# Patient Record
Sex: MALE | Race: WHITE | NOT HISPANIC OR LATINO | Employment: FULL TIME | URBAN - METROPOLITAN AREA
[De-identification: names, ages, dates, MRNs, and addresses within clinical notes are randomized per-mention and may not be internally consistent; named-entity substitution may affect disease eponyms.]

---

## 2017-01-16 ENCOUNTER — TELEPHONE (OUTPATIENT)
Dept: INTERNAL MEDICINE | Facility: CLINIC | Age: 62
End: 2017-01-16

## 2017-01-16 NOTE — TELEPHONE ENCOUNTER
Spoke with wife.  Advised that Dr. Pelaez would evaluate at office visit prior to ordering lab, as she may want to order more labs other than just the CBC.  Wife voiced understanding.    ----- Message from Deborah Gomez sent at 1/16/2017 10:06 AM EST -----  Regarding: labs  New patient with Dr. Pelaez this Wednesday.  Wife,  Evangelina, phoned asking about labs.  Patient has leukemia and they like to keep a watch on his blood count.  Can he get done before appointment or at least have done while he is here on Wednesday.    I told her if the doctor decided to put order in prior to Wednesday we would let her know and maybe they could go to lab at [place] to have drawn.      [phone] (Evangelina Espino)

## 2017-02-08 ENCOUNTER — TELEPHONE (OUTPATIENT)
Dept: ONCOLOGY | Facility: HOSPITAL | Age: 62
End: 2017-02-08

## 2017-02-08 ENCOUNTER — TELEPHONE (OUTPATIENT)
Dept: ONCOLOGY | Facility: CLINIC | Age: 62
End: 2017-02-08

## 2017-02-08 NOTE — TELEPHONE ENCOUNTER
LEFT MESSAGE IN REGARD BLOOD COUNTS THAT ARE STABLE IN COMPARASION WITH PREVIOUS ONES, NO NEED FOR ANY OTHER INTERVENTION, REPEAT COUNTS IN 4 TO 6 MONTHS, CALL IF PROBLEMS

## 2017-02-08 NOTE — TELEPHONE ENCOUNTER
----- Message from Jo-Ann Stafford sent at 2/8/2017  2:53 PM EST -----   Pt's wife wants to talk to a nurse      [phone]      Wife letting us know that she is faxing over labs to be reviewed by Dr. Cagle.  They have an agreement with Dr. Cagle that they can fax labs and he will review them.  She understands to call by Friday afternoon if she hasn't heard anything back from us yet to make sure we got the labs.

## 2017-02-24 ENCOUNTER — OFFICE VISIT (OUTPATIENT)
Dept: INTERNAL MEDICINE | Facility: CLINIC | Age: 62
End: 2017-02-24

## 2017-02-24 VITALS
WEIGHT: 197 LBS | DIASTOLIC BLOOD PRESSURE: 82 MMHG | OXYGEN SATURATION: 97 % | SYSTOLIC BLOOD PRESSURE: 124 MMHG | HEIGHT: 74 IN | BODY MASS INDEX: 25.28 KG/M2 | HEART RATE: 113 BPM

## 2017-02-24 DIAGNOSIS — E78.5 HYPERLIPIDEMIA, UNSPECIFIED HYPERLIPIDEMIA TYPE: ICD-10-CM

## 2017-02-24 DIAGNOSIS — C91.10 CLL (CHRONIC LYMPHOCYTIC LEUKEMIA) (HCC): Primary | ICD-10-CM

## 2017-02-24 DIAGNOSIS — Z00.00 HEALTHCARE MAINTENANCE: ICD-10-CM

## 2017-02-24 DIAGNOSIS — I10 ESSENTIAL HYPERTENSION: ICD-10-CM

## 2017-02-24 PROCEDURE — 99214 OFFICE O/P EST MOD 30 MIN: CPT | Performed by: INTERNAL MEDICINE

## 2017-02-24 RX ORDER — LISINOPRIL AND HYDROCHLOROTHIAZIDE 12.5; 1 MG/1; MG/1
1 TABLET ORAL DAILY
Qty: 90 TABLET | Refills: 3 | Status: SHIPPED | OUTPATIENT
Start: 2017-02-24 | End: 2017-05-16

## 2017-02-24 RX ORDER — ATORVASTATIN CALCIUM 10 MG/1
10 TABLET, FILM COATED ORAL DAILY
Qty: 90 TABLET | Refills: 3 | Status: SHIPPED | OUTPATIENT
Start: 2017-02-24 | End: 2017-06-05 | Stop reason: SDUPTHER

## 2017-02-24 NOTE — PROGRESS NOTES
Subjective     Francisco Espino is a 61 y.o. male, who presents with a chief complaint of   Chief Complaint   Patient presents with   • Establish Care   htn, hld    HPI   The pt is here to [facility].  He used to follow with dr Sewell.    HTN - pt on lisinopril.  No ha/dizziness    HLD - He had cramps bad last month.  He says he over did it that day being out and working. His wife says she gave him gatorade, xanax, epsom salt soak, and fluids.   No issues since.  He does have leg cramps occasionally but he says this is rare.  He is on CoQ10.      Allergies - he takes claritin d daily and has for 10 years.  We discussed the risk of taking daily sudafed in a pt with sudafed.  We discussed adding in nasal sprays such as flonase.     CLL - pt's labs have been stable.  He last had labs done on 1/31/17.  He follows with Dr. Cagle in the [facility].       The following portions of the patient's history were reviewed and updated as appropriate: allergies, current medications, past family history, past medical history, past social history, past surgical history and problem list.    Allergies: Codeine    Review of Systems   Constitutional: Negative.    HENT: Negative.    Eyes: Negative.    Respiratory: Negative.    Cardiovascular: Negative.    Gastrointestinal: Negative.    Endocrine: Negative.    Genitourinary: Negative.    Musculoskeletal: Negative.    Skin: Negative.    Allergic/Immunologic: Negative.    Neurological: Negative.    Hematological: Negative.    Psychiatric/Behavioral: Negative.    All other systems reviewed and are negative.      Objective     Wt Readings from Last 3 Encounters:   02/24/17 197 lb (89.4 kg)   05/10/16 198 lb (89.8 kg)   10/07/15 192 lb (87.1 kg)     Temp Readings from Last 3 Encounters:   05/10/16 97.9 °F (36.6 °C) (Oral)   10/07/15 97.3 °F (36.3 °C) (Oral)   04/01/15 96.9 °F (36.1 °C) (Oral)     BP Readings from Last 3 Encounters:   02/24/17 124/82   05/10/16 112/68   10/07/15 119/79     Pulse  Readings from Last 3 Encounters:   02/24/17 113   05/10/16 (!) 122   10/07/15 98     Body mass index is 25.29 kg/(m^2).  SpO2 Readings from Last 3 Encounters:   02/24/17 97%   05/10/16 97%   10/07/15 97%       Physical Exam   Constitutional: He is oriented to person, place, and time. He appears well-developed and well-nourished. No distress.   HENT:   Head: Normocephalic and atraumatic.   Right Ear: External ear normal.   Left Ear: External ear normal.   Nose: Nose normal.   Mouth/Throat: Oropharynx is clear and moist.   Eyes: Conjunctivae and EOM are normal. Pupils are equal, round, and reactive to light.   Neck: Normal range of motion. Neck supple.   Cardiovascular: Normal rate, regular rhythm, normal heart sounds and intact distal pulses.    Pulmonary/Chest: Effort normal and breath sounds normal. No respiratory distress. He has no wheezes.   Musculoskeletal: Normal range of motion.   Normal gait   Neurological: He is alert and oriented to person, place, and time.   Skin: Skin is warm and dry.   Psychiatric: He has a normal mood and affect. His behavior is normal. Judgment and thought content normal.   Nursing note and vitals reviewed.    1/31/17 cbc reviewed.  Wbc 30.4, counts stable.     Results for orders placed or performed in visit on 05/10/16   POCT urinalysis dipstick, automated   Result Value Ref Range    Color Yellow Yellow, Straw, Dark Yellow, Avelina    Clarity, UA Clear Clear    Glucose, UA Negative Negative mg/dL    Bilirubin Negative Negative    Ketones, UA Negative Negative    Specific Gravity  1.025 1.005 - 1.030    Blood, UA Negative Negative    pH, Urine 5.0 5.0 - 8.0    Protein, POC Trace (A) Negative mg/dL    Urobilinogen, UA Normal Normal    Leukocytes Negative Negative    Nitrite, UA Negative Negative       Assessment/Plan   Francisco was seen today for establish care.    Diagnoses and all orders for this visit:    CLL (chronic lymphocytic leukemia)  -     Comprehensive Metabolic Panel; Future  -      CBC & Differential; Future    Essential hypertension  -     lisinopril-hydrochlorothiazide (PRINZIDE,ZESTORETIC) 10-12.5 MG per tablet; Take 1 tablet by mouth Daily.    Hyperlipidemia, unspecified hyperlipidemia type  -     atorvastatin (LIPITOR) 10 MG tablet; Take 1 tablet by mouth Daily. Needs an appointment for further refills    Healthcare maintenance  -     Comprehensive Metabolic Panel; Future  -     CBC & Differential; Future  -     T4, Free; Future  -     TSH; Future  -     Lipid Panel With LDL / HDL Ratio; Future  -     PSA; Future      Labs in May, next OV/labs in November.      Outpatient Medications Prior to Visit   Medication Sig Dispense Refill   • Biotin 5000 MCG capsule Take by mouth.     • Coenzyme Q10 (CO Q-10) 200 MG capsule Take by mouth.     • loratadine-pseudoephedrine (CLARITIN-D 12-hour) 5-120 MG per 12 hr tablet Take 1 tablet by mouth every 12 (twelve) hours. 180 tablet 1   • Multiple Vitamin (MULTIVITAMINS PO) Take by mouth.     • Omega-3 Fatty Acids (FISH OIL) 1000 MG capsule capsule Take by mouth.     • vitamin C (ASCORBIC ACID) 500 MG tablet Take 500 mg by mouth daily.     • Zinc 30 MG capsule Take by mouth.     • atorvastatin (LIPITOR) 10 MG tablet Take 1 tablet by mouth Daily. Needs an appointment for further refills 90 tablet 0   • lisinopril-hydrochlorothiazide (PRINZIDE,ZESTORETIC) 10-12.5 MG per tablet Take 1 tablet by mouth daily. 90 tablet 1   • Glucosamine-Chondroit-Vit C-Mn (GLUCOSAMINE CHONDR 1500 COMPLX PO) Take by mouth.     • Thiamine HCl (VITAMIN B-1) 100 MG tablet Take by mouth.     • TURMERIC CURCUMIN PO Take by mouth.       No facility-administered medications prior to visit.      New Medications Ordered This Visit   Medications   • lisinopril-hydrochlorothiazide (PRINZIDE,ZESTORETIC) 10-12.5 MG per tablet     Sig: Take 1 tablet by mouth Daily.     Dispense:  90 tablet     Refill:  3   • atorvastatin (LIPITOR) 10 MG tablet     Sig: Take 1 tablet by mouth Daily. Needs  an appointment for further refills     Dispense:  90 tablet     Refill:  3       Medications Discontinued During This Encounter   Medication Reason   • Glucosamine-Chondroit-Vit C-Mn (GLUCOSAMINE CHONDR 1500 COMPLX PO)    • Thiamine HCl (VITAMIN B-1) 100 MG tablet    • TURMERIC CURCUMIN PO    • lisinopril-hydrochlorothiazide (PRINZIDE,ZESTORETIC) 10-12.5 MG per tablet Reorder   • atorvastatin (LIPITOR) 10 MG tablet Reorder         Return for due for labs only in May, f/u ov/labs in November.

## 2017-03-01 ENCOUNTER — RESULTS ENCOUNTER (OUTPATIENT)
Dept: INTERNAL MEDICINE | Facility: CLINIC | Age: 62
End: 2017-03-01

## 2017-03-01 DIAGNOSIS — C91.10 CLL (CHRONIC LYMPHOCYTIC LEUKEMIA) (HCC): ICD-10-CM

## 2017-03-01 DIAGNOSIS — Z00.00 HEALTHCARE MAINTENANCE: ICD-10-CM

## 2017-05-16 ENCOUNTER — OFFICE VISIT (OUTPATIENT)
Dept: INTERNAL MEDICINE | Facility: CLINIC | Age: 62
End: 2017-05-16

## 2017-05-16 VITALS
SYSTOLIC BLOOD PRESSURE: 128 MMHG | WEIGHT: 196 LBS | DIASTOLIC BLOOD PRESSURE: 84 MMHG | TEMPERATURE: 97.6 F | HEART RATE: 87 BPM | OXYGEN SATURATION: 98 % | HEIGHT: 74 IN | BODY MASS INDEX: 25.15 KG/M2

## 2017-05-16 DIAGNOSIS — I21.11 ST ELEVATION MYOCARDIAL INFARCTION INVOLVING RIGHT CORONARY ARTERY (HCC): ICD-10-CM

## 2017-05-16 DIAGNOSIS — I10 BENIGN ESSENTIAL HYPERTENSION: ICD-10-CM

## 2017-05-16 DIAGNOSIS — G47.9 SLEEP DIFFICULTIES: ICD-10-CM

## 2017-05-16 DIAGNOSIS — E78.2 MIXED HYPERLIPIDEMIA: ICD-10-CM

## 2017-05-16 DIAGNOSIS — Z09 HOSPITAL DISCHARGE FOLLOW-UP: Primary | ICD-10-CM

## 2017-05-16 PROCEDURE — 99215 OFFICE O/P EST HI 40 MIN: CPT | Performed by: INTERNAL MEDICINE

## 2017-05-16 RX ORDER — ASPIRIN 81 MG/1
81 TABLET ORAL EVERY OTHER DAY
COMMUNITY

## 2017-05-16 RX ORDER — CARVEDILOL 6.25 MG/1
6.25 TABLET ORAL 2 TIMES DAILY WITH MEALS
COMMUNITY
End: 2017-06-05 | Stop reason: SDUPTHER

## 2017-05-16 RX ORDER — LISINOPRIL 5 MG/1
5 TABLET ORAL DAILY
Qty: 90 TABLET | Refills: 1 | Status: SHIPPED | OUTPATIENT
Start: 2017-05-16 | End: 2017-06-05 | Stop reason: SDUPTHER

## 2017-05-16 RX ORDER — TRAZODONE HYDROCHLORIDE 50 MG/1
50 TABLET ORAL NIGHTLY
Qty: 30 TABLET | Refills: 0 | Status: SHIPPED | OUTPATIENT
Start: 2017-05-16 | End: 2017-05-17

## 2017-05-17 ENCOUNTER — TELEPHONE (OUTPATIENT)
Dept: INTERNAL MEDICINE | Facility: CLINIC | Age: 62
End: 2017-05-17

## 2017-05-17 RX ORDER — DOXEPIN HYDROCHLORIDE 10 MG/1
10 CAPSULE ORAL NIGHTLY
Qty: 30 CAPSULE | Refills: 0 | Status: SHIPPED | OUTPATIENT
Start: 2017-05-17 | End: 2017-10-31

## 2017-06-05 DIAGNOSIS — I10 BENIGN ESSENTIAL HYPERTENSION: ICD-10-CM

## 2017-06-05 DIAGNOSIS — E78.5 HYPERLIPIDEMIA, UNSPECIFIED HYPERLIPIDEMIA TYPE: ICD-10-CM

## 2017-06-05 DIAGNOSIS — I21.11 ST ELEVATION MYOCARDIAL INFARCTION INVOLVING RIGHT CORONARY ARTERY (HCC): ICD-10-CM

## 2017-06-05 RX ORDER — ATORVASTATIN CALCIUM 10 MG/1
10 TABLET, FILM COATED ORAL DAILY
Qty: 90 TABLET | Refills: 3 | Status: SHIPPED | OUTPATIENT
Start: 2017-06-05 | End: 2018-05-09 | Stop reason: SDUPTHER

## 2017-06-05 RX ORDER — CARVEDILOL 6.25 MG/1
6.25 TABLET ORAL 2 TIMES DAILY WITH MEALS
Qty: 180 TABLET | Refills: 3 | Status: SHIPPED | OUTPATIENT
Start: 2017-06-05 | End: 2018-05-09 | Stop reason: SDUPTHER

## 2017-06-05 RX ORDER — LISINOPRIL 5 MG/1
5 TABLET ORAL DAILY
Qty: 90 TABLET | Refills: 3 | Status: SHIPPED | OUTPATIENT
Start: 2017-06-05 | End: 2018-04-24 | Stop reason: SDUPTHER

## 2017-06-27 ENCOUNTER — OFFICE VISIT (OUTPATIENT)
Dept: ORTHOPEDIC SURGERY | Facility: CLINIC | Age: 62
End: 2017-06-27

## 2017-06-27 VITALS — WEIGHT: 198.8 LBS | BODY MASS INDEX: 25.51 KG/M2 | TEMPERATURE: 98.2 F | HEIGHT: 74 IN

## 2017-06-27 DIAGNOSIS — M25.511 CHRONIC RIGHT SHOULDER PAIN: Primary | ICD-10-CM

## 2017-06-27 DIAGNOSIS — M75.01 ADHESIVE CAPSULITIS OF RIGHT SHOULDER: ICD-10-CM

## 2017-06-27 DIAGNOSIS — G89.29 CHRONIC RIGHT SHOULDER PAIN: Primary | ICD-10-CM

## 2017-06-27 PROCEDURE — 73030 X-RAY EXAM OF SHOULDER: CPT | Performed by: ORTHOPAEDIC SURGERY

## 2017-06-27 PROCEDURE — 20610 DRAIN/INJ JOINT/BURSA W/O US: CPT | Performed by: ORTHOPAEDIC SURGERY

## 2017-06-27 PROCEDURE — 99204 OFFICE O/P NEW MOD 45 MIN: CPT | Performed by: ORTHOPAEDIC SURGERY

## 2017-06-27 RX ADMIN — BUPIVACAINE HYDROCHLORIDE 4 ML: 5 INJECTION, SOLUTION EPIDURAL; INTRACAUDAL at 14:59

## 2017-06-27 RX ADMIN — METHYLPREDNISOLONE ACETATE 80 MG: 80 INJECTION, SUSPENSION INTRA-ARTICULAR; INTRALESIONAL; INTRAMUSCULAR; SOFT TISSUE at 14:59

## 2017-06-28 ENCOUNTER — TELEPHONE (OUTPATIENT)
Dept: ORTHOPEDIC SURGERY | Facility: CLINIC | Age: 62
End: 2017-06-28

## 2017-06-30 ENCOUNTER — TELEPHONE (OUTPATIENT)
Dept: ORTHOPEDIC SURGERY | Facility: CLINIC | Age: 62
End: 2017-06-30

## 2017-06-30 DIAGNOSIS — R52 PAIN: Primary | ICD-10-CM

## 2017-07-03 RX ORDER — METHYLPREDNISOLONE ACETATE 80 MG/ML
80 INJECTION, SUSPENSION INTRA-ARTICULAR; INTRALESIONAL; INTRAMUSCULAR; SOFT TISSUE
Status: COMPLETED | OUTPATIENT
Start: 2017-06-27 | End: 2017-06-27

## 2017-07-03 RX ORDER — NAPROXEN 500 MG/1
500 TABLET ORAL 2 TIMES DAILY
Qty: 60 TABLET | Refills: 0 | Status: SHIPPED | OUTPATIENT
Start: 2017-07-03 | End: 2018-05-01

## 2017-07-03 RX ORDER — BUPIVACAINE HYDROCHLORIDE 5 MG/ML
4 INJECTION, SOLUTION EPIDURAL; INTRACAUDAL
Status: COMPLETED | OUTPATIENT
Start: 2017-06-27 | End: 2017-06-27

## 2017-07-11 ENCOUNTER — TREATMENT (OUTPATIENT)
Dept: PHYSICAL THERAPY | Facility: CLINIC | Age: 62
End: 2017-07-11

## 2017-07-11 DIAGNOSIS — M75.01 ADHESIVE CAPSULITIS OF RIGHT SHOULDER: Primary | ICD-10-CM

## 2017-07-11 DIAGNOSIS — M25.511 CHRONIC RIGHT SHOULDER PAIN: ICD-10-CM

## 2017-07-11 DIAGNOSIS — G89.29 CHRONIC RIGHT SHOULDER PAIN: ICD-10-CM

## 2017-07-11 PROCEDURE — 97161 PT EVAL LOW COMPLEX 20 MIN: CPT | Performed by: PHYSICAL THERAPIST

## 2017-07-11 PROCEDURE — 97110 THERAPEUTIC EXERCISES: CPT | Performed by: PHYSICAL THERAPIST

## 2017-07-11 NOTE — PROGRESS NOTES
Physical Therapy Initial Evaluation and Plan of Care    Patient Name: Francisco Espino       Patient MRN: [id number]  : 1955  Physician:Nelsy Laguna MD  Date: 2017    Encounter Diagnoses   Name Primary?   • Adhesive capsulitis of right shoulder Yes   • Chronic right shoulder pain        Subjective Evaluation    History of Present Illness  Date of onset: 2017  Mechanism of injury: Pain insidious onset with certain movement.  Injected in shoulder at MD appt.        Subjective comment: Right shoulder hurts whrn I reach back and out to the side.   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 Quality of life: excellent    Pain  Current pain ratin  At best pain ratin  At worst pain ratin  Location: R ant shoulder  Quality: sharp and tight  Relieving factors: rest  Aggravating factors: outstretched reach, movement, overhead activity and sleeping  Progression: improved    Social Support  Lives with: spouse    Hand dominance: right    Diagnostic Tests  X-ray: normal    Patient Goals  Patient goals for therapy: decreased pain, increased motion, increased strength and independence with ADLs/IADLs             Objective     Postural Observations  Seated posture: good  Standing posture: good        Tenderness     Right Shoulder  Tenderness in the biceps tendon (proximal).     Neurological Testing   Sensation     Shoulder   Left Shoulder   Intact: light touch    Right Shoulder   Intact: light touch    Active Range of Motion   Left Shoulder   Flexion: 158 degrees   Extension: 70 degrees   Abduction: 153 degrees   External rotation 90°: 72 degrees   Internal rotation 90°: 80 degrees   Internal rotation BTB: T7     Right Shoulder   Flexion: 123 degrees with pain  Extension: 57 degrees   Abduction: 84 degrees with pain  External rotation 45°: 30 degrees with pain  Internal rotation 45°: 65 degrees   Internal rotation BTB: L2 with pain    Passive Range of Motion     Right Shoulder   Flexion:  150 degrees   Abduction: 90 degrees   External rotation 45°: 37 degrees     Scapular Mobility     Right Shoulder   Scapular mobility: fair    Joint Play     Right Shoulder  Hypomobile in the anterior capsule and posterior capsule.     Strength/Myotome Testing     Right Shoulder     Planes of Motion   Flexion: 4+   Abduction: 4+   External rotation at 0°: 5   Internal rotation at 0°: 5     Isolated Muscles   Biceps: 4+ (pain)   Triceps: 5     Tests     Right Shoulder   Positive Hawkin's.          Assessment & Plan     Assessment  Impairments: abnormal or restricted ROM, activity intolerance, lacks appropriate home exercise program and pain with function  Assessment details: Patient is a good candidate for PT to address the listed impairments and functional limitations. Signs and symptoms are consistent with R glenohumeral adhesive capsulitis.   Prognosis: good    Plan  Therapy options: will be seen for skilled physical therapy services  Planned modality interventions: cryotherapy, ultrasound, thermotherapy (hydrocollator packs) and electrical stimulation/Russian stimulation  Planned therapy interventions: functional ROM exercises, home exercise program, joint mobilization, manual therapy, stretching and strengthening  Frequency: 2x week  Duration in weeks: 6  Treatment plan discussed with: patient  Functional Limitations: lifting, sleeping, pulling, pushing, uncomfortable because of pain, reaching behind back and reaching overhead      See Exercise, Manual, and Modality Logs for complete treatment.     PROCEDURES AND MODALITIES:  Paraffin:   pre-rx  Moist Heat:    Ice:   post-rx  E-Stim:    Ultrasound:    Ionto:   Traction:    Dry Needling:         Therapy Exercise 18584 15 minutes     Timed Code Treatment: 15 Minutes  Total Treatment Time: 55 Minutes    PT SIGNATURE: Meghana Garibay PT, DPT [place] License # [id number]  DATE TREATMENT INITIATED: 7/11/2017    Initial Certification  Certification Period: 10/9/2017  I certify  that the therapy services are furnished while this patient is under my care.  The services outlined above are required by this patient, and will be reviewed every 90 days.     PHYSICIAN: Nelsy Laguna MD      DATE:     Please sign and return via fax to [phone].. Thank you, [facility].

## 2017-07-12 NOTE — PATIENT INSTRUCTIONS
See Exercise Pro printout for HEP issued today.   Educated wife on post capsule stretch with GH AP glide mob.

## 2017-07-13 ENCOUNTER — TREATMENT (OUTPATIENT)
Dept: PHYSICAL THERAPY | Facility: CLINIC | Age: 62
End: 2017-07-13

## 2017-07-13 DIAGNOSIS — M75.01 ADHESIVE CAPSULITIS OF RIGHT SHOULDER: Primary | ICD-10-CM

## 2017-07-13 DIAGNOSIS — G89.29 CHRONIC RIGHT SHOULDER PAIN: ICD-10-CM

## 2017-07-13 DIAGNOSIS — M25.511 CHRONIC RIGHT SHOULDER PAIN: ICD-10-CM

## 2017-07-13 PROCEDURE — 97110 THERAPEUTIC EXERCISES: CPT | Performed by: PHYSICAL THERAPIST

## 2017-07-13 PROCEDURE — 97140 MANUAL THERAPY 1/> REGIONS: CPT | Performed by: PHYSICAL THERAPIST

## 2017-07-13 NOTE — PROGRESS NOTES
Physical Therapy Daily Progress Note    Visit #: 2  Francisco Espino reports: I can already tell there is improvement in my motion. No pain with putting on my belt today.   Pain Scale (0-10):     Subjective       Objective   See Exercise, Manual, and Modality Logs for complete treatment.     PROCEDURES AND MODALITIES:  Paraffin:   pre-rx  Moist Heat:    Ice:   post-rx  E-Stim:    Ultrasound:    Ionto:   Traction:    Dry Needling:         Manual PT [id number] 15 minutes and Therapy Exercise 23510 20 minutes     Timed Code Treatment: 35 Minutes  Total Treatment Time: 38 Minutes    Assessment/Plan  Patient is reporting improvement in AROM and shoulder pain. Seems to be compliant with HEP. Flexion AROM is significantly improved today and less painful.     Progress strengthening /stabilization /functional activity      Meghana Garibay PT, DPT  Physical Therapist  [place] License # [id number]

## 2017-07-18 ENCOUNTER — TREATMENT (OUTPATIENT)
Dept: PHYSICAL THERAPY | Facility: CLINIC | Age: 62
End: 2017-07-18

## 2017-07-18 DIAGNOSIS — G89.29 CHRONIC RIGHT SHOULDER PAIN: ICD-10-CM

## 2017-07-18 DIAGNOSIS — M25.511 CHRONIC RIGHT SHOULDER PAIN: ICD-10-CM

## 2017-07-18 DIAGNOSIS — M75.01 ADHESIVE CAPSULITIS OF RIGHT SHOULDER: Primary | ICD-10-CM

## 2017-07-18 PROCEDURE — 97140 MANUAL THERAPY 1/> REGIONS: CPT | Performed by: PHYSICAL THERAPIST

## 2017-07-18 PROCEDURE — 97110 THERAPEUTIC EXERCISES: CPT | Performed by: PHYSICAL THERAPIST

## 2017-07-18 NOTE — PROGRESS NOTES
Physical Therapy Daily Progress Note    Visit #: 3  Francisco Espino reports: Shoulder is much less painful with putting on my belt and tucking in my shirt. Getting better. Not as much pain in bicep now.   Pain Scale (0-10):     Subjective       Objective     Active Range of Motion     Right Shoulder   Flexion: 150 degrees   Abduction: 128 degrees   External rotation 45°: 46 degrees   Internal rotation BTB: T10      See Exercise, Manual, and Modality Logs for complete treatment.     PROCEDURES AND MODALITIES:  Paraffin:   pre-rx  Moist Heat:    Ice:   post-rx  E-Stim:    Ultrasound:    Ionto:   Traction:    Dry Needling:         Manual PT [id number] 15 minutes and Therapy Exercise [id number] 15 minutes     Timed Code Treatment: 30 Minutes  Total Treatment Time: 32 Minutes    Assessment/Plan  Shoulder mobility is improved in all directions. Pain continues to improve with functional movement. Continues to be compliant with HEP. Abd and ER are most limited at this point in rehab.     Progress per Plan of Care      Meghana Garibay PT, DPT  Physical Therapist  [place] License # [id number]

## 2017-07-20 ENCOUNTER — TREATMENT (OUTPATIENT)
Dept: PHYSICAL THERAPY | Facility: CLINIC | Age: 62
End: 2017-07-20

## 2017-07-20 DIAGNOSIS — M75.01 ADHESIVE CAPSULITIS OF RIGHT SHOULDER: Primary | ICD-10-CM

## 2017-07-20 DIAGNOSIS — M25.511 CHRONIC RIGHT SHOULDER PAIN: ICD-10-CM

## 2017-07-20 DIAGNOSIS — G89.29 CHRONIC RIGHT SHOULDER PAIN: ICD-10-CM

## 2017-07-20 PROCEDURE — 97140 MANUAL THERAPY 1/> REGIONS: CPT | Performed by: PHYSICAL THERAPIST

## 2017-07-20 PROCEDURE — 97110 THERAPEUTIC EXERCISES: CPT | Performed by: PHYSICAL THERAPIST

## 2017-07-20 NOTE — PROGRESS NOTES
Physical Therapy Daily Progress Note    Visit #: 4  Francisco Espino reports: shoulder is sore. Much less pain overall with things like reaching into the back seat.   Pain Scale (0-10):     Subjective       Objective   See Exercise, Manual, and Modality Logs for complete treatment.     PROCEDURES AND MODALITIES:  Paraffin:   pre-rx  Moist Heat:    Ice:   post-rx  E-Stim:    Ultrasound:    Ionto:   Traction:    Dry Needling:         Manual PT [id number] 25 minutes and Therapy Exercise [id number] 14 minutes     Timed Code Treatment: 39 Minutes  Total Treatment Time: 39 Minutes    Assessment/Plan  Flex and abduction are moth improved. Pain is decreasing. Continues to make great progress in shoulder AROM in all motions.     Progress strengthening /stabilization /functional activity      Meghana Garibay PT, DPT  Physical Therapist  [place] License # [id number]

## 2017-09-12 DIAGNOSIS — Z11.59 NEED FOR HEPATITIS C SCREENING TEST: ICD-10-CM

## 2017-09-12 DIAGNOSIS — E78.49 OTHER HYPERLIPIDEMIA: ICD-10-CM

## 2017-09-12 DIAGNOSIS — Z12.5 SCREENING PSA (PROSTATE SPECIFIC ANTIGEN): ICD-10-CM

## 2017-09-12 DIAGNOSIS — I10 ESSENTIAL HYPERTENSION: Primary | ICD-10-CM

## 2017-09-12 PROBLEM — F10.10 ALCOHOL ABUSE: Status: ACTIVE | Noted: 2017-05-09

## 2017-09-12 PROBLEM — E78.5 DYSLIPIDEMIA: Status: ACTIVE | Noted: 2017-05-09

## 2017-09-12 PROBLEM — I21.11 ST ELEVATION (STEMI) MYOCARDIAL INFARCTION INVOLVING RIGHT CORONARY ARTERY: Status: ACTIVE | Noted: 2017-05-09

## 2017-09-12 PROBLEM — I21.9 MYOCARDIAL INFARCTION: Status: ACTIVE | Noted: 2017-05-10

## 2017-09-12 PROBLEM — I25.119 CORONARY ARTERY DISEASE INVOLVING NATIVE CORONARY ARTERY OF NATIVE HEART WITH ANGINA PECTORIS: Status: ACTIVE | Noted: 2017-05-09

## 2017-09-12 PROBLEM — N17.0 ACUTE RENAL FAILURE WITH TUBULAR NECROSIS (HCC): Status: ACTIVE | Noted: 2017-05-09

## 2017-09-12 PROBLEM — Z95.820 S/P ANGIOPLASTY WITH STENT: Status: ACTIVE | Noted: 2017-05-10

## 2017-09-13 ENCOUNTER — LAB (OUTPATIENT)
Dept: INTERNAL MEDICINE | Facility: CLINIC | Age: 62
End: 2017-09-13

## 2017-09-13 DIAGNOSIS — Z11.59 NEED FOR HEPATITIS C SCREENING TEST: ICD-10-CM

## 2017-09-13 DIAGNOSIS — I10 ESSENTIAL HYPERTENSION: ICD-10-CM

## 2017-09-13 DIAGNOSIS — Z12.5 SCREENING PSA (PROSTATE SPECIFIC ANTIGEN): ICD-10-CM

## 2017-09-13 DIAGNOSIS — E78.49 OTHER HYPERLIPIDEMIA: ICD-10-CM

## 2017-09-14 LAB
ALBUMIN SERPL-MCNC: 4.6 G/DL (ref 3.5–5.2)
ALBUMIN/GLOB SERPL: 2 G/DL
ALP SERPL-CCNC: 60 U/L (ref 40–129)
ALT SERPL-CCNC: 29 U/L (ref 5–41)
AST SERPL-CCNC: 36 U/L (ref 5–40)
BILIRUB SERPL-MCNC: 1.1 MG/DL (ref 0.2–1.2)
BUN SERPL-MCNC: 13 MG/DL (ref 8–23)
BUN/CREAT SERPL: 11.7 (ref 7–25)
CALCIUM SERPL-MCNC: 9.3 MG/DL (ref 8.8–10.5)
CHLORIDE SERPL-SCNC: 102 MMOL/L (ref 98–107)
CHOLEST SERPL-MCNC: 170 MG/DL (ref 0–200)
CO2 SERPL-SCNC: 26.4 MMOL/L (ref 22–29)
CREAT SERPL-MCNC: 1.11 MG/DL (ref 0.76–1.27)
DIFFERENTIAL COMMENT: NORMAL
ERYTHROCYTE [DISTWIDTH] IN BLOOD BY AUTOMATED COUNT: 13.8 % (ref 11.5–14.5)
GLOBULIN SER CALC-MCNC: 2.3 GM/DL
GLUCOSE SERPL-MCNC: 99 MG/DL (ref 65–99)
HCT VFR BLD AUTO: 37.9 % (ref 42–52)
HCV AB S/CO SERPL IA: <0.1 S/CO RATIO (ref 0–0.9)
HDLC SERPL-MCNC: 68 MG/DL (ref 40–60)
HGB BLD-MCNC: 12.7 G/DL (ref 14–18)
LDLC SERPL CALC-MCNC: 56 MG/DL (ref 0–100)
LDLC/HDLC SERPL: 0.83 {RATIO}
MCH RBC QN AUTO: 33.5 PG (ref 27–31)
MCHC RBC AUTO-ENTMCNC: 33.5 G/DL (ref 31–37)
MCV RBC AUTO: 100 FL (ref 80–94)
PLATELET # BLD AUTO: 126 10*3/MM3 (ref 140–500)
POTASSIUM SERPL-SCNC: 4.1 MMOL/L (ref 3.5–5.2)
PROT SERPL-MCNC: 6.9 G/DL (ref 6–8.5)
PSA SERPL-MCNC: 0.27 NG/ML (ref 0–4)
RBC # BLD AUTO: 3.79 10*6/MM3 (ref 4.7–6.1)
SODIUM SERPL-SCNC: 143 MMOL/L (ref 136–145)
TRIGL SERPL-MCNC: 228 MG/DL (ref 0–150)
VLDLC SERPL CALC-MCNC: 45.6 MG/DL (ref 8–32)
WBC # BLD AUTO: 35.01 10*3/MM3 (ref 4.8–10.8)

## 2017-09-26 ENCOUNTER — TELEPHONE (OUTPATIENT)
Dept: INTERNAL MEDICINE | Facility: CLINIC | Age: 62
End: 2017-09-26

## 2017-09-26 NOTE — TELEPHONE ENCOUNTER
Patient notified.   ----- Message from Eveline Pelaez MD sent at 9/25/2017  5:00 PM EDT -----  Call pt about labs.  Labs stable

## 2017-10-10 ENCOUNTER — TELEPHONE (OUTPATIENT)
Dept: ONCOLOGY | Facility: HOSPITAL | Age: 62
End: 2017-10-10

## 2017-10-10 NOTE — TELEPHONE ENCOUNTER
Patient's wife called and LVM to let Dr Cagle know he had recent labs done and wanted to see what Dr Cagle thought. Attempted to call her back. No answer. LVM. Last labs are from September. Printed and placed on Dr Cagle desk for review.

## 2017-10-31 ENCOUNTER — OFFICE VISIT (OUTPATIENT)
Dept: INTERNAL MEDICINE | Facility: CLINIC | Age: 62
End: 2017-10-31

## 2017-10-31 VITALS
SYSTOLIC BLOOD PRESSURE: 114 MMHG | HEART RATE: 75 BPM | HEIGHT: 74 IN | WEIGHT: 193.2 LBS | OXYGEN SATURATION: 96 % | DIASTOLIC BLOOD PRESSURE: 84 MMHG | BODY MASS INDEX: 24.79 KG/M2

## 2017-10-31 DIAGNOSIS — J30.1 CHRONIC SEASONAL ALLERGIC RHINITIS DUE TO POLLEN: ICD-10-CM

## 2017-10-31 DIAGNOSIS — I21.11 ST ELEVATION (STEMI) MYOCARDIAL INFARCTION INVOLVING RIGHT CORONARY ARTERY (HCC): ICD-10-CM

## 2017-10-31 DIAGNOSIS — C91.10 CHRONIC LYMPHOCYTIC LEUKEMIA (HCC): ICD-10-CM

## 2017-10-31 DIAGNOSIS — Z00.00 HEALTHCARE MAINTENANCE: Primary | ICD-10-CM

## 2017-10-31 DIAGNOSIS — I10 ESSENTIAL HYPERTENSION: ICD-10-CM

## 2017-10-31 DIAGNOSIS — E78.2 MIXED HYPERLIPIDEMIA: ICD-10-CM

## 2017-10-31 PROCEDURE — 99213 OFFICE O/P EST LOW 20 MIN: CPT | Performed by: INTERNAL MEDICINE

## 2017-10-31 PROCEDURE — 99396 PREV VISIT EST AGE 40-64: CPT | Performed by: INTERNAL MEDICINE

## 2017-10-31 PROCEDURE — 90471 IMMUNIZATION ADMIN: CPT | Performed by: INTERNAL MEDICINE

## 2017-10-31 PROCEDURE — 90670 PCV13 VACCINE IM: CPT | Performed by: INTERNAL MEDICINE

## 2017-10-31 NOTE — PROGRESS NOTES
Subjective     Francisco Espino is a 62 y.o. male, who presents with a chief complaint of   Chief Complaint   Patient presents with   • Annual Exam       HPI   The pt is here for his physical.  He is active but knows he needs to exercise more.  He did cardiac rehab after his heart attack last year.      HLD - on lipitor.  No cramps or myalgias    CLL - Labs being followed by Dr. Cagle    HTN - well controlled.  No ha/dizziness    CAD - s/p stent.  No cp, soa, LE edema, orthopnea, or PND.      Allergic rhinitis - he has seasonal allergy issues that are     UTD at dentist    Last eye exam about 1 year ago.      The following portions of the patient's history were reviewed and updated as appropriate: allergies, current medications, past family history, past medical history, past social history, past surgical history and problem list.    Allergies: Codeine    Review of Systems   Constitutional: Negative.    HENT: Negative.    Eyes: Negative.    Respiratory: Negative.    Cardiovascular: Negative.    Gastrointestinal: Negative.    Endocrine: Negative.    Genitourinary: Negative.    Musculoskeletal: Negative.    Skin: Negative.    Allergic/Immunologic: Negative.    Neurological: Negative.    Hematological: Negative.    Psychiatric/Behavioral: Negative.    All other systems reviewed and are negative.      Objective     Wt Readings from Last 3 Encounters:   10/31/17 193 lb 3.2 oz (87.6 kg)   06/27/17 198 lb 12.8 oz (90.2 kg)   05/16/17 196 lb (88.9 kg)     Temp Readings from Last 3 Encounters:   06/27/17 98.2 °F (36.8 °C) (Temporal Artery )   05/16/17 97.6 °F (36.4 °C)   05/10/16 97.9 °F (36.6 °C) (Oral)     BP Readings from Last 3 Encounters:   10/31/17 114/84   05/16/17 128/84   02/24/17 124/82     Pulse Readings from Last 3 Encounters:   10/31/17 75   05/16/17 87   02/24/17 113     Body mass index is 24.81 kg/(m^2).  SpO2 Readings from Last 3 Encounters:   10/31/17 96%   05/16/17 98%   02/24/17 97%       Physical Exam    Constitutional: He is oriented to person, place, and time. He appears well-developed and well-nourished. No distress.   HENT:   Head: Normocephalic and atraumatic.   Right Ear: External ear normal.   Left Ear: External ear normal.   Nose: Nose normal.   Mouth/Throat: Oropharynx is clear and moist.   Eyes: Conjunctivae and EOM are normal. Pupils are equal, round, and reactive to light. Right eye exhibits no discharge. Left eye exhibits no discharge. No scleral icterus.   Neck: Normal range of motion. Neck supple. No JVD present. No tracheal deviation present. No thyromegaly present.   Cardiovascular: Normal rate, regular rhythm, normal heart sounds and intact distal pulses.    Pulmonary/Chest: Effort normal and breath sounds normal. No stridor. No respiratory distress. He has no wheezes.   Abdominal: Soft. He exhibits no distension and no mass. There is no tenderness.   Genitourinary: Penis normal.   Musculoskeletal: Normal range of motion.   Lymphadenopathy:     He has no cervical adenopathy.   Neurological: He is alert and oriented to person, place, and time. He has normal reflexes. No cranial nerve deficit. He exhibits normal muscle tone.   Skin: Skin is warm and dry. No rash noted.   Psychiatric: He has a normal mood and affect. His behavior is normal. Judgment and thought content normal.   Nursing note and vitals reviewed.      Results for orders placed or performed in visit on 09/13/17   Comprehensive metabolic panel   Result Value Ref Range    Glucose 99 65 - 99 mg/dL    BUN 13 8 - 23 mg/dL    Creatinine 1.11 0.76 - 1.27 mg/dL    eGFR Non African Am 67 >60 mL/min/1.73    eGFR African Am 81 >60 mL/min/1.73    BUN/Creatinine Ratio 11.7 7.0 - 25.0    Sodium 143 136 - 145 mmol/L    Potassium 4.1 3.5 - 5.2 mmol/L    Chloride 102 98 - 107 mmol/L    Total CO2 26.4 22.0 - 29.0 mmol/L    Calcium 9.3 8.8 - 10.5 mg/dL    Total Protein 6.9 6.0 - 8.5 g/dL    Albumin 4.60 3.50 - 5.20 g/dL    Globulin 2.3 gm/dL    A/G  Ratio 2.0 g/dL    Total Bilirubin 1.1 0.2 - 1.2 mg/dL    Alkaline Phosphatase 60 40 - 129 U/L    AST (SGOT) 36 5 - 40 U/L    ALT (SGPT) 29 5 - 41 U/L   Lipid Panel With LDL/HDL Ratio   Result Value Ref Range    Total Cholesterol 170 0 - 200 mg/dL    Triglycerides 228 (H) 0 - 150 mg/dL    HDL Cholesterol 68 (H) 40 - 60 mg/dL    VLDL Cholesterol 45.6 (H) 8 - 32 mg/dL    LDL Cholesterol  56 0 - 100 mg/dL    LDL/HDL Ratio 0.83    Hepatitis C antibody   Result Value Ref Range    Hep C Virus Ab <0.1 0.0 - 0.9 s/co ratio   PSA   Result Value Ref Range    PSA 0.270 0.000 - 4.000 ng/mL   CBC w AUTO Differential   Result Value Ref Range    WBC 35.01 (H) 4.80 - 10.80 10*3/mm3    RBC 3.79 (L) 4.70 - 6.10 10*6/mm3    Hemoglobin 12.7 (L) 14.0 - 18.0 g/dL    Hematocrit 37.9 (L) 42.0 - 52.0 %    .0 (H) 80.0 - 94.0 fL    MCH 33.5 (H) 27.0 - 31.0 pg    MCHC 33.5 31.0 - 37.0 g/dL    RDW 13.8 11.5 - 14.5 %    Platelets 126 (L) 140 - 500 10*3/mm3   Manual Differential   Result Value Ref Range    Differential Comment Comment        Assessment/Plan   Francisco was seen today for annual exam.    Diagnoses and all orders for this visit:    Healthcare maintenance  -     Pneumococcal Conjugate Vaccine 13-Valent All    Chronic lymphocytic leukemia  -     Pneumococcal Conjugate Vaccine 13-Valent All    ST elevation (STEMI) myocardial infarction involving right coronary artery    Essential hypertension    Mixed hyperlipidemia    Chronic seasonal allergic rhinitis due to pollen    Other orders  -     loratadine-pseudoephedrine (CLARITIN-D 12 HOUR) 5-120 MG per 12 hr tablet; Take 1 tablet by mouth Daily.          Outpatient Medications Prior to Visit   Medication Sig Dispense Refill   • aspirin 81 MG EC tablet Take 81 mg by mouth Daily.     • atorvastatin (LIPITOR) 10 MG tablet Take 1 tablet by mouth Daily. Needs an appointment for further refills 90 tablet 3   • carvedilol (COREG) 6.25 MG tablet Take 1 tablet by mouth 2 (Two) Times a Day With  Meals. 180 tablet 3   • Coenzyme Q10 (COQ10 PO) Take  by mouth.     • lisinopril (PRINIVIL,ZESTRIL) 5 MG tablet Take 1 tablet by mouth Daily. 90 tablet 3   • Multiple Vitamin (MULTIVITAMINS PO) Take by mouth.     • Multiple Vitamins-Minerals (EYE VITAMINS) capsule Take  by mouth.     • naproxen (NAPROSYN) 500 MG tablet Take 1 tablet by mouth 2 (Two) Times a Day. 60 tablet 0   • ticagrelor (BRILINTA) 90 MG tablet tablet Take 1 tablet by mouth 2 (Two) Times a Day. 180 tablet 3   • doxepin (SINEquan) 10 MG capsule Take 1 capsule by mouth Every Night. 30 capsule 0     No facility-administered medications prior to visit.      New Medications Ordered This Visit   Medications   • loratadine-pseudoephedrine (CLARITIN-D 12 HOUR) 5-120 MG per 12 hr tablet     Sig: Take 1 tablet by mouth Daily.     Dispense:  90 tablet     Refill:  1       Medications Discontinued During This Encounter   Medication Reason   • doxepin (SINEquan) 10 MG capsule Therapy completed   • Loratadine-Pseudoephedrine (CLARITIN-D 12 HOUR PO) Reorder         Return in about 6 months (around 4/30/2018) for Recheck.

## 2018-04-06 RX ORDER — LORATADINE, PSEUDOEPHEDRINE 5; 120 MG/1; MG/1
TABLET, EXTENDED RELEASE ORAL
Qty: 80 TABLET | Refills: 0 | Status: SHIPPED | OUTPATIENT
Start: 2018-04-06 | End: 2018-05-01

## 2018-04-19 DIAGNOSIS — E78.2 MIXED HYPERLIPIDEMIA: ICD-10-CM

## 2018-04-19 DIAGNOSIS — I10 ESSENTIAL HYPERTENSION: Primary | ICD-10-CM

## 2018-04-19 DIAGNOSIS — E78.5 DYSLIPIDEMIA: ICD-10-CM

## 2018-04-24 ENCOUNTER — RESULTS ENCOUNTER (OUTPATIENT)
Dept: INTERNAL MEDICINE | Facility: CLINIC | Age: 63
End: 2018-04-24

## 2018-04-24 DIAGNOSIS — E78.2 MIXED HYPERLIPIDEMIA: ICD-10-CM

## 2018-04-24 DIAGNOSIS — I10 ESSENTIAL HYPERTENSION: ICD-10-CM

## 2018-04-24 DIAGNOSIS — I21.11 ST ELEVATION MYOCARDIAL INFARCTION INVOLVING RIGHT CORONARY ARTERY (HCC): ICD-10-CM

## 2018-04-24 DIAGNOSIS — I10 BENIGN ESSENTIAL HYPERTENSION: ICD-10-CM

## 2018-04-24 DIAGNOSIS — E78.5 DYSLIPIDEMIA: ICD-10-CM

## 2018-04-24 LAB
ALBUMIN SERPL-MCNC: 4.8 G/DL (ref 3.5–5.2)
ALBUMIN/GLOB SERPL: 1.8 G/DL
ALP SERPL-CCNC: 69 U/L (ref 40–129)
ALT SERPL-CCNC: 118 U/L (ref 5–41)
AST SERPL-CCNC: 124 U/L (ref 5–40)
BASOPHILS # BLD AUTO: 0.07 10*3/MM3 (ref 0–0.2)
BASOPHILS # BLD MANUAL: 0 10*3/MM3 (ref 0–0.2)
BASOPHILS NFR BLD AUTO: 0.2 % (ref 0–2)
BASOPHILS NFR BLD MANUAL: 0 % (ref 0–2)
BILIRUB SERPL-MCNC: 1.2 MG/DL (ref 0.2–1.2)
BUN SERPL-MCNC: 9 MG/DL (ref 8–23)
BUN/CREAT SERPL: 8.3 (ref 7–25)
CALCIUM SERPL-MCNC: 10.1 MG/DL (ref 8.8–10.5)
CHLORIDE SERPL-SCNC: 101 MMOL/L (ref 98–107)
CHOLEST SERPL-MCNC: 163 MG/DL (ref 0–200)
CO2 SERPL-SCNC: 28 MMOL/L (ref 22–29)
CREAT SERPL-MCNC: 1.08 MG/DL (ref 0.76–1.27)
DIFFERENTIAL COMMENT: ABNORMAL
EOSINOPHIL # BLD AUTO: 0.08 10*3/MM3 (ref 0.1–0.3)
EOSINOPHIL # BLD MANUAL: 0 10*3/MM3 (ref 0.1–0.3)
EOSINOPHIL NFR BLD AUTO: 0.2 % (ref 0–4)
EOSINOPHIL NFR BLD MANUAL: 0 % (ref 0–4)
ERYTHROCYTE [DISTWIDTH] IN BLOOD BY AUTOMATED COUNT: 13.3 % (ref 11.5–14.5)
GFR SERPLBLD CREATININE-BSD FMLA CKD-EPI: 69 ML/MIN/1.73
GFR SERPLBLD CREATININE-BSD FMLA CKD-EPI: 84 ML/MIN/1.73
GLOBULIN SER CALC-MCNC: 2.7 GM/DL
GLUCOSE SERPL-MCNC: 96 MG/DL (ref 65–99)
HCT VFR BLD AUTO: 40.5 % (ref 42–52)
HDLC SERPL-MCNC: 70 MG/DL (ref 40–60)
HGB BLD-MCNC: 13.6 G/DL (ref 14–18)
IMM GRANULOCYTES # BLD: 0.06 10*3/MM3 (ref 0–0.03)
IMM GRANULOCYTES NFR BLD: 0.2 % (ref 0–0.5)
LDLC SERPL CALC-MCNC: 66 MG/DL (ref 0–100)
LDLC/HDLC SERPL: 0.94 {RATIO}
LYMPHOCYTES # BLD AUTO: 29.25 10*3/MM3 (ref 0.6–4.8)
LYMPHOCYTES # BLD MANUAL: 30.64 10*3/MM3 (ref 0.6–4.8)
LYMPHOCYTES NFR BLD AUTO: 86.9 % (ref 20–45)
LYMPHOCYTES NFR BLD MANUAL: 91 % (ref 20–45)
MCH RBC QN AUTO: 34.3 PG (ref 27–31)
MCHC RBC AUTO-ENTMCNC: 33.6 G/DL (ref 31–37)
MCV RBC AUTO: 102 FL (ref 80–94)
MONOCYTES # BLD AUTO: 1.25 10*3/MM3 (ref 0–1)
MONOCYTES # BLD MANUAL: 0 10*3/MM3 (ref 0–1)
MONOCYTES NFR BLD AUTO: 3.7 % (ref 3–8)
MONOCYTES NFR BLD MANUAL: 0 % (ref 3–8)
NEUTROPHILS # BLD AUTO: 2.96 10*3/MM3 (ref 1.5–8.3)
NEUTROPHILS # BLD MANUAL: 3.03 10*3/MM3 (ref 1.5–8.3)
NEUTROPHILS NFR BLD AUTO: 8.8 % (ref 45–70)
NEUTROPHILS NFR BLD MANUAL: 9 % (ref 45–70)
NRBC BLD AUTO-RTO: 0 /100 WBC (ref 0–0)
PLATELET # BLD AUTO: 119 10*3/MM3 (ref 140–500)
PLATELET BLD QL SMEAR: ABNORMAL
POTASSIUM SERPL-SCNC: 4.4 MMOL/L (ref 3.5–5.2)
PROT SERPL-MCNC: 7.5 G/DL (ref 6–8.5)
RBC # BLD AUTO: 3.97 10*6/MM3 (ref 4.7–6.1)
RBC MORPH BLD: ABNORMAL
SODIUM SERPL-SCNC: 141 MMOL/L (ref 136–145)
TRIGL SERPL-MCNC: 137 MG/DL (ref 0–150)
VLDLC SERPL CALC-MCNC: 27.4 MG/DL (ref 8–32)
WBC # BLD AUTO: 33.67 10*3/MM3 (ref 4.8–10.8)

## 2018-04-24 RX ORDER — LISINOPRIL 5 MG/1
TABLET ORAL
Qty: 90 TABLET | Refills: 3 | Status: SHIPPED | OUTPATIENT
Start: 2018-04-24 | End: 2019-03-21

## 2018-05-01 ENCOUNTER — OFFICE VISIT (OUTPATIENT)
Dept: INTERNAL MEDICINE | Facility: CLINIC | Age: 63
End: 2018-05-01

## 2018-05-01 VITALS
HEART RATE: 82 BPM | SYSTOLIC BLOOD PRESSURE: 110 MMHG | DIASTOLIC BLOOD PRESSURE: 84 MMHG | BODY MASS INDEX: 24.26 KG/M2 | OXYGEN SATURATION: 94 % | WEIGHT: 189 LBS | HEIGHT: 74 IN

## 2018-05-01 DIAGNOSIS — I10 ESSENTIAL HYPERTENSION: ICD-10-CM

## 2018-05-01 DIAGNOSIS — C91.10 CHRONIC LYMPHOCYTIC LEUKEMIA (HCC): ICD-10-CM

## 2018-05-01 DIAGNOSIS — I21.11 ST ELEVATION (STEMI) MYOCARDIAL INFARCTION INVOLVING RIGHT CORONARY ARTERY (HCC): ICD-10-CM

## 2018-05-01 DIAGNOSIS — R21 RASH: Primary | ICD-10-CM

## 2018-05-01 DIAGNOSIS — Z95.820 S/P ANGIOPLASTY WITH STENT: ICD-10-CM

## 2018-05-01 DIAGNOSIS — E78.2 MIXED HYPERLIPIDEMIA: ICD-10-CM

## 2018-05-01 DIAGNOSIS — R79.89 ELEVATED LIVER FUNCTION TESTS: ICD-10-CM

## 2018-05-01 PROCEDURE — 99214 OFFICE O/P EST MOD 30 MIN: CPT | Performed by: INTERNAL MEDICINE

## 2018-05-01 RX ORDER — UBIDECARENONE 100 MG
100 CAPSULE ORAL DAILY
COMMUNITY
End: 2018-06-15

## 2018-05-01 NOTE — PROGRESS NOTES
Francisco Espino is a 62 y.o. male, who presents with a chief complaint of   Chief Complaint   Patient presents with   • Follow-up     Had labs recently.   • Hypertension       HPI   The pt is here for follow up    htn - well controlled.  On ha/dizziness.    HLD - on statin.  No cramps or myalgias.     LFt's - normal in September but significant increase in LFT's on most recent labs.  He has been on his statin for a long time.  No tylenol.  He has taken cannabis oil a few times.  He takes co q 10, mvi, and eye vitamin.   He drinks several drinks every night.      CAD - s/p stent.  No gallegos, orthopnea.  He is worn out by the end of the day at work.  No formal exercise    CLL - counts all stable.  Dr. Cagle is pt's oncologist.      Right small finger contracture - pt had surgery at [facility] and klein[facility] but sx returned 2 mo later.  He also has a bump on his thumb that isnt painful.        The following portions of the patient's history were reviewed and updated as appropriate: allergies, current medications, past family history, past medical history, past social history, past surgical history and problem list.    Allergies: Codeine    Review of Systems   Constitutional: Negative.    HENT: Negative.    Eyes: Negative.    Respiratory: Negative.    Cardiovascular: Negative.    Gastrointestinal: Negative.    Endocrine: Negative.    Genitourinary: Negative.    Musculoskeletal: Negative.    Skin: Negative.    Allergic/Immunologic: Negative.    Neurological: Negative.    Hematological: Negative.    Psychiatric/Behavioral: Negative.    All other systems reviewed and are negative.            Wt Readings from Last 3 Encounters:   05/01/18 85.7 kg (189 lb)   02/13/18 86.2 kg (190 lb)   12/28/17 86.2 kg (190 lb)     Temp Readings from Last 3 Encounters:   02/13/18 97.6 °F (36.4 °C) (Oral)   12/28/17 98.2 °F (36.8 °C) (Oral)   06/27/17 98.2 °F (36.8 °C) (Temporal Artery )     BP Readings from Last 3 Encounters:   05/01/18 110/84    02/13/18 125/87   12/28/17 132/77     Pulse Readings from Last 3 Encounters:   05/01/18 82   02/13/18 97   12/28/17 83     Body mass index is 24.26 kg/m².  @LASTSAO2(3)@    Physical Exam   Constitutional: He is oriented to person, place, and time. He appears well-developed and well-nourished. No distress.   HENT:   Head: Normocephalic and atraumatic.   Right Ear: External ear normal.   Left Ear: External ear normal.   Nose: Nose normal.   Mouth/Throat: Oropharynx is clear and moist.   Eyes: Conjunctivae and EOM are normal. Pupils are equal, round, and reactive to light.   Neck: Normal range of motion. Neck supple.   Cardiovascular: Normal rate, regular rhythm, normal heart sounds and intact distal pulses.    Pulmonary/Chest: Effort normal and breath sounds normal. No respiratory distress. He has no wheezes.   Musculoskeletal: Normal range of motion.   Normal gait   Neurological: He is alert and oriented to person, place, and time.   Skin: Skin is warm and dry.   Psychiatric: He has a normal mood and affect. His behavior is normal. Judgment and thought content normal.   Nursing note and vitals reviewed.      Results for orders placed or performed in visit on 04/24/18   Comprehensive metabolic panel   Result Value Ref Range    Glucose 96 65 - 99 mg/dL    BUN 9 8 - 23 mg/dL    Creatinine 1.08 0.76 - 1.27 mg/dL    eGFR Non African Am 69 >60 mL/min/1.73    eGFR African Am 84 >60 mL/min/1.73    BUN/Creatinine Ratio 8.3 7.0 - 25.0    Sodium 141 136 - 145 mmol/L    Potassium 4.4 3.5 - 5.2 mmol/L    Chloride 101 98 - 107 mmol/L    Total CO2 28.0 22.0 - 29.0 mmol/L    Calcium 10.1 8.8 - 10.5 mg/dL    Total Protein 7.5 6.0 - 8.5 g/dL    Albumin 4.80 3.50 - 5.20 g/dL    Globulin 2.7 gm/dL    A/G Ratio 1.8 g/dL    Total Bilirubin 1.2 0.2 - 1.2 mg/dL    Alkaline Phosphatase 69 40 - 129 U/L    AST (SGOT) 124 (H) 5 - 40 U/L    ALT (SGPT) 118 (H) 5 - 41 U/L   Lipid Panel With LDL/HDL Ratio   Result Value Ref Range    Total  Cholesterol 163 0 - 200 mg/dL    Triglycerides 137 0 - 150 mg/dL    HDL Cholesterol 70 (H) 40 - 60 mg/dL    VLDL Cholesterol 27.4 8 - 32 mg/dL    LDL Cholesterol  66 0 - 100 mg/dL    LDL/HDL Ratio 0.94    CBC w AUTO Differential   Result Value Ref Range    WBC 33.67 (H) 4.80 - 10.80 10*3/mm3    RBC 3.97 (L) 4.70 - 6.10 10*6/mm3    Hemoglobin 13.6 (L) 14.0 - 18.0 g/dL    Hematocrit 40.5 (L) 42.0 - 52.0 %    .0 (H) 80.0 - 94.0 fL    MCH 34.3 (H) 27.0 - 31.0 pg    MCHC 33.6 31.0 - 37.0 g/dL    RDW 13.3 11.5 - 14.5 %    Platelets 119 (L) 140 - 500 10*3/mm3    Neutrophil Rel % 8.8 (L) 45.0 - 70.0 %    Lymphocyte Rel % 86.9 (H) 20.0 - 45.0 %    Monocyte Rel % 3.7 3.0 - 8.0 %    Eosinophil Rel % 0.2 0.0 - 4.0 %    Basophil Rel % 0.2 0.0 - 2.0 %    Neutrophils Absolute 2.96 1.50 - 8.30 10*3/mm3    Lymphocytes Absolute 29.25 (H) 0.60 - 4.80 10*3/mm3    Monocytes Absolute 1.25 (H) 0.00 - 1.00 10*3/mm3    Eosinophils Absolute 0.08 (L) 0.10 - 0.30 10*3/mm3    Basophils Absolute 0.07 0.00 - 0.20 10*3/mm3    Immature Granulocyte Rel % 0.2 0.0 - 0.5 %    Immature Grans Absolute 0.06 (H) 0.00 - 0.03 10*3/mm3    nRBC 0.0 0.0 - 0.0 /100 WBC   Manual Differential   Result Value Ref Range    Neutrophil Rel % 9.0 (L) 45.0 - 70.0 %    Lymphocyte Rel % 91.0 (H) 20.0 - 45.0 %    Monocyte Rel % 0.0 (L) 3.0 - 8.0 %    Eosinophil Rel % 0.0 0.0 - 4.0 %    Basophil Rel % 0.0 0.0 - 2.0 %    Neutrophils Absolute 3.03 1.50 - 8.30 10*3/mm3    Lymphocytes Absolute 30.64 (H) 0.60 - 4.80 10*3/mm3    Monocytes Absolute 0.00 0.00 - 1.00 10*3/mm3    Eosinophil Abs 0.00 (L) 0.10 - 0.30 10*3/mm3    Basophils Absolute 0.00 0.00 - 0.20 10*3/mm3    Differential Comment Comment     Comment Comment     Plt Comment Comment            Francisco was seen today for follow-up and hypertension.    Diagnoses and all orders for this visit:    Chronic lymphocytic leukemia - cbc stable.  Pt follows with dr. Cagle  -      Liver; Future    Essential hypertension -  well controlled.      Mixed hyperlipidemia  - hold statin/co q 10 bc of elevated LFT's.  -     US Liver; Future    ST elevation (STEMI) myocardial infarction involving right coronary artery - cont asa, brilinta, bb, ace-I    S/P angioplasty with stent  -     US Liver; Future    Elevated liver function tests - hold statin, encouraged pt to cut down on EtOH.   -     US Liver; Future    Recheck labs today    Outpatient Medications Prior to Visit   Medication Sig Dispense Refill   • aspirin 81 MG EC tablet Take 81 mg by mouth Daily.     • atorvastatin (LIPITOR) 10 MG tablet Take 1 tablet by mouth Daily. Needs an appointment for further refills 90 tablet 3   • carvedilol (COREG) 6.25 MG tablet Take 1 tablet by mouth 2 (Two) Times a Day With Meals. 180 tablet 3   • lisinopril (PRINIVIL,ZESTRIL) 5 MG tablet TAKE 1 TABLET DAILY 90 tablet 3   • ticagrelor (BRILINTA) 90 MG tablet tablet Take 1 tablet by mouth 2 (Two) Times a Day. 180 tablet 3   • Coenzyme Q10 (COQ10 PO) Take  by mouth.     • guaiFENesin (MUCINEX) 600 MG 12 hr tablet 1-2 po BID prn drainage     • KLS ALLERCLEAR D-12HR 5-120 MG per 12 hr tablet TAKE ONE TABLET BY MOUTH ONE TIME DAILY  80 tablet 0   • Multiple Vitamin (MULTIVITAMINS PO) Take by mouth.     • Multiple Vitamins-Minerals (EYE VITAMINS) capsule Take  by mouth.     • naproxen (NAPROSYN) 500 MG tablet Take 1 tablet by mouth 2 (Two) Times a Day. 60 tablet 0   • ondansetron (ZOFRAN) 4 MG tablet Take 1 tablet by mouth Every 6 (Six) Hours As Needed for Nausea or Vomiting for up to 10 doses. 10 tablet 0     No facility-administered medications prior to visit.      No orders of the defined types were placed in this encounter.    [unfilled]  Medications Discontinued During This Encounter   Medication Reason   • KLS ALLERCLEAR D-12HR 5-120 MG per 12 hr tablet *Therapy completed   • guaiFENesin (MUCINEX) 600 MG 12 hr tablet *Therapy completed   • Coenzyme Q10 (COQ10 PO) *Therapy completed   • ondansetron  (ZOFRAN) 4 MG tablet *Therapy completed   • naproxen (NAPROSYN) 500 MG tablet *Therapy completed   • Multiple Vitamins-Minerals (EYE VITAMINS) capsule *Therapy completed   • Multiple Vitamin (MULTIVITAMINS PO) *Therapy completed         Return in about 4 weeks (around 5/29/2018) for Recheck.

## 2018-05-02 ENCOUNTER — TELEPHONE (OUTPATIENT)
Dept: INTERNAL MEDICINE | Facility: CLINIC | Age: 63
End: 2018-05-02

## 2018-05-02 LAB
ALBUMIN SERPL-MCNC: 5 G/DL (ref 3.5–5.2)
ALBUMIN/GLOB SERPL: 1.9 G/DL
ALP SERPL-CCNC: 78 U/L (ref 40–129)
ALT SERPL-CCNC: 106 U/L (ref 5–41)
AST SERPL-CCNC: 114 U/L (ref 5–40)
BILIRUB SERPL-MCNC: 1.8 MG/DL (ref 0.2–1.2)
BUN SERPL-MCNC: 9 MG/DL (ref 8–23)
BUN/CREAT SERPL: 8.5 (ref 7–25)
CALCIUM SERPL-MCNC: 9.8 MG/DL (ref 8.8–10.5)
CHLORIDE SERPL-SCNC: 102 MMOL/L (ref 98–107)
CO2 SERPL-SCNC: 27.2 MMOL/L (ref 22–29)
CREAT SERPL-MCNC: 1.06 MG/DL (ref 0.76–1.27)
GFR SERPLBLD CREATININE-BSD FMLA CKD-EPI: 71 ML/MIN/1.73
GFR SERPLBLD CREATININE-BSD FMLA CKD-EPI: 86 ML/MIN/1.73
GGT SERPL-CCNC: 52 U/L (ref 8–61)
GLOBULIN SER CALC-MCNC: 2.6 GM/DL
GLUCOSE SERPL-MCNC: 96 MG/DL (ref 65–99)
HAV IGM SERPL QL IA: NEGATIVE
HBV CORE IGM SERPL QL IA: NEGATIVE
HBV SURFACE AG SERPL QL IA: NEGATIVE
HCV AB S/CO SERPL IA: <0.1 S/CO RATIO (ref 0–0.9)
POTASSIUM SERPL-SCNC: 5.1 MMOL/L (ref 3.5–5.2)
PROT SERPL-MCNC: 7.6 G/DL (ref 6–8.5)
SODIUM SERPL-SCNC: 143 MMOL/L (ref 136–145)

## 2018-05-02 NOTE — TELEPHONE ENCOUNTER
----- Message from Eveline Pelaez MD sent at 5/2/2018 11:41 AM EDT -----  Call pt about labs. Lft/s still high and now bilirubin high.  Needs liver u/s asap

## 2018-05-02 NOTE — TELEPHONE ENCOUNTER
This has been scheduled for tomorrow.  ----- Message from Eveline Pelaez MD sent at 5/2/2018 11:41 AM EDT -----  Call pt about labs. Lft/s still high and now bilirubin high.  Needs liver u/s asap

## 2018-05-03 ENCOUNTER — HOSPITAL ENCOUNTER (OUTPATIENT)
Dept: ULTRASOUND IMAGING | Facility: HOSPITAL | Age: 63
Discharge: HOME | End: 2018-05-03
Attending: INTERNAL MEDICINE | Admitting: INTERNAL MEDICINE

## 2018-05-03 DIAGNOSIS — Z95.820 S/P ANGIOPLASTY WITH STENT: ICD-10-CM

## 2018-05-03 DIAGNOSIS — R79.89 ELEVATED LIVER FUNCTION TESTS: ICD-10-CM

## 2018-05-03 DIAGNOSIS — E78.2 MIXED HYPERLIPIDEMIA: ICD-10-CM

## 2018-05-03 DIAGNOSIS — C91.10 CHRONIC LYMPHOCYTIC LEUKEMIA (HCC): ICD-10-CM

## 2018-05-03 PROCEDURE — 76705 ECHO EXAM OF ABDOMEN: CPT

## 2018-05-04 ENCOUNTER — TELEPHONE (OUTPATIENT)
Dept: INTERNAL MEDICINE | Facility: CLINIC | Age: 63
End: 2018-05-04

## 2018-05-04 DIAGNOSIS — K76.0 FATTY LIVER: Primary | ICD-10-CM

## 2018-05-04 NOTE — TELEPHONE ENCOUNTER
Patient notified, I tried to order MRCP but was flagged for patients creatinine, attempted to get in touch with Dr. Pelaez who is not here today but was unsuccessful. Will talk to her when she comes back on Monday and then order test.   ----- Message from Eveline Pelaez MD sent at 5/3/2018 11:44 AM EDT -----  Call pt about labs.  Liver u/s  Shows fatty infiltration of liver.  Need more detailed imaging.  Needs MRCP ordered

## 2018-05-04 NOTE — TELEPHONE ENCOUNTER
----- Message from Eveline Pelaez MD sent at 5/3/2018 11:44 AM EDT -----  Call pt about labs.  Liver u/s  Shows fatty infiltration of liver.  Need more detailed imaging.  Needs MRCP ordered

## 2018-05-07 DIAGNOSIS — K76.0 FATTY LIVER: Primary | ICD-10-CM

## 2018-05-07 DIAGNOSIS — R79.89 ABNORMAL LFTS: Primary | ICD-10-CM

## 2018-05-08 ENCOUNTER — HOSPITAL ENCOUNTER (OUTPATIENT)
Dept: MRI IMAGING | Facility: HOSPITAL | Age: 63
Discharge: HOME | End: 2018-05-08
Attending: INTERNAL MEDICINE | Admitting: INTERNAL MEDICINE

## 2018-05-08 DIAGNOSIS — R79.89 ABNORMAL LFTS: ICD-10-CM

## 2018-05-08 PROCEDURE — A9577 INJ MULTIHANCE: HCPCS | Performed by: INTERNAL MEDICINE

## 2018-05-08 PROCEDURE — 0 GADOBENATE DIMEGLUMINE 529 MG/ML SOLUTION: Performed by: INTERNAL MEDICINE

## 2018-05-08 PROCEDURE — 74183 MRI ABD W/O CNTR FLWD CNTR: CPT

## 2018-05-08 RX ADMIN — GADOBENATE DIMEGLUMINE 16 ML: 529 INJECTION, SOLUTION INTRAVENOUS at 10:28

## 2018-05-09 DIAGNOSIS — R79.89 ABNORMAL LFTS (LIVER FUNCTION TESTS): Primary | ICD-10-CM

## 2018-05-09 DIAGNOSIS — E78.5 HYPERLIPIDEMIA, UNSPECIFIED HYPERLIPIDEMIA TYPE: ICD-10-CM

## 2018-05-09 RX ORDER — ATORVASTATIN CALCIUM 10 MG/1
TABLET, FILM COATED ORAL
Qty: 90 TABLET | Refills: 3 | Status: SHIPPED | OUTPATIENT
Start: 2018-05-09 | End: 2018-09-05 | Stop reason: ALTCHOICE

## 2018-05-09 RX ORDER — TICAGRELOR 90 MG/1
TABLET ORAL
Qty: 180 TABLET | Refills: 3 | Status: SHIPPED | OUTPATIENT
Start: 2018-05-09 | End: 2019-01-09

## 2018-05-09 RX ORDER — CARVEDILOL 6.25 MG/1
TABLET ORAL
Qty: 180 TABLET | Refills: 3 | Status: SHIPPED | OUTPATIENT
Start: 2018-05-09 | End: 2019-03-21

## 2018-05-12 ENCOUNTER — RESULTS ENCOUNTER (OUTPATIENT)
Dept: INTERNAL MEDICINE | Facility: CLINIC | Age: 63
End: 2018-05-12

## 2018-05-12 DIAGNOSIS — K76.0 FATTY LIVER: ICD-10-CM

## 2018-05-14 ENCOUNTER — RESULTS ENCOUNTER (OUTPATIENT)
Dept: INTERNAL MEDICINE | Facility: CLINIC | Age: 63
End: 2018-05-14

## 2018-05-14 DIAGNOSIS — R79.89 ABNORMAL LFTS (LIVER FUNCTION TESTS): ICD-10-CM

## 2018-05-22 ENCOUNTER — TELEPHONE (OUTPATIENT)
Dept: GASTROENTEROLOGY | Facility: CLINIC | Age: 63
End: 2018-05-22

## 2018-06-01 LAB
ALBUMIN SERPL-MCNC: 4.9 G/DL (ref 3.5–5.2)
ALBUMIN/GLOB SERPL: 2.6 G/DL
ALP SERPL-CCNC: 66 U/L (ref 40–129)
ALT SERPL-CCNC: 73 U/L (ref 5–41)
AST SERPL-CCNC: 88 U/L (ref 5–40)
BILIRUB DIRECT SERPL-MCNC: 0.2 MG/DL (ref 0.2–0.3)
BILIRUB INDIRECT SERPL-MCNC: 1.2 MG/DL
BILIRUB SERPL-MCNC: 1.4 MG/DL (ref 0.2–1.2)
BUN SERPL-MCNC: 10 MG/DL (ref 8–23)
BUN/CREAT SERPL: 10.3 (ref 7–25)
CALCIUM SERPL-MCNC: 9.7 MG/DL (ref 8.8–10.5)
CHLORIDE SERPL-SCNC: 102 MMOL/L (ref 98–107)
CO2 SERPL-SCNC: 26.4 MMOL/L (ref 22–29)
CREAT SERPL-MCNC: 0.97 MG/DL (ref 0.76–1.27)
GFR SERPLBLD CREATININE-BSD FMLA CKD-EPI: 78 ML/MIN/1.73
GFR SERPLBLD CREATININE-BSD FMLA CKD-EPI: 95 ML/MIN/1.73
GLOBULIN SER CALC-MCNC: 1.9 GM/DL
GLUCOSE SERPL-MCNC: 99 MG/DL (ref 65–99)
POTASSIUM SERPL-SCNC: 4.5 MMOL/L (ref 3.5–5.2)
PROT SERPL-MCNC: 6.8 G/DL (ref 6–8.5)
SODIUM SERPL-SCNC: 141 MMOL/L (ref 136–145)

## 2018-06-11 ENCOUNTER — TELEPHONE (OUTPATIENT)
Dept: INTERNAL MEDICINE | Facility: CLINIC | Age: 63
End: 2018-06-11

## 2018-06-11 DIAGNOSIS — R79.89 ELEVATED LFTS: Primary | ICD-10-CM

## 2018-06-11 NOTE — TELEPHONE ENCOUNTER
Left message with details on voicemail, lab order put in.   ----- Message from Eveline Pelaez MD sent at 6/8/2018  4:27 PM EDT -----  Call pt about labs.  LFt's improving.  Recheck in 1-2 weeks.

## 2018-06-13 ENCOUNTER — TELEPHONE (OUTPATIENT)
Dept: ONCOLOGY | Facility: CLINIC | Age: 63
End: 2018-06-13

## 2018-06-13 ENCOUNTER — TELEPHONE (OUTPATIENT)
Dept: INTERNAL MEDICINE | Facility: CLINIC | Age: 63
End: 2018-06-13

## 2018-06-13 DIAGNOSIS — C91.10 CLL (CHRONIC LYMPHOCYTIC LEUKEMIA) (HCC): Primary | ICD-10-CM

## 2018-06-13 NOTE — TELEPHONE ENCOUNTER
----- Message from Russell Cagle MD sent at 6/13/2018  1:57 PM EDT -----  Regarding: FW: lab results  Contact: [phone]  I do not see cbc in may, in April was stable  ----- Message -----  From: Caterina Damian RN  Sent: 6/13/2018  12:52 PM  To: Russell Cagle MD  Subject: lab results                                      Patient's family called today to let us know that his labs from April and May are in Epic.  Please advise if you need us to add or order anything.  Thanks

## 2018-06-13 NOTE — TELEPHONE ENCOUNTER
I spoke with patients wife, notified her of results. She restarted patient back on his Lipitor, she would like to know if he should continue? He started back on 6/2/18. Per Dr. Pelaez, LFT still too high, stop taking and she will recheck at OV. Appt made for patient. Wife understands.   ----- Message from Eveline Pelaez MD sent at 6/13/2018  2:20 PM EDT -----  Repeat LFT's still high but improving. Imaging showed fatty infiltration of liver but otherwise normal.  Ov/labs in 3-4 weeks.    ----- Message -----  From: Belgica Torres MA  Sent: 6/12/2018   4:30 PM  To: Eveline Pelaez MD    Did you see his MRCP? Theres no notes in his chart where you saw it and then sent it to us with your notes. Is he supposed to be taking Lipitor? Does he just need to make an OV?  ----- Message -----  From: Sharona Noguera MA  Sent: 6/12/2018  11:01 AM  To: Eveline Pelaez MD, Belgica Torres MA        ----- Message -----  From: Jessie Cory  Sent: 6/12/2018  10:57 AM  To: Suzanne Pelaez Clinical [place]    PT SPOUSE IS CALLER. STATES HE WAS CALLED WITH LABS RESULTS YESTERDAY BUT WANTS TO KNOW ABOUT HIS LIVER/US RESULTS. WANTS TO MAKE SURE THE LABS AND EVERYTHING WERE OKAY.   HE WAS TAKEN OFF LIPITOR AND THEN PUT BACK ON-jUNE 2ND, FOR CHOLESTEROL. WANTS TO MAKE SURE HE IS SUPPOSED TO BE TAKING THIS-SINCE HE IS A HEART PATIENT.   CALL BACK [phone].  SEES RODRICK

## 2018-06-13 NOTE — TELEPHONE ENCOUNTER
Patient's family called today to inform us that patient's lab results were in Epic for Dr. Cagle to review.  In basket message sent to Dr. Cagle regarding same.  Instructed family member that message was sent . Family v/u.

## 2018-06-15 ENCOUNTER — OFFICE VISIT (OUTPATIENT)
Dept: INTERNAL MEDICINE | Facility: CLINIC | Age: 63
End: 2018-06-15

## 2018-06-15 VITALS
RESPIRATION RATE: 16 BRPM | HEART RATE: 87 BPM | TEMPERATURE: 97.8 F | OXYGEN SATURATION: 98 % | BODY MASS INDEX: 23.87 KG/M2 | DIASTOLIC BLOOD PRESSURE: 78 MMHG | HEIGHT: 74 IN | WEIGHT: 186 LBS | SYSTOLIC BLOOD PRESSURE: 120 MMHG

## 2018-06-15 DIAGNOSIS — R79.89 ELEVATED LIVER FUNCTION TESTS: Primary | ICD-10-CM

## 2018-06-15 DIAGNOSIS — I21.11 ST ELEVATION (STEMI) MYOCARDIAL INFARCTION INVOLVING RIGHT CORONARY ARTERY (HCC): ICD-10-CM

## 2018-06-15 DIAGNOSIS — R23.3 EASY BRUISING: ICD-10-CM

## 2018-06-15 DIAGNOSIS — E78.2 MIXED HYPERLIPIDEMIA: ICD-10-CM

## 2018-06-15 DIAGNOSIS — C91.10 CHRONIC LYMPHOCYTIC LEUKEMIA (HCC): ICD-10-CM

## 2018-06-15 PROCEDURE — 99214 OFFICE O/P EST MOD 30 MIN: CPT | Performed by: INTERNAL MEDICINE

## 2018-06-15 NOTE — PROGRESS NOTES
Francisco Espino is a 63 y.o. male, who presents with a chief complaint of   Chief Complaint   Patient presents with   • Hyperlipidemia       HPI   The pt is here for follow up.  He has been on cholesterol medication for about 4 years.  He has been on cbd oil.  No change in EtOH consumption.  No abd pain.  No jaundice.   He missed his GI appt with dr. krishnan.  The pt had a MI last year.  He saw cardiology and dr. Moy would like him on either a statin or PSCK9 inhibitor.  He has been on brilinta for about a year.  He had lots of bruising and dr. Moy cut the dose down on the brilinta and made his aspirin every other day.  No abd swelling.  No n/v/d/c.  The pt's wife feels like his appetite is down some.      The following portions of the patient's history were reviewed and updated as appropriate: allergies, current medications, past family history, past medical history, past social history, past surgical history and problem list.    Allergies: Codeine    Review of Systems   Constitutional: Negative.    HENT: Negative.    Eyes: Negative.    Respiratory: Negative.    Cardiovascular: Negative.    Gastrointestinal: Negative.    Endocrine: Negative.    Genitourinary: Negative.    Musculoskeletal: Negative.    Skin: Negative.    Allergic/Immunologic: Negative.    Neurological: Negative.    Hematological: Bruises/bleeds easily.   Psychiatric/Behavioral: Negative.    All other systems reviewed and are negative.            Wt Readings from Last 3 Encounters:   06/15/18 84.4 kg (186 lb)   05/08/18 83.9 kg (185 lb)   05/01/18 85.7 kg (189 lb)     Temp Readings from Last 3 Encounters:   06/15/18 97.8 °F (36.6 °C)   02/13/18 97.6 °F (36.4 °C) (Oral)   12/28/17 98.2 °F (36.8 °C) (Oral)     BP Readings from Last 3 Encounters:   06/15/18 120/78   05/01/18 110/84   02/13/18 125/87     Pulse Readings from Last 3 Encounters:   06/15/18 87   05/01/18 82   02/13/18 97     Body mass index is 23.88  kg/m².  @LASTSAO2(3)@    Physical Exam   Constitutional: He is oriented to person, place, and time. He appears well-developed and well-nourished. No distress.   HENT:   Head: Normocephalic and atraumatic.   Right Ear: External ear normal.   Left Ear: External ear normal.   Nose: Nose normal.   Mouth/Throat: Oropharynx is clear and moist.   Eyes: Conjunctivae and EOM are normal. Pupils are equal, round, and reactive to light.   Neck: Normal range of motion. Neck supple.   Cardiovascular: Normal rate, regular rhythm, normal heart sounds and intact distal pulses.    Pulmonary/Chest: Effort normal and breath sounds normal. No respiratory distress. He has no wheezes.   Musculoskeletal: Normal range of motion.   Normal gait   Neurological: He is alert and oriented to person, place, and time.   Skin: Skin is warm and dry.   Psychiatric: He has a normal mood and affect. His behavior is normal. Judgment and thought content normal.   Nursing note and vitals reviewed.      Results for orders placed or performed in visit on 05/14/18   Comprehensive metabolic panel   Result Value Ref Range    Glucose 99 65 - 99 mg/dL    BUN 10 8 - 23 mg/dL    Creatinine 0.97 0.76 - 1.27 mg/dL    eGFR Non African Am 78 >60 mL/min/1.73    eGFR African Am 95 >60 mL/min/1.73    BUN/Creatinine Ratio 10.3 7.0 - 25.0    Sodium 141 136 - 145 mmol/L    Potassium 4.5 3.5 - 5.2 mmol/L    Chloride 102 98 - 107 mmol/L    Total CO2 26.4 22.0 - 29.0 mmol/L    Calcium 9.7 8.8 - 10.5 mg/dL    Total Protein 6.8 6.0 - 8.5 g/dL    Albumin 4.90 3.50 - 5.20 g/dL    Globulin 1.9 gm/dL    A/G Ratio 2.6 g/dL    Total Bilirubin 1.4 (H) 0.2 - 1.2 mg/dL    Alkaline Phosphatase 66 40 - 129 U/L    AST (SGOT) 88 (H) 5 - 40 U/L    ALT (SGPT) 73 (H) 5 - 41 U/L   Bilirubin, Total & Direct   Result Value Ref Range    Bilirubin, Direct 0.2 0.2 - 0.3 mg/dL    Bilirubin, Indirect 1.2 mg/dL           Francisco was seen today for hyperlipidemia.    Diagnoses and all orders for this  visit:    Elevated liver function tests  -     Comprehensive Metabolic Panel; Future  -     CBC & Differential; Future  -     Protime-INR; Future    Easy bruising  -     Comprehensive Metabolic Panel; Future  -     CBC & Differential; Future  -     Protime-INR; Future    Mixed hyperlipidemia    ST elevation (STEMI) myocardial infarction involving right coronary artery    Chronic lymphocytic leukemia  -     CBC & Differential; Future      Check cbc and pt/inr to see if any change in cbc or problem with clotting factors since pt bruising more easily and had to have chronic meds changed    Recheck lft's.  If back to baseline will try statin again.  Pt agrees to f/u with GI.      Will send report to Dr. Cagle for lab review since pt has CLL.     Outpatient Medications Prior to Visit   Medication Sig Dispense Refill   • aspirin 81 MG EC tablet Take 81 mg by mouth Daily.     • atorvastatin (LIPITOR) 10 MG tablet TAKE 1 TABLET DAILY (NEED AN APPOINTMENT FOR FURTHER REFILLS) 90 tablet 3   • BRILINTA 90 MG tablet tablet TAKE 1 TABLET TWICE A  tablet 3   • carvedilol (COREG) 6.25 MG tablet TAKE 1 TABLET TWICE A DAY WITH MEALS 180 tablet 3   • lisinopril (PRINIVIL,ZESTRIL) 5 MG tablet TAKE 1 TABLET DAILY 90 tablet 3   • Multiple Vitamins-Minerals (EYE VITAMINS & MINERALS PO) Take  by mouth.     • Multiple Vitamins-Minerals (MULTIVITAMIN ADULT PO) Take  by mouth.     • coenzyme Q10 100 MG capsule Take 100 mg by mouth Daily.       No facility-administered medications prior to visit.      No orders of the defined types were placed in this encounter.    [unfilled]  Medications Discontinued During This Encounter   Medication Reason   • coenzyme Q10 100 MG capsule *Therapy completed         Return in about 6 weeks (around 7/27/2018) for Recheck, labs.

## 2018-06-16 ENCOUNTER — RESULTS ENCOUNTER (OUTPATIENT)
Dept: INTERNAL MEDICINE | Facility: CLINIC | Age: 63
End: 2018-06-16

## 2018-06-16 DIAGNOSIS — R79.89 ELEVATED LFTS: ICD-10-CM

## 2018-06-19 ENCOUNTER — TELEPHONE (OUTPATIENT)
Dept: INTERNAL MEDICINE | Facility: CLINIC | Age: 63
End: 2018-06-19

## 2018-06-19 NOTE — PROGRESS NOTES
White blood cell count stable, but kidney function down some. I want him to push fluids for the rest of the week and repeat BMP on Friday. If not better, we will hold medications that can affect his kidneys. Has he started any new meds like Mobic, Aleve or Ibuprofen? I so, needs to hold these.

## 2018-06-21 LAB
ALBUMIN SERPL-MCNC: 5.1 G/DL (ref 3.5–5.2)
ALBUMIN/GLOB SERPL: 2.8 G/DL
ALP SERPL-CCNC: 62 U/L (ref 40–129)
ALT SERPL-CCNC: 90 U/L (ref 5–41)
AST SERPL-CCNC: 100 U/L (ref 5–40)
BASOPHILS # BLD MANUAL: 0 10*3/MM3 (ref 0–0.2)
BASOPHILS NFR BLD MANUAL: 0 % (ref 0–2)
BILIRUB SERPL-MCNC: 1.8 MG/DL (ref 0.2–1.2)
BUN SERPL-MCNC: 13 MG/DL (ref 8–23)
BUN/CREAT SERPL: 10.2 (ref 7–25)
CALCIUM SERPL-MCNC: 10.2 MG/DL (ref 8.8–10.5)
CHLORIDE SERPL-SCNC: 98 MMOL/L (ref 98–107)
CO2 SERPL-SCNC: 28.2 MMOL/L (ref 22–29)
CREAT SERPL-MCNC: 1.28 MG/DL (ref 0.76–1.27)
DIFFERENTIAL COMMENT: ABNORMAL
EOSINOPHIL # BLD MANUAL: 0.41 10*3/MM3 (ref 0.1–0.3)
EOSINOPHIL NFR BLD MANUAL: 1 % (ref 0–4)
ERYTHROCYTE [DISTWIDTH] IN BLOOD BY AUTOMATED COUNT: 13.7 % (ref 11.5–14.5)
GFR SERPLBLD CREATININE-BSD FMLA CKD-EPI: 57 ML/MIN/1.73
GFR SERPLBLD CREATININE-BSD FMLA CKD-EPI: 69 ML/MIN/1.73
GLOBULIN SER CALC-MCNC: 1.8 GM/DL
GLUCOSE SERPL-MCNC: 100 MG/DL (ref 65–99)
HCT VFR BLD AUTO: 40.8 % (ref 42–52)
HGB BLD-MCNC: 13.7 G/DL (ref 14–18)
INR PPP: 0.99 (ref 0.9–1.1)
LYMPHOCYTES # BLD MANUAL: 34.59 10*3/MM3 (ref 0.6–4.8)
LYMPHOCYTES NFR BLD MANUAL: 84 % (ref 20–45)
MCH RBC QN AUTO: 34.6 PG (ref 27–31)
MCHC RBC AUTO-ENTMCNC: 33.6 G/DL (ref 31–37)
MCV RBC AUTO: 103 FL (ref 80–94)
MONOCYTES # BLD MANUAL: 0.41 10*3/MM3 (ref 0–1)
MONOCYTES NFR BLD MANUAL: 1 % (ref 3–8)
NEUTROPHILS # BLD MANUAL: 5.77 10*3/MM3 (ref 1.5–8.3)
NEUTROPHILS NFR BLD MANUAL: 14 % (ref 45–70)
PATH INTERP BLD-IMP: NORMAL
PATHOLOGIST NAME: NORMAL
PLATELET # BLD AUTO: 134 10*3/MM3 (ref 140–500)
PLATELET BLD QL SMEAR: ABNORMAL
POTASSIUM SERPL-SCNC: 4.6 MMOL/L (ref 3.5–5.2)
PROT SERPL-MCNC: 6.9 G/DL (ref 6–8.5)
PROTHROMBIN TIME: 13.1 SECONDS (ref 12.1–15)
RBC # BLD AUTO: 3.96 10*6/MM3 (ref 4.7–6.1)
RBC MORPH BLD: ABNORMAL
SODIUM SERPL-SCNC: 139 MMOL/L (ref 136–145)
WBC # BLD AUTO: 41.18 10*3/MM3 (ref 4.8–10.8)

## 2018-06-29 ENCOUNTER — TELEPHONE (OUTPATIENT)
Dept: INTERNAL MEDICINE | Facility: CLINIC | Age: 63
End: 2018-06-29

## 2018-06-29 NOTE — TELEPHONE ENCOUNTER
----- Message from Evans Dale MD sent at 6/27/2018  4:08 PM EDT -----      ----- Message -----  From: Prachi Doan MA  Sent: 6/22/2018   3:00 PM  To: Evans Dale MD    Will you look at this for me. dorys  ----- Message -----  From: Jessie Martinez MD  Sent: 6/21/2018   7:42 PM  To: Suzanne More[address] [place] [address]    Make sure that someone follows up on his kidney function Friday. Coming in for BMP.    I talked to patient to give him lab results and he is unable to come in to get a BMP until July 18th.dg

## 2018-06-29 NOTE — TELEPHONE ENCOUNTER
----- Message from Evans Dale MD sent at 6/27/2018  4:08 PM EDT -----      ----- Message -----  From: Prachi Doan MA  Sent: 6/22/2018   3:00 PM  To: Evans Dale MD    Will you look at this for me. dorys  ----- Message -----  From: Jessie Martinez MD  Sent: 6/21/2018   7:42 PM  To: Suzanne Rosario[address]    Make sure that someone follows up on his kidney function Friday. Coming in for BMP.

## 2018-07-20 ENCOUNTER — LAB (OUTPATIENT)
Dept: INTERNAL MEDICINE | Facility: CLINIC | Age: 63
End: 2018-07-20

## 2018-07-20 DIAGNOSIS — N17.0 ACUTE RENAL FAILURE WITH TUBULAR NECROSIS (HCC): ICD-10-CM

## 2018-07-20 DIAGNOSIS — I10 ESSENTIAL HYPERTENSION: ICD-10-CM

## 2018-07-20 DIAGNOSIS — I10 ESSENTIAL HYPERTENSION: Primary | ICD-10-CM

## 2018-07-20 LAB
ALBUMIN SERPL-MCNC: 4.8 G/DL (ref 3.5–5.2)
ALBUMIN/GLOB SERPL: 2.7 G/DL
ALP SERPL-CCNC: 59 U/L (ref 40–129)
ALT SERPL-CCNC: 44 U/L (ref 5–41)
AST SERPL-CCNC: 49 U/L (ref 5–40)
BASOPHILS # BLD MANUAL: 0 10*3/MM3 (ref 0–0.2)
BASOPHILS NFR BLD MANUAL: 0 % (ref 0–2)
BILIRUB SERPL-MCNC: 1.2 MG/DL (ref 0.2–1.2)
BUN SERPL-MCNC: 10 MG/DL (ref 8–23)
BUN/CREAT SERPL: 9.5 (ref 7–25)
CALCIUM SERPL-MCNC: 9.7 MG/DL (ref 8.8–10.5)
CHLORIDE SERPL-SCNC: 102 MMOL/L (ref 98–107)
CO2 SERPL-SCNC: 28.1 MMOL/L (ref 22–29)
CREAT SERPL-MCNC: 1.05 MG/DL (ref 0.76–1.27)
DIFFERENTIAL COMMENT: ABNORMAL
EOSINOPHIL # BLD MANUAL: 0 10*3/MM3 (ref 0.1–0.3)
EOSINOPHIL NFR BLD MANUAL: 0 % (ref 0–4)
ERYTHROCYTE [DISTWIDTH] IN BLOOD BY AUTOMATED COUNT: 13.8 % (ref 11.5–14.5)
GLOBULIN SER CALC-MCNC: 1.8 GM/DL
GLUCOSE SERPL-MCNC: 98 MG/DL (ref 65–99)
HCT VFR BLD AUTO: 39.7 % (ref 42–52)
HGB BLD-MCNC: 13.6 G/DL (ref 14–18)
LYMPHOCYTES # BLD MANUAL: 35.17 10*3/MM3 (ref 0.6–4.8)
LYMPHOCYTES NFR BLD MANUAL: 92 % (ref 20–45)
MCH RBC QN AUTO: 35 PG (ref 27–31)
MCHC RBC AUTO-ENTMCNC: 34.3 G/DL (ref 31–37)
MCV RBC AUTO: 102.1 FL (ref 80–94)
MONOCYTES # BLD MANUAL: 0 10*3/MM3 (ref 0–1)
MONOCYTES NFR BLD MANUAL: 0 % (ref 3–8)
NEUTROPHILS # BLD MANUAL: 3.06 10*3/MM3 (ref 1.5–8.3)
NEUTROPHILS NFR BLD MANUAL: 8 % (ref 45–70)
PLATELET # BLD AUTO: 126 10*3/MM3 (ref 140–500)
PLATELET BLD QL SMEAR: ABNORMAL
POTASSIUM SERPL-SCNC: 5 MMOL/L (ref 3.5–5.2)
PROT SERPL-MCNC: 6.6 G/DL (ref 6–8.5)
RBC # BLD AUTO: 3.89 10*6/MM3 (ref 4.7–6.1)
RBC MORPH BLD: ABNORMAL
SODIUM SERPL-SCNC: 140 MMOL/L (ref 136–145)
WBC # BLD AUTO: 38.23 10*3/MM3 (ref 4.8–10.8)

## 2018-07-24 ENCOUNTER — TELEPHONE (OUTPATIENT)
Dept: INTERNAL MEDICINE | Facility: CLINIC | Age: 63
End: 2018-07-24

## 2018-07-24 NOTE — TELEPHONE ENCOUNTER
Pt  Given  Results  Will call tomorrow to get  Set up for recheck  labs      ----- Message from Eveline Mensah MD sent at 7/24/2018  4:42 PM EDT -----  This was addressed yesterday. Check results bucket    ----- Message -----  From: Sharona Noguera MA  Sent: 7/24/2018  10:00 AM  To: Eveline Mensah MD, Belgica Torres MA        ----- Message -----  From: Sherri Emerson  Sent: 7/24/2018   9:53 AM  To: Suzanne Mensah Clinical Pool    PATIENT CALLED TO SAY HE JUST WANTS DR MENSAH TO LOOK OVER HIS LABS AND CALL HIM WITH RESULTS PLEAS    [phone]

## 2018-08-31 ENCOUNTER — LAB (OUTPATIENT)
Dept: INTERNAL MEDICINE | Facility: CLINIC | Age: 63
End: 2018-08-31

## 2018-08-31 DIAGNOSIS — R79.89 ELEVATED LIVER FUNCTION TESTS: ICD-10-CM

## 2018-08-31 DIAGNOSIS — E78.2 MIXED HYPERLIPIDEMIA: ICD-10-CM

## 2018-08-31 DIAGNOSIS — E78.5 DYSLIPIDEMIA: ICD-10-CM

## 2018-08-31 DIAGNOSIS — I10 ESSENTIAL HYPERTENSION: Primary | ICD-10-CM

## 2018-08-31 DIAGNOSIS — I10 ESSENTIAL HYPERTENSION: ICD-10-CM

## 2018-08-31 LAB
ALBUMIN SERPL-MCNC: 4.9 G/DL (ref 3.5–5.2)
ALBUMIN/GLOB SERPL: 2.1 G/DL
ALP SERPL-CCNC: 69 U/L (ref 40–129)
ALT SERPL-CCNC: 30 U/L (ref 5–41)
AST SERPL-CCNC: 37 U/L (ref 5–40)
BASOPHILS # BLD AUTO: 0.03 10*3/MM3 (ref 0–0.2)
BASOPHILS NFR BLD AUTO: 0.1 % (ref 0–2)
BILIRUB SERPL-MCNC: 1.2 MG/DL (ref 0.2–1.2)
BUN SERPL-MCNC: 9 MG/DL (ref 8–23)
BUN/CREAT SERPL: 8 (ref 7–25)
CALCIUM SERPL-MCNC: 9.4 MG/DL (ref 8.8–10.5)
CHLORIDE SERPL-SCNC: 103 MMOL/L (ref 98–107)
CHOLEST SERPL-MCNC: 231 MG/DL (ref 0–200)
CO2 SERPL-SCNC: 27.9 MMOL/L (ref 22–29)
CREAT SERPL-MCNC: 1.12 MG/DL (ref 0.76–1.27)
EOSINOPHIL # BLD AUTO: 0.13 10*3/MM3 (ref 0.1–0.3)
EOSINOPHIL NFR BLD AUTO: 0.4 % (ref 0–4)
ERYTHROCYTE [DISTWIDTH] IN BLOOD BY AUTOMATED COUNT: 13.1 % (ref 11.5–14.5)
GLOBULIN SER CALC-MCNC: 2.3 GM/DL
GLUCOSE SERPL-MCNC: 108 MG/DL (ref 65–99)
HCT VFR BLD AUTO: 39.1 % (ref 42–52)
HDLC SERPL-MCNC: 70 MG/DL (ref 40–60)
HGB BLD-MCNC: 13.4 G/DL (ref 14–18)
IMM GRANULOCYTES # BLD: 0.09 10*3/MM3 (ref 0–0.03)
IMM GRANULOCYTES NFR BLD: 0.2 % (ref 0–0.5)
LDLC SERPL CALC-MCNC: 136 MG/DL (ref 0–100)
LDLC/HDLC SERPL: 1.95 {RATIO}
LYMPHOCYTES # BLD AUTO: 31.71 10*3/MM3 (ref 0.6–4.8)
LYMPHOCYTES NFR BLD AUTO: 85.4 % (ref 20–45)
MCH RBC QN AUTO: 34.9 PG (ref 27–31)
MCHC RBC AUTO-ENTMCNC: 34.3 G/DL (ref 31–37)
MCV RBC AUTO: 101.8 FL (ref 80–94)
MONOCYTES # BLD AUTO: 0.52 10*3/MM3 (ref 0–1)
MONOCYTES NFR BLD AUTO: 1.4 % (ref 3–8)
NEUTROPHILS # BLD AUTO: 4.63 10*3/MM3 (ref 1.5–8.3)
NEUTROPHILS NFR BLD AUTO: 12.5 % (ref 45–70)
NRBC BLD AUTO-RTO: 0 /100 WBC (ref 0–0)
PLATELET # BLD AUTO: 121 10*3/MM3 (ref 140–500)
POTASSIUM SERPL-SCNC: 4.4 MMOL/L (ref 3.5–5.2)
PROT SERPL-MCNC: 7.2 G/DL (ref 6–8.5)
RBC # BLD AUTO: 3.84 10*6/MM3 (ref 4.7–6.1)
SODIUM SERPL-SCNC: 141 MMOL/L (ref 136–145)
TRIGL SERPL-MCNC: 124 MG/DL (ref 0–150)
VLDLC SERPL CALC-MCNC: 24.8 MG/DL (ref 8–32)
WBC # BLD AUTO: 37.11 10*3/MM3 (ref 4.8–10.8)

## 2018-09-04 ENCOUNTER — TELEPHONE (OUTPATIENT)
Dept: INTERNAL MEDICINE | Facility: CLINIC | Age: 63
End: 2018-09-04

## 2018-09-04 NOTE — TELEPHONE ENCOUNTER
----- Message from Eveline Pelaez MD sent at 8/31/2018  3:20 PM EDT -----  Call pt about labs.  lft's all back to normal.  Is pt taking his statin again?    Pt given info.dg

## 2018-09-05 ENCOUNTER — TELEPHONE (OUTPATIENT)
Dept: INTERNAL MEDICINE | Facility: CLINIC | Age: 63
End: 2018-09-05

## 2018-09-05 RX ORDER — ROSUVASTATIN CALCIUM 5 MG/1
5 TABLET, COATED ORAL DAILY
Qty: 30 TABLET | Refills: 0 | Status: SHIPPED | OUTPATIENT
Start: 2018-09-05 | End: 2018-10-03 | Stop reason: SDUPTHER

## 2018-09-05 NOTE — TELEPHONE ENCOUNTER
Rx sent in and lab scheduled.    ----- Message from Eveline Pelaez MD sent at 2018  1:47 PM EDT -----  Regarding: RE: STATIN???  Contact: [phone]  crestor 5mg daily  Recheck cmp in 1 month    ----- Message -----  From: Anyi Pereira MA  Sent: 2018  12:36 PM  To: Eveline Pelaez MD  Subject: FW: STATIN???                                        ----- Message -----  From: Tresa Gordon  Sent: 2018  11:37 AM  To: Suzanne Pelaez Clinical Pool  Subject: STATIN???                                        :  53  RODRICK PT.    PATIENT CALLED WANTING TO KNOW THE LEVEL OF STATIN HE NEEDS TO BE TAKING NOW THAT THE LAB RESULTS ARE BACK. PLEASE CALL.

## 2018-09-27 DIAGNOSIS — I10 ESSENTIAL HYPERTENSION: Primary | ICD-10-CM

## 2018-09-27 DIAGNOSIS — E78.2 MIXED HYPERLIPIDEMIA: ICD-10-CM

## 2018-09-27 DIAGNOSIS — R79.89 ELEVATED LIVER FUNCTION TESTS: ICD-10-CM

## 2018-09-27 DIAGNOSIS — E78.5 DYSLIPIDEMIA: ICD-10-CM

## 2018-10-01 LAB
ALBUMIN SERPL-MCNC: 5.4 G/DL (ref 3.5–5.2)
ALBUMIN/GLOB SERPL: 2.2 G/DL
ALP SERPL-CCNC: 71 U/L (ref 40–129)
ALT SERPL-CCNC: 59 U/L (ref 5–41)
AST SERPL-CCNC: 55 U/L (ref 5–40)
BILIRUB SERPL-MCNC: 1.7 MG/DL (ref 0.2–1.2)
BUN SERPL-MCNC: 10 MG/DL (ref 8–23)
BUN/CREAT SERPL: 9 (ref 7–25)
CALCIUM SERPL-MCNC: 10.2 MG/DL (ref 8.8–10.5)
CHLORIDE SERPL-SCNC: 101 MMOL/L (ref 98–107)
CHOLEST SERPL-MCNC: 189 MG/DL (ref 0–200)
CO2 SERPL-SCNC: 27.6 MMOL/L (ref 22–29)
CREAT SERPL-MCNC: 1.11 MG/DL (ref 0.76–1.27)
GLOBULIN SER CALC-MCNC: 2.5 GM/DL
GLUCOSE SERPL-MCNC: 93 MG/DL (ref 65–99)
HDLC SERPL-MCNC: 80 MG/DL (ref 40–60)
LDLC SERPL CALC-MCNC: 83 MG/DL (ref 0–100)
LDLC/HDLC SERPL: 1.04 {RATIO}
POTASSIUM SERPL-SCNC: 4.7 MMOL/L (ref 3.5–5.2)
PROT SERPL-MCNC: 7.9 G/DL (ref 6–8.5)
SODIUM SERPL-SCNC: 141 MMOL/L (ref 136–145)
TRIGL SERPL-MCNC: 130 MG/DL (ref 0–150)
VLDLC SERPL CALC-MCNC: 26 MG/DL (ref 8–32)

## 2018-10-02 ENCOUNTER — RESULTS ENCOUNTER (OUTPATIENT)
Dept: INTERNAL MEDICINE | Facility: CLINIC | Age: 63
End: 2018-10-02

## 2018-10-02 DIAGNOSIS — E78.2 MIXED HYPERLIPIDEMIA: ICD-10-CM

## 2018-10-02 DIAGNOSIS — E78.5 DYSLIPIDEMIA: ICD-10-CM

## 2018-10-02 DIAGNOSIS — R79.89 ELEVATED LIVER FUNCTION TESTS: ICD-10-CM

## 2018-10-02 DIAGNOSIS — I10 ESSENTIAL HYPERTENSION: ICD-10-CM

## 2018-10-03 RX ORDER — ROSUVASTATIN CALCIUM 5 MG/1
TABLET, COATED ORAL
Qty: 30 TABLET | Refills: 5 | Status: SHIPPED | OUTPATIENT
Start: 2018-10-03 | End: 2019-01-24 | Stop reason: SDUPTHER

## 2018-10-16 DIAGNOSIS — R79.89 ELEVATED LFTS: Primary | ICD-10-CM

## 2018-10-18 RX ORDER — LORATADINE, PSEUDOEPHEDRINE 5; 120 MG/1; MG/1
TABLET, EXTENDED RELEASE ORAL
Qty: 80 TABLET | Refills: 2 | Status: SHIPPED | OUTPATIENT
Start: 2018-10-18 | End: 2019-03-21

## 2019-01-09 ENCOUNTER — OFFICE VISIT (OUTPATIENT)
Dept: INTERNAL MEDICINE | Facility: CLINIC | Age: 64
End: 2019-01-09

## 2019-01-09 ENCOUNTER — HOSPITAL ENCOUNTER (OUTPATIENT)
Dept: GENERAL RADIOLOGY | Facility: HOSPITAL | Age: 64
Discharge: HOME | End: 2019-01-09
Attending: INTERNAL MEDICINE | Admitting: INTERNAL MEDICINE

## 2019-01-09 ENCOUNTER — TELEPHONE (OUTPATIENT)
Dept: INTERNAL MEDICINE | Facility: CLINIC | Age: 64
End: 2019-01-09

## 2019-01-09 VITALS
OXYGEN SATURATION: 97 % | SYSTOLIC BLOOD PRESSURE: 128 MMHG | DIASTOLIC BLOOD PRESSURE: 76 MMHG | TEMPERATURE: 99.2 F | WEIGHT: 186 LBS | HEIGHT: 74 IN | BODY MASS INDEX: 23.87 KG/M2 | HEART RATE: 74 BPM

## 2019-01-09 DIAGNOSIS — A68.9 RECURRENT FEVER: Primary | ICD-10-CM

## 2019-01-09 DIAGNOSIS — R79.89 ELEVATED LIVER FUNCTION TESTS: ICD-10-CM

## 2019-01-09 DIAGNOSIS — A68.9 RECURRENT FEVER: ICD-10-CM

## 2019-01-09 DIAGNOSIS — R05.9 COUGH: ICD-10-CM

## 2019-01-09 DIAGNOSIS — C91.10 CHRONIC LYMPHOCYTIC LEUKEMIA (HCC): ICD-10-CM

## 2019-01-09 LAB
ALBUMIN SERPL-MCNC: 4.3 G/DL (ref 3.5–5.2)
ALBUMIN/GLOB SERPL: 1.3 G/DL
ALP SERPL-CCNC: 81 U/L (ref 40–129)
ALT SERPL-CCNC: 8 U/L (ref 5–41)
AST SERPL-CCNC: 12 U/L (ref 5–40)
BASOPHILS # BLD MANUAL: 0 10*3/MM3 (ref 0–0.2)
BASOPHILS NFR BLD MANUAL: 0 % (ref 0–2)
BILIRUB BLD-MCNC: NEGATIVE MG/DL
BILIRUB SERPL-MCNC: 0.8 MG/DL (ref 0.2–1.2)
BUN SERPL-MCNC: 15 MG/DL (ref 8–23)
BUN/CREAT SERPL: 11.4 (ref 7–25)
CALCIUM SERPL-MCNC: 9.4 MG/DL (ref 8.8–10.5)
CHLORIDE SERPL-SCNC: 97 MMOL/L (ref 98–107)
CLARITY, POC: CLEAR
CO2 SERPL-SCNC: 29.1 MMOL/L (ref 22–29)
COLOR UR: ABNORMAL
CREAT SERPL-MCNC: 1.32 MG/DL (ref 0.76–1.27)
DIFFERENTIAL COMMENT: ABNORMAL
EOSINOPHIL # BLD MANUAL: 0 10*3/MM3 (ref 0.1–0.3)
EOSINOPHIL NFR BLD MANUAL: 0 % (ref 0–4)
ERYTHROCYTE [DISTWIDTH] IN BLOOD BY AUTOMATED COUNT: 13.5 % (ref 11.5–14.5)
GLOBULIN SER CALC-MCNC: 3.2 GM/DL
GLUCOSE SERPL-MCNC: 110 MG/DL (ref 65–99)
GLUCOSE UR STRIP-MCNC: NEGATIVE MG/DL
HCT VFR BLD AUTO: 36.6 % (ref 42–52)
HGB BLD-MCNC: 11.9 G/DL (ref 14–18)
KETONES UR QL: ABNORMAL
LEUKOCYTE EST, POC: NEGATIVE
LYMPHOCYTES # BLD MANUAL: 37.16 10*3/MM3 (ref 0.6–4.8)
LYMPHOCYTES NFR BLD MANUAL: 72 % (ref 20–45)
MCH RBC QN AUTO: 33.1 PG (ref 27–31)
MCHC RBC AUTO-ENTMCNC: 32.5 G/DL (ref 31–37)
MCV RBC AUTO: 101.9 FL (ref 80–94)
MONOCYTES # BLD MANUAL: 1.03 10*3/MM3 (ref 0–1)
MONOCYTES NFR BLD MANUAL: 2 % (ref 3–8)
NEUTROPHILS # BLD MANUAL: 8.26 10*3/MM3 (ref 1.5–8.3)
NEUTROPHILS NFR BLD MANUAL: 16 % (ref 45–70)
NITRITE UR-MCNC: NEGATIVE MG/ML
PH UR: 8.5 [PH] (ref 5–8)
PLATELET # BLD AUTO: 184 10*3/MM3 (ref 140–500)
PLATELET BLD QL SMEAR: ABNORMAL
POTASSIUM SERPL-SCNC: 4.3 MMOL/L (ref 3.5–5.2)
PROT SERPL-MCNC: 7.5 G/DL (ref 6–8.5)
PROT UR STRIP-MCNC: NEGATIVE MG/DL
RBC # BLD AUTO: 3.59 10*6/MM3 (ref 4.7–6.1)
RBC # UR STRIP: NEGATIVE /UL
RBC MORPH BLD: ABNORMAL
SODIUM SERPL-SCNC: 140 MMOL/L (ref 136–145)
SP GR UR: 1015 (ref 1–1.03)
UROBILINOGEN UR QL: NORMAL
WBC # BLD AUTO: 51.61 10*3/MM3 (ref 4.8–10.8)

## 2019-01-09 PROCEDURE — 99214 OFFICE O/P EST MOD 30 MIN: CPT | Performed by: INTERNAL MEDICINE

## 2019-01-09 PROCEDURE — 81003 URINALYSIS AUTO W/O SCOPE: CPT | Performed by: INTERNAL MEDICINE

## 2019-01-09 PROCEDURE — 71046 X-RAY EXAM CHEST 2 VIEWS: CPT

## 2019-01-09 RX ORDER — IMIQUIMOD 12.5 MG/.25G
CREAM TOPICAL
COMMUNITY
Start: 2018-12-06 | End: 2019-01-17

## 2019-01-09 NOTE — TELEPHONE ENCOUNTER
----- Message from Jessie Ray sent at 1/9/2019  8:24 AM EST -----  Pt wife is caller. Pt has an appt on Friday and wants to know if tyson wants to do labs for anything before he comes in. He is experiencing fever x 5 weeks. This morning it was 101, last night 103. States he went to the urgent care on thanksgiving and had a URI. Call back is [phone].    Pt is coming into office .dg

## 2019-01-09 NOTE — PROGRESS NOTES
Francisco Espino is a 63 y.o. male, who presents with a chief complaint of   Chief Complaint   Patient presents with   • URI     Cough fever 2 weeks     had cough and sorethroat 2 x's between [facility] and [facility]  Was seen at  [facility] 2 x's       HPI   Pt was sick around [facility] and went to [facility].  he was on abx and got better. Then a week or so later he got sick again.  He went back to [facility] and was put on steroids, cough med, and flonase.  He has had fevers off and on for 1-2 weeks of 101-103.  Tylenol helps.  No weight loss.  He has still been going to work.  No pain.  Most of his upper respiratory sx are now resolved.      He has had recurrent hernias.  He has an appt with dr. lamb on 1/23.  I advised him that we need to assess his fevers before he has any surgery.     The following portions of the patient's history were reviewed and updated as appropriate: allergies, current medications, past family history, past medical history, past social history, past surgical history and problem list.    Allergies: Codeine    Review of Systems   Constitutional: Positive for fever.   HENT: Negative.    Eyes: Negative.    Respiratory: Positive for cough.    Cardiovascular: Negative.    Gastrointestinal: Negative.    Endocrine: Negative.    Genitourinary: Negative.    Musculoskeletal: Negative.    Skin: Negative.    Allergic/Immunologic: Negative.    Neurological: Negative.    Hematological: Negative.    Psychiatric/Behavioral: Negative.    All other systems reviewed and are negative.            Wt Readings from Last 3 Encounters:   01/09/19 84.4 kg (186 lb)   12/17/18 81.6 kg (180 lb)   06/15/18 84.4 kg (186 lb)     Temp Readings from Last 3 Encounters:   01/09/19 99.2 °F (37.3 °C)   12/17/18 98.6 °F (37 °C) (Temporal)   11/23/18 99.1 °F (37.3 °C) (Oral)     BP Readings from Last 3 Encounters:   01/09/19 128/76   12/17/18 118/73   11/23/18 141/86     Pulse Readings from Last 3 Encounters:   01/09/19 74   12/17/18 97    11/23/18 107     Body mass index is 23.88 kg/m².  @LASTSAO2(3)@    Physical Exam   Constitutional: He is oriented to person, place, and time. He appears well-developed and well-nourished. No distress.   HENT:   Head: Normocephalic and atraumatic.   Right Ear: External ear normal.   Left Ear: External ear normal.   Nose: Nose normal.   Mouth/Throat: Oropharynx is clear and moist.   Eyes: Conjunctivae and EOM are normal. Pupils are equal, round, and reactive to light.   Neck: Normal range of motion. Neck supple.   Cardiovascular: Normal rate, regular rhythm, normal heart sounds and intact distal pulses.   Pulmonary/Chest: Effort normal and breath sounds normal. No respiratory distress. He has no wheezes.   Musculoskeletal: Normal range of motion.   Normal gait   Neurological: He is alert and oriented to person, place, and time.   Skin: Skin is warm and dry.   Psychiatric: He has a normal mood and affect. His behavior is normal. Judgment and thought content normal.   Nursing note and vitals reviewed.      Results for orders placed or performed in visit on 01/09/19   POC Urinalysis Dipstick, Automated   Result Value Ref Range    Color Avelina Yellow, Straw, Dark Yellow, Avelina    Clarity, UA Clear Clear    Specific Gravity  1,015.000 (A) 1.005 - 1.030    pH, Urine 8.5 (A) 5.0 - 8.0    Leukocytes Negative Negative    Nitrite, UA Negative Negative    Protein, POC Negative Negative mg/dL    Glucose, UA Negative Negative, 1000 mg/dL (3+) mg/dL    Ketones, UA Trace (A) Negative    Urobilinogen, UA Normal Normal    Bilirubin Negative Negative    Blood, UA Negative Negative           Francisco was seen today for uri.    Diagnoses and all orders for this visit:    Recurrent fever  -     Comprehensive Metabolic Panel  -     CBC & Differential  -     XR Chest PA & Lateral; Future  -     POC Urinalysis Dipstick, Automated    Cough  -     Comprehensive Metabolic Panel  -     CBC & Differential  -     XR Chest PA & Lateral;  Future    Chronic lymphocytic leukemia (CMS/HCC)  -     Comprehensive Metabolic Panel  -     CBC & Differential    Elevated liver function tests  -     Comprehensive Metabolic Panel  -     CBC & Differential      Pt looks well on exam today. Check labs, cxr, and urine today.  Next steps in work up depending on lab results. Push fluids    Outpatient Medications Prior to Visit   Medication Sig Dispense Refill   • ticagrelor (BRILINTA) 60 MG tablet tablet Take 60 mg by mouth 2 (Two) Times a Day.     • aspirin 81 MG EC tablet Take 81 mg by mouth Daily.     • carvedilol (COREG) 6.25 MG tablet TAKE 1 TABLET TWICE A DAY WITH MEALS 180 tablet 3   • FLUZONE QUADRIVALENT 0.5 ML suspension prefilled syringe injection ADM 0.5ML IM UTD  0   • HAVRIX 1440 EL U/ML vaccine ADM 1ML IM UTD  0   • imiquimod (ALDARA) 5 % cream      • KLS ALLERCLEAR D-12HR 5-120 MG per 12 hr tablet TAKE ONE TABLET BY MOUTH ONE TIME DAILY  80 tablet 2   • lisinopril (PRINIVIL,ZESTRIL) 5 MG tablet TAKE 1 TABLET DAILY 90 tablet 3   • Multiple Vitamins-Minerals (EYE VITAMINS & MINERALS PO) Take  by mouth.     • rosuvastatin (CRESTOR) 5 MG tablet TAKE ONE TABLET BY MOUTH ONE TIME DAILY  30 tablet 5   • Zoster Vac Recomb Adjuvanted (SHINGRIX IM) ADM 0.5ML IM UTD  0   • BRILINTA 90 MG tablet tablet TAKE 1 TABLET TWICE A  tablet 3   • fluticasone (FLONASE) 50 MCG/ACT nasal spray 2 sprays into the nostril(s) as directed by provider Daily. 15.8 mL 0   • promethazine-dextromethorphan (PROMETHAZINE-DM) 6.25-15 MG/5ML syrup Take 5 mL by mouth 4 (Four) Times a Day As Needed for Cough. 120 mL 0     No facility-administered medications prior to visit.      No orders of the defined types were placed in this encounter.    [unfilled]  Medications Discontinued During This Encounter   Medication Reason   • fluticasone (FLONASE) 50 MCG/ACT nasal spray *Therapy completed   • promethazine-dextromethorphan (PROMETHAZINE-DM) 6.25-15 MG/5ML syrup *Therapy completed    • BRILINTA 90 MG tablet tablet *Therapy completed         Return if symptoms worsen or fail to improve, for follow up based on lab work.

## 2019-01-10 DIAGNOSIS — J18.9 PNEUMONIA OF LEFT LOWER LOBE DUE TO INFECTIOUS ORGANISM: Primary | ICD-10-CM

## 2019-01-10 RX ORDER — LEVOFLOXACIN 750 MG/1
750 TABLET ORAL DAILY
Qty: 7 TABLET | Refills: 0 | Status: SHIPPED | OUTPATIENT
Start: 2019-01-10 | End: 2019-01-17

## 2019-01-17 ENCOUNTER — HOSPITAL ENCOUNTER (OUTPATIENT)
Dept: GENERAL RADIOLOGY | Facility: HOSPITAL | Age: 64
Discharge: HOME | End: 2019-01-17
Attending: FAMILY MEDICINE | Admitting: FAMILY MEDICINE

## 2019-01-17 ENCOUNTER — OFFICE VISIT (OUTPATIENT)
Dept: INTERNAL MEDICINE | Facility: CLINIC | Age: 64
End: 2019-01-17

## 2019-01-17 VITALS
SYSTOLIC BLOOD PRESSURE: 112 MMHG | DIASTOLIC BLOOD PRESSURE: 80 MMHG | WEIGHT: 179 LBS | BODY MASS INDEX: 22.97 KG/M2 | RESPIRATION RATE: 16 BRPM | HEIGHT: 74 IN | OXYGEN SATURATION: 95 % | HEART RATE: 93 BPM | TEMPERATURE: 97.8 F

## 2019-01-17 DIAGNOSIS — J18.9 PNEUMONIA OF LEFT LOWER LOBE DUE TO INFECTIOUS ORGANISM: Primary | ICD-10-CM

## 2019-01-17 DIAGNOSIS — J18.9 PNEUMONIA OF LEFT LOWER LOBE DUE TO INFECTIOUS ORGANISM: ICD-10-CM

## 2019-01-17 LAB
BASOPHILS # BLD AUTO: 0.03 10*3/MM3 (ref 0–0.2)
BASOPHILS # BLD MANUAL: 0 10*3/MM3 (ref 0–0.2)
BASOPHILS NFR BLD AUTO: 0.1 % (ref 0–2)
BASOPHILS NFR BLD MANUAL: 0 % (ref 0–2)
CRP SERPL-MCNC: 11.8 MG/DL (ref 0–0.5)
DIFFERENTIAL COMMENT: ABNORMAL
EOSINOPHIL # BLD AUTO: 0.17 10*3/MM3 (ref 0.1–0.3)
EOSINOPHIL # BLD MANUAL: 0.39 10*3/MM3 (ref 0.1–0.3)
EOSINOPHIL NFR BLD AUTO: 0.4 % (ref 0–4)
EOSINOPHIL NFR BLD MANUAL: 1 % (ref 0–4)
ERYTHROCYTE [DISTWIDTH] IN BLOOD BY AUTOMATED COUNT: 13.4 % (ref 11.5–14.5)
HCT VFR BLD AUTO: 36.2 % (ref 42–52)
HGB BLD-MCNC: 11.8 G/DL (ref 14–18)
IMM GRANULOCYTES # BLD AUTO: 0.23 10*3/MM3 (ref 0–0.03)
IMM GRANULOCYTES NFR BLD AUTO: 0.6 % (ref 0–0.5)
LYMPHOCYTES # BLD AUTO: 26.93 10*3/MM3 (ref 0.6–4.8)
LYMPHOCYTES # BLD MANUAL: 25.27 10*3/MM3 (ref 0.6–4.8)
LYMPHOCYTES NFR BLD AUTO: 68.2 % (ref 20–45)
LYMPHOCYTES NFR BLD MANUAL: 64 % (ref 20–45)
MCH RBC QN AUTO: 33.1 PG (ref 27–31)
MCHC RBC AUTO-ENTMCNC: 32.6 G/DL (ref 31–37)
MCV RBC AUTO: 101.4 FL (ref 80–94)
MONOCYTES # BLD AUTO: 0.79 10*3/MM3 (ref 0–1)
MONOCYTES # BLD MANUAL: 0.39 10*3/MM3 (ref 0–1)
MONOCYTES NFR BLD AUTO: 2 % (ref 3–8)
MONOCYTES NFR BLD MANUAL: 1 % (ref 3–8)
NEUTROPHILS # BLD AUTO: 11.34 10*3/MM3 (ref 1.5–8.3)
NEUTROPHILS # BLD MANUAL: 11.45 10*3/MM3 (ref 1.5–8.3)
NEUTROPHILS NFR BLD AUTO: 28.7 % (ref 45–70)
NEUTROPHILS NFR BLD MANUAL: 28 % (ref 45–70)
NRBC BLD AUTO-RTO: 0 /100 WBC (ref 0–0)
PLATELET # BLD AUTO: 252 10*3/MM3 (ref 140–500)
PLATELET BLD QL SMEAR: ABNORMAL
RBC # BLD AUTO: 3.57 10*6/MM3 (ref 4.7–6.1)
RBC MORPH BLD: ABNORMAL
WBC # BLD AUTO: 39.49 10*3/MM3 (ref 4.8–10.8)

## 2019-01-17 PROCEDURE — 71046 X-RAY EXAM CHEST 2 VIEWS: CPT

## 2019-01-17 PROCEDURE — 99213 OFFICE O/P EST LOW 20 MIN: CPT | Performed by: FAMILY MEDICINE

## 2019-01-17 RX ORDER — AZITHROMYCIN 250 MG/1
TABLET, FILM COATED ORAL
Qty: 6 TABLET | Refills: 0 | Status: SHIPPED | OUTPATIENT
Start: 2019-01-17 | End: 2019-02-13

## 2019-01-17 NOTE — PROGRESS NOTES
Francisco Espino is a 63 y.o. male, who presents with a chief complaint of   Chief Complaint   Patient presents with   • Pneumonia     having fevers still       HPI     Pt here to f/u on fevers and pneumonia.  He finished the Levaquin.  He states he is feeling somewhat better and feels that his lungs have improved but he reports that he is still spiking fevers.  He doesn't know if the frequency and height of the fevers have improved.  Appetite is a little down and he is tired but still working.    The following portions of the patient's history were reviewed and updated as appropriate: allergies, current medications, past family history, past medical history, past social history, past surgical history and problem list.    Allergies: Codeine    Review of Systems   Constitutional: Positive for appetite change and fever.   Respiratory: Positive for cough.    Cardiovascular: Negative.    Gastrointestinal: Negative.    Neurological: Negative.    Psychiatric/Behavioral: Negative.              Wt Readings from Last 3 Encounters:   01/17/19 81.2 kg (179 lb)   01/09/19 84.4 kg (186 lb)   12/17/18 81.6 kg (180 lb)     Temp Readings from Last 3 Encounters:   01/17/19 97.8 °F (36.6 °C)   01/09/19 99.2 °F (37.3 °C)   12/17/18 98.6 °F (37 °C) (Temporal)     BP Readings from Last 3 Encounters:   01/17/19 112/80   01/09/19 128/76   12/17/18 118/73     Pulse Readings from Last 3 Encounters:   01/17/19 93   01/09/19 74   12/17/18 97     Body mass index is 22.98 kg/m².  @LASTSAO2(3)@    Physical Exam   Constitutional: He is oriented to person, place, and time. He appears well-developed and well-nourished. No distress.   HENT:   Head: Normocephalic and atraumatic.   Eyes: Conjunctivae and EOM are normal.   Neck: Neck supple. No thyromegaly present.   Cardiovascular: Regular rhythm and normal heart sounds.   Pulmonary/Chest: Effort normal and breath sounds normal. No respiratory distress. He has no wheezes. He has no rales.    Musculoskeletal: Normal range of motion. He exhibits no edema.   Neurological: He is alert and oriented to person, place, and time.   Skin: Skin is warm and dry. He is not diaphoretic.   Psychiatric: He has a normal mood and affect. His behavior is normal.   Nursing note and vitals reviewed.      Results for orders placed or performed in visit on 01/09/19   Comprehensive Metabolic Panel   Result Value Ref Range    Glucose 110 (H) 65 - 99 mg/dL    BUN 15 8 - 23 mg/dL    Creatinine 1.32 (H) 0.76 - 1.27 mg/dL    eGFR Non African Am 55 (L) >60 mL/min/1.73    eGFR African Am 66 >60 mL/min/1.73    BUN/Creatinine Ratio 11.4 7.0 - 25.0    Sodium 140 136 - 145 mmol/L    Potassium 4.3 3.5 - 5.2 mmol/L    Chloride 97 (L) 98 - 107 mmol/L    Total CO2 29.1 (H) 22.0 - 29.0 mmol/L    Calcium 9.4 8.8 - 10.5 mg/dL    Total Protein 7.5 6.0 - 8.5 g/dL    Albumin 4.30 3.50 - 5.20 g/dL    Globulin 3.2 gm/dL    A/G Ratio 1.3 g/dL    Total Bilirubin 0.8 0.2 - 1.2 mg/dL    Alkaline Phosphatase 81 40 - 129 U/L    AST (SGOT) 12 5 - 40 U/L    ALT (SGPT) 8 5 - 41 U/L   Manual Differential   Result Value Ref Range    Neutrophil Rel % 16.0 (L) 45.0 - 70.0 %    Lymphocyte Rel % 72.0 (H) 20.0 - 45.0 %    Monocyte Rel % 2.0 (L) 3.0 - 8.0 %    Eosinophil Rel % 0.0 0.0 - 4.0 %    Basophil Rel % 0.0 0.0 - 2.0 %    Neutrophils Absolute 8.26 1.50 - 8.30 10*3/mm3    Lymphocytes Absolute 37.16 (H) 0.60 - 4.80 10*3/mm3    Monocytes Absolute 1.03 (H) 0.00 - 1.00 10*3/mm3    Eosinophil Abs 0.00 (L) 0.10 - 0.30 10*3/mm3    Basophils Absolute 0.00 0.00 - 0.20 10*3/mm3    Differential Comment Comment     Comment Comment     Plt Comment Comment    POC Urinalysis Dipstick, Automated   Result Value Ref Range    Color Avelina Yellow, Straw, Dark Yellow, Avelina    Clarity, UA Clear Clear    Specific Gravity  1,015.000 (A) 1.005 - 1.030    pH, Urine 8.5 (A) 5.0 - 8.0    Leukocytes Negative Negative    Nitrite, UA Negative Negative    Protein, POC Negative Negative  mg/dL    Glucose, UA Negative Negative, 1000 mg/dL (3+) mg/dL    Ketones, UA Trace (A) Negative    Urobilinogen, UA Normal Normal    Bilirubin Negative Negative    Blood, UA Negative Negative   CBC & Differential   Result Value Ref Range    WBC 51.61 (C) 4.80 - 10.80 10*3/mm3    RBC 3.59 (L) 4.70 - 6.10 10*6/mm3    Hemoglobin 11.9 (L) 14.0 - 18.0 g/dL    Hematocrit 36.6 (L) 42.0 - 52.0 %    .9 (H) 80.0 - 94.0 fL    MCH 33.1 (H) 27.0 - 31.0 pg    MCHC 32.5 31.0 - 37.0 g/dL    RDW 13.5 11.5 - 14.5 %    Platelets 184 140 - 500 10*3/mm3           Francisco was seen today for pneumonia.    Diagnoses and all orders for this visit:    Pneumonia of left lower lobe due to infectious organism (CMS/HCC)  -     CBC & Differential  -     C-reactive Protein  -     XR Chest PA & Lateral; Future  -     azithromycin (ZITHROMAX) 250 MG tablet; Take 2 tablets the first day, then 1 tablet daily for 4 days.      Finished levofloxacin.  Still spiking fevers.  Recheck CXR and CBC and add azithromycin.  F/U 1 week.    Outpatient Medications Prior to Visit   Medication Sig Dispense Refill   • aspirin 81 MG EC tablet Take 81 mg by mouth Daily.     • carvedilol (COREG) 6.25 MG tablet TAKE 1 TABLET TWICE A DAY WITH MEALS 180 tablet 3   • KLS ALLERCLEAR D-12HR 5-120 MG per 12 hr tablet TAKE ONE TABLET BY MOUTH ONE TIME DAILY  80 tablet 2   • lisinopril (PRINIVIL,ZESTRIL) 5 MG tablet TAKE 1 TABLET DAILY 90 tablet 3   • Multiple Vitamins-Minerals (EYE VITAMINS & MINERALS PO) Take  by mouth.     • rosuvastatin (CRESTOR) 5 MG tablet TAKE ONE TABLET BY MOUTH ONE TIME DAILY  30 tablet 5   • ticagrelor (BRILINTA) 60 MG tablet tablet Take 60 mg by mouth 2 (Two) Times a Day.     • Zoster Vac Recomb Adjuvanted (SHINGRIX IM) ADM 0.5ML IM UTD  0   • FLUZONE QUADRIVALENT 0.5 ML suspension prefilled syringe injection ADM 0.5ML IM UTD  0   • HAVRIX 1440 EL U/ML vaccine ADM 1ML IM UTD  0   • imiquimod (ALDARA) 5 % cream      • levoFLOXacin (LEVAQUIN) 750 MG  tablet Take 1 tablet by mouth Daily. 7 tablet 0     No facility-administered medications prior to visit.      New Medications Ordered This Visit   Medications   • azithromycin (ZITHROMAX) 250 MG tablet     Sig: Take 2 tablets the first day, then 1 tablet daily for 4 days.     Dispense:  6 tablet     Refill:  0     [unfilled]  Medications Discontinued During This Encounter   Medication Reason   • FLUZONE QUADRIVALENT 0.5 ML suspension prefilled syringe injection *Therapy completed   • HAVRIX 1440 EL U/ML vaccine *Therapy completed   • imiquimod (ALDARA) 5 % cream *Therapy completed   • levoFLOXacin (LEVAQUIN) 750 MG tablet *Therapy completed   • Zoster Vac Recomb Adjuvanted (SHINGRIX IM) *Therapy completed         Return in about 1 week (around 1/24/2019).

## 2019-01-24 ENCOUNTER — OFFICE VISIT (OUTPATIENT)
Dept: INTERNAL MEDICINE | Facility: CLINIC | Age: 64
End: 2019-01-24

## 2019-01-24 VITALS
SYSTOLIC BLOOD PRESSURE: 100 MMHG | OXYGEN SATURATION: 97 % | WEIGHT: 180 LBS | RESPIRATION RATE: 16 BRPM | HEART RATE: 87 BPM | BODY MASS INDEX: 23.1 KG/M2 | TEMPERATURE: 97.8 F | HEIGHT: 74 IN | DIASTOLIC BLOOD PRESSURE: 64 MMHG

## 2019-01-24 DIAGNOSIS — J18.9 PNEUMONIA OF LEFT LOWER LOBE DUE TO INFECTIOUS ORGANISM: Primary | ICD-10-CM

## 2019-01-24 PROCEDURE — 99213 OFFICE O/P EST LOW 20 MIN: CPT | Performed by: FAMILY MEDICINE

## 2019-01-24 RX ORDER — ROSUVASTATIN CALCIUM 5 MG/1
5 TABLET, COATED ORAL DAILY
Qty: 90 TABLET | Refills: 1 | Status: SHIPPED | OUTPATIENT
Start: 2019-01-24 | End: 2019-07-08 | Stop reason: SDUPTHER

## 2019-01-24 NOTE — PROGRESS NOTES
Francisco Espino is a 63 y.o. male, who presents with a chief complaint of   Chief Complaint   Patient presents with   • Pneumonia     recheck       HPI     Pt here to f/u pneumonia.  He took a course of levofloxacin, followed by a course of azithromycin when he was still having fevers.  He reports that he is feeling much better.  He has been afebrile X 4 days and his energy has returned.  His shortness of breath and cough have resolved as well.  Appetite is normal.    The following portions of the patient's history were reviewed and updated as appropriate: allergies, current medications, past family history, past medical history, past social history, past surgical history and problem list.    Allergies: Codeine    Review of Systems   Constitutional: Negative for chills, fatigue and fever.   HENT: Negative.    Respiratory: Negative for cough, shortness of breath and wheezing.    Cardiovascular: Negative.    Gastrointestinal: Negative.    Neurological: Negative.    Psychiatric/Behavioral: Negative.              Wt Readings from Last 3 Encounters:   01/24/19 81.6 kg (180 lb)   01/17/19 81.2 kg (179 lb)   01/09/19 84.4 kg (186 lb)     Temp Readings from Last 3 Encounters:   01/24/19 97.8 °F (36.6 °C)   01/17/19 97.8 °F (36.6 °C)   01/09/19 99.2 °F (37.3 °C)     BP Readings from Last 3 Encounters:   01/24/19 100/64   01/17/19 112/80   01/09/19 128/76     Pulse Readings from Last 3 Encounters:   01/24/19 87   01/17/19 93   01/09/19 74     Body mass index is 23.11 kg/m².  @LASTSAO2(3)@    Physical Exam   Constitutional: He is oriented to person, place, and time. He appears well-developed and well-nourished. No distress.   HENT:   Head: Normocephalic and atraumatic.   Eyes: Conjunctivae and EOM are normal.   Neck: Neck supple. No thyromegaly present.   Cardiovascular: Normal rate and regular rhythm.   Pulmonary/Chest: Effort normal and breath sounds normal. No respiratory distress. He has no wheezes. He has no rales.    Musculoskeletal: Normal range of motion. He exhibits no edema.   Neurological: He is alert and oriented to person, place, and time.   Skin: Skin is warm and dry.   Psychiatric: He has a normal mood and affect. His behavior is normal.   Nursing note and vitals reviewed.      Results for orders placed or performed in visit on 01/17/19   C-reactive Protein   Result Value Ref Range    C-Reactive Protein 11.80 (H) 0.00 - 0.50 mg/dL   Manual Differential   Result Value Ref Range    Neutrophil Rel % 28.0 (L) 45.0 - 70.0 %    Lymphocyte Rel % 64.0 (H) 20.0 - 45.0 %    Monocyte Rel % 1.0 (L) 3.0 - 8.0 %    Eosinophil Rel % 1.0 0.0 - 4.0 %    Basophil Rel % 0.0 0.0 - 2.0 %    Neutrophils Absolute 11.45 (H) 1.50 - 8.30 10*3/mm3    Lymphocytes Absolute 25.27 (H) 0.60 - 4.80 10*3/mm3    Monocytes Absolute 0.39 0.00 - 1.00 10*3/mm3    Eosinophil Abs 0.39 (H) 0.10 - 0.30 10*3/mm3    Basophils Absolute 0.00 0.00 - 0.20 10*3/mm3    Differential Comment Comment     Comment Comment     Plt Comment Comment    CBC & Differential   Result Value Ref Range    WBC 39.49 (H) 4.80 - 10.80 10*3/mm3    RBC 3.57 (L) 4.70 - 6.10 10*6/mm3    Hemoglobin 11.8 (L) 14.0 - 18.0 g/dL    Hematocrit 36.2 (L) 42.0 - 52.0 %    .4 (H) 80.0 - 94.0 fL    MCH 33.1 (H) 27.0 - 31.0 pg    MCHC 32.6 31.0 - 37.0 g/dL    RDW 13.4 11.5 - 14.5 %    Platelets 252 140 - 500 10*3/mm3    Neutrophil Rel % 28.7 (L) 45.0 - 70.0 %    Lymphocyte Rel % 68.2 (H) 20.0 - 45.0 %    Monocyte Rel % 2.0 (L) 3.0 - 8.0 %    Eosinophil Rel % 0.4 0.0 - 4.0 %    Basophil Rel % 0.1 0.0 - 2.0 %    Neutrophils Absolute 11.34 (H) 1.50 - 8.30 10*3/mm3    Lymphocytes Absolute 26.93 (H) 0.60 - 4.80 10*3/mm3    Monocytes Absolute 0.79 0.00 - 1.00 10*3/mm3    Eosinophils Absolute 0.17 0.10 - 0.30 10*3/mm3    Basophils Absolute 0.03 0.00 - 0.20 10*3/mm3    Immature Granulocyte Rel % 0.6 (H) 0.0 - 0.5 %    Immature Grans Absolute 0.23 (H) 0.00 - 0.03 10*3/mm3    nRBC 0.0 0.0 - 0.0 /100 WBC            Francisco was seen today for pneumonia.    Diagnoses and all orders for this visit:    Pneumonia of left lower lobe due to infectious organism (CMS/HCC)  -     XR Chest PA & Lateral; Future    Other orders  -     rosuvastatin (CRESTOR) 5 MG tablet; Take 1 tablet by mouth Daily.      Symptoms resolved.  CXR and labs reviewed.  Repeat chest x-ray in 4 weeks.    Outpatient Medications Prior to Visit   Medication Sig Dispense Refill   • aspirin 81 MG EC tablet Take 81 mg by mouth Daily.     • azithromycin (ZITHROMAX) 250 MG tablet Take 2 tablets the first day, then 1 tablet daily for 4 days. 6 tablet 0   • carvedilol (COREG) 6.25 MG tablet TAKE 1 TABLET TWICE A DAY WITH MEALS 180 tablet 3   • KLS ALLERCLEAR D-12HR 5-120 MG per 12 hr tablet TAKE ONE TABLET BY MOUTH ONE TIME DAILY  80 tablet 2   • lisinopril (PRINIVIL,ZESTRIL) 5 MG tablet TAKE 1 TABLET DAILY 90 tablet 3   • Multiple Vitamins-Minerals (EYE VITAMINS & MINERALS PO) Take  by mouth.     • ticagrelor (BRILINTA) 60 MG tablet tablet Take 60 mg by mouth 2 (Two) Times a Day.     • rosuvastatin (CRESTOR) 5 MG tablet TAKE ONE TABLET BY MOUTH ONE TIME DAILY  30 tablet 5     No facility-administered medications prior to visit.      New Medications Ordered This Visit   Medications   • rosuvastatin (CRESTOR) 5 MG tablet     Sig: Take 1 tablet by mouth Daily.     Dispense:  90 tablet     Refill:  1     [unfilled]  Medications Discontinued During This Encounter   Medication Reason   • rosuvastatin (CRESTOR) 5 MG tablet Reorder         No Follow-up on file.

## 2019-02-10 PROBLEM — K40.90 INGUINAL HERNIA: Status: ACTIVE | Noted: 2019-02-10

## 2019-02-10 NOTE — PROGRESS NOTES
SURGERY  Francisco SOSA Srinivas   1955 02/13/19    Chief Complaint: Recurrent left inguinal hernia    HPI    Patient is a very pleasant 63 y.o. male who was referred by Dr. Aparna Pelaez, with a recurrent left inguinal hernia.  Unfortunately he had an open repair on the left side in his late 20s.  He has a complicated hernia history, with 2 prior right sided repairs by Dr. Mcdonald, and finally a laparoscopic right inguinal hernia by me in 2002.  I opened up the old EMR system in order to ascertain that the procedure was done via transperitoneal approach, using a 7-1/2 x 15 cm piece of [place].  He never had any trouble with that and has always been pleased.    He also has a somewhat complicated history in the context that he is on Brilinta and aspirin, having undergone a stent placement, cared for by Dr. James Moy at [facility].  He just saw him last week and was given a clean bill of health, but did not discuss anything as far as the potential need to stop his medications.    Further complicating medical decision making for or against surgery are his chronic lymphocytic leukemia, with WBC running in the 30-50Ks.  His problem list includes thrombocytopenia, but his most recent value inn 2019 was 252K.  Dr. Guerra has not recommended any treatment at this time.    His last colonoscopy was in 2006 and he had a normal Cologuard recently.    Past Medical History:   Diagnosis Date   • Abnormal liver function test    • CLL (chronic lymphocytic leukemia) (CMS/HCC)    • Heart disease    • Hyperlipidemia    • Hypertension    • Screen for colon cancer 09/2016    Negative Cologaurd   • Screening PSA (prostate specific antigen) 02/2013    .5   • Thrombocytopenia (CMS/HCC)      Past Surgical History:   Procedure Laterality Date   • CATARACT EXTRACTION Bilateral    • COLONOSCOPY N/A 06/05/2006    Normal, repeat in 10 years, Dr. Preethi Gonzalez   • CORONARY ANGIOPLASTY WITH STENT PLACEMENT N/A 05/09/2017    Left heart catheterization,  Selective native vessel coronary arteriography, Left ventriculography, Successful percutaneous intervention to the 100% occluded right coronary artery with a 3.5 x 38 mm Synergy drug-eluting stent (post-dilated w/ a 4.0 x 20 mm NC Emerge balloon), Selective right femoral angiography, Successful Perclose arteriotomy closure. Dr. James Pierson   • INGUINAL HERNIA REPAIR Left    • INGUINAL HERNIA REPAIR Right     x2   • LAPAROSCOPIC INGUINAL HERNIA REPAIR Right 10/03/2002    w/ extra peritoneal [place]-Benton mesh (transperitoneal repair), Dr. Preethi Gonzalez   • REFRACTIVE SURGERY Bilateral    • RETINAL DETACHMENT SURGERY Right 07/02/2013     Family History   Problem Relation Age of Onset   • Heart attack Mother    • Heart disease Mother    • Diabetes Mother    • Aortic aneurysm Mother    • No Known Problems Father    • Diabetes type II Other    • Diabetes Brother    • Hyperlipidemia Brother    • Stroke Sister 65   • No Known Problems Brother      Social History     Socioeconomic History   • Marital status:      Spouse name: Charlotte Espino   • Number of children: Not on file   • Years of education: Not on file   • Highest education level: Not on file   Social Needs   • Financial resource strain: Not on file   • Food insecurity - worry: Not on file   • Food insecurity - inability: Not on file   • Transportation needs - medical: Not on file   • Transportation needs - non-medical: Not on file   Occupational History   • Occupation: Sales     Employer: [employer]   Tobacco Use   • Smoking status: Never Smoker   • Smokeless tobacco: Never Used   Substance and Sexual Activity   • Alcohol use: Yes     Comment: several a day   • Drug use: No   • Sexual activity: Defer   Other Topics Concern   • Not on file   Social History Narrative   • Not on file         Current Outpatient Medications:   •  aspirin 81 MG EC tablet, Take 81 mg by mouth Daily., Disp: , Rfl:   •  carvedilol (COREG) 6.25 MG tablet, TAKE 1 TABLET TWICE  "A DAY WITH MEALS, Disp: 180 tablet, Rfl: 3  •  KLS ALLERCLEAR D-12HR 5-120 MG per 12 hr tablet, TAKE ONE TABLET BY MOUTH ONE TIME DAILY , Disp: 80 tablet, Rfl: 2  •  lisinopril (PRINIVIL,ZESTRIL) 5 MG tablet, TAKE 1 TABLET DAILY, Disp: 90 tablet, Rfl: 3  •  Multiple Vitamins-Minerals (EYE VITAMINS & MINERALS PO), Take 1 tablet by mouth Daily., Disp: , Rfl:   •  Multiple Vitamins-Minerals (MULTIVITAMIN PO), Take 1 tablet by mouth Daily., Disp: , Rfl:   •  rosuvastatin (CRESTOR) 5 MG tablet, Take 1 tablet by mouth Daily., Disp: 90 tablet, Rfl: 1  •  ticagrelor (BRILINTA) 60 MG tablet tablet, Take 60 mg by mouth 2 (Two) Times a Day., Disp: , Rfl:     Allergies   Allergen Reactions   • Codeine Nausea Only     Review of Systems   Genitourinary: Positive for scrotal swelling. Negative for testicular pain.   All other systems reviewed and are negative.      Vitals:    02/13/19 1350   Pulse: 89   SpO2: 94%   Weight: 85.3 kg (188 lb)   Height: 188 cm (74\")       PHYSICAL EXAM:    Pulse 89   Ht 188 cm (74\")   Wt 85.3 kg (188 lb)   SpO2 94%   BMI 24.14 kg/m²   Body mass index is 24.14 kg/m².    Constitutional: well developed, well nourished, appears very healthy, stated age, thin  Eyes: sclera nonicteric, conjunctiva not injected   ENMT: Hearing intact, trachea midline, thyroid without masses  CVS: RRR, no murmur, peripheral edema not present  Respiratory: CTA, normal respiratory effort   Gastrointestinal: no hepatosplenomegaly, abdomen soft, nontender, no guarding, no rebound   Genitourinary: inguinal hernia visible and quite large on the left, with a diameter of about 4 cm, protruding down into the scrotum, but reducible  Musculoskeletal: gait normal, muscle mass normal  Skin: warm and dry, no rashes visible  Neurological: awake and alert, seems to have reasonable capacity for understanding for medical decision making  Psychiatric: appears to have reasonable judgement, pleasant  Lymphatics: no cervical " adenopathy    Radiographic Findings: Chest x-ray 1/17/2019 showed a left base infiltrate.  Patient indicates that he had symptoms that have now resolved    Lab Findings: Hemoglobin 11.8, white count 39.5 on 1/17/2019    Pamphlet reviewed: Laparoscopic inguinal hernia repair    IMPRESSION:  · Recurrent left inguinal hernia, that is quite large, with what is probably a chronic sac  · Prior hernia repair on the right x 2, with recurrence, with repair that remains done by me thereafter in 2002.  This was done via a transperitoneal approach with [place]  · Hypertension on Coreg and lisinopril  · Hyperlipidemia on Crestor  · Status post MI with stents on Brilinta and aspirin  · Intolerance for codeine.  Patient states now it likely was related to taking that on an empty stomach and he can take synthetic codeine    PLAN:  · Attempted laparoscopic left recurrent inguinal hernia repair.  I described to the patient that since he has had an open repair there, it would be much preferable to do it laparoscopically, since he has had mesh placed on the right, it may interfere with our extraperitoneal approach, and we may have to convert to either a transperitoneal or an open.  At any rate, it needs to be fixed as it is quite large.  · Consult with cardiology as to whether the patient can be off his Brilinta and aspirin for 3 days.  Since there is a fairly large area of space that is developed for the hernia repair behind the abdominal wall, I would consider this a high risk for bleed status.  We will send this note to Dr. Moy so that he can have his input.  Patient will attempt to contact them as well.  · Continue all other meds perioperatively  · Kefzol, OxyContin 10, Tylenol 1000 mg, SCDs and holding.    Preethi Gonzalez MD  02/13/19  5:32 PM

## 2019-02-13 ENCOUNTER — OFFICE VISIT (OUTPATIENT)
Dept: SURGERY | Facility: CLINIC | Age: 64
End: 2019-02-13

## 2019-02-13 ENCOUNTER — PREP FOR SURGERY (OUTPATIENT)
Dept: OTHER | Facility: HOSPITAL | Age: 64
End: 2019-02-13

## 2019-02-13 VITALS — BODY MASS INDEX: 24.13 KG/M2 | HEIGHT: 74 IN | OXYGEN SATURATION: 94 % | HEART RATE: 89 BPM | WEIGHT: 188 LBS

## 2019-02-13 DIAGNOSIS — F10.10 ALCOHOL ABUSE: Primary | ICD-10-CM

## 2019-02-13 DIAGNOSIS — N17.0 ACUTE RENAL FAILURE WITH TUBULAR NECROSIS (HCC): ICD-10-CM

## 2019-02-13 DIAGNOSIS — Z95.820 S/P ANGIOPLASTY WITH STENT: ICD-10-CM

## 2019-02-13 DIAGNOSIS — I25.119 CORONARY ARTERY DISEASE INVOLVING NATIVE CORONARY ARTERY OF NATIVE HEART WITH ANGINA PECTORIS (HCC): ICD-10-CM

## 2019-02-13 DIAGNOSIS — K40.90 LEFT INGUINAL HERNIA: Primary | ICD-10-CM

## 2019-02-13 DIAGNOSIS — C91.10 CHRONIC LYMPHOCYTIC LEUKEMIA (HCC): ICD-10-CM

## 2019-02-13 DIAGNOSIS — K40.90 INGUINAL HERNIA WITHOUT OBSTRUCTION OR GANGRENE, RECURRENCE NOT SPECIFIED, UNSPECIFIED LATERALITY: ICD-10-CM

## 2019-02-13 DIAGNOSIS — K40.90 LEFT INGUINAL HERNIA: ICD-10-CM

## 2019-02-13 PROCEDURE — 99244 OFF/OP CNSLTJ NEW/EST MOD 40: CPT | Performed by: SURGERY

## 2019-02-13 RX ORDER — CEFAZOLIN SODIUM 2 G/100ML
2 INJECTION, SOLUTION INTRAVENOUS ONCE
Status: CANCELLED | OUTPATIENT
Start: 2019-03-14 | End: 2019-02-13

## 2019-02-13 RX ORDER — ACETAMINOPHEN 500 MG
1000 TABLET ORAL ONCE
Status: CANCELLED | OUTPATIENT
Start: 2019-03-14 | End: 2019-02-13

## 2019-02-13 RX ORDER — OXYCODONE HCL 10 MG/1
10 TABLET, FILM COATED, EXTENDED RELEASE ORAL ONCE
Status: CANCELLED | OUTPATIENT
Start: 2019-03-14 | End: 2019-02-13

## 2019-03-04 ENCOUNTER — TELEPHONE (OUTPATIENT)
Dept: SURGERY | Facility: CLINIC | Age: 64
End: 2019-03-04

## 2019-03-07 ENCOUNTER — APPOINTMENT (OUTPATIENT)
Dept: PREADMISSION TESTING | Facility: HOSPITAL | Age: 64
End: 2019-03-07

## 2019-03-21 ENCOUNTER — APPOINTMENT (OUTPATIENT)
Dept: PREADMISSION TESTING | Facility: HOSPITAL | Age: 64
End: 2019-03-21

## 2019-03-21 VITALS
WEIGHT: 189 LBS | SYSTOLIC BLOOD PRESSURE: 123 MMHG | RESPIRATION RATE: 16 BRPM | DIASTOLIC BLOOD PRESSURE: 79 MMHG | HEART RATE: 93 BPM | OXYGEN SATURATION: 97 % | BODY MASS INDEX: 24.26 KG/M2 | HEIGHT: 74 IN | TEMPERATURE: 97.5 F

## 2019-03-21 DIAGNOSIS — K40.90 LEFT INGUINAL HERNIA: ICD-10-CM

## 2019-03-21 LAB
ABO GROUP BLD: NORMAL
ANION GAP SERPL CALCULATED.3IONS-SCNC: 12.8 MMOL/L
BLD GP AB SCN SERPL QL: NEGATIVE
BUN BLD-MCNC: 13 MG/DL (ref 8–23)
BUN/CREAT SERPL: 12 (ref 7–25)
CALCIUM SPEC-SCNC: 9.7 MG/DL (ref 8.6–10.5)
CHLORIDE SERPL-SCNC: 103 MMOL/L (ref 98–107)
CO2 SERPL-SCNC: 26.2 MMOL/L (ref 22–29)
CREAT BLD-MCNC: 1.08 MG/DL (ref 0.76–1.27)
DEPRECATED RDW RBC AUTO: 54.6 FL (ref 37–54)
ERYTHROCYTE [DISTWIDTH] IN BLOOD BY AUTOMATED COUNT: 14.6 % (ref 12.3–15.4)
GFR SERPL CREATININE-BSD FRML MDRD: 69 ML/MIN/1.73
GLUCOSE BLD-MCNC: 114 MG/DL (ref 65–99)
HCT VFR BLD AUTO: 37.2 % (ref 37.5–51)
HGB BLD-MCNC: 12.1 G/DL (ref 13–17.7)
MCH RBC QN AUTO: 33.1 PG (ref 26.6–33)
MCHC RBC AUTO-ENTMCNC: 32.5 G/DL (ref 31.5–35.7)
MCV RBC AUTO: 101.6 FL (ref 79–97)
PLATELET # BLD AUTO: 133 10*3/MM3 (ref 140–450)
PMV BLD AUTO: 10.4 FL (ref 6–12)
POTASSIUM BLD-SCNC: 4.4 MMOL/L (ref 3.5–5.2)
RBC # BLD AUTO: 3.66 10*6/MM3 (ref 4.14–5.8)
RH BLD: POSITIVE
SODIUM BLD-SCNC: 142 MMOL/L (ref 136–145)
T&S EXPIRATION DATE: NORMAL
WBC NRBC COR # BLD: 47.79 10*3/MM3 (ref 3.4–10.8)

## 2019-03-21 PROCEDURE — 86901 BLOOD TYPING SEROLOGIC RH(D): CPT | Performed by: SURGERY

## 2019-03-21 PROCEDURE — 85027 COMPLETE CBC AUTOMATED: CPT | Performed by: SURGERY

## 2019-03-21 PROCEDURE — 86850 RBC ANTIBODY SCREEN: CPT | Performed by: SURGERY

## 2019-03-21 PROCEDURE — 86900 BLOOD TYPING SEROLOGIC ABO: CPT | Performed by: SURGERY

## 2019-03-21 PROCEDURE — 36415 COLL VENOUS BLD VENIPUNCTURE: CPT

## 2019-03-21 PROCEDURE — 80048 BASIC METABOLIC PNL TOTAL CA: CPT | Performed by: SURGERY

## 2019-03-21 RX ORDER — CARVEDILOL 6.25 MG/1
6.25 TABLET ORAL 2 TIMES DAILY WITH MEALS
COMMUNITY
End: 2019-04-15 | Stop reason: SDUPTHER

## 2019-03-21 RX ORDER — LISINOPRIL 5 MG/1
5 TABLET ORAL EVERY MORNING
COMMUNITY
End: 2019-04-17 | Stop reason: SDUPTHER

## 2019-03-21 RX ORDER — ACETAMINOPHEN 500 MG
1000 TABLET ORAL EVERY 6 HOURS PRN
COMMUNITY

## 2019-03-21 NOTE — DISCHARGE INSTRUCTIONS
2% CHLORAHEXIDINE GLUCONATE* CLOTH  Preparing or “prepping” skin before surgery can reduce the risk of infection at the surgical site. To make the process easier, [facility] has chosen disposable cloths moistened with a rinse-free, 2% Chlorhexidine Gluconate (CHG) antiseptic solution. The steps below outline the prepping process and should be carefully followed.        Use the prep cloth on the area that is circled in the diagram             Directions Night before Surgery  1) Shower using a fresh bar of anti-bacterial soap (such as Dial) and clean washcloth.  Use a clean towel to completely dry your skin.  2) Do not use any lotions, oils or creams on your skin.  3) Open the package and remove 1 cloth, wipe your skin for 30 seconds in a circular motion.  Allow to dry for 3 minutes.  4) Repeat #3 with second cloth.  5) Do not touch your eyes, ears, or mouth with the prep cloth.  6) Allow the wet prep solution to air dry.  7) Discard the prep cloth and wash your hands with soap and water.   8) Dress in clean bed clothes and sleep on fresh clean bed sheets.   9) You may experience some temporary itching after the prep.    Directions Day of Surgery  1) Repeat steps 1,2,3,4,5,6,7, and 9.   2) Dress in clean clothes before coming to the hospital.  Take the following medications the morning of surgery with a small sip of water:        General Instructions:  • Do not eat solid food after midnight the night before surgery.  • You may drink clear liquids day of surgery but must stop at least one hour before your hospital arrival time.  • It is beneficial for you to have a clear drink that contains carbohydrates the day of surgery.  We suggest a 12 to 20 ounce bottle of Gatorade or Powerade for non-diabetic patients or a 12 to 20 ounce bottle of G2 or Powerade Zero for diabetic patients. (Pediatric patients, are not advised to drink a 12 to 20 ounce carbohydrate drink)    Clear liquids are liquids you can see  through.  Nothing red in color.     Plain water                               Sports drinks  Sodas                                   Gelatin (Jell-O)  Fruit juices without pulp such as white grape juice and apple juice  Popsicles that contain no fruit or yogurt  Tea or coffee (no cream or milk added)  Gatorade / Powerade  G2 / Powerade Zero    • Infants may have breast milk up to four hours before surgery.  • Infants drinking formula may drink formula up to six hours before surgery.   • Patients who avoid smoking, chewing tobacco and alcohol for 4 weeks prior to surgery have a reduced risk of post-operative complications.  Quit smoking as many days before surgery as you can.  • Do not smoke, use chewing tobacco or drink alcohol the day of surgery.   • If applicable bring your C-PAP/ BI-PAP machine.  • Bring any papers given to you in the doctor’s office.  • Wear clean comfortable clothes and socks.  • Do not wear contact lenses or make-up.  Bring a case for your glasses.   • Bring crutches or walker if applicable.  • Remove all piercings.  Leave jewelry and any other valuables at home.  • Hair extensions with metal clips must be removed prior to surgery.  • The Pre-Admission Testing nurse will instruct you to bring medications if unable to obtain an accurate list in Pre-Admission Testing.        If you were given a blood bank ID arm band remember to bring it with you the day of surgery.    Preventing a Surgical Site Infection:  • For 2 to 3 days before surgery, avoid shaving with a razor because the razor can irritate skin and make it easier to develop an infection.    • Any areas of open skin can increase the risk of a post-operative wound infection by allowing bacteria to enter and travel throughout the body.  Notify your surgeon if you have any skin wounds / rashes even if it is not near the expected surgical site.  The area will need assessed to determine if surgery should be delayed until it is healed.  • The  night prior to surgery sleep in a clean bed with clean clothing.  Do not allow pets to sleep with you.  • Shower on the morning of surgery using a fresh bar of anti-bacterial soap (such as Dial) and clean washcloth.  Dry with a clean towel and dress in clean clothing.  • Ask your surgeon if you will be receiving antibiotics prior to surgery.  • Make sure you, your family, and all healthcare providers clean their hands with soap and water or an alcohol based hand  before caring for you or your wound.    Day of surgery:3/28/19   0545  Upon arrival, a Pre-op nurse and Anesthesiologist will review your health history, obtain vital signs, and answer questions you may have.  The only belongings needed at this time will be your home medications and if applicable your C-PAP/BI-PAP machine.  If you are staying overnight your family can leave the rest of your belongings in the car and bring them to your room later.  A Pre-op nurse will start an IV and you may receive medication in preparation for surgery, including something to help you relax.  Your family will be able to see you in the Pre-op area.  While you are in surgery your family should notify the waiting room  if they leave the waiting room area and provide a contact phone number.    Please be aware that surgery does come with discomfort.  We want to make every effort to control your discomfort so please discuss any uncontrolled symptoms with your nurse.   Your doctor will most likely have prescribed pain medications.      If you are going home after surgery you will receive individualized written care instructions before being discharged.  A responsible adult must drive you to and from the hospital on the day of your surgery and stay with you for 24 hours.    If you are staying overnight following surgery, you will be transported to your hospital room following the recovery period.  [facility] has all private rooms.    You have  received a list of surgical assistants for your reference.  If you have any questions please call Pre-Admission Testing at [phone].  Deductibles and co-payments are collected on the day of service. Please be prepared to pay the required co-pay, deductible or deposit on the day of service as defined by your plan.

## 2019-03-22 ENCOUNTER — TELEPHONE (OUTPATIENT)
Dept: SURGERY | Facility: CLINIC | Age: 64
End: 2019-03-22

## 2019-03-28 ENCOUNTER — ANESTHESIA EVENT (OUTPATIENT)
Dept: PERIOP | Facility: HOSPITAL | Age: 64
End: 2019-03-28

## 2019-03-28 ENCOUNTER — ANESTHESIA (OUTPATIENT)
Dept: PERIOP | Facility: HOSPITAL | Age: 64
End: 2019-03-28

## 2019-03-28 ENCOUNTER — HOSPITAL ENCOUNTER (OUTPATIENT)
Facility: HOSPITAL | Age: 64
Setting detail: HOSPITAL OUTPATIENT SURGERY
Discharge: HOME | End: 2019-03-28
Attending: SURGERY | Admitting: SURGERY

## 2019-03-28 VITALS
WEIGHT: 190.04 LBS | TEMPERATURE: 98.3 F | RESPIRATION RATE: 16 BRPM | SYSTOLIC BLOOD PRESSURE: 121 MMHG | DIASTOLIC BLOOD PRESSURE: 84 MMHG | OXYGEN SATURATION: 94 % | HEART RATE: 78 BPM | BODY MASS INDEX: 24.4 KG/M2

## 2019-03-28 DIAGNOSIS — K40.90 LEFT INGUINAL HERNIA: ICD-10-CM

## 2019-03-28 PROCEDURE — 25010000002 FENTANYL CITRATE (PF) 100 MCG/2ML SOLUTION: Performed by: ANESTHESIOLOGY

## 2019-03-28 PROCEDURE — 25010000002 ONDANSETRON PER 1 MG: Performed by: NURSE ANESTHETIST, CERTIFIED REGISTERED

## 2019-03-28 PROCEDURE — 25010000002 MIDAZOLAM PER 1 MG: Performed by: ANESTHESIOLOGY

## 2019-03-28 PROCEDURE — 25010000003 CEFAZOLIN IN DEXTROSE 2-4 GM/100ML-% SOLUTION: Performed by: SURGERY

## 2019-03-28 PROCEDURE — 25010000002 FENTANYL CITRATE (PF) 100 MCG/2ML SOLUTION: Performed by: NURSE ANESTHETIST, CERTIFIED REGISTERED

## 2019-03-28 PROCEDURE — 25010000002 DEXAMETHASONE PER 1 MG: Performed by: NURSE ANESTHETIST, CERTIFIED REGISTERED

## 2019-03-28 PROCEDURE — C1781 MESH (IMPLANTABLE): HCPCS | Performed by: SURGERY

## 2019-03-28 PROCEDURE — 49651 LAP ING HERNIA REPAIR RECUR: CPT | Performed by: SPECIALIST/TECHNOLOGIST, OTHER

## 2019-03-28 PROCEDURE — 25010000002 PROPOFOL 10 MG/ML EMULSION: Performed by: NURSE ANESTHETIST, CERTIFIED REGISTERED

## 2019-03-28 PROCEDURE — 49651 LAP ING HERNIA REPAIR RECUR: CPT | Performed by: SURGERY

## 2019-03-28 DEVICE — CLIP LIG HEMOLOK PA 6CT MD/LG GRN: Type: IMPLANTABLE DEVICE | Status: FUNCTIONAL

## 2019-03-28 DEVICE — BARD 3DMAX MESH LEFT LARGE
Type: IMPLANTABLE DEVICE | Status: FUNCTIONAL
Brand: BARD 3DMAX MESH

## 2019-03-28 RX ORDER — SODIUM CHLORIDE 0.9 % (FLUSH) 0.9 %
3 SYRINGE (ML) INJECTION EVERY 12 HOURS SCHEDULED
Status: DISCONTINUED | OUTPATIENT
Start: 2019-03-28 | End: 2019-03-28 | Stop reason: HOSPADM

## 2019-03-28 RX ORDER — MAGNESIUM HYDROXIDE 1200 MG/15ML
LIQUID ORAL AS NEEDED
Status: DISCONTINUED | OUTPATIENT
Start: 2019-03-28 | End: 2019-03-28 | Stop reason: HOSPADM

## 2019-03-28 RX ORDER — LIDOCAINE HYDROCHLORIDE 10 MG/ML
0.5 INJECTION, SOLUTION EPIDURAL; INFILTRATION; INTRACAUDAL; PERINEURAL ONCE AS NEEDED
Status: DISCONTINUED | OUTPATIENT
Start: 2019-03-28 | End: 2019-03-28 | Stop reason: HOSPADM

## 2019-03-28 RX ORDER — HYDROMORPHONE HYDROCHLORIDE 1 MG/ML
0.5 INJECTION, SOLUTION INTRAMUSCULAR; INTRAVENOUS; SUBCUTANEOUS
Status: DISCONTINUED | OUTPATIENT
Start: 2019-03-28 | End: 2019-03-28 | Stop reason: HOSPADM

## 2019-03-28 RX ORDER — ROCURONIUM BROMIDE 10 MG/ML
INJECTION, SOLUTION INTRAVENOUS AS NEEDED
Status: DISCONTINUED | OUTPATIENT
Start: 2019-03-28 | End: 2019-03-28 | Stop reason: SURG

## 2019-03-28 RX ORDER — OXYCODONE AND ACETAMINOPHEN 7.5; 325 MG/1; MG/1
1 TABLET ORAL EVERY 4 HOURS PRN
Status: DISCONTINUED | OUTPATIENT
Start: 2019-03-28 | End: 2019-03-28 | Stop reason: HOSPADM

## 2019-03-28 RX ORDER — LIDOCAINE HYDROCHLORIDE 20 MG/ML
INJECTION, SOLUTION INFILTRATION; PERINEURAL AS NEEDED
Status: DISCONTINUED | OUTPATIENT
Start: 2019-03-28 | End: 2019-03-28 | Stop reason: SURG

## 2019-03-28 RX ORDER — PROMETHAZINE HYDROCHLORIDE 25 MG/1
25 TABLET ORAL ONCE AS NEEDED
Status: DISCONTINUED | OUTPATIENT
Start: 2019-03-28 | End: 2019-03-28 | Stop reason: HOSPADM

## 2019-03-28 RX ORDER — OXYCODONE HCL 10 MG/1
10 TABLET, FILM COATED, EXTENDED RELEASE ORAL ONCE
Status: COMPLETED | OUTPATIENT
Start: 2019-03-28 | End: 2019-03-28

## 2019-03-28 RX ORDER — SODIUM CHLORIDE 9 MG/ML
INJECTION, SOLUTION INTRAVENOUS AS NEEDED
Status: DISCONTINUED | OUTPATIENT
Start: 2019-03-28 | End: 2019-03-28 | Stop reason: HOSPADM

## 2019-03-28 RX ORDER — PROMETHAZINE HYDROCHLORIDE 25 MG/ML
6.25 INJECTION, SOLUTION INTRAMUSCULAR; INTRAVENOUS ONCE AS NEEDED
Status: DISCONTINUED | OUTPATIENT
Start: 2019-03-28 | End: 2019-03-28 | Stop reason: HOSPADM

## 2019-03-28 RX ORDER — OXYCODONE HYDROCHLORIDE AND ACETAMINOPHEN 5; 325 MG/1; MG/1
1 TABLET ORAL EVERY 6 HOURS PRN
Qty: 10 TABLET | Refills: 0 | Status: SHIPPED | OUTPATIENT
Start: 2019-03-28 | End: 2019-04-08

## 2019-03-28 RX ORDER — SODIUM CHLORIDE, SODIUM LACTATE, POTASSIUM CHLORIDE, CALCIUM CHLORIDE 600; 310; 30; 20 MG/100ML; MG/100ML; MG/100ML; MG/100ML
9 INJECTION, SOLUTION INTRAVENOUS CONTINUOUS
Status: DISCONTINUED | OUTPATIENT
Start: 2019-03-28 | End: 2019-03-28 | Stop reason: HOSPADM

## 2019-03-28 RX ORDER — CEFAZOLIN SODIUM 2 G/100ML
2 INJECTION, SOLUTION INTRAVENOUS ONCE
Status: COMPLETED | OUTPATIENT
Start: 2019-03-28 | End: 2019-03-28

## 2019-03-28 RX ORDER — MIDAZOLAM HYDROCHLORIDE 1 MG/ML
1 INJECTION INTRAMUSCULAR; INTRAVENOUS
Status: DISCONTINUED | OUTPATIENT
Start: 2019-03-28 | End: 2019-03-28 | Stop reason: HOSPADM

## 2019-03-28 RX ORDER — ONDANSETRON 2 MG/ML
4 INJECTION INTRAMUSCULAR; INTRAVENOUS ONCE AS NEEDED
Status: DISCONTINUED | OUTPATIENT
Start: 2019-03-28 | End: 2019-03-28 | Stop reason: HOSPADM

## 2019-03-28 RX ORDER — EPHEDRINE SULFATE 50 MG/ML
5 INJECTION, SOLUTION INTRAVENOUS ONCE AS NEEDED
Status: DISCONTINUED | OUTPATIENT
Start: 2019-03-28 | End: 2019-03-28 | Stop reason: HOSPADM

## 2019-03-28 RX ORDER — ONDANSETRON 2 MG/ML
INJECTION INTRAMUSCULAR; INTRAVENOUS AS NEEDED
Status: DISCONTINUED | OUTPATIENT
Start: 2019-03-28 | End: 2019-03-28 | Stop reason: SURG

## 2019-03-28 RX ORDER — PROMETHAZINE HYDROCHLORIDE 25 MG/1
25 SUPPOSITORY RECTAL ONCE AS NEEDED
Status: DISCONTINUED | OUTPATIENT
Start: 2019-03-28 | End: 2019-03-28 | Stop reason: HOSPADM

## 2019-03-28 RX ORDER — BUPIVACAINE HYDROCHLORIDE AND EPINEPHRINE 5; 5 MG/ML; UG/ML
INJECTION, SOLUTION PERINEURAL AS NEEDED
Status: DISCONTINUED | OUTPATIENT
Start: 2019-03-28 | End: 2019-03-28 | Stop reason: HOSPADM

## 2019-03-28 RX ORDER — FLUMAZENIL 0.1 MG/ML
0.2 INJECTION INTRAVENOUS AS NEEDED
Status: DISCONTINUED | OUTPATIENT
Start: 2019-03-28 | End: 2019-03-28 | Stop reason: HOSPADM

## 2019-03-28 RX ORDER — PROPOFOL 10 MG/ML
VIAL (ML) INTRAVENOUS AS NEEDED
Status: DISCONTINUED | OUTPATIENT
Start: 2019-03-28 | End: 2019-03-28 | Stop reason: SURG

## 2019-03-28 RX ORDER — FAMOTIDINE 10 MG/ML
20 INJECTION, SOLUTION INTRAVENOUS ONCE
Status: COMPLETED | OUTPATIENT
Start: 2019-03-28 | End: 2019-03-28

## 2019-03-28 RX ORDER — SODIUM CHLORIDE 0.9 % (FLUSH) 0.9 %
1-10 SYRINGE (ML) INJECTION AS NEEDED
Status: DISCONTINUED | OUTPATIENT
Start: 2019-03-28 | End: 2019-03-28 | Stop reason: HOSPADM

## 2019-03-28 RX ORDER — FENTANYL CITRATE 50 UG/ML
100 INJECTION, SOLUTION INTRAMUSCULAR; INTRAVENOUS
Status: DISCONTINUED | OUTPATIENT
Start: 2019-03-28 | End: 2019-03-28 | Stop reason: HOSPADM

## 2019-03-28 RX ORDER — HYDROCODONE BITARTRATE AND ACETAMINOPHEN 7.5; 325 MG/1; MG/1
1 TABLET ORAL ONCE AS NEEDED
Status: DISCONTINUED | OUTPATIENT
Start: 2019-03-28 | End: 2019-03-28 | Stop reason: HOSPADM

## 2019-03-28 RX ORDER — FENTANYL CITRATE 50 UG/ML
50 INJECTION, SOLUTION INTRAMUSCULAR; INTRAVENOUS
Status: DISCONTINUED | OUTPATIENT
Start: 2019-03-28 | End: 2019-03-28 | Stop reason: HOSPADM

## 2019-03-28 RX ORDER — ACETAMINOPHEN 500 MG
1000 TABLET ORAL ONCE
Status: COMPLETED | OUTPATIENT
Start: 2019-03-28 | End: 2019-03-28

## 2019-03-28 RX ORDER — FENTANYL CITRATE 50 UG/ML
INJECTION, SOLUTION INTRAMUSCULAR; INTRAVENOUS AS NEEDED
Status: DISCONTINUED | OUTPATIENT
Start: 2019-03-28 | End: 2019-03-28 | Stop reason: SURG

## 2019-03-28 RX ORDER — MIDAZOLAM HYDROCHLORIDE 1 MG/ML
2 INJECTION INTRAMUSCULAR; INTRAVENOUS
Status: DISCONTINUED | OUTPATIENT
Start: 2019-03-28 | End: 2019-03-28 | Stop reason: HOSPADM

## 2019-03-28 RX ORDER — ONDANSETRON 4 MG/1
4 TABLET, ORALLY DISINTEGRATING ORAL EVERY 8 HOURS PRN
Qty: 10 TABLET | Refills: 0 | Status: SHIPPED | OUTPATIENT
Start: 2019-03-28 | End: 2019-04-08

## 2019-03-28 RX ORDER — DEXAMETHASONE SODIUM PHOSPHATE 10 MG/ML
INJECTION INTRAMUSCULAR; INTRAVENOUS AS NEEDED
Status: DISCONTINUED | OUTPATIENT
Start: 2019-03-28 | End: 2019-03-28 | Stop reason: SURG

## 2019-03-28 RX ADMIN — FENTANYL CITRATE 50 MCG: 50 INJECTION INTRAMUSCULAR; INTRAVENOUS at 08:01

## 2019-03-28 RX ADMIN — CEFAZOLIN SODIUM 2 G: 2 INJECTION, SOLUTION INTRAVENOUS at 07:40

## 2019-03-28 RX ADMIN — ROCURONIUM BROMIDE 40 MG: 10 INJECTION, SOLUTION INTRAVENOUS at 07:34

## 2019-03-28 RX ADMIN — FENTANYL CITRATE 50 MCG: 50 INJECTION, SOLUTION INTRAMUSCULAR; INTRAVENOUS at 09:07

## 2019-03-28 RX ADMIN — ONDANSETRON 4 MG: 2 INJECTION INTRAMUSCULAR; INTRAVENOUS at 08:23

## 2019-03-28 RX ADMIN — LIDOCAINE HYDROCHLORIDE 100 MG: 20 INJECTION, SOLUTION INFILTRATION; PERINEURAL at 07:34

## 2019-03-28 RX ADMIN — ROCURONIUM BROMIDE 10 MG: 10 INJECTION, SOLUTION INTRAVENOUS at 07:54

## 2019-03-28 RX ADMIN — MIDAZOLAM 1 MG: 1 INJECTION INTRAMUSCULAR; INTRAVENOUS at 06:49

## 2019-03-28 RX ADMIN — FAMOTIDINE 20 MG: 10 INJECTION INTRAVENOUS at 06:48

## 2019-03-28 RX ADMIN — SODIUM CHLORIDE, POTASSIUM CHLORIDE, SODIUM LACTATE AND CALCIUM CHLORIDE 9 ML/HR: 600; 310; 30; 20 INJECTION, SOLUTION INTRAVENOUS at 08:48

## 2019-03-28 RX ADMIN — PROPOFOL 200 MG: 10 INJECTION, EMULSION INTRAVENOUS at 07:34

## 2019-03-28 RX ADMIN — OXYCODONE HYDROCHLORIDE 10 MG: 10 TABLET, FILM COATED, EXTENDED RELEASE ORAL at 06:48

## 2019-03-28 RX ADMIN — ACETAMINOPHEN 1000 MG: 500 TABLET, FILM COATED ORAL at 06:48

## 2019-03-28 RX ADMIN — FENTANYL CITRATE 100 MCG: 50 INJECTION INTRAMUSCULAR; INTRAVENOUS at 07:34

## 2019-03-28 RX ADMIN — OXYCODONE HYDROCHLORIDE AND ACETAMINOPHEN 1 TABLET: 7.5; 325 TABLET ORAL at 08:48

## 2019-03-28 RX ADMIN — DEXAMETHASONE SODIUM PHOSPHATE 8 MG: 10 INJECTION INTRAMUSCULAR; INTRAVENOUS at 07:56

## 2019-03-28 RX ADMIN — SODIUM CHLORIDE, POTASSIUM CHLORIDE, SODIUM LACTATE AND CALCIUM CHLORIDE 9 ML/HR: 600; 310; 30; 20 INJECTION, SOLUTION INTRAVENOUS at 06:48

## 2019-03-28 RX ADMIN — FENTANYL CITRATE 50 MCG: 50 INJECTION INTRAMUSCULAR; INTRAVENOUS at 08:34

## 2019-03-28 RX ADMIN — FENTANYL CITRATE 50 MCG: 50 INJECTION, SOLUTION INTRAMUSCULAR; INTRAVENOUS at 09:14

## 2019-03-28 NOTE — ANESTHESIA POSTPROCEDURE EVALUATION
Patient: Francisco Espino    Procedure Summary     Date:  03/28/19 Room / Location:   [facility]  [facility]    Anesthesia Start:  0730 Anesthesia Stop:  0844    Procedure:  LEFT INGUINAL HERNIA REPAIR LAPAROSCOPIC (Left Abdomen) Diagnosis:       Left inguinal hernia      (Left inguinal hernia [K40.90])    Surgeon:  Preethi Gonzalez MD Provider:  Vicente Pack MD    Anesthesia Type:  general ASA Status:  3          Anesthesia Type: general  Last vitals  BP   121/84 (03/28/19 0946)   Temp   36.8 °C (98.3 °F) (03/28/19 0841)   Pulse   78 (03/28/19 0946)   Resp   16 (03/28/19 0946)     SpO2   94 % (03/28/19 0946)     Post Anesthesia Care and Evaluation    Patient location during evaluation: bedside  Patient participation: complete - patient participated  Level of consciousness: awake  Pain score: 1  Pain management: adequate  Airway patency: patent  Anesthetic complications: No anesthetic complications    Cardiovascular status: acceptable  Respiratory status: acceptable  Hydration status: acceptable    Comments: --------------------            03/28/19 0946     --------------------   BP:       121/84     Pulse:      78       Resp:       16       Temp:                SpO2:      94%      --------------------

## 2019-03-28 NOTE — ANESTHESIA PREPROCEDURE EVALUATION
Anesthesia Evaluation     Patient summary reviewed and Nursing notes reviewed   NPO Solid Status: > 8 hours             Airway   Mallampati: II  TM distance: >3 FB  Neck ROM: full  no difficulty expected  Dental - normal exam     Pulmonary - negative pulmonary ROS and normal exam   Cardiovascular - normal exam    (+) hypertension, past MI , CAD, cardiac stents more than 12 months ago     ROS comment: No cp/soa    Neuro/Psych- negative ROS  GI/Hepatic/Renal/Endo - negative ROS     Musculoskeletal (-) negative ROS    Abdominal  - normal exam   Substance History - negative use     OB/GYN negative ob/gyn ROS         Other        ROS/Med Hx Other: CLL                  Anesthesia Plan    ASA 3     general     intravenous induction   Anesthetic plan, all risks, benefits, and alternatives have been provided, discussed and informed consent has been obtained with: patient.    Plan discussed with CRNA.

## 2019-03-28 NOTE — ANESTHESIA PROCEDURE NOTES
Airway  Urgency: elective    Date/Time: 3/28/2019 7:50 AM  End Time:3/28/2019 7:50 AM  Airway not difficult    General Information and Staff    Patient location during procedure: OR  Anesthesiologist: Vicente Pack MD  CRNA: Shannan Fernandes CRNA    Indications and Patient Condition  Indications for airway management: airway protection    Preoxygenated: yes  MILS maintained throughout  Mask difficulty assessment: 1 - vent by mask    Final Airway Details  Final airway type: endotracheal airway      Successful airway: ETT  Cuffed: yes   Successful intubation technique: direct laryngoscopy  Endotracheal tube insertion site: oral  Blade: [place]  Blade size: 2  ETT size (mm): 7.5  Cormack-Lehane Classification: grade I - full view of glottis  Placement verified by: chest auscultation and capnometry   Measured from: lips  Number of attempts at approach: 1    Additional Comments  Atraumatic, Secured after verification of placement

## 2019-03-30 RX ORDER — BENZONATATE 100 MG/1
100 CAPSULE ORAL 3 TIMES DAILY PRN
Qty: 30 CAPSULE | Refills: 1 | Status: SHIPPED | OUTPATIENT
Start: 2019-03-30 | End: 2019-03-31

## 2019-03-31 ENCOUNTER — HOSPITAL ENCOUNTER (EMERGENCY)
Facility: HOSPITAL | Age: 64
Discharge: HOME | End: 2019-03-31
Attending: EMERGENCY MEDICINE | Admitting: EMERGENCY MEDICINE

## 2019-03-31 ENCOUNTER — APPOINTMENT (OUTPATIENT)
Dept: GENERAL RADIOLOGY | Facility: HOSPITAL | Age: 64
End: 2019-03-31

## 2019-03-31 VITALS
WEIGHT: 180.38 LBS | BODY MASS INDEX: 23.15 KG/M2 | DIASTOLIC BLOOD PRESSURE: 97 MMHG | HEART RATE: 86 BPM | TEMPERATURE: 97.8 F | RESPIRATION RATE: 18 BRPM | SYSTOLIC BLOOD PRESSURE: 148 MMHG | OXYGEN SATURATION: 95 % | HEIGHT: 74 IN

## 2019-03-31 DIAGNOSIS — J10.1 INFLUENZA A: Primary | ICD-10-CM

## 2019-03-31 LAB
ALBUMIN SERPL-MCNC: 4.1 G/DL (ref 3.5–5.2)
ALBUMIN/GLOB SERPL: 1.5 G/DL
ALP SERPL-CCNC: 61 U/L (ref 39–117)
ALT SERPL W P-5'-P-CCNC: 17 U/L (ref 1–41)
ANION GAP SERPL CALCULATED.3IONS-SCNC: 11.9 MMOL/L
AST SERPL-CCNC: 20 U/L (ref 1–40)
BILIRUB SERPL-MCNC: 1.2 MG/DL (ref 0.2–1.2)
BUN BLD-MCNC: 11 MG/DL (ref 8–23)
BUN/CREAT SERPL: 11.5 (ref 7–25)
CALCIUM SPEC-SCNC: 8.9 MG/DL (ref 8.6–10.5)
CHLORIDE SERPL-SCNC: 104 MMOL/L (ref 98–107)
CO2 SERPL-SCNC: 25.1 MMOL/L (ref 22–29)
CREAT BLD-MCNC: 0.96 MG/DL (ref 0.76–1.27)
DEPRECATED RDW RBC AUTO: 54.6 FL (ref 37–54)
EOSINOPHIL # BLD MANUAL: 0.68 10*3/MM3 (ref 0–0.4)
EOSINOPHIL NFR BLD MANUAL: 2 % (ref 0.3–6.2)
ERYTHROCYTE [DISTWIDTH] IN BLOOD BY AUTOMATED COUNT: 14.8 % (ref 12.3–15.4)
FLUAV AG NPH QL: POSITIVE
FLUBV AG NPH QL IA: NEGATIVE
GFR SERPL CREATININE-BSD FRML MDRD: 79 ML/MIN/1.73
GLOBULIN UR ELPH-MCNC: 2.8 GM/DL
GLUCOSE BLD-MCNC: 105 MG/DL (ref 65–99)
HCT VFR BLD AUTO: 32.8 % (ref 37.5–51)
HGB BLD-MCNC: 10.6 G/DL (ref 13–17.7)
LYMPHOCYTES # BLD MANUAL: 22.14 10*3/MM3 (ref 0.7–3.1)
LYMPHOCYTES NFR BLD MANUAL: 5 % (ref 5–12)
LYMPHOCYTES NFR BLD MANUAL: 65 % (ref 19.6–45.3)
MCH RBC QN AUTO: 32.7 PG (ref 26.6–33)
MCHC RBC AUTO-ENTMCNC: 32.3 G/DL (ref 31.5–35.7)
MCV RBC AUTO: 101.2 FL (ref 79–97)
MONOCYTES # BLD AUTO: 1.7 10*3/MM3 (ref 0.1–0.9)
NEUTROPHILS # BLD AUTO: 9.54 10*3/MM3 (ref 1.4–7)
NEUTROPHILS NFR BLD MANUAL: 28 % (ref 42.7–76)
PLAT MORPH BLD: NORMAL
PLATELET # BLD AUTO: 93 10*3/MM3 (ref 140–450)
PMV BLD AUTO: 10.4 FL (ref 6–12)
POTASSIUM BLD-SCNC: 3.8 MMOL/L (ref 3.5–5.2)
PROT SERPL-MCNC: 6.9 G/DL (ref 6–8.5)
RBC # BLD AUTO: 3.24 10*6/MM3 (ref 4.14–5.8)
RBC MORPH BLD: NORMAL
SMUDGE CELLS BLD QL SMEAR: ABNORMAL
SODIUM BLD-SCNC: 141 MMOL/L (ref 136–145)
WBC NRBC COR # BLD: 34.06 10*3/MM3 (ref 3.4–10.8)

## 2019-03-31 PROCEDURE — 80053 COMPREHEN METABOLIC PANEL: CPT | Performed by: PHYSICIAN ASSISTANT

## 2019-03-31 PROCEDURE — 87804 INFLUENZA ASSAY W/OPTIC: CPT | Performed by: PHYSICIAN ASSISTANT

## 2019-03-31 PROCEDURE — 71046 X-RAY EXAM CHEST 2 VIEWS: CPT

## 2019-03-31 PROCEDURE — 99283 EMERGENCY DEPT VISIT LOW MDM: CPT

## 2019-03-31 PROCEDURE — 85007 BL SMEAR W/DIFF WBC COUNT: CPT | Performed by: PHYSICIAN ASSISTANT

## 2019-03-31 PROCEDURE — 85025 COMPLETE CBC W/AUTO DIFF WBC: CPT | Performed by: PHYSICIAN ASSISTANT

## 2019-03-31 RX ORDER — SODIUM CHLORIDE 0.9 % (FLUSH) 0.9 %
10 SYRINGE (ML) INJECTION AS NEEDED
Status: DISCONTINUED | OUTPATIENT
Start: 2019-03-31 | End: 2019-03-31 | Stop reason: HOSPADM

## 2019-03-31 RX ORDER — OSELTAMIVIR PHOSPHATE 75 MG/1
75 CAPSULE ORAL 2 TIMES DAILY
Qty: 10 CAPSULE | Refills: 0 | Status: SHIPPED | OUTPATIENT
Start: 2019-03-31 | End: 2019-04-08

## 2019-03-31 NOTE — PROGRESS NOTES
Called because he has a cough. Had pna a couple weeks ago.  Just had hernia repair two days ago.  No fever.  Cough is not productive  Not SOA  Will call in tessalon  I don't think he needs an ED visit for CXR or labs tonight.  He will go to ED if SOA or fever.

## 2019-04-08 ENCOUNTER — OFFICE VISIT (OUTPATIENT)
Dept: SURGERY | Facility: CLINIC | Age: 64
End: 2019-04-08

## 2019-04-08 DIAGNOSIS — K40.90 LEFT INGUINAL HERNIA: Primary | ICD-10-CM

## 2019-04-08 PROCEDURE — 99024 POSTOP FOLLOW-UP VISIT: CPT | Performed by: SURGERY

## 2019-04-08 NOTE — PROGRESS NOTES
SURGERY: [facility]  Post op Visit  Francisco Espino  04/08/19    Mr Espino presents today after having undergone Surgery 3/28/19, of a laparoscopic left recurrent inguinal hernia repair, total extraperitoneal approach, with mesh.  He unfortunately was diagnosed with the flu 2 days thereafter, and thus almost certainly had it on the day of surgery.  Thus he has had a lot of coughing today.  His history was notable for a prior hernia repair on the right X 3, most recent done by me, intact, 2002, transperitoneal approach with [place]-B[place].  At this year's surgery, he was found to have a large direct chronic recurrent left inguinal hernia with contained fat and a recurrent indirect with scarring of the fascia and muscle to the lateral aspect.     He looks great today with minor bruising.  The repair feels intact.   i'm glad to see this with his periop WBC of 47K    He did a cologuard a couple of years ago.     Preethi Gonzalez MD   04/08/19  3:37 PM

## 2019-04-15 RX ORDER — CARVEDILOL 6.25 MG/1
TABLET ORAL
Qty: 180 TABLET | Refills: 3 | Status: SHIPPED | OUTPATIENT
Start: 2019-04-15 | End: 2019-07-11 | Stop reason: SDUPTHER

## 2019-04-17 RX ORDER — LISINOPRIL 5 MG/1
TABLET ORAL
Qty: 90 TABLET | Refills: 3 | Status: SHIPPED | OUTPATIENT
Start: 2019-04-17 | End: 2020-06-25

## 2019-06-25 ENCOUNTER — TELEPHONE (OUTPATIENT)
Dept: ONCOLOGY | Facility: HOSPITAL | Age: 64
End: 2019-06-25

## 2019-06-25 NOTE — TELEPHONE ENCOUNTER
----- Message from Elliot Michel sent at 6/25/2019  4:09 PM EDT -----  Contact: [phone]  Has CLL doing great. It's time for his booster shoot for shingles. Wife  Was reading about this and it said people with cancer should not take it's a live viruses.

## 2019-06-25 NOTE — TELEPHONE ENCOUNTER
Pt's wife called and said pt is due for his shingles booster. Reviewed with Dr Cagle and it is ok for him to have.

## 2019-07-08 RX ORDER — ROSUVASTATIN CALCIUM 5 MG/1
TABLET, COATED ORAL
Qty: 90 TABLET | Refills: 3 | Status: SHIPPED | OUTPATIENT
Start: 2019-07-08 | End: 2019-07-22 | Stop reason: SDUPTHER

## 2019-07-11 RX ORDER — CARVEDILOL 6.25 MG/1
6.25 TABLET ORAL 2 TIMES DAILY WITH MEALS
Qty: 180 TABLET | Refills: 3 | Status: SHIPPED | OUTPATIENT
Start: 2019-07-11 | End: 2020-06-25

## 2019-07-22 RX ORDER — ROSUVASTATIN CALCIUM 5 MG/1
5 TABLET, COATED ORAL DAILY
Qty: 90 TABLET | Refills: 3 | Status: SHIPPED | OUTPATIENT
Start: 2019-07-22 | End: 2019-07-25 | Stop reason: SDUPTHER

## 2019-07-25 RX ORDER — ROSUVASTATIN CALCIUM 5 MG/1
5 TABLET, COATED ORAL DAILY
Qty: 90 TABLET | Refills: 1 | Status: SHIPPED | OUTPATIENT
Start: 2019-07-25 | End: 2020-06-25

## 2019-08-13 ENCOUNTER — APPOINTMENT (OUTPATIENT)
Dept: GENERAL RADIOLOGY | Facility: HOSPITAL | Age: 64
End: 2019-08-13

## 2019-08-13 PROCEDURE — 71046 X-RAY EXAM CHEST 2 VIEWS: CPT | Performed by: EMERGENCY MEDICINE

## 2019-08-21 RX ORDER — LORATADINE, PSEUDOEPHEDRINE 5; 120 MG/1; MG/1
TABLET, EXTENDED RELEASE ORAL
Qty: 60 TABLET | Refills: 1 | OUTPATIENT
Start: 2019-08-21

## 2019-10-10 DIAGNOSIS — E78.5 DYSLIPIDEMIA: ICD-10-CM

## 2019-10-10 DIAGNOSIS — E78.2 MIXED HYPERLIPIDEMIA: ICD-10-CM

## 2019-10-10 DIAGNOSIS — I10 ESSENTIAL HYPERTENSION: Primary | ICD-10-CM

## 2019-10-14 LAB
ALBUMIN SERPL-MCNC: 5 G/DL (ref 3.5–5.2)
ALBUMIN/GLOB SERPL: 2.3 G/DL
ALP SERPL-CCNC: 62 U/L (ref 39–117)
ALT SERPL-CCNC: 37 U/L (ref 1–41)
AST SERPL-CCNC: 47 U/L (ref 1–40)
BASOPHILS # BLD AUTO: (no result) 10*3/UL
BASOPHILS # BLD MANUAL: 0 10*3/MM3 (ref 0–0.2)
BASOPHILS NFR BLD MANUAL: 0 % (ref 0–1.5)
BILIRUB SERPL-MCNC: 1.2 MG/DL (ref 0.2–1.2)
BUN SERPL-MCNC: 12 MG/DL (ref 8–23)
BUN/CREAT SERPL: 10.9 (ref 7–25)
CALCIUM SERPL-MCNC: 9.5 MG/DL (ref 8.6–10.5)
CHLORIDE SERPL-SCNC: 103 MMOL/L (ref 98–107)
CHOLEST SERPL-MCNC: 175 MG/DL (ref 0–200)
CO2 SERPL-SCNC: 26.6 MMOL/L (ref 22–29)
CREAT SERPL-MCNC: 1.1 MG/DL (ref 0.76–1.27)
DIFFERENTIAL COMMENT: ABNORMAL
EOSINOPHIL # BLD AUTO: (no result) 10*3/UL
EOSINOPHIL # BLD MANUAL: 0 10*3/MM3 (ref 0–0.4)
EOSINOPHIL NFR BLD AUTO: (no result) %
EOSINOPHIL NFR BLD MANUAL: 0 % (ref 0.3–6.2)
ERYTHROCYTE [DISTWIDTH] IN BLOOD BY AUTOMATED COUNT: 13.4 % (ref 12.3–15.4)
GLOBULIN SER CALC-MCNC: 2.2 GM/DL
GLUCOSE SERPL-MCNC: 99 MG/DL (ref 65–99)
HBA1C MFR BLD: 5.9 % (ref 4.8–5.6)
HCT VFR BLD AUTO: 38.1 % (ref 37.5–51)
HDLC SERPL-MCNC: 61 MG/DL (ref 40–60)
HGB BLD-MCNC: 12.7 G/DL (ref 13–17.7)
LDLC SERPL CALC-MCNC: 90 MG/DL (ref 0–100)
LDLC/HDLC SERPL: 1.47 {RATIO}
LYMPHOCYTES # BLD AUTO: (no result) 10*3/UL
LYMPHOCYTES # BLD MANUAL: 38.05 10*3/MM3 (ref 0.7–3.1)
LYMPHOCYTES NFR BLD AUTO: (no result) %
LYMPHOCYTES NFR BLD MANUAL: 94 % (ref 19.6–45.3)
MCH RBC QN AUTO: 34.3 PG (ref 26.6–33)
MCHC RBC AUTO-ENTMCNC: 33.3 G/DL (ref 31.5–35.7)
MCV RBC AUTO: 103 FL (ref 79–97)
MONOCYTES # BLD MANUAL: 0.4 10*3/MM3 (ref 0.1–0.9)
MONOCYTES NFR BLD AUTO: (no result) %
MONOCYTES NFR BLD MANUAL: 1 % (ref 5–12)
NEUTROPHILS # BLD MANUAL: 2.02 10*3/MM3 (ref 1.7–7)
NEUTROPHILS NFR BLD AUTO: (no result) %
NEUTROPHILS NFR BLD MANUAL: 5 % (ref 42.7–76)
PLATELET # BLD AUTO: 118 10*3/MM3 (ref 140–450)
PLATELET BLD QL SMEAR: ABNORMAL
POTASSIUM SERPL-SCNC: 4.9 MMOL/L (ref 3.5–5.2)
PROT SERPL-MCNC: 7.2 G/DL (ref 6–8.5)
RBC # BLD AUTO: 3.7 10*6/MM3 (ref 4.14–5.8)
RBC MORPH BLD: ABNORMAL
SODIUM SERPL-SCNC: 142 MMOL/L (ref 136–145)
TRIGL SERPL-MCNC: 121 MG/DL (ref 0–150)
VLDLC SERPL CALC-MCNC: 24.2 MG/DL
WBC # BLD AUTO: 40.48 10*3/MM3 (ref 3.4–10.8)

## 2019-10-15 ENCOUNTER — RESULTS ENCOUNTER (OUTPATIENT)
Dept: INTERNAL MEDICINE | Facility: CLINIC | Age: 64
End: 2019-10-15

## 2019-10-15 DIAGNOSIS — E78.2 MIXED HYPERLIPIDEMIA: ICD-10-CM

## 2019-10-15 DIAGNOSIS — E78.5 DYSLIPIDEMIA: ICD-10-CM

## 2019-10-15 DIAGNOSIS — I10 ESSENTIAL HYPERTENSION: ICD-10-CM

## 2019-11-11 ENCOUNTER — OFFICE VISIT (OUTPATIENT)
Dept: INTERNAL MEDICINE | Facility: CLINIC | Age: 64
End: 2019-11-11

## 2019-11-11 ENCOUNTER — RESULTS ENCOUNTER (OUTPATIENT)
Dept: INTERNAL MEDICINE | Facility: CLINIC | Age: 64
End: 2019-11-11

## 2019-11-11 VITALS
HEIGHT: 74 IN | OXYGEN SATURATION: 98 % | WEIGHT: 190.4 LBS | DIASTOLIC BLOOD PRESSURE: 68 MMHG | BODY MASS INDEX: 24.43 KG/M2 | SYSTOLIC BLOOD PRESSURE: 122 MMHG | RESPIRATION RATE: 16 BRPM | HEART RATE: 73 BPM

## 2019-11-11 DIAGNOSIS — I25.119 CORONARY ARTERY DISEASE INVOLVING NATIVE CORONARY ARTERY OF NATIVE HEART WITH ANGINA PECTORIS (HCC): ICD-10-CM

## 2019-11-11 DIAGNOSIS — Z00.00 HEALTHCARE MAINTENANCE: ICD-10-CM

## 2019-11-11 DIAGNOSIS — E78.2 MIXED HYPERLIPIDEMIA: ICD-10-CM

## 2019-11-11 DIAGNOSIS — R73.01 IMPAIRED FASTING GLUCOSE: ICD-10-CM

## 2019-11-11 DIAGNOSIS — I21.11 ST ELEVATION (STEMI) MYOCARDIAL INFARCTION INVOLVING RIGHT CORONARY ARTERY (HCC): ICD-10-CM

## 2019-11-11 DIAGNOSIS — C91.10 CHRONIC LYMPHOCYTIC LEUKEMIA (HCC): ICD-10-CM

## 2019-11-11 DIAGNOSIS — I10 ESSENTIAL HYPERTENSION: ICD-10-CM

## 2019-11-11 DIAGNOSIS — Z00.00 HEALTHCARE MAINTENANCE: Primary | ICD-10-CM

## 2019-11-11 PROCEDURE — 99397 PER PM REEVAL EST PAT 65+ YR: CPT | Performed by: INTERNAL MEDICINE

## 2019-11-11 PROCEDURE — 99213 OFFICE O/P EST LOW 20 MIN: CPT | Performed by: INTERNAL MEDICINE

## 2019-11-11 PROCEDURE — 90471 IMMUNIZATION ADMIN: CPT | Performed by: INTERNAL MEDICINE

## 2019-11-11 PROCEDURE — 90732 PPSV23 VACC 2 YRS+ SUBQ/IM: CPT | Performed by: INTERNAL MEDICINE

## 2019-11-11 NOTE — PROGRESS NOTES
Francisco Espino is a 64 y.o. male, who presents with a chief complaint of   Chief Complaint   Patient presents with   • Annual Exam   impaired glucose tolerance, cad, htn, hld.     HPI   Pt here for his physical.  He has been doing well.    No increased fatigue.     He has had hernia surgery with Dr. Gonzalez.    He tries to stay active and eat a healthy diet    Up to date at dentist and eye doctor.    Due for cologuard.      HM reviewed.     CLL - we monitor labs and send them to dr. Cagle.      Impaired fasting glucose - a1c 5.9    HLD - on crestor.  No cramps or myalgias.     htn - on carvedilol, lisinopril.  No ha/dizziness.  No cough.     CAD - pt denies soa/chest pain.  Pt on ace-I, bb, statin, brilinta, and baby asa.    The following portions of the patient's history were reviewed and updated as appropriate: allergies, current medications, past family history, past medical history, past social history, past surgical history and problem list.    Allergies: Codeine    Review of Systems   Constitutional: Negative.    HENT: Negative.    Eyes: Negative.    Respiratory: Negative.    Cardiovascular: Negative.    Gastrointestinal: Negative.    Endocrine: Negative.    Genitourinary: Negative.    Musculoskeletal: Negative.    Skin: Negative.    Allergic/Immunologic: Negative.    Neurological: Negative.    Hematological: Negative.    Psychiatric/Behavioral: Negative.    All other systems reviewed and are negative.            Wt Readings from Last 3 Encounters:   11/11/19 86.4 kg (190 lb 6.4 oz)   03/31/19 81.8 kg (180 lb 6 oz)   03/28/19 86.2 kg (190 lb 0.6 oz)     Temp Readings from Last 3 Encounters:   08/16/19 98.2 °F (36.8 °C) (Oral)   08/13/19 98 °F (36.7 °C) (Oral)   03/31/19 97.8 °F (36.6 °C) (Tympanic)     BP Readings from Last 3 Encounters:   11/11/19 122/68   08/16/19 127/83   08/13/19 123/82     Pulse Readings from Last 3 Encounters:   11/11/19 73   08/16/19 92   08/13/19 83     Body mass index is 24.45  kg/m².  @LASTSAO2(3)@    Physical Exam   Constitutional: He is oriented to person, place, and time. He appears well-developed and well-nourished. No distress.   HENT:   Head: Normocephalic and atraumatic.   Right Ear: External ear normal.   Left Ear: External ear normal.   Nose: Nose normal.   Mouth/Throat: Oropharynx is clear and moist.   Eyes: Conjunctivae and EOM are normal. Pupils are equal, round, and reactive to light.   Neck: Normal range of motion. Neck supple.   Cardiovascular: Normal rate, regular rhythm, normal heart sounds and intact distal pulses.   Pulmonary/Chest: Effort normal and breath sounds normal. No respiratory distress. He has no wheezes.   Musculoskeletal: Normal range of motion.   Normal gait   Neurological: He is alert and oriented to person, place, and time.   Skin: Skin is warm and dry.   Psychiatric: He has a normal mood and affect. His behavior is normal. Judgment and thought content normal.   Nursing note and vitals reviewed.      Results for orders placed or performed in visit on 10/15/19   Lipid Panel With LDL / HDL Ratio   Result Value Ref Range    Total Cholesterol 175 0 - 200 mg/dL    Triglycerides 121 0 - 150 mg/dL    HDL Cholesterol 61 (H) 40 - 60 mg/dL    VLDL Cholesterol 24.2 mg/dL    LDL Cholesterol  90 0 - 100 mg/dL    LDL/HDL Ratio 1.47    Hemoglobin A1c   Result Value Ref Range    Hemoglobin A1C 5.90 (H) 4.80 - 5.60 %   Comprehensive Metabolic Panel   Result Value Ref Range    Glucose 99 65 - 99 mg/dL    BUN 12 8 - 23 mg/dL    Creatinine 1.10 0.76 - 1.27 mg/dL    eGFR Non African Am 67 >60 mL/min/1.73    eGFR African Am 81 >60 mL/min/1.73    BUN/Creatinine Ratio 10.9 7.0 - 25.0    Sodium 142 136 - 145 mmol/L    Potassium 4.9 3.5 - 5.2 mmol/L    Chloride 103 98 - 107 mmol/L    Total CO2 26.6 22.0 - 29.0 mmol/L    Calcium 9.5 8.6 - 10.5 mg/dL    Total Protein 7.2 6.0 - 8.5 g/dL    Albumin 5.00 3.50 - 5.20 g/dL    Globulin 2.2 gm/dL    A/G Ratio 2.3 g/dL    Total Bilirubin  1.2 0.2 - 1.2 mg/dL    Alkaline Phosphatase 62 39 - 117 U/L    AST (SGOT) 47 (H) 1 - 40 U/L    ALT (SGPT) 37 1 - 41 U/L   Manual Differential   Result Value Ref Range    Neutrophil Rel % 5.0 (L) 42.7 - 76.0 %    Lymphocyte Rel % 94.0 (H) 19.6 - 45.3 %    Monocyte Rel % 1.0 (L) 5.0 - 12.0 %    Eosinophil Rel % 0.0 (L) 0.3 - 6.2 %    Basophil Rel % 0.0 0.0 - 1.5 %    Neutrophils Absolute 2.02 1.70 - 7.00 10*3/mm3    Lymphocytes Absolute 38.05 (H) 0.70 - 3.10 10*3/mm3    Monocytes Absolute 0.40 0.10 - 0.90 10*3/mm3    Eosinophil Abs 0.00 0.00 - 0.40 10*3/mm3    Basophils Absolute 0.00 0.00 - 0.20 10*3/mm3    Differential Comment Comment     Comment Comment     Plt Comment Comment    CBC & Differential   Result Value Ref Range    WBC 40.48 (C) 3.40 - 10.80 10*3/mm3    RBC 3.70 (L) 4.14 - 5.80 10*6/mm3    Hemoglobin 12.7 (L) 13.0 - 17.7 g/dL    Hematocrit 38.1 37.5 - 51.0 %    .0 (H) 79.0 - 97.0 fL    MCH 34.3 (H) 26.6 - 33.0 pg    MCHC 33.3 31.5 - 35.7 g/dL    RDW 13.4 12.3 - 15.4 %    Platelets 118 (L) 140 - 450 10*3/mm3    Neutrophil Rel % CANCELED     Lymphocyte Rel % CANCELED     Monocyte Rel % CANCELED     Eosinophil Rel % CANCELED     Lymphocytes Absolute CANCELED     Eosinophils Absolute CANCELED     Basophils Absolute CANCELED            Francisco was seen today for annual exam.    Diagnoses and all orders for this visit:    Healthcare maintenance  -     Cologuard - Stool, Per Rectum; Future  -     Pneumococcal Polysaccharide Vaccine 23-Valent (PPSV23) Greater Than or Equal To 1yo Subcutaneous / IM  -     PSA SCREENING; Future    Essential hypertension  -     T4, Free; Future  -     TSH; Future  -     Comprehensive Metabolic Panel; Future  -     CBC & Differential; Future    Mixed hyperlipidemia    Coronary artery disease involving native coronary artery of native heart with angina pectoris (CMS/HCC)  -     Comprehensive Metabolic Panel; Future  -     CBC & Differential; Future  -     Lipid Panel With LDL /  HDL Ratio; Future    ST elevation (STEMI) myocardial infarction involving right coronary artery (CMS/HCC)    Chronic lymphocytic leukemia (CMS/HCC)  -     CBC & Differential; Future    Impaired fasting glucose  -     Comprehensive Metabolic Panel; Future  -     Hemoglobin A1c; Future  -     Lipid Panel With LDL / HDL Ratio; Future    Other orders  -     Cancel: DEXA Bone Density Axial; Future  -     Discontinue: loratadine-pseudoephedrine (CLARITIN-D 12 HOUR) 5-120 MG per 12 hr tablet; Take 1 tablet by mouth 2 (Two) Times a Day.  -     Discontinue: loratadine-pseudoephedrine (CLARITIN-D 12 HOUR) 5-120 MG per 12 hr tablet; Take 1 tablet by mouth 2 (Two) Times a Day.  -     loratadine-pseudoephedrine (CLARITIN-D 12 HOUR) 5-120 MG per 12 hr tablet; Take 1 tablet by mouth 2 (Two) Times a Day.      Discussed health maintenance recommendations with patient and handout given.      Outpatient Medications Prior to Visit   Medication Sig Dispense Refill   • acetaminophen (TYLENOL) 500 MG tablet Take 1,000 mg by mouth Every 6 (Six) Hours As Needed for Mild Pain .     • aspirin 81 MG EC tablet Take 81 mg by mouth Every Other Day.     • carvedilol (COREG) 6.25 MG tablet Take 1 tablet by mouth 2 (Two) Times a Day With Meals. 180 tablet 3   • fluticasone (FLONASE) 50 MCG/ACT nasal spray 2 sprays into the nostril(s) as directed by provider Daily. 1 bottle 0   • lisinopril (PRINIVIL,ZESTRIL) 5 MG tablet TAKE 1 TABLET DAILY 90 tablet 3   • Multiple Vitamins-Minerals (EYE VITAMINS & MINERALS PO) Take 1 tablet by mouth Daily.     • rosuvastatin (CRESTOR) 5 MG tablet Take 1 tablet by mouth Daily. 90 tablet 1   • ticagrelor (BRILINTA) 60 MG tablet tablet Take 1 tablet by mouth 2 (Two) Times a Day. Told to stop 5 days before surgery 60 tablet    • benzonatate (TESSALON) 200 MG capsule Take 1 capsule by mouth 3 (Three) Times a Day As Needed for Cough. 20 capsule 0   • Loratadine-Pseudoephedrine (CLARITIN-D 12 HOUR PO) Take 1 tablet by  mouth Daily.     • predniSONE (DELTASONE) 10 MG (21) tablet pack Take  by mouth Daily. Use as directed on package 1 each 0     No facility-administered medications prior to visit.      New Medications Ordered This Visit   Medications   • loratadine-pseudoephedrine (CLARITIN-D 12 HOUR) 5-120 MG per 12 hr tablet     Sig: Take 1 tablet by mouth 2 (Two) Times a Day.     Dispense:  180 tablet     Refill:  0     [unfilled]  Medications Discontinued During This Encounter   Medication Reason   • benzonatate (TESSALON) 200 MG capsule *Therapy completed   • predniSONE (DELTASONE) 10 MG (21) tablet pack *Therapy completed   • Loratadine-Pseudoephedrine (CLARITIN-D 12 HOUR PO) Reorder   • loratadine-pseudoephedrine (CLARITIN-D 12 HOUR) 5-120 MG per 12 hr tablet Reorder   • loratadine-pseudoephedrine (CLARITIN-D 12 HOUR) 5-120 MG per 12 hr tablet Reorder         Return in about 6 months (around 5/11/2020) for Recheck, labs.

## 2019-11-11 NOTE — PATIENT INSTRUCTIONS
Health Maintenance, Male  A healthy lifestyle and preventive care is important for your health and wellness. Ask your health care provider about what schedule of regular examinations is right for you.  What should I know about weight and diet?  Eat a Healthy Diet  · Eat plenty of vegetables, fruits, whole grains, low-fat dairy products, and lean protein.  · Do not eat a lot of foods high in solid fats, added sugars, or salt.    Maintain a Healthy Weight  Regular exercise can help you achieve or maintain a healthy weight. You should:  · Do at least 150 minutes of exercise each week. The exercise should increase your heart rate and make you sweat (moderate-intensity exercise).  · Do strength-training exercises at least twice a week.  Watch Your Levels of Cholesterol and Blood Lipids  · Have your blood tested for lipids and cholesterol every 5 years starting at 35 years of age. If you are at high risk for heart disease, you should start having your blood tested when you are 20 years old. You may need to have your cholesterol levels checked more often if:  ? Your lipid or cholesterol levels are high.  ? You are older than 50 years of age.  ? You are at high risk for heart disease.  What should I know about cancer screening?  Many types of cancers can be detected early and may often be prevented.  Lung Cancer  · You should be screened every year for lung cancer if:  ? You are a current smoker who has smoked for at least 30 years.  ? You are a former smoker who has quit within the past 15 years.  · Talk to your health care provider about your screening options, when you should start screening, and how often you should be screened.  Colorectal Cancer  · Routine colorectal cancer screening usually begins at 50 years of age and should be repeated every 5-10 years until you are 75 years old. You may need to be screened more often if early forms of precancerous polyps or small growths are found. Your health care provider may  recommend screening at an earlier age if you have risk factors for colon cancer.  · Your health care provider may recommend using home test kits to check for hidden blood in the stool.  · A small camera at the end of a tube can be used to examine your colon (sigmoidoscopy or colonoscopy). This checks for the earliest forms of colorectal cancer.  Prostate and Testicular Cancer  · Depending on your age and overall health, your health care provider may do certain tests to screen for prostate and testicular cancer.  · Talk to your health care provider about any symptoms or concerns you have about testicular or prostate cancer.  Skin Cancer  · Check your skin from head to toe regularly.  · Tell your health care provider about any new moles or changes in moles, especially if:  ? There is a change in a mole’s size, shape, or color.  ? You have a mole that is larger than a pencil eraser.  · Always use sunscreen. Apply sunscreen liberally and repeat throughout the day.  · Protect yourself by wearing long sleeves, pants, a wide-brimmed hat, and sunglasses when outside.  What should I know about heart disease, diabetes, and high blood pressure?  · If you are 18-39 years of age, have your blood pressure checked every 3-5 years. If you are 40 years of age or older, have your blood pressure checked every year. You should have your blood pressure measured twice--once when you are at a hospital or clinic, and once when you are not at a hospital or clinic. Record the average of the two measurements. To check your blood pressure when you are not at a hospital or clinic, you can use:  ? An automated blood pressure machine at a pharmacy.  ? A home blood pressure monitor.  · Talk to your health care provider about your target blood pressure.  · If you are between 45-79 years old, ask your health care provider if you should take aspirin to prevent heart disease.  · Have regular diabetes screenings by checking your fasting blood sugar  level.  ? If you are at a normal weight and have a low risk for diabetes, have this test once every three years after the age of 45.  ? If you are overweight and have a high risk for diabetes, consider being tested at a younger age or more often.  · A one-time screening for abdominal aortic aneurysm (AAA) by ultrasound is recommended for men aged 65-75 years who are current or former smokers.  What should I know about preventing infection?  Hepatitis B  If you have a higher risk for hepatitis B, you should be screened for this virus. Talk with your health care provider to find out if you are at risk for hepatitis B infection.  Hepatitis C  Blood testing is recommended for:  · Everyone born from 1945 through 1965.  · Anyone with known risk factors for hepatitis C.  Sexually Transmitted Diseases (STDs)  · You should be screened each year for STDs including gonorrhea and chlamydia if:  ? You are sexually active and are younger than 24 years of age.  ? You are older than 24 years of age and your health care provider tells you that you are at risk for this type of infection.  ? Your sexual activity has changed since you were last screened and you are at an increased risk for chlamydia or gonorrhea. Ask your health care provider if you are at risk.  · Talk with your health care provider about whether you are at high risk of being infected with HIV. Your health care provider may recommend a prescription medicine to help prevent HIV infection.  What else can I do?  · Schedule regular health, dental, and eye exams.  · Stay current with your vaccines (immunizations).  · Do not use any tobacco products, such as cigarettes, chewing tobacco, and e-cigarettes. If you need help quitting, ask your health care provider.  · Limit alcohol intake to no more than 2 drinks per day. One drink equals 12 ounces of beer, 5 ounces of wine, or 1½ ounces of hard liquor.  · Do not use street drugs.  · Do not share needles.  · Ask your health  care provider for help if you need support or information about quitting drugs.  · Tell your health care provider if you often feel depressed.  · Tell your health care provider if you have ever been abused or do not feel safe at home.  This information is not intended to replace advice given to you by your health care provider. Make sure you discuss any questions you have with your health care provider.  Document Released: 06/15/2009 Document Revised: 08/16/2017 Document Reviewed: 09/20/2016  [employer] Interactive Patient Education © 2019 [employer]

## 2019-12-11 ENCOUNTER — TELEPHONE (OUTPATIENT)
Dept: INTERNAL MEDICINE | Facility: CLINIC | Age: 64
End: 2019-12-11

## 2019-12-11 NOTE — TELEPHONE ENCOUNTER
Patient has not received kit for cologuard and cologuard tells patient's wife they have no order.  Please fax order to cologuard.  Order appears to be in Epic.

## 2020-01-10 DIAGNOSIS — R19.5 POSITIVE COLORECTAL CANCER SCREENING USING COLOGUARD TEST: Primary | ICD-10-CM

## 2020-01-14 ENCOUNTER — TELEPHONE (OUTPATIENT)
Dept: INTERNAL MEDICINE | Facility: CLINIC | Age: 65
End: 2020-01-14

## 2020-01-14 NOTE — TELEPHONE ENCOUNTER
Pt would like to use jerry lamb for his colonoscopy not dr leon. Can we update his referral? Has a positive cologuard. Sees tyson.

## 2020-02-03 ENCOUNTER — TELEPHONE (OUTPATIENT)
Dept: GASTROENTEROLOGY | Facility: CLINIC | Age: 65
End: 2020-02-03

## 2020-02-03 ENCOUNTER — PREP FOR SURGERY (OUTPATIENT)
Dept: OTHER | Facility: HOSPITAL | Age: 65
End: 2020-02-03

## 2020-02-03 DIAGNOSIS — R19.5 POSITIVE COLORECTAL CANCER SCREENING USING COLOGUARD TEST: Primary | ICD-10-CM

## 2020-02-06 PROBLEM — R19.5 POSITIVE COLORECTAL CANCER SCREENING USING COLOGUARD TEST: Status: ACTIVE | Noted: 2020-02-06

## 2020-03-10 ENCOUNTER — TELEPHONE (OUTPATIENT)
Dept: ONCOLOGY | Facility: CLINIC | Age: 65
End: 2020-03-10

## 2020-03-10 NOTE — TELEPHONE ENCOUNTER
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 They are requesting:  Pt's Name  DX  Pt's Doctor  [facility] [employer] : [phone]

## 2020-03-10 NOTE — TELEPHONE ENCOUNTER
D/W Virginia Donis. Per Virginia Pt is to contact PCP for letter. He has not been seen in our office since 2018. Pt will call PCP and

## 2020-03-10 NOTE — TELEPHONE ENCOUNTER
PT'S WIFE CALLING ASKING FOR LETTER TO CANCEL THEIR UPCOMING TRIP D/T COVID-19. WE HAVEN'T SEEN PT IN OFFICE SINCE '14. PER DR. ENNIS PT IS NOT F/U HERE ANYMORE. IF THEY CALL FOR ANYTHING PT'S PCP NEEDS TO HANDLE. HE WILL NOT WRITE LETTER. PT'S PCP NEEDS TO DO THAT. MADE PT'S WIFE AWARE AND SHE V/U.

## 2020-03-20 RX ORDER — LORATADINE, PSEUDOEPHEDRINE 5; 120 MG/1; MG/1
TABLET, EXTENDED RELEASE ORAL
Qty: 180 TABLET | Refills: 0 | Status: SHIPPED | OUTPATIENT
Start: 2020-03-20 | End: 2020-09-11

## 2020-03-24 ENCOUNTER — TELEPHONE (OUTPATIENT)
Dept: GASTROENTEROLOGY | Facility: CLINIC | Age: 65
End: 2020-03-24

## 2020-04-20 NOTE — TELEPHONE ENCOUNTER
Received a faxed clearance from Dr Moy that it is okay for the patient to hold his Brilinta for 5 days prior to his scheduled colonoscopy.

## 2020-05-11 ENCOUNTER — RESULTS ENCOUNTER (OUTPATIENT)
Dept: INTERNAL MEDICINE | Facility: CLINIC | Age: 65
End: 2020-05-11

## 2020-05-11 DIAGNOSIS — R73.01 IMPAIRED FASTING GLUCOSE: ICD-10-CM

## 2020-05-11 DIAGNOSIS — C91.10 CHRONIC LYMPHOCYTIC LEUKEMIA (HCC): ICD-10-CM

## 2020-05-11 DIAGNOSIS — Z00.00 HEALTHCARE MAINTENANCE: ICD-10-CM

## 2020-05-11 DIAGNOSIS — I10 ESSENTIAL HYPERTENSION: ICD-10-CM

## 2020-05-11 DIAGNOSIS — I25.119 CORONARY ARTERY DISEASE INVOLVING NATIVE CORONARY ARTERY OF NATIVE HEART WITH ANGINA PECTORIS (HCC): ICD-10-CM

## 2020-06-18 NOTE — TELEPHONE ENCOUNTER
Spoke with patient's wife and told her that patient needs to hold his Brilinta 5 days prior to his scheduled colonoscopy.  Patient also needs to come to the hospital several days prior to his colonoscopy to be tested for covid.  The hospital will contact him to set up an appointment.  Voiced understanding.

## 2020-06-25 RX ORDER — ROSUVASTATIN CALCIUM 5 MG/1
TABLET, COATED ORAL
Qty: 30 TABLET | Refills: 0 | Status: SHIPPED | OUTPATIENT
Start: 2020-06-25 | End: 2020-08-04

## 2020-06-25 RX ORDER — CARVEDILOL 6.25 MG/1
TABLET ORAL
Qty: 60 TABLET | Refills: 0 | Status: SHIPPED | OUTPATIENT
Start: 2020-06-25 | End: 2020-07-24

## 2020-06-25 RX ORDER — LISINOPRIL 5 MG/1
TABLET ORAL
Qty: 30 TABLET | Refills: 0 | Status: SHIPPED | OUTPATIENT
Start: 2020-06-25 | End: 2020-08-04

## 2020-06-26 ENCOUNTER — TELEPHONE (OUTPATIENT)
Dept: ONCOLOGY | Facility: CLINIC | Age: 65
End: 2020-06-26

## 2020-06-26 ENCOUNTER — TELEPHONE (OUTPATIENT)
Dept: INTERNAL MEDICINE | Facility: CLINIC | Age: 65
End: 2020-06-26

## 2020-06-26 NOTE — TELEPHONE ENCOUNTER
Patient hasn't been in for a couple of years. Was seeing dr. Cagle and wants to make an appt for check up/ follow up       Call patient back at [phone]

## 2020-07-02 ENCOUNTER — TELEPHONE (OUTPATIENT)
Dept: INTERNAL MEDICINE | Facility: CLINIC | Age: 65
End: 2020-07-02

## 2020-07-02 ENCOUNTER — TRANSCRIBE ORDERS (OUTPATIENT)
Dept: SLEEP MEDICINE | Facility: HOSPITAL | Age: 65
End: 2020-07-02

## 2020-07-02 DIAGNOSIS — C91.10 CHRONIC LYMPHOCYTIC LEUKEMIA (HCC): Primary | ICD-10-CM

## 2020-07-02 DIAGNOSIS — Z01.818 OTHER SPECIFIED PRE-OPERATIVE EXAMINATION: Primary | ICD-10-CM

## 2020-07-07 ENCOUNTER — TELEPHONE (OUTPATIENT)
Dept: ONCOLOGY | Facility: CLINIC | Age: 65
End: 2020-07-07

## 2020-07-07 NOTE — TELEPHONE ENCOUNTER
Patient is returning call from [facility]. There is no note in chart as to who or why patient was called.     [phone]

## 2020-07-13 ENCOUNTER — LAB (OUTPATIENT)
Dept: LAB | Facility: HOSPITAL | Age: 65
End: 2020-07-13

## 2020-07-13 DIAGNOSIS — Z01.818 OTHER SPECIFIED PRE-OPERATIVE EXAMINATION: ICD-10-CM

## 2020-07-13 PROCEDURE — U0004 COV-19 TEST NON-CDC HGH THRU: HCPCS

## 2020-07-13 PROCEDURE — C9803 HOPD COVID-19 SPEC COLLECT: HCPCS

## 2020-07-14 LAB
REF LAB TEST METHOD: NORMAL
SARS-COV-2 RNA RESP QL NAA+PROBE: NOT DETECTED

## 2020-07-15 ENCOUNTER — ANESTHESIA EVENT (OUTPATIENT)
Dept: GASTROENTEROLOGY | Facility: HOSPITAL | Age: 65
End: 2020-07-15

## 2020-07-15 ENCOUNTER — ANESTHESIA (OUTPATIENT)
Dept: GASTROENTEROLOGY | Facility: HOSPITAL | Age: 65
End: 2020-07-15

## 2020-07-15 ENCOUNTER — HOSPITAL ENCOUNTER (OUTPATIENT)
Facility: HOSPITAL | Age: 65
Setting detail: HOSPITAL OUTPATIENT SURGERY
Discharge: HOME | End: 2020-07-15
Attending: INTERNAL MEDICINE | Admitting: INTERNAL MEDICINE

## 2020-07-15 VITALS
BODY MASS INDEX: 24 KG/M2 | HEART RATE: 76 BPM | OXYGEN SATURATION: 96 % | SYSTOLIC BLOOD PRESSURE: 114 MMHG | RESPIRATION RATE: 16 BRPM | WEIGHT: 187 LBS | DIASTOLIC BLOOD PRESSURE: 79 MMHG | HEIGHT: 74 IN | TEMPERATURE: 98.1 F

## 2020-07-15 DIAGNOSIS — R19.5 POSITIVE COLORECTAL CANCER SCREENING USING COLOGUARD TEST: ICD-10-CM

## 2020-07-15 PROCEDURE — 25010000002 PROPOFOL 10 MG/ML EMULSION: Performed by: ANESTHESIOLOGY

## 2020-07-15 PROCEDURE — S0260 H&P FOR SURGERY: HCPCS | Performed by: INTERNAL MEDICINE

## 2020-07-15 PROCEDURE — 45385 COLONOSCOPY W/LESION REMOVAL: CPT | Performed by: INTERNAL MEDICINE

## 2020-07-15 PROCEDURE — 88305 TISSUE EXAM BY PATHOLOGIST: CPT | Performed by: INTERNAL MEDICINE

## 2020-07-15 DEVICE — DEV CLIP ENDO RESOLUTION360 CONTRL ROT 235CM: Type: IMPLANTABLE DEVICE | Site: DESCENDING COLON | Status: FUNCTIONAL

## 2020-07-15 RX ORDER — PROPOFOL 10 MG/ML
VIAL (ML) INTRAVENOUS CONTINUOUS PRN
Status: DISCONTINUED | OUTPATIENT
Start: 2020-07-15 | End: 2020-07-15 | Stop reason: SURG

## 2020-07-15 RX ORDER — SODIUM CHLORIDE, SODIUM LACTATE, POTASSIUM CHLORIDE, CALCIUM CHLORIDE 600; 310; 30; 20 MG/100ML; MG/100ML; MG/100ML; MG/100ML
30 INJECTION, SOLUTION INTRAVENOUS CONTINUOUS PRN
Status: DISCONTINUED | OUTPATIENT
Start: 2020-07-15 | End: 2020-07-15 | Stop reason: HOSPADM

## 2020-07-15 RX ORDER — LIDOCAINE HYDROCHLORIDE 20 MG/ML
INJECTION, SOLUTION INFILTRATION; PERINEURAL AS NEEDED
Status: DISCONTINUED | OUTPATIENT
Start: 2020-07-15 | End: 2020-07-15 | Stop reason: SURG

## 2020-07-15 RX ORDER — SODIUM CHLORIDE 0.9 % (FLUSH) 0.9 %
10 SYRINGE (ML) INJECTION AS NEEDED
Status: DISCONTINUED | OUTPATIENT
Start: 2020-07-15 | End: 2020-07-15 | Stop reason: HOSPADM

## 2020-07-15 RX ORDER — PROPOFOL 10 MG/ML
VIAL (ML) INTRAVENOUS AS NEEDED
Status: DISCONTINUED | OUTPATIENT
Start: 2020-07-15 | End: 2020-07-15 | Stop reason: SURG

## 2020-07-15 RX ADMIN — PROPOFOL 150 MG: 10 INJECTION, EMULSION INTRAVENOUS at 10:14

## 2020-07-15 RX ADMIN — SODIUM CHLORIDE, POTASSIUM CHLORIDE, SODIUM LACTATE AND CALCIUM CHLORIDE 30 ML/HR: 600; 310; 30; 20 INJECTION, SOLUTION INTRAVENOUS at 10:03

## 2020-07-15 RX ADMIN — PROPOFOL 50 MG: 10 INJECTION, EMULSION INTRAVENOUS at 10:26

## 2020-07-15 RX ADMIN — PROPOFOL 50 MG: 10 INJECTION, EMULSION INTRAVENOUS at 10:21

## 2020-07-15 RX ADMIN — PROPOFOL 50 MG: 10 INJECTION, EMULSION INTRAVENOUS at 10:37

## 2020-07-15 RX ADMIN — PROPOFOL 160 MCG/KG/MIN: 10 INJECTION, EMULSION INTRAVENOUS at 10:14

## 2020-07-15 RX ADMIN — LIDOCAINE HYDROCHLORIDE 100 MG: 20 INJECTION, SOLUTION INFILTRATION; PERINEURAL at 10:14

## 2020-07-15 RX ADMIN — PROPOFOL 50 MG: 10 INJECTION, EMULSION INTRAVENOUS at 10:32

## 2020-07-15 NOTE — ANESTHESIA POSTPROCEDURE EVALUATION
Patient: Francisco Espino    Procedure Summary     Date:  07/15/20 Room / Location:   [facility]    Anesthesia Start:  1009 Anesthesia Stop:  1043    Procedure:  COLONOSCOPY TO CECUM & T.I. WITH COLD SNARE POLYPECTOMIES, CLIP PLACEMENT X 2 (N/A ) Diagnosis:       Positive colorectal cancer screening using Cologuard test      (Positive colorectal cancer screening using Cologuard test [R19.5])    Surgeon:  Yennifer Salazar MD Provider:  Collin Rowe MD    Anesthesia Type:  MAC ASA Status:  3          Anesthesia Type: MAC    Vitals  No vitals data found for the desired time range.          Post Anesthesia Care and Evaluation    Patient location during evaluation: bedside  Patient participation: complete - patient participated  Level of consciousness: responsive to light touch, responsive to verbal stimuli and sleepy but conscious  Pain score: 0  Pain management: adequate  Airway patency: patent  Anesthetic complications: No anesthetic complications  PONV Status: none  Cardiovascular status: acceptable and hemodynamically stable  Respiratory status: acceptable  Hydration status: acceptable

## 2020-07-15 NOTE — H&P
[facility]  Pre Procedure History & Physical    Chief Complaint:   Positive cologard    Subjective     HPI:   64 yo with h/o positive cologard.  H/o CAD on brilinta - held x 5 days.   H/o CLL with thrombocytopenia.  He reports negative cologard 5 years ago.  Negative c/s 2005.  No fh crc/polyps.     Past Medical History:   Past Medical History:   Diagnosis Date   • Abnormal liver function test    • CLL (chronic lymphocytic leukemia) (CMS/HCC)    • CLL (chronic lymphocytic leukemia) (CMS/HCC)    • Coronary artery disease     stent   • Heart disease    • History of pneumonia     1/2019   • Hyperlipidemia    • Hypertension    • Inguinal hernia     left side to have surgery 3/28/19   • Screen for colon cancer 09/2016    Negative Cologaurd   • Screening PSA (prostate specific antigen) 02/2013    .5   • Thrombocytopenia (CMS/HCC)        Past Surgical History:  Past Surgical History:   Procedure Laterality Date   • CARDIAC CATHETERIZATION     • CATARACT EXTRACTION Bilateral    • COLONOSCOPY N/A 06/05/2006    Normal, repeat in 10 years, Dr. Preethi Gonzalez   • CORONARY ANGIOPLASTY WITH STENT PLACEMENT N/A 05/09/2017    Left heart catheterization, Selective native vessel coronary arteriography, Left ventriculography, Successful percutaneous intervention to the 100% occluded right coronary artery with a 3.5 x 38 mm Synergy drug-eluting stent (post-dilated w/ a 4.0 x 20 mm NC Emerge balloon), Selective right femoral angiography, Successful Perclose arteriotomy closure. Dr. James Pierson   • INGUINAL HERNIA REPAIR Left    • INGUINAL HERNIA REPAIR Right     x2   • INGUINAL HERNIA REPAIR Left 3/28/2019    Procedure: LEFT INGUINAL HERNIA REPAIR LAPAROSCOPIC;  Surgeon: Preethi Gonzalez MD;  [place] [facility] [place] [facility] [place]  Service: General   • LAPAROSCOPIC INGUINAL HERNIA REPAIR Right 10/03/2002    w/ extra peritoneal Goe-Benton mesh (transperitoneal repair), Dr. Preethi Gonzalez   • REFRACTIVE SURGERY Bilateral     • RETINAL DETACHMENT SURGERY Right 07/02/2013       Family History:  Family History   Problem Relation Age of Onset   • Heart attack Mother    • Heart disease Mother    • Diabetes Mother    • Aortic aneurysm Mother    • No Known Problems Father    • Diabetes type II Other    • Diabetes Brother    • Hyperlipidemia Brother    • Stroke Sister 65   • No Known Problems Brother    • Malig Hyperthermia Neg Hx        Social History:   reports that he has never smoked. He has never used smokeless tobacco. He reports that he drinks about 21.0 standard drinks of alcohol per week. He reports that he does not use drugs.    Medications:   Medications Prior to Admission   Medication Sig Dispense Refill Last Dose   • acetaminophen (TYLENOL) 500 MG tablet Take 1,000 mg by mouth Every 6 (Six) Hours As Needed for Mild Pain .   7/12/2020   • aspirin 81 MG EC tablet Take 81 mg by mouth Every Other Day.   7/13/2020 at Unknown time   • carvedilol (COREG) 6.25 MG tablet TAKE 1 TABLET TWICE DAILY  WITH MEALS (Patient taking differently: Take 6.25 mg by mouth 2 (Two) Times a Day With Meals.) 60 tablet 0 7/15/2020 at Unknown time   • fluticasone (FLONASE) 50 MCG/ACT nasal spray 2 sprays into the nostril(s) as directed by provider Daily. 1 bottle 0 7/12/2020   • KLS ALLERCLEAR D-12HR 5-120 MG per 12 hr tablet TAKE ONE TABLET BY MOUTH TWICE DAILY  (Patient taking differently: Take 1 tablet by mouth 2 (Two) Times a Day.) 180 tablet 0 7/14/2020 at Unknown time   • lisinopril (PRINIVIL,ZESTRIL) 5 MG tablet TAKE 1 TABLET DAILY (Patient taking differently: Take 5 mg by mouth.) 30 tablet 0 7/15/2020 at Unknown time   • Multiple Vitamins-Minerals (EYE VITAMINS & MINERALS PO) Take 1 tablet by mouth Daily.   7/13/2020   • rosuvastatin (CRESTOR) 5 MG tablet TAKE 1 TABLET DAILY 30 tablet 0 7/13/2020   • ticagrelor (BRILINTA) 60 MG tablet tablet Take 1 tablet by mouth 2 (Two) Times a Day. Told to stop 5 days before surgery 60 tablet  7/12/2020  "      Allergies:  Codeine    ROS:    Pertinent items are noted in HPI, all other systems reviewed and negative     Objective     Blood pressure 144/96, pulse 83, temperature 98.1 °F (36.7 °C), temperature source Oral, resp. rate 12, height 188 cm (74\"), weight 84.8 kg (187 lb), SpO2 96 %.    Physical Exam   Constitutional: Pt is oriented to person, place, and time and well-developed, well-nourished, and in no distress.   Mouth/Throat: Oropharynx is clear and moist.   Neck: Normal range of motion.   Cardiovascular: Normal rate, regular rhythm    Pulmonary/Chest: Effort normal    Abdominal: Soft. Nontender  Skin: Skin is warm and dry.   Psychiatric: Mood, memory, affect and judgment normal.     Assessment/Plan     Diagnosis:  Positive cologard    Anticipated Surgical Procedure:  colonoscopy with possible interventions    The risks, benefits, and alternatives of this procedure have been discussed with the patient or the responsible party- the patient understands and agrees to proceed.                                                            "

## 2020-07-15 NOTE — ANESTHESIA PREPROCEDURE EVALUATION
Anesthesia Evaluation     Patient summary reviewed and Nursing notes reviewed   no history of anesthetic complications:  NPO Solid Status: > 8 hours  NPO Liquid Status: > 2 hours           Airway   Mallampati: II  TM distance: >3 FB  Neck ROM: full  no difficulty expected  Dental - normal exam     Pulmonary - normal exam   (+) pneumonia resolved ,   (-) COPD, asthma, lung cancer, no home oxygen  Cardiovascular - normal exam  Exercise tolerance: excellent (>7 METS)    ECG reviewed  Rhythm: regular  Rate: normal    (+) hypertension well controlled 2 medications or greater, past MI  >12 months, CAD, cardiac stents more than 12 months ago angina with exertion, hyperlipidemia,   (-) CHF, orthopnea, PND, [place], pericardial effusion      Neuro/Psych  (+) psychiatric history,     (-) seizures, TIA, CVA  GI/Hepatic/Renal/Endo    (+)   liver disease history of elevated LFT, renal disease,   (-) hiatal hernia, GERD, PUD, hepatitis, diabetes, GI bleed, no thyroid disorder    Musculoskeletal (-) negative ROS    Abdominal  - normal exam   Substance History   (+) alcohol use,       Comment: Hx/o EtOH use/abuse.   OB/GYN negative ob/gyn ROS         Other   blood dyscrasia thrombocytopenia,   history of cancer remission      Other Comment: Hx/o CLL                  Anesthesia Plan    ASA 3     MAC     intravenous induction     Anesthetic plan, all risks, benefits, and alternatives have been provided, discussed and informed consent has been obtained with: patient.    Plan discussed with CRNA and attending.

## 2020-07-15 NOTE — DISCHARGE INSTRUCTIONS
Colon Polyps    Polyps are tissue growths inside the body. Polyps can grow in many places, including the large intestine (colon). A polyp may be a round bump or a mushroom-shaped growth. You could have one polyp or several.  Most colon polyps are noncancerous (benign). However, some colon polyps can become cancerous over time. Finding and removing the polyps early can help prevent this.  What are the causes?  The exact cause of colon polyps is not known.  What increases the risk?  You are more likely to develop this condition if you:  · Have a family history of colon cancer or colon polyps.  · Are older than 50 or older than 45 if you are .  · Have inflammatory bowel disease, such as ulcerative colitis or Crohn's disease.  · Have certain hereditary conditions, such as:  ? Familial adenomatous polyposis.  ? Ramirez syndrome.  ? Turcot syndrome.  ? Peutz-Jeghers syndrome.  · Are overweight.  · Smoke cigarettes.  · Do not get enough exercise.  · Drink too much alcohol.  · Eat a diet that is high in fat and red meat and low in fiber.  · Had childhood cancer that was treated with abdominal radiation.  What are the signs or symptoms?  Most polyps do not cause symptoms.  If you have symptoms, they may include:  · Blood coming from your rectum when having a bowel movement.  · Blood in your stool. The stool may look dark red or black.  · Abdominal pain.  · A change in bowel habits, such as constipation or diarrhea.  How is this diagnosed?  This condition is diagnosed with a colonoscopy. This is a procedure in which a lighted, flexible scope is inserted into the anus and then passed into the colon to examine the area. Polyps are sometimes found when a colonoscopy is done as part of routine cancer screening tests.  How is this treated?  Treatment for this condition involves removing any polyps that are found. Most polyps can be removed during a colonoscopy. Those polyps will then be tested for cancer.  Additional treatment may be needed depending on the results of testing.  Follow these instructions at home:  Lifestyle  · Maintain a healthy weight, or lose weight if recommended by your health care provider.  · Exercise every day or as told by your health care provider.  · Do not use any products that contain nicotine or tobacco, such as cigarettes and e-cigarettes. If you need help quitting, ask your health care provider.  · If you drink alcohol, limit how much you have:  ? 0-1 drink a day for women.  ? 0-2 drinks a day for men.  · Be aware of how much alcohol is in your drink. In the [place] one drink equals one 12 oz bottle of beer (355 mL), one 5 oz glass of wine (148 mL), or one 1½ oz shot of hard liquor (44 mL).  Eating and drinking    · Eat foods that are high in fiber, such as fruits, vegetables, and whole grains.  · Eat foods that are high in calcium and vitamin D, such as milk, cheese, yogurt, eggs, liver, fish, and broccoli.  · Limit foods that are high in fat, such as fried foods and desserts.  · Limit the amount of red meat and processed meat you eat, such as hot dogs, sausage, helton, and lunch meats.  General instructions  · Keep all follow-up visits as told by your health care provider. This is important.  ? This includes having regularly scheduled colonoscopies.  ? Talk to your health care provider about when you need a colonoscopy.  Contact a health care provider if:  · You have new or worsening bleeding during a bowel movement.  · You have new or increased blood in your stool.  · You have a change in bowel habits.  · You lose weight for no known reason.  Summary  · Polyps are tissue growths inside the body. Polyps can grow in many places, including the colon.  · Most colon polyps are noncancerous (benign), but some can become cancerous over time.  · This condition is diagnosed with a colonoscopy.  · Treatment for this condition involves removing any polyps that are found. Most polyps can be removed  during a colonoscopy.  This information is not intended to replace advice given to you by your health care provider. Make sure you discuss any questions you have with your health care provider.  Document Released: 09/13/2005 Document Revised: 04/04/2019 Document Reviewed: 04/04/2019  Elsevier Patient Education © 2020 [employer]    Hemorrhoids  Hemorrhoids are swollen veins in and around the rectum or anus. There are two types of hemorrhoids:  · Internal hemorrhoids. These occur in the veins that are just inside the rectum. They may poke through to the outside and become irritated and painful.  · External hemorrhoids. These occur in the veins that are outside the anus and can be felt as a painful swelling or hard lump near the anus.  Most hemorrhoids do not cause serious problems, and they can be managed with home treatments such as diet and lifestyle changes. If home treatments do not help the symptoms, procedures can be done to shrink or remove the hemorrhoids.  What are the causes?  This condition is caused by increased pressure in the anal area. This pressure may result from various things, including:  · Constipation.  · Straining to have a bowel movement.  · Diarrhea.  · Pregnancy.  · Obesity.  · Sitting for long periods of time.  · Heavy lifting or other activity that causes you to strain.  · Anal sex.  · Riding a bike for a long period of time.  What are the signs or symptoms?  Symptoms of this condition include:  · Pain.  · Anal itching or irritation.  · Rectal bleeding.  · Leakage of stool (feces).  · Anal swelling.  · One or more lumps around the anus.  How is this diagnosed?  This condition can often be diagnosed through a visual exam. Other exams or tests may also be done, such as:  · An exam that involves feeling the rectal area with a gloved hand (digital rectal exam).  · An exam of the anal canal that is done using a small tube (anoscope).  · A blood test, if you have lost a significant amount of  blood.  · A test to look inside the colon using a flexible tube with a camera on the end (sigmoidoscopy or colonoscopy).  How is this treated?  This condition can usually be treated at home. However, various procedures may be done if dietary changes, lifestyle changes, and other home treatments do not help your symptoms. These procedures can help make the hemorrhoids smaller or remove them completely. Some of these procedures involve surgery, and others do not. Common procedures include:  · Rubber band ligation. Rubber bands are placed at the base of the hemorrhoids to cut off their blood supply.  · Sclerotherapy. Medicine is injected into the hemorrhoids to shrink them.  · Infrared coagulation. A type of light energy is used to get rid of the hemorrhoids.  · Hemorrhoidectomy surgery. The hemorrhoids are surgically removed, and the veins that supply them are tied off.  · Stapled hemorrhoidopexy surgery. The surgeon staples the base of the hemorrhoid to the rectal wall.  Follow these instructions at home:  Eating and drinking    · Eat foods that have a lot of fiber in them, such as whole grains, beans, nuts, fruits, and vegetables.  · Ask your health care provider about taking products that have added fiber (fiber supplements).  · Reduce the amount of fat in your diet. You can do this by eating low-fat dairy products, eating less red meat, and avoiding processed foods.  · Drink enough fluid to keep your urine pale yellow.  Managing pain and swelling    · Take warm sitz baths for 20 minutes, 3-4 times a day to ease pain and discomfort. You may do this in a bathtub or using a portable sitz bath that fits over the toilet.  · If directed, apply ice to the affected area. Using ice packs between sitz baths may be helpful.  ? Put ice in a plastic bag.  ? Place a towel between your skin and the bag.  ? Leave the ice on for 20 minutes, 2-3 times a day.  General instructions  · Take over-the-counter and prescription medicines  only as told by your health care provider.  · Use medicated creams or suppositories as told.  · Get regular exercise. Ask your health care provider how much and what kind of exercise is best for you. In general, you should do moderate exercise for at least 30 minutes on most days of the week (150 minutes each week). This can include activities such as walking, biking, or yoga.  · Go to the bathroom when you have the urge to have a bowel movement. Do not wait.  · Avoid straining to have bowel movements.  · Keep the anal area dry and clean. Use wet toilet paper or moist towelettes after a bowel movement.  · Do not sit on the toilet for long periods of time. This increases blood pooling and pain.  · Keep all follow-up visits as told by your health care provider. This is important.  Contact a health care provider if you have:  · Increasing pain and swelling that are not controlled by treatment or medicine.  · Difficulty having a bowel movement, or you are unable to have a bowel movement.  · Pain or inflammation outside the area of the hemorrhoids.  Get help right away if you have:  · Uncontrolled bleeding from your rectum.  Summary  · Hemorrhoids are swollen veins in and around the rectum or anus.  · Most hemorrhoids can be managed with home treatments such as diet and lifestyle changes.  · Taking warm sitz baths can help ease pain and discomfort.  · In severe cases, procedures or surgery can be done to shrink or remove the hemorrhoids.  This information is not intended to replace advice given to you by your health care provider. Make sure you discuss any questions you have with your health care provider.  Document Released: 12/15/2001 Document Revised: 12/26/2019 Document Reviewed: 05/09/2019  [employer] Patient Education © 2020 [employer]    Diverticulosis    Diverticulosis is a condition that develops when small pouches (diverticula) form in the wall of the large intestine (colon). The colon is where water is  absorbed and stool is formed. The pouches form when the inside layer of the colon pushes through weak spots in the outer layers of the colon. You may have a few pouches or many of them.  What are the causes?  The cause of this condition is not known.  What increases the risk?  The following factors may make you more likely to develop this condition:  · Being older than age 60. Your risk for this condition increases with age. Diverticulosis is rare among people younger than age 30. By age 80, many people have it.  · Eating a low-fiber diet.  · Having frequent constipation.  · Being overweight.  · Not getting enough exercise.  · Smoking.  · Taking over-the-counter pain medicines, like aspirin and ibuprofen.  · Having a family history of diverticulosis.  What are the signs or symptoms?  In most people, there are no symptoms of this condition. If you do have symptoms, they may include:  · Bloating.  · Cramps in the abdomen.  · Constipation or diarrhea.  · Pain in the lower left side of the abdomen.  How is this diagnosed?  This condition is most often diagnosed during an exam for other colon problems. Because diverticulosis usually has no symptoms, it often cannot be diagnosed independently. This condition may be diagnosed by:  · Using a flexible scope to examine the colon (colonoscopy).  · Taking an X-ray of the colon after dye has been put into the colon (barium enema).  · Doing a CT scan.  How is this treated?  You may not need treatment for this condition if you have never developed an infection related to diverticulosis. If you have had an infection before, treatment may include:  · Eating a high-fiber diet. This may include eating more fruits, vegetables, and grains.  · Taking a fiber supplement.  · Taking a live bacteria supplement (probiotic).  · Taking medicine to relax your colon.  · Taking antibiotic medicines.  Follow these instructions at home:  · Drink 6-8 glasses of water or more each day to prevent  constipation.  · Try not to strain when you have a bowel movement.  · If you have had an infection before:  ? Eat more fiber as directed by your health care provider or your diet and nutrition specialist (dietitian).  ? Take a fiber supplement or probiotic, if your health care provider approves.  · Take over-the-counter and prescription medicines only as told by your health care provider.  · If you were prescribed an antibiotic, take it as told by your health care provider. Do not stop taking the antibiotic even if you start to feel better.  · Keep all follow-up visits as told by your health care provider. This is important.  Contact a health care provider if:  · You have pain in your abdomen.  · You have bloating.  · You have cramps.  · You have not had a bowel movement in 3 days.  Get help right away if:  · Your pain gets worse.  · Your bloating becomes very bad.  · You have a fever or chills, and your symptoms suddenly get worse.  · You vomit.  · You have bowel movements that are bloody or black.  · You have bleeding from your rectum.  Summary  · Diverticulosis is a condition that develops when small pouches (diverticula) form in the wall of the large intestine (colon).  · You may have a few pouches or many of them.  · This condition is most often diagnosed during an exam for other colon problems.  · If you have had an infection related to diverticulosis, treatment may include increasing the fiber in your diet, taking supplements, or taking medicines.  This information is not intended to replace advice given to you by your health care provider. Make sure you discuss any questions you have with your health care provider.  Document Released: 09/14/2005 Document Revised: 11/30/2018 Document Reviewed: 11/06/2017  [employer] Patient Education © 2020 [employer]    Colonoscopy, Adult, Care After  This sheet gives you information about how to care for yourself after your procedure. Your doctor may also give you more  specific instructions. If you have problems or questions, call your doctor.  What can I expect after the procedure?  After the procedure, it is common to have:  · A small amount of blood in your poop for 24 hours.  · Some gas.  · Mild cramping or bloating in your belly.  Follow these instructions at home:  General instructions  · For the first 24 hours after the procedure:  ? Do not drive or use machinery.  ? Do not sign important documents.  ? Do not drink alcohol.  ? Do your daily activities more slowly than normal.  ? Eat foods that are soft and easy to digest.  · Take over-the-counter or prescription medicines only as told by your doctor.  To help cramping and bloating:    · Try walking around.  · Put heat on your belly (abdomen) as told by your doctor. Use a heat source that your doctor recommends, such as a moist heat pack or a heating pad.  ? Put a towel between your skin and the heat source.  ? Leave the heat on for 20-30 minutes.  ? Remove the heat if your skin turns bright red. This is especially important if you cannot feel pain, heat, or cold. You can get burned.  Eating and drinking    · Drink enough fluid to keep your pee (urine) clear or pale yellow.  · Return to your normal diet as told by your doctor. Avoid heavy or fried foods that are hard to digest.  · Avoid drinking alcohol for as long as told by your doctor.  Contact a doctor if:  · You have blood in your poop (stool) 2-3 days after the procedure.  Get help right away if:  · You have more than a small amount of blood in your poop.  · You see large clumps of tissue (blood clots) in your poop.  · Your belly is swollen.  · You feel sick to your stomach (nauseous).  · You throw up (vomit).  · You have a fever.  · You have belly pain that gets worse, and medicine does not help your pain.  Summary  · After the procedure, it is common to have a small amount of blood in your poop. You may also have mild cramping and bloating in your belly.  · For the  first 24 hours after the procedure, do not drive or use machinery, do not sign important documents, and do not drink alcohol.  · Get help right away if you have a lot of blood in your poop, feel sick to your stomach, have a fever, or have more belly pain.  This information is not intended to replace advice given to you by your health care provider. Make sure you discuss any questions you have with your health care provider.  Document Released: 01/20/2012 Document Revised: 10/18/2018 Document Reviewed: 09/11/2017  [employer] Patient Education © 2020 [employer]

## 2020-07-16 LAB
CYTO UR: NORMAL
LAB AP CASE REPORT: NORMAL
PATH REPORT.FINAL DX SPEC: NORMAL
PATH REPORT.GROSS SPEC: NORMAL

## 2020-07-17 ENCOUNTER — TELEPHONE (OUTPATIENT)
Dept: GASTROENTEROLOGY | Facility: CLINIC | Age: 65
End: 2020-07-17

## 2020-07-20 NOTE — TELEPHONE ENCOUNTER
Called pt and advised of Dr Salazar's note. Pt verb understanding.    C/s placed in recall for 07/15/2023.

## 2020-07-24 RX ORDER — CARVEDILOL 6.25 MG/1
6.25 TABLET ORAL 2 TIMES DAILY WITH MEALS
Qty: 180 TABLET | Refills: 0 | Status: SHIPPED | OUTPATIENT
Start: 2020-07-24 | End: 2020-10-09

## 2020-08-04 RX ORDER — LISINOPRIL 5 MG/1
TABLET ORAL
Qty: 30 TABLET | Refills: 0 | Status: SHIPPED | OUTPATIENT
Start: 2020-08-04 | End: 2020-08-24

## 2020-08-04 RX ORDER — ROSUVASTATIN CALCIUM 5 MG/1
TABLET, COATED ORAL
Qty: 30 TABLET | Refills: 0 | Status: SHIPPED | OUTPATIENT
Start: 2020-08-04 | End: 2020-08-24

## 2020-08-11 ENCOUNTER — CONSULT (OUTPATIENT)
Dept: ONCOLOGY | Facility: CLINIC | Age: 65
End: 2020-08-11

## 2020-08-11 ENCOUNTER — LAB (OUTPATIENT)
Dept: LAB | Facility: HOSPITAL | Age: 65
End: 2020-08-11

## 2020-08-11 VITALS
HEART RATE: 73 BPM | DIASTOLIC BLOOD PRESSURE: 81 MMHG | OXYGEN SATURATION: 96 % | TEMPERATURE: 97.5 F | RESPIRATION RATE: 18 BRPM | WEIGHT: 189.2 LBS | BODY MASS INDEX: 24.28 KG/M2 | HEIGHT: 74 IN | SYSTOLIC BLOOD PRESSURE: 125 MMHG

## 2020-08-11 DIAGNOSIS — C91.10 CHRONIC LYMPHOCYTIC LEUKEMIA (HCC): Primary | ICD-10-CM

## 2020-08-11 DIAGNOSIS — C91.10 CHRONIC LYMPHOCYTIC LEUKEMIA (HCC): ICD-10-CM

## 2020-08-11 DIAGNOSIS — G96.89 CHRONIC LYMPHOCYTIC INFLAMMATION WITH PONTINE PERIVASCULAR ENHANCEMENT RESPONSIVE TO STEROIDS: Primary | ICD-10-CM

## 2020-08-11 LAB
ALBUMIN SERPL-MCNC: 5.1 G/DL (ref 3.5–5.2)
ALBUMIN/GLOB SERPL: 1.8 G/DL (ref 1.1–2.4)
ALP SERPL-CCNC: 72 U/L (ref 38–116)
ALT SERPL W P-5'-P-CCNC: 51 U/L (ref 0–41)
ANION GAP SERPL CALCULATED.3IONS-SCNC: 12.5 MMOL/L (ref 5–15)
AST SERPL-CCNC: 65 U/L (ref 0–40)
B2 MICROGLOB SERPL-MCNC: 2.9 MG/L (ref 0.8–2.2)
BASOPHILS # BLD AUTO: 0.03 10*3/MM3 (ref 0–0.2)
BASOPHILS NFR BLD AUTO: 0.1 % (ref 0–1.5)
BILIRUB SERPL-MCNC: 1.6 MG/DL (ref 0.2–1.2)
BUN SERPL-MCNC: 11 MG/DL (ref 6–20)
BUN/CREAT SERPL: 8.9 (ref 7.3–30)
CALCIUM SPEC-SCNC: 10.1 MG/DL (ref 8.5–10.2)
CHLORIDE SERPL-SCNC: 102 MMOL/L (ref 98–107)
CO2 SERPL-SCNC: 25.5 MMOL/L (ref 22–29)
CREAT SERPL-MCNC: 1.23 MG/DL (ref 0.7–1.3)
DEPRECATED RDW RBC AUTO: 48.9 FL (ref 37–54)
EOSINOPHIL # BLD AUTO: 0.06 10*3/MM3 (ref 0–0.4)
EOSINOPHIL NFR BLD AUTO: 0.1 % (ref 0.3–6.2)
ERYTHROCYTE [DISTWIDTH] IN BLOOD BY AUTOMATED COUNT: 13.2 % (ref 12.3–15.4)
GFR SERPL CREATININE-BSD FRML MDRD: 59 ML/MIN/1.73
GLOBULIN UR ELPH-MCNC: 2.8 GM/DL (ref 1.8–3.5)
GLUCOSE SERPL-MCNC: 107 MG/DL (ref 74–124)
HCT VFR BLD AUTO: 37.5 % (ref 37.5–51)
HGB BLD-MCNC: 13 G/DL (ref 13–17.7)
IMM GRANULOCYTES # BLD AUTO: 0.04 10*3/MM3 (ref 0–0.05)
IMM GRANULOCYTES NFR BLD AUTO: 0.1 % (ref 0–0.5)
LDH SERPL-CCNC: 167 U/L (ref 99–259)
LYMPHOCYTES # BLD AUTO: 53.6 10*3/MM3 (ref 0.7–3.1)
LYMPHOCYTES NFR BLD AUTO: 94 % (ref 19.6–45.3)
MCH RBC QN AUTO: 35 PG (ref 26.6–33)
MCHC RBC AUTO-ENTMCNC: 34.7 G/DL (ref 31.5–35.7)
MCV RBC AUTO: 101.1 FL (ref 79–97)
MONOCYTES # BLD AUTO: 0.91 10*3/MM3 (ref 0.1–0.9)
MONOCYTES NFR BLD AUTO: 1.6 % (ref 5–12)
NEUTROPHILS NFR BLD AUTO: 2.37 10*3/MM3 (ref 1.7–7)
NEUTROPHILS NFR BLD AUTO: 4.1 % (ref 42.7–76)
NRBC BLD AUTO-RTO: 0 /100 WBC (ref 0–0.2)
PLATELET # BLD AUTO: 106 10*3/MM3 (ref 140–450)
PMV BLD AUTO: 10.2 FL (ref 6–12)
POTASSIUM SERPL-SCNC: 4.5 MMOL/L (ref 3.5–4.7)
PROT SERPL-MCNC: 7.9 G/DL (ref 6.3–8)
RBC # BLD AUTO: 3.71 10*6/MM3 (ref 4.14–5.8)
SODIUM SERPL-SCNC: 140 MMOL/L (ref 134–145)
WBC # BLD AUTO: 57.01 10*3/MM3 (ref 3.4–10.8)

## 2020-08-11 PROCEDURE — 83615 LACTATE (LD) (LDH) ENZYME: CPT | Performed by: INTERNAL MEDICINE

## 2020-08-11 PROCEDURE — 82232 ASSAY OF BETA-2 PROTEIN: CPT | Performed by: INTERNAL MEDICINE

## 2020-08-11 PROCEDURE — 99204 OFFICE O/P NEW MOD 45 MIN: CPT | Performed by: INTERNAL MEDICINE

## 2020-08-11 PROCEDURE — 85025 COMPLETE CBC W/AUTO DIFF WBC: CPT

## 2020-08-11 PROCEDURE — 36415 COLL VENOUS BLD VENIPUNCTURE: CPT

## 2020-08-11 PROCEDURE — 80053 COMPREHEN METABOLIC PANEL: CPT | Performed by: INTERNAL MEDICINE

## 2020-08-11 NOTE — PROGRESS NOTES
Subjective     REASON FOR CONSULTATION: CLL SINCE 2005 NOT REQUIRING ANY INTERVENTION   Provide an opinion on any further workup or treatment                             REQUESTING PHYSICIAN:  MARY MENSAH MD.    RECORDS OBTAINED:  Records of the patients history including those obtained from the referring provider were reviewed and summarized in detail.        History of Present Illness This patient is a 65-year-old white male who I saw in consultation the 1st time in 12/2010 with a chronic history of lymphocytosis and mild leukocytosis with normal hemoglobin, normal platelet count, no organomegalies or lymphadenopathy and no infections or autoimmunity in the background of diagnosis of chronic lymphoid leukemia. I followed him up through the years and then he got lost to follow up given the lack of need for any other intervention. His wife has been insistent to him in coming back for reassessment. Overall the patient continues doing extremely well. He has not had any fever, chills, night sweats, pruritus, lymphadenopathy, fatigue or repetitive infections. He has not developed any autoimmunities. He has not had any clinical bleeding. His energy level is excellent. He continues working for [employer]. He does not smoke, he does not drink at this time. The patient otherwise is busy with his life and with his family.     The patient also has had multiple other testing in between that will be described below.    In the interval of time he presented to [facility] close to a year and a half ago with indigestion and abdominal pain and nausea. He was diagnosed with an inferior acute myocardial infarction, he had a stent in circulation, complete occlusion of the right coronary artery and since then he has not had any further issues.         Past Medical History:   Diagnosis Date   • Abnormal liver function test    • Alcohol abuse    • CLL (chronic lymphocytic leukemia) (CMS/HCC)    • Coronary artery disease      stent   • Heart disease    • History of pneumonia     1/2019   • Hyperlipidemia    • Hypertension    • Inguinal hernia     left side to have surgery 3/28/19   • Myocardial infarction (CMS/HCC)    • Screen for colon cancer 09/2016    Negative Cologaurd   • Screening PSA (prostate specific antigen) 02/2013    .5   • Thrombocytopenia (CMS/HCC)         Past Surgical History:   Procedure Laterality Date   • CARDIAC CATHETERIZATION  2017   • CATARACT EXTRACTION Bilateral    • COLONOSCOPY N/A 06/05/2006    Normal, repeat in 10 years, Dr. Preethi Gonzalez   • COLONOSCOPY N/A 7/15/2020    Procedure: COLONOSCOPY TO CECUM & T.I. WITH COLD SNARE POLYPECTOMIES, CLIP PLACEMENT X 2;  Surgeon: Yennifer Salazar MD;  Location: [facility] ENDOSCOPY;  Service: Gastroenterology;  Laterality: N/A;  PRE- POSITIVE COLOGUARD  POST- COLON POLYPS, DIVERTICULOSIS, HEMORRHOIDS   • CORONARY ANGIOPLASTY WITH STENT PLACEMENT N/A 05/09/2017    Left heart catheterization, Selective native vessel coronary arteriography, Left ventriculography, Successful percutaneous intervention to the 100% occluded right coronary artery with a 3.5 x 38 mm Synergy drug-eluting stent (post-dilated w/ a 4.0 x 20 mm NC Emerge balloon), Selective right femoral angiography, Successful Perclose arteriotomy closure. Dr. James Pierson   • INGUINAL HERNIA REPAIR Left    • INGUINAL HERNIA REPAIR Right     x2   • INGUINAL HERNIA REPAIR Left 3/28/2019    Procedure: LEFT INGUINAL HERNIA REPAIR LAPAROSCOPIC;  Surgeon: Preethi Gonzalez MD;  Location: [facility] OR [facility] [place];  Service: General   • LAPAROSCOPIC INGUINAL HERNIA REPAIR Right 10/03/2002    w/ extra peritoneal Goe-Benton mesh (transperitoneal repair), Dr. Preethi Gonzalez   • REFRACTIVE SURGERY Bilateral    • RETINAL DETACHMENT SURGERY Right 07/02/2013      ONCOLOGY HISTORY SHOLA ENNIS MD:ONCOLOGIC HISTORY:  On 12/22/10 he was reviewed.  He   had a totally normal white count with normal differential, predominance of lymphocytes.   Normal platelet count.  His chemistry profile was normal including LDH.  His beta-2 microglobulin was normal.  His quantitative immunoglobulins were normal.  His jane  p  heral blood flow cytometry documented a typical pattern by flow cytometry of CLL, CD5, CD19 negative, dim positive CD20, ZAP-70 negative and CD38 negative.  His CT scan of the chest, abdomen and pelvis disclosed no peripheral adenopathy or hepatosplenomeg  a  ly.  His level of immunoglobulins was normal.  With this in mind we thought that the patient had very early stage disease with excellent prognostic features and we advised him to remain on observation.  He will be rechecked every 3 months for the first ye  ar and thereafter every 6 months depending on the clinical circumstances.  Appropriate booklet information was given to him and his wife to review and further learn.          Given the excellent characteristics of his disease on clinical grounds, I doubt at this time a bone marrow is needed.        On 1/8/13 his physical exam is unremarkable with no peripheral adenopathy or hepatosplenomegaly, no B symptoms, no infections, no autoimmunity.  On the other hand he had areas of the skin on the left cheek and right cheek bhavani  t look like actinic keratosis.  His white count was up to17,000 with a normal hemoglobin and borderline platelet count of 125,000.  We asked the patient to try to minimize stressors in life and we asked him to return back in four months.  We also asked priscila edwards to be seen by dermatology in order to treat his multiple actinic keratosis.          On 05/09/13 the patient was asymptomatic with regard to his chronic lymphoid leukemia, no fatigue, fevers, chills, repeated infections or peripheral lymphadenopathy.  His physi  francisco j exam was unremarkable with no palpable lymphadenopathy or hepatosplenomegaly.  His white count has now improved to 12,900, stable hemoglobin of 13.6 and stable platelet count of 121,000.  With this in mind we  advised him to remain on observation, sawyer honeycutt for reevaluation in six months.         On 05/07/2014 the patient was asymptomatic in regard to his CLL with no autoimmunity, no infections and no progressive disease clinically.  On the other hand we have seen duplication of his white count in one year.  T  he patient and wife are aware that eventually he will require intervention for this if the white count continues changing the way it is.  At this time we do not justify the need of any other therapy or any other testing.        Current Outpatient Medications on File Prior to Visit   Medication Sig Dispense Refill   • acetaminophen (TYLENOL) 500 MG tablet Take 1,000 mg by mouth Every 6 (Six) Hours As Needed for Mild Pain .     • aspirin 81 MG EC tablet Take 81 mg by mouth Every Other Day.     • carvedilol (COREG) 6.25 MG tablet Take 1 tablet by mouth 2 (Two) Times a Day With Meals. 180 tablet 0   • fluticasone (FLONASE) 50 MCG/ACT nasal spray 2 sprays into the nostril(s) as directed by provider Daily. 1 bottle 0   • KLS ALLERCLEAR D-12HR 5-120 MG per 12 hr tablet TAKE ONE TABLET BY MOUTH TWICE DAILY  (Patient taking differently: Take 1 tablet by mouth 2 (Two) Times a Day.) 180 tablet 0   • lisinopril (PRINIVIL,ZESTRIL) 5 MG tablet TAKE 1 TABLET DAILY, PLEASEMAKE AN APPOINTMENT WITH   YOUR DOCTOR 30 tablet 0   • Multiple Vitamins-Minerals (EYE VITAMINS & MINERALS PO) Take 1 tablet by mouth Daily.     • rosuvastatin (CRESTOR) 5 MG tablet TAKE 1 TABLET DAILY, PLEASEMAKE AN APPOINTMENT WITH   YOUR DOCTOR 30 tablet 0   • ticagrelor (BRILINTA) 60 MG tablet tablet Take 1 tablet by mouth 2 (Two) Times a Day. Told to stop 5 days before surgery 60 tablet      No current facility-administered medications on file prior to visit.         ALLERGIES:    Allergies   Allergen Reactions   • Codeine Nausea Only        Social History     Socioeconomic History   • Marital status:      Spouse name: Charlotte Espino   • Number of  children: Not on file   • Years of education: Not on file   • Highest education level: Not on file   Occupational History   • Occupation: [employer]     Employer: [employer]   Tobacco Use   • Smoking status: Never Smoker   • Smokeless tobacco: Never Used   Substance and Sexual Activity   • Alcohol use: Yes     Alcohol/week: 21.0 standard drinks     Types: 21 Shots of liquor per week   • Drug use: No   • Sexual activity: Defer        Family History   Problem Relation Age of Onset   • Heart attack Mother    • Heart disease Mother    • Diabetes Mother    • Aortic aneurysm Mother    • Coronary artery disease Mother    • Early death Mother    • No Known Problems Father    • Diabetes type II Other    • Diabetes Brother    • Hyperlipidemia Brother    • Stroke Sister 65   • Diabetes Sister    • Hyperlipidemia Sister    • Hypertension Sister    • No Known Problems Brother    • Stomach cancer Paternal Uncle    • Malig Hyperthermia Neg Hx         Review of Systems   General: no fever, no chills, no fatigue,no weight changes, no lack of appetite.  Eyes: no epiphora, xerophthalmia,conjunctivitis, pain, glaucoma, blurred vision, blindness, secretion, photophobia, proptosis, diplopia.  Ears: no otorrhea, tinnitus, otorrhagia, deafness, pain, vertigo.  Nose: no rhinorrhea, no epistaxis, no alteration in perception of odors, no sinuses pressure.  Mouth: no alteration in gums or teeth,  No ulcers, no difficulty with mastication or deglut ion, no odynophagia.  Neck: no masses or pain, no thyroid alterations, no pain in muscles or arteries, no carotid odynia, no crepitation.  Respiratory: no cough, no sputum production,no dyspnea,no trepopnea, no pleuritic pain,no hemoptysis.  Heart: no syncope, no irregularity, no palpitations, no angina,no orthopnea,no paroxysmal nocturnal dyspnea.  Vascular Venous: no tenderness,no edema,no palpable cords,no postphlebitic syndrome, no skin changes no ulcerations.  Vascular Arterial: no distal  "ischemia, noclaudication, no gangrene, no neuropathic ischemic pain, no skin ulcers, no paleness no cyanosis.  GI: no dysphagia, no odynophagia, no regurgitation, no heartburn,no indigestion,no nausea,no vomiting,no hematemesis ,no melena,no jaundice,no distention, no obstipation,no enterorrhagia,no proctalgia,no anal  lesions, no changes in bowel habits.  : no frequency, no hesitancy, no hematuria, no discharge,no  pain.  Musculoskeletal: no muscle or tendon pain or inflammation,no  joint pain, no edema, no functional limitation,no fasciculations, no mass.  Neurologic: no headache, no seizures, noalterations on Craneal nerves, no motor deficit, no sensory deficit, normal coordination, no alteration in memory,normal orientation, calculation,normal writting, verbal and written language.  Skin: no rashes,no pruritus no localized lesions.  Psychiatric: no anxiety, no depression,no agitation, no delusions, proper insight.      Objective     Vitals:    08/11/20 1006   BP: 125/81   Pulse: 73   Resp: 18   Temp: 97.5 °F (36.4 °C)   TempSrc: Temporal   SpO2: 96%   Weight: 85.8 kg (189 lb 3.2 oz)   Height: 188 cm (74.02\")   PainSc: 0-No pain     Current Status 8/11/2020   ECOG score 0       Physical Exam  I HAVE PERSONALLY REVIEWED THE HISTORY OF THE PRESENT ILLNESS, PAST MEDICAL HISTORY, FAMILY HISTORY, SOCIAL HISTORY, ALLERGIES, MEDICATIONS STATED ABOVE IN THE OFFICE NOTE FROM TODAY.        GENERAL:  Well-developed, well-nourished  Patient  in no acute distress.   SKIN:  Warm, dry ,NO rashes,NO purpura ,NO petechiae.MULTIPLE ACTINIC KERATOSIS IN LEFT SIDE OF THE FACE, BASAL CELL CANCER DORSAL ASPECT LEFT HAND  HEENT:  Pupils were equal and reactive to light and accomodation, conjunctivas non injected, no pterigion, normal extraocular movements, normal visual acuity.   NECK:  Supple with good range of motion; no thyromegaly or masses, no JVD or bruits, no cervical adenopathies.No carotid arteries pain, no carotid abnormal " pulsation , NO arterial dance.  LYMPHATICS:  No cervical, NO supraclavicular, NO axillary,NO epitrochlear , NO inguinal adenopathy.  CARDIAC   normal rate and regular rhythm, without murmur,NO rubs NO S3 NO S4 right or left . Normal femoral, popliteal, pedis, brachial and carotid pulses.  VASCULAR ARTERIAL: normal carotids,brachial,radial,femoral,popliteal, pedis pulses , no bruits.no paleness or cyanosis, no pain, no edema, no numbness, no gangrene.  VASCULAR VENOUS: no cyanosis, collateral circulation, varicosities, edema, palpable cords, pain, erythema.  ABDOMEN:  Soft, nontender with no hepatomegaly, no splenomegaly,no masses, no ascites, no collateral circulation,no distention,no Dallin sign, no abdominal pain, no inguinal hernias,no umbilical hernia, no abdominal bruits. Normal bowel sounds.  GENITAL: Not  Performed.  EXTREMITIES  AND SPINE:  No clubbing, cyanosis or edema, no deformities or pain .No kyphosis, scoliosis, deformities or pain in spine, ribs or pelvic bone.  NEUROLOGICAL:  Patient was awake, alert, oriented to time, person and place.      RECENT LABS:  Hematology WBC   Date Value Ref Range Status   08/11/2020 57.01 (H) 3.40 - 10.80 10*3/mm3 Final   10/14/2019 40.48 (C) 3.40 - 10.80 10*3/mm3 Final     RBC   Date Value Ref Range Status   08/11/2020 3.71 (L) 4.14 - 5.80 10*6/mm3 Final   10/14/2019 3.70 (L) 4.14 - 5.80 10*6/mm3 Final     Hemoglobin   Date Value Ref Range Status   08/11/2020 13.0 13.0 - 17.7 g/dL Final     Hematocrit   Date Value Ref Range Status   08/11/2020 37.5 37.5 - 51.0 % Final     Platelets   Date Value Ref Range Status   08/11/2020 106 (L) 140 - 450 10*3/mm3 Final      MRI ABDOMEN AND MRCP, 5/8/2018:     HISTORY:  62-year-old male with abnormal liver function tests.     TECHNIQUE:  MRI examination of the abdomen before and after gadolinium contrast  administration (16 cc MultiHance), including multiphase postcontrast  images. MRCP sequences were also obtained.      FINDINGS:  Minimal geographic regions of hepatic steatosis. Liver and spleen are  otherwise normal in size and appearance. No mass or other suspicious  focal liver lesion is identified. The pancreas is normal in MRI  appearance. No upper abdominal ascites, adenopathy or mass is  identified. The gallbladder is nondistended.     The intrahepatic and extra hepatic bile ducts are normal in caliber and  appearance. The pancreatic duct is also normal. Hepatic veins, portal  veins and IVC are patent.     IMPRESSION:  1. Minimal geographic regions of hepatic steatosis.  2. MRI examination of the abdomen is otherwise negative, including MRCP.     This report was finalized on 5/8/2018 3:15 PM by Dr. Braeden Walton MD.     PA AND LATERAL CHEST X-RAY     HISTORY: Cough, pneumonia diagnosis in January.     Chest x-ray consisting of PA and lateral views is provided.  Comparison  exams: Chest x-ray 03/31/2019 and 01/09/2019.     FINDINGS: There is a coronary artery stent. The cardiomediastinal  silhouette is normal. The lungs are clear; the pneumonia seen previously  has resolved. The costophrenic sulci are dry and the bones appear  normal. There is no pneumothorax.     IMPRESSION:  Negative.     This report was finalized on 8/13/2019 3:06 PM by Dr. Christopher Ball M.D.     Component      Latest Ref Rng & Units 8/31/2018 1/9/2019 1/9/2019 1/17/2019          10:41 AM  4:41 PM  4:41 PM 12:31 PM   WBC      3.40 - 10.80 10*3/mm3 37.11 (H) 51.61 (HH)  39.49 (H)   RBC      4.14 - 5.80 10*6/mm3 3.84 (L) 3.59 (L)  3.57 (L)   Hemoglobin      13.0 - 17.7 g/dL 13.4 (L) 11.9 (L)  11.8 (L)   Hematocrit      37.5 - 51.0 % 39.1 (L) 36.6 (L)  36.2 (L)   MCV      79.0 - 97.0 fL 101.8 (H) 101.9 (H)  101.4 (H)   MCH      26.6 - 33.0 pg 34.9 (H) 33.1 (H)  33.1 (H)   MCHC      31.5 - 35.7 g/dL 34.3 32.5  32.6   RDW      12.3 - 15.4 % 13.1 13.5  13.4   RDW-SD      37.0 - 54.0 fl       MPV      6.0 - 12.0 fL       Platelets      140 - 450  10*3/mm3 121 (L) 184  252   Neutrophil Rel %      42.7 - 76.0 % 12.5 (L)  16.0 (L) 28.7 (L)   Lymphocyte Rel %      19.6 - 45.3 % 85.4 (H)  72.0 (H) 68.2 (H)   Monocyte Rel %      5.0 - 12.0 % 1.4 (L)  2.0 (L) 2.0 (L)   Eosinophil Rel %      0.3 - 6.2 % 0.4  0.0 0.4   Basophil Rel %      0.0 - 1.5 % 0.1  0.0 0.1   Immature Granulocyte Rel %      0.0 - 0.5 % 0.2   0.6 (H)   Neutrophils Absolute      1.70 - 7.00 10*3/mm3 4.63  8.26 11.34 (H)   Lymphocytes Absolute      0.70 - 3.10 10*3/mm3 31.71 (H)  37.16 (H) 26.93 (H)   Monocytes Absolute      0.10 - 0.90 10*3/mm3 0.52  1.03 (H) 0.79   Eosinophils Absolute      0.00 - 0.40 10*3/mm3 0.13   0.17   Basophils Absolute      0.00 - 0.20 10*3/mm3 0.03  0.00 0.03   Immature Grans, Absolute      0.00 - 0.05 10*3/mm3 0.09 (H)   0.23 (H)   nRBC      0.0 - 0.2 /100 WBC 0.0   0.0     Component      Latest Ref Rng & Units 1/17/2019 3/21/2019 3/31/2019 3/31/2019          12:31 PM  2:35 PM  8:11 AM  8:11 AM   WBC      3.40 - 10.80 10*3/mm3  47.79 (HH) 34.06 (HH)    RBC      4.14 - 5.80 10*6/mm3  3.66 (L) 3.24 (L)    Hemoglobin      13.0 - 17.7 g/dL  12.1 (L) 10.6 (L)    Hematocrit      37.5 - 51.0 %  37.2 (L) 32.8 (L)    MCV      79.0 - 97.0 fL  101.6 (H) 101.2 (H)    MCH      26.6 - 33.0 pg  33.1 (H) 32.7    MCHC      31.5 - 35.7 g/dL  32.5 32.3    RDW      12.3 - 15.4 %  14.6 14.8    RDW-SD      37.0 - 54.0 fl  54.6 (H) 54.6 (H)    MPV      6.0 - 12.0 fL  10.4 10.4    Platelets      140 - 450 10*3/mm3  133 (L) 93 (L)    Neutrophil Rel %      42.7 - 76.0 % 28.0 (L)   28.0 (L)   Lymphocyte Rel %      19.6 - 45.3 % 64.0 (H)   65.0 (H)   Monocyte Rel %      5.0 - 12.0 % 1.0 (L)   5.0   Eosinophil Rel %      0.3 - 6.2 % 1.0   2.0   Basophil Rel %      0.0 - 1.5 % 0.0      Immature Granulocyte Rel %      0.0 - 0.5 %       Neutrophils Absolute      1.70 - 7.00 10*3/mm3 11.45 (H)   9.54 (H)   Lymphocytes Absolute      0.70 - 3.10 10*3/mm3 25.27 (H)   22.14 (H)   Monocytes Absolute       0.10 - 0.90 10*3/mm3 0.39   1.70 (H)   Eosinophils Absolute      0.00 - 0.40 10*3/mm3    0.68 (H)   Basophils Absolute      0.00 - 0.20 10*3/mm3 0.00      Immature Grans, Absolute      0.00 - 0.05 10*3/mm3       nRBC      0.0 - 0.2 /100 WBC         Component      Latest Ref Rng & Units 10/14/2019 10/14/2019 8/11/2020           9:39 AM  9:39 AM  7:49 AM   WBC      3.40 - 10.80 10*3/mm3 40.48 (HH)  57.01 (H)   RBC      4.14 - 5.80 10*6/mm3 3.70 (L)  3.71 (L)   Hemoglobin      13.0 - 17.7 g/dL 12.7 (L)  13.0   Hematocrit      37.5 - 51.0 % 38.1  37.5   MCV      79.0 - 97.0 fL 103.0 (H)  101.1 (H)   MCH      26.6 - 33.0 pg 34.3 (H)  35.0 (H)   MCHC      31.5 - 35.7 g/dL 33.3  34.7   RDW      12.3 - 15.4 % 13.4  13.2   RDW-SD      37.0 - 54.0 fl   48.9   MPV      6.0 - 12.0 fL   10.2   Platelets      140 - 450 10*3/mm3 118 (L)  106 (L)   Neutrophil Rel %      42.7 - 76.0 % CANCELED 5.0 (L) 4.1 (L)   Lymphocyte Rel %      19.6 - 45.3 % CANCELED 94.0 (H) 94.0 (H)   Monocyte Rel %      5.0 - 12.0 % CANCELED 1.0 (L) 1.6 (L)   Eosinophil Rel %      0.3 - 6.2 % CANCELED 0.0 (L) 0.1 (L)   Basophil Rel %      0.0 - 1.5 %  0.0 0.1   Immature Granulocyte Rel %      0.0 - 0.5 %   0.1   Neutrophils Absolute      1.70 - 7.00 10*3/mm3  2.02 2.37   Lymphocytes Absolute      0.70 - 3.10 10*3/mm3 CANCELED 38.05 (H) 53.60 (H)   Monocytes Absolute      0.10 - 0.90 10*3/mm3  0.40 0.91 (H)   Eosinophils Absolute      0.00 - 0.40 10*3/mm3 CANCELED  0.06   Basophils Absolute      0.00 - 0.20 10*3/mm3 CANCELED 0.00 0.03   Immature Grans, Absolute      0.00 - 0.05 10*3/mm3   0.04   nRBC      0.0 - 0.2 /100 WBC   0.0       Assessment/Plan  In summary this patient has had chronic lymphoid leukemia for the last 15 years. He never has required any intervention since the original definitive diagnosis in 12/2010. At that time his white count was close to 13,000. Obviously the white count has slowly crept up to what it is today but the count today is  no different than what it was in 01/2019. The hemoglobin is stable. The platelet count is the only number that has really changed through the years. The patient is not having any clinical bleeding. He has no palpable splenomegaly, he has no peripheral adenopathy, he has no B symptoms, he has no repetitive infections. He has not had anything that suggests autoimmunity.     The patient is up to date on his vaccinations.     He has had colonoscopy, polyps were removed from the colon. All of them were tubular adenomas with no dysplasia. He had a positive Cologuard test.     The patient has very white skin, he has a lot of sun exposure. Most of the exposure is on the left side of his face, multiple lesions actinic keratosis and he has a lesion on the left hand dorsal aspect of it 2 cm in diameter that corresponds to a basal cell carcinoma. The skin cancer is not an issue that goes away from the diagnosis of CLL, in fact as I pointed out to him today and as I did in the past skin cancer is more prevalent in patients who have lymphoid malignancies. Even sometimes it is more aggressive. Therefore I asked him to be more proactive in regard to sun protection on his face and upper body. He has tinted the window of his car to minimize sun exposure. I think this is a great measure and I asked him to have a hat in the car, Neutrogena 50 or 100 also available for application and also application on his lower lip. I asked him to be seen by his dermatologist to take care of the lesions in his face and his left hand.     I sent him back to the lab to obtain a chemistry profile, beta-2 microglobulin, LDH, flow cytometry and serum protein immunoelectrophoresis. He will be called at home with the report of this. I would like to review him back in 4 months with a CBC. I find no need for radiological assessment at this time or new bone marrow testing.     As I did in the past there are multiple new medications for the treatment of CLL  including venetoclax that made a wonder drug and if in the future he needs any I think this will be the ideal medication in a tablet form that should be very dramatic in regard to improvement.    I discussed all of these facts with the patient and his wife today.     I DISCUSSED WITH PATIENT IN DETAIL FORMS TO DECREASE CHANCES OF CORONAVIRUS INFECTION INCLUDING ISOLATION, PROPER HAND HIGIENE, AVOID PUBLIC PLACES  WITH CROWDS, FOLLOW  CDC RECOMENDATIONS, AND KEEP PERSONAL AND SOCIAL RESPONSIBILITY, WARE A MASK IN PUBLIC PLACES.  PATIENT IS AWARE THIS INFECTION COULD HAVE SEVERE CONSEQUENCES TO PERSONAL HEALTH AND FAMILY RAMIFICATIONS OF THIS.

## 2020-08-13 ENCOUNTER — TELEPHONE (OUTPATIENT)
Dept: ONCOLOGY | Facility: CLINIC | Age: 65
End: 2020-08-13

## 2020-08-13 LAB
ALBUMIN SERPL-MCNC: 4.4 G/DL (ref 2.9–4.4)
ALBUMIN/GLOB SERPL: 1.6 {RATIO} (ref 0.7–1.7)
ALPHA1 GLOB FLD ELPH-MCNC: 0.2 G/DL (ref 0–0.4)
ALPHA2 GLOB SERPL ELPH-MCNC: 0.7 G/DL (ref 0.4–1)
B-GLOBULIN SERPL ELPH-MCNC: 1.1 G/DL (ref 0.7–1.3)
GAMMA GLOB SERPL ELPH-MCNC: 0.8 G/DL (ref 0.4–1.8)
GLOBULIN SER CALC-MCNC: 2.8 G/DL (ref 2.2–3.9)
IGA SERPL-MCNC: 121 MG/DL (ref 61–437)
IGG SERPL-MCNC: 918 MG/DL (ref 603–1613)
IGM SERPL-MCNC: 35 MG/DL (ref 20–172)
INTERPRETATION SERPL IEP-IMP: ABNORMAL
KAPPA LC SERPL-MCNC: 23.6 MG/L (ref 3.3–19.4)
KAPPA LC/LAMBDA SER: 1.9 {RATIO} (ref 0.26–1.65)
LAMBDA LC FREE SERPL-MCNC: 12.4 MG/L (ref 5.7–26.3)
Lab: ABNORMAL
M-SPIKE: ABNORMAL G/DL
PROT SERPL-MCNC: 7.2 G/DL (ref 6–8.5)

## 2020-08-13 NOTE — TELEPHONE ENCOUNTER
I have called the patient today to notify him that the flow cytometry in peripheral blood is no different than what it was 10 years ago. Eventually in the next appointment in 4 months we will take a sample for IgVH rearrangement. The chemistry profile remains normal. The beta-2 microglobulin is minimally elevated and his serum protein electrophoresis discloses no monoclonal proteins. The patient had visits with the dermatologist today. The lesion in the left hand was taken care of and many of the lesions in the face. We will review him back in 4 months. He was grateful about the phone call.

## 2020-08-24 RX ORDER — ROSUVASTATIN CALCIUM 5 MG/1
TABLET, COATED ORAL
Qty: 30 TABLET | Refills: 0 | Status: SHIPPED | OUTPATIENT
Start: 2020-08-24 | End: 2020-09-21

## 2020-08-24 RX ORDER — LISINOPRIL 5 MG/1
TABLET ORAL
Qty: 30 TABLET | Refills: 0 | Status: SHIPPED | OUTPATIENT
Start: 2020-08-24 | End: 2020-09-21

## 2020-09-11 RX ORDER — LORATADINE, PSEUDOEPHEDRINE 5; 120 MG/1; MG/1
TABLET, EXTENDED RELEASE ORAL
Qty: 30 TABLET | Refills: 0 | Status: SHIPPED | OUTPATIENT
Start: 2020-09-11 | End: 2021-07-16

## 2020-09-15 LAB — REF LAB TEST METHOD: NORMAL

## 2020-09-21 DIAGNOSIS — C91.10 CHRONIC LYMPHOCYTIC LEUKEMIA (HCC): Primary | ICD-10-CM

## 2020-09-21 RX ORDER — ROSUVASTATIN CALCIUM 5 MG/1
TABLET, COATED ORAL
Qty: 30 TABLET | Refills: 0 | Status: SHIPPED | OUTPATIENT
Start: 2020-09-21 | End: 2020-10-09

## 2020-09-21 RX ORDER — LISINOPRIL 5 MG/1
TABLET ORAL
Qty: 30 TABLET | Refills: 0 | Status: SHIPPED | OUTPATIENT
Start: 2020-09-21 | End: 2020-10-12

## 2020-10-09 RX ORDER — CARVEDILOL 6.25 MG/1
TABLET ORAL
Qty: 180 TABLET | Refills: 0 | Status: SHIPPED | OUTPATIENT
Start: 2020-10-09 | End: 2020-12-08 | Stop reason: SDUPTHER

## 2020-10-09 RX ORDER — ROSUVASTATIN CALCIUM 5 MG/1
TABLET, COATED ORAL
Qty: 30 TABLET | Refills: 0 | Status: SHIPPED | OUTPATIENT
Start: 2020-10-09 | End: 2020-11-02

## 2020-10-12 RX ORDER — LISINOPRIL 5 MG/1
TABLET ORAL
Qty: 30 TABLET | Refills: 0 | Status: SHIPPED | OUTPATIENT
Start: 2020-10-12 | End: 2020-11-09

## 2020-11-02 RX ORDER — ROSUVASTATIN CALCIUM 5 MG/1
TABLET, COATED ORAL
Qty: 30 TABLET | Refills: 0 | Status: SHIPPED | OUTPATIENT
Start: 2020-11-02 | End: 2020-12-08 | Stop reason: SDUPTHER

## 2020-11-05 LAB
ALBUMIN SERPL-MCNC: 5 G/DL (ref 3.5–5.2)
ALBUMIN/GLOB SERPL: 2.8 G/DL
ALP SERPL-CCNC: 82 U/L (ref 39–117)
ALT SERPL-CCNC: 51 U/L (ref 1–41)
AST SERPL-CCNC: 74 U/L (ref 1–40)
BASOPHILS # BLD AUTO: ABNORMAL 10*3/UL
BASOPHILS # BLD MANUAL: 0 10*3/MM3 (ref 0–0.2)
BASOPHILS NFR BLD MANUAL: 0 % (ref 0–1.5)
BILIRUB SERPL-MCNC: 1.6 MG/DL (ref 0–1.2)
BUN SERPL-MCNC: 12 MG/DL (ref 8–23)
BUN/CREAT SERPL: 10.6 (ref 7–25)
CALCIUM SERPL-MCNC: 9.4 MG/DL (ref 8.6–10.5)
CHLORIDE SERPL-SCNC: 102 MMOL/L (ref 98–107)
CHOLEST SERPL-MCNC: 183 MG/DL (ref 0–200)
CO2 SERPL-SCNC: 26.3 MMOL/L (ref 22–29)
CREAT SERPL-MCNC: 1.13 MG/DL (ref 0.76–1.27)
DIFFERENTIAL COMMENT: ABNORMAL
EOSINOPHIL # BLD AUTO: ABNORMAL 10*3/UL
EOSINOPHIL # BLD MANUAL: 0 10*3/MM3 (ref 0–0.4)
EOSINOPHIL NFR BLD AUTO: ABNORMAL %
EOSINOPHIL NFR BLD MANUAL: 0 % (ref 0.3–6.2)
ERYTHROCYTE [DISTWIDTH] IN BLOOD BY AUTOMATED COUNT: 13.3 % (ref 12.3–15.4)
GLOBULIN SER CALC-MCNC: 1.8 GM/DL
GLUCOSE SERPL-MCNC: 86 MG/DL (ref 65–99)
HBA1C MFR BLD: 5.8 % (ref 4.8–5.6)
HCT VFR BLD AUTO: 36.2 % (ref 37.5–51)
HDLC SERPL-MCNC: 72 MG/DL (ref 40–60)
HGB BLD-MCNC: 12.6 G/DL (ref 13–17.7)
LDLC SERPL CALC-MCNC: 76 MG/DL (ref 0–100)
LDLC/HDLC SERPL: 0.94 {RATIO}
LYMPHOCYTES # BLD AUTO: ABNORMAL 10*3/UL
LYMPHOCYTES # BLD MANUAL: 41.71 10*3/MM3 (ref 0.7–3.1)
LYMPHOCYTES NFR BLD AUTO: ABNORMAL %
LYMPHOCYTES NFR BLD MANUAL: 89 % (ref 19.6–45.3)
MCH RBC QN AUTO: 35.9 PG (ref 26.6–33)
MCHC RBC AUTO-ENTMCNC: 34.8 G/DL (ref 31.5–35.7)
MCV RBC AUTO: 103.1 FL (ref 79–97)
MONOCYTES # BLD MANUAL: 1.41 10*3/MM3 (ref 0.1–0.9)
MONOCYTES NFR BLD AUTO: ABNORMAL %
MONOCYTES NFR BLD MANUAL: 3 % (ref 5–12)
NEUTROPHILS # BLD MANUAL: 3.75 10*3/MM3 (ref 1.7–7)
NEUTROPHILS NFR BLD AUTO: ABNORMAL %
NEUTROPHILS NFR BLD MANUAL: 8 % (ref 42.7–76)
PLATELET # BLD AUTO: 92 10*3/MM3 (ref 140–450)
PLATELET BLD QL SMEAR: ABNORMAL
POTASSIUM SERPL-SCNC: 4.6 MMOL/L (ref 3.5–5.2)
PROT SERPL-MCNC: 6.8 G/DL (ref 6–8.5)
PSA SERPL-MCNC: 0.37 NG/ML (ref 0–4)
RBC # BLD AUTO: 3.51 10*6/MM3 (ref 4.14–5.8)
RBC MORPH BLD: ABNORMAL
SODIUM SERPL-SCNC: 141 MMOL/L (ref 136–145)
T4 FREE SERPL-MCNC: 1.19 NG/DL (ref 0.93–1.7)
TRIGL SERPL-MCNC: 215 MG/DL (ref 0–150)
TSH SERPL DL<=0.005 MIU/L-ACNC: 1.21 UIU/ML (ref 0.27–4.2)
VLDLC SERPL CALC-MCNC: 35 MG/DL (ref 5–40)
WBC # BLD AUTO: 46.87 10*3/MM3 (ref 3.4–10.8)

## 2020-11-09 RX ORDER — LISINOPRIL 5 MG/1
TABLET ORAL
Qty: 30 TABLET | Refills: 0 | Status: SHIPPED | OUTPATIENT
Start: 2020-11-09 | End: 2020-12-08 | Stop reason: SDUPTHER

## 2020-11-24 RX ORDER — ROSUVASTATIN CALCIUM 5 MG/1
TABLET, COATED ORAL
Qty: 30 TABLET | Refills: 0 | OUTPATIENT
Start: 2020-11-24

## 2020-12-01 ENCOUNTER — LAB (OUTPATIENT)
Dept: LAB | Facility: HOSPITAL | Age: 65
End: 2020-12-01

## 2020-12-01 ENCOUNTER — OFFICE VISIT (OUTPATIENT)
Dept: ONCOLOGY | Facility: CLINIC | Age: 65
End: 2020-12-01

## 2020-12-01 VITALS
SYSTOLIC BLOOD PRESSURE: 131 MMHG | RESPIRATION RATE: 16 BRPM | WEIGHT: 187.9 LBS | BODY MASS INDEX: 24.11 KG/M2 | DIASTOLIC BLOOD PRESSURE: 78 MMHG | HEART RATE: 86 BPM | HEIGHT: 74 IN | TEMPERATURE: 98.3 F | OXYGEN SATURATION: 97 %

## 2020-12-01 DIAGNOSIS — C91.10 CHRONIC LYMPHOCYTIC LEUKEMIA (HCC): ICD-10-CM

## 2020-12-01 DIAGNOSIS — C91.10 CHRONIC LYMPHOCYTIC LEUKEMIA (HCC): Primary | ICD-10-CM

## 2020-12-01 LAB
BASOPHILS # BLD AUTO: 0.03 10*3/MM3 (ref 0–0.2)
BASOPHILS NFR BLD AUTO: 0.1 % (ref 0–1.5)
DEPRECATED RDW RBC AUTO: 53.6 FL (ref 37–54)
EOSINOPHIL # BLD AUTO: 0.04 10*3/MM3 (ref 0–0.4)
EOSINOPHIL NFR BLD AUTO: 0.1 % (ref 0.3–6.2)
ERYTHROCYTE [DISTWIDTH] IN BLOOD BY AUTOMATED COUNT: 13.6 % (ref 12.3–15.4)
HCT VFR BLD AUTO: 38 % (ref 37.5–51)
HGB BLD-MCNC: 12.6 G/DL (ref 13–17.7)
IMM GRANULOCYTES # BLD AUTO: 0.05 10*3/MM3 (ref 0–0.05)
IMM GRANULOCYTES NFR BLD AUTO: 0.1 % (ref 0–0.5)
LYMPHOCYTES # BLD AUTO: 41.79 10*3/MM3 (ref 0.7–3.1)
LYMPHOCYTES NFR BLD AUTO: 93.4 % (ref 19.6–45.3)
MCH RBC QN AUTO: 35.6 PG (ref 26.6–33)
MCHC RBC AUTO-ENTMCNC: 33.2 G/DL (ref 31.5–35.7)
MCV RBC AUTO: 107.3 FL (ref 79–97)
MONOCYTES # BLD AUTO: 0.52 10*3/MM3 (ref 0.1–0.9)
MONOCYTES NFR BLD AUTO: 1.2 % (ref 5–12)
NEUTROPHILS NFR BLD AUTO: 2.29 10*3/MM3 (ref 1.7–7)
NEUTROPHILS NFR BLD AUTO: 5.1 % (ref 42.7–76)
NRBC BLD AUTO-RTO: 0 /100 WBC (ref 0–0.2)
PLATELET # BLD AUTO: 96 10*3/MM3 (ref 140–450)
PMV BLD AUTO: 10.1 FL (ref 6–12)
RBC # BLD AUTO: 3.54 10*6/MM3 (ref 4.14–5.8)
WBC # BLD AUTO: 44.72 10*3/MM3 (ref 3.4–10.8)

## 2020-12-01 PROCEDURE — 88185 FLOWCYTOMETRY/TC ADD-ON: CPT

## 2020-12-01 PROCEDURE — 36415 COLL VENOUS BLD VENIPUNCTURE: CPT

## 2020-12-01 PROCEDURE — 88184 FLOWCYTOMETRY/ TC 1 MARKER: CPT

## 2020-12-01 PROCEDURE — 99213 OFFICE O/P EST LOW 20 MIN: CPT | Performed by: NURSE PRACTITIONER

## 2020-12-01 PROCEDURE — 85025 COMPLETE CBC W/AUTO DIFF WBC: CPT

## 2020-12-01 NOTE — PROGRESS NOTES
Subjective     REASON FOR FOLLOW-UP: CLL SINCE 2005 NOT REQUIRING ANY INTERVENTION                             REQUESTING PHYSICIAN:  MARY MENSAH MD.    History of Present Illness T patient is a 65-year-old male with the above-mentioned history who is here today for follow-up on CLL.  He reports he is doing well.  He denies fevers, chills, night sweats.  He has a good appetite and good energy level.  He states he has had no changes since his last visit.  He continues on anticoagulation due to history of acute MI, denies bleeding issues.      Past Medical History:   Diagnosis Date   • Abnormal liver function test    • Alcohol abuse    • CLL (chronic lymphocytic leukemia) (CMS/HCC)    • Coronary artery disease     stent   • Heart disease    • History of pneumonia     1/2019   • Hyperlipidemia    • Hypertension    • Inguinal hernia     left side to have surgery 3/28/19   • Myocardial infarction (CMS/HCC)    • Screen for colon cancer 09/2016    Negative Cologaurd   • Screening PSA (prostate specific antigen) 02/2013    .5   • Thrombocytopenia (CMS/HCC)         Past Surgical History:   Procedure Laterality Date   • CARDIAC CATHETERIZATION  2017   • CATARACT EXTRACTION Bilateral    • COLONOSCOPY N/A 06/05/2006    Normal, repeat in 10 years, Dr. Preethi Gonzalez   • COLONOSCOPY N/A 7/15/2020    Procedure: COLONOSCOPY TO CECUM & T.I. WITH COLD SNARE POLYPECTOMIES, CLIP PLACEMENT X 2;  Surgeon: Yennifer Salazar MD;  Location: [facility] ENDOSCOPY;  Service: Gastroenterology;  Laterality: N/A;  PRE- POSITIVE COLOGUARD  POST- COLON POLYPS, DIVERTICULOSIS, HEMORRHOIDS   • CORONARY ANGIOPLASTY WITH STENT PLACEMENT N/A 05/09/2017    Left heart catheterization, Selective native vessel coronary arteriography, Left ventriculography, Successful percutaneous intervention to the 100% occluded right coronary artery with a 3.5 x 38 mm Synergy drug-eluting stent (post-dilated w/ a 4.0 x 20 mm NC Emerge balloon), Selective right femoral  angiography, Successful Perclose arteriotomy closure. Dr. James Pierson   • INGUINAL HERNIA REPAIR Left    • INGUINAL HERNIA REPAIR Right     x2   • INGUINAL HERNIA REPAIR Left 3/28/2019    Procedure: LEFT INGUINAL HERNIA REPAIR LAPAROSCOPIC;  Surgeon: Preethi Gonzalez MD;  Location: [facility] [place];  Service: General   • LAPAROSCOPIC INGUINAL HERNIA REPAIR Right 10/03/2002    w/ extra peritoneal Goe-Benton mesh (transperitoneal repair), Dr. Preethi Gonzalez   • REFRACTIVE SURGERY Bilateral    • RETINAL DETACHMENT SURGERY Right 07/02/2013      ONCOLOGY HISTORY SHOLA ENNIS MD:ONCOLOGIC HISTORY:  On 12/22/10 he was reviewed.  He   had a totally normal white count with normal differential, predominance of lymphocytes.  Normal platelet count.  His chemistry profile was normal including LDH.  His beta-2 microglobulin was normal.  His quantitative immunoglobulins were normal.  His jane  p  heral blood flow cytometry documented a typical pattern by flow cytometry of CLL, CD5, CD19 negative, dim positive CD20, ZAP-70 negative and CD38 negative.  His CT scan of the chest, abdomen and pelvis disclosed no peripheral adenopathy or hepatosplenomeg  a  ly.  His level of immunoglobulins was normal.  With this in mind we thought that the patient had very early stage disease with excellent prognostic features and we advised him to remain on observation.  He will be rechecked every 3 months for the first ye  ar and thereafter every 6 months depending on the clinical circumstances.  Appropriate booklet information was given to him and his wife to review and further learn.          Given the excellent characteristics of his disease on clinical grounds, I doubt at this time a bone marrow is needed.        On 1/8/13 his physical exam is unremarkable with no peripheral adenopathy or hepatosplenomegaly, no B symptoms, no infections, no autoimmunity.  On the other hand he had areas of the skin on the left cheek and right cheek bhavani  t look  like actinic keratosis.  His white count was up to17,000 with a normal hemoglobin and borderline platelet count of 125,000.  We asked the patient to try to minimize stressors in life and we asked him to return back in four months.  We also asked priscila edwards to be seen by dermatology in order to treat his multiple actinic keratosis.          On 05/09/13 the patient was asymptomatic with regard to his chronic lymphoid leukemia, no fatigue, fevers, chills, repeated infections or peripheral lymphadenopathy.  His physi  francisco j exam was unremarkable with no palpable lymphadenopathy or hepatosplenomegaly.  His white count has now improved to 12,900, stable hemoglobin of 13.6 and stable platelet count of 121,000.  With this in mind we advised him to remain on observation, retu  rning for reevaluation in six months.         On 05/07/2014 the patient was asymptomatic in regard to his CLL with no autoimmunity, no infections and no progressive disease clinically.  On the other hand we have seen duplication of his white count in one year.  T  he patient and wife are aware that eventually he will require intervention for this if the white count continues changing the way it is.  At this time we do not justify the need of any other therapy or any other testing.        Current Outpatient Medications on File Prior to Visit   Medication Sig Dispense Refill   • acetaminophen (TYLENOL) 500 MG tablet Take 1,000 mg by mouth Every 6 (Six) Hours As Needed for Mild Pain .     • aspirin 81 MG EC tablet Take 81 mg by mouth Every Other Day.     • carvedilol (COREG) 6.25 MG tablet TAKE 1 TABLET TWICE DAILY  WITH MEALS. 180 tablet 0   • fluticasone (FLONASE) 50 MCG/ACT nasal spray 2 sprays into the nostril(s) as directed by provider Daily. 1 bottle 0   • KLS ALLERCLEAR D-12HR 5-120 MG per 12 hr tablet TAKE ONE TABLET BY MOUTH TWICE DAILY  30 tablet 0   • lisinopril (PRINIVIL,ZESTRIL) 5 MG tablet TAKE 1 TABLET DAILY 30 tablet 0   • Multiple  Vitamins-Minerals (EYE VITAMINS & MINERALS PO) Take 1 tablet by mouth Daily.     • rosuvastatin (CRESTOR) 5 MG tablet TAKE 1 TABLET DAILY 30 tablet 0   • ticagrelor (BRILINTA) 60 MG tablet tablet Take 1 tablet by mouth 2 (Two) Times a Day. Told to stop 5 days before surgery 60 tablet    • [DISCONTINUED] azithromycin (ZITHROMAX) 250 MG tablet Take 2 tablets the first day, then 1 tablet daily for 4 days. 6 tablet 0     No current facility-administered medications on file prior to visit.         ALLERGIES:    Allergies   Allergen Reactions   • Codeine Nausea Only        Social History     Socioeconomic History   • Marital status:      Spouse name: Charlotte Espino   • Number of children: Not on file   • Years of education: Not on file   • Highest education level: Not on file   Occupational History   • Occupation: [employer]     Employer: [employer]   Tobacco Use   • Smoking status: Never Smoker   • Smokeless tobacco: Never Used   Substance and Sexual Activity   • Alcohol use: Yes     Alcohol/week: 21.0 standard drinks     Types: 21 Shots of liquor per week   • Drug use: No   • Sexual activity: Defer        Family History   Problem Relation Age of Onset   • Heart attack Mother    • Heart disease Mother    • Diabetes Mother    • Aortic aneurysm Mother    • Coronary artery disease Mother    • Early death Mother    • No Known Problems Father    • Diabetes type II Other    • Diabetes Brother    • Hyperlipidemia Brother    • Stroke Sister 65   • Diabetes Sister    • Hyperlipidemia Sister    • Hypertension Sister    • No Known Problems Brother    • Stomach cancer Paternal Uncle    • Malig Hyperthermia Neg Hx         Review of Systems   Constitutional: Negative for activity change, appetite change, chills, fatigue and fever.   HENT: Negative for mouth sores, nosebleeds and trouble swallowing.    Respiratory: Negative for cough and shortness of breath.    Cardiovascular: Negative for chest pain and leg swelling.  "  Gastrointestinal: Negative for abdominal pain, constipation, diarrhea, nausea and vomiting.   Genitourinary: Negative for difficulty urinating.   Skin: Negative for rash.   Neurological: Negative for dizziness, weakness and numbness.   Hematological: Negative for adenopathy. Does not bruise/bleed easily.   Psychiatric/Behavioral: Negative for sleep disturbance.        Objective     Vitals:    12/01/20 1059   BP: 131/78   Pulse: 86   Resp: 16   Temp: 98.3 °F (36.8 °C)   TempSrc: Temporal   SpO2: 97%   Weight: 85.2 kg (187 lb 14.4 oz)   Height: 188 cm (74.02\")   PainSc: 0-No pain     Current Status 8/11/2020   ECOG score 0       Physical Exam   Constitutional: He is oriented to person, place, and time. He appears well-developed.   HENT:   Head: Normocephalic and atraumatic.   Nose: Nose normal.   Mouth/Throat: No oropharyngeal exudate.   Eyes: Pupils are equal, round, and reactive to light.   Neck: Normal range of motion. Neck supple.   Cardiovascular: Normal rate, regular rhythm and normal heart sounds.   Pulmonary/Chest: Effort normal and breath sounds normal. No respiratory distress. He has no wheezes. He has no rhonchi. He has no rales.   Abdominal: Soft. Normal appearance and bowel sounds are normal. He exhibits no distension. There is no abdominal tenderness.   Musculoskeletal: Normal range of motion.   Lymphadenopathy:     He has no cervical adenopathy.        Right: No supraclavicular adenopathy present.        Left: No supraclavicular adenopathy present.   Neurological: He is alert and oriented to person, place, and time.   Skin: Skin is warm and dry.   Psychiatric: His behavior is normal.   Nursing note and vitals reviewed.        RECENT LABS:  Hematology WBC   Date Value Ref Range Status   12/01/2020 44.72 (H) 3.40 - 10.80 10*3/mm3 Final   11/04/2020 46.87 (C) 3.40 - 10.80 10*3/mm3 Final     RBC   Date Value Ref Range Status   12/01/2020 3.54 (L) 4.14 - 5.80 10*6/mm3 Final   11/04/2020 3.51 (L) 4.14 - " 5.80 10*6/mm3 Final     Hemoglobin   Date Value Ref Range Status   12/01/2020 12.6 (L) 13.0 - 17.7 g/dL Final     Hematocrit   Date Value Ref Range Status   12/01/2020 38.0 37.5 - 51.0 % Final     Platelets   Date Value Ref Range Status   12/01/2020 96 (L) 140 - 450 10*3/mm3 Final        Assessment/Plan    1.  CLL: originally diagnosed in 2005. He never has required any intervention since the original definitive diagnosis in 12/2010. At that time his white count was close to 13,000. Obviously the white count has slowly crept up to what it is today but the count today is no different than what it was in 01/2019. The hemoglobin is stable. The platelet count is the only number that has really changed through the years. The patient is not having any clinical bleeding. He has no palpable splenomegaly, he has no peripheral adenopathy, he has no B symptoms, he has no repetitive infections. He has not had anything that suggests autoimmunity.   · Patient returns 12/1/2020 for follow-up, continuing to do well no B symptoms, issues with repetitive infections, or other concerns.  WBC today 44.72, hemoglobin 12.6, platelet count 96,000.  We will continue to closely monitor.  Flow cytometry is pending today.        PLAN:  1. Return in 4 months for follow-up visit with Dr. Cagle with repeat labs at that time, sooner should he develop any new concerns or problems.

## 2020-12-07 LAB — REF LAB TEST METHOD: NORMAL

## 2020-12-08 ENCOUNTER — TELEMEDICINE (OUTPATIENT)
Dept: INTERNAL MEDICINE | Facility: CLINIC | Age: 65
End: 2020-12-08

## 2020-12-08 VITALS — HEART RATE: 82 BPM | DIASTOLIC BLOOD PRESSURE: 71 MMHG | SYSTOLIC BLOOD PRESSURE: 110 MMHG | TEMPERATURE: 98.5 F

## 2020-12-08 DIAGNOSIS — I10 ESSENTIAL HYPERTENSION: Primary | ICD-10-CM

## 2020-12-08 DIAGNOSIS — C91.10 CHRONIC LYMPHOCYTIC LEUKEMIA (HCC): ICD-10-CM

## 2020-12-08 DIAGNOSIS — I25.119 CORONARY ARTERY DISEASE INVOLVING NATIVE CORONARY ARTERY OF NATIVE HEART WITH ANGINA PECTORIS (HCC): ICD-10-CM

## 2020-12-08 DIAGNOSIS — R73.01 IMPAIRED FASTING GLUCOSE: ICD-10-CM

## 2020-12-08 DIAGNOSIS — E78.5 DYSLIPIDEMIA: ICD-10-CM

## 2020-12-08 PROCEDURE — 99214 OFFICE O/P EST MOD 30 MIN: CPT | Performed by: INTERNAL MEDICINE

## 2020-12-08 RX ORDER — LISINOPRIL 5 MG/1
5 TABLET ORAL DAILY
Qty: 90 TABLET | Refills: 3 | Status: SHIPPED | OUTPATIENT
Start: 2020-12-08 | End: 2021-06-21 | Stop reason: SDUPTHER

## 2020-12-08 RX ORDER — ROSUVASTATIN CALCIUM 5 MG/1
5 TABLET, COATED ORAL DAILY
Qty: 90 TABLET | Refills: 3 | Status: SHIPPED | OUTPATIENT
Start: 2020-12-08 | End: 2020-12-15

## 2020-12-08 RX ORDER — CARVEDILOL 6.25 MG/1
6.25 TABLET ORAL 2 TIMES DAILY WITH MEALS
Qty: 180 TABLET | Refills: 3 | Status: SHIPPED | OUTPATIENT
Start: 2020-12-08 | End: 2020-12-15

## 2020-12-08 NOTE — PROGRESS NOTES
Francisco Espino is a 65 y.o. male, who presents with a chief complaint of   Chief Complaint   Patient presents with   • Hypertension   • Hyperlipidemia       HPI   This visit has been scheduled as a telehealth visit to comply with patient safety concerns in accordance with CDC recommendations. This was an audio and video enabled telemedicine encounter.    You have chosen to receive care through a televisit visit. Do you consent to use a televisit visit for your medical care today? Yes    Pt here for f/u.  LOV 11/2020    He has been working from home.  He wears a mask when he goes out.  He had a URI about a month ago but is doing well at this time.      CLL - Pt follows with dr. Cagle.  CLL was present on flow cytometry but unchanged.  Next f/u in 4 mo. No increased fatigue, increased infections or other B sx.      Impaired fasting glucose - a1c 5.9->5.8.  He know he needs to watch how many starches.  He is not exercising.  He has a treadmill he needs to use.       HLD - on crestor.  No cramps or myalgias.      htn - on carvedilol, lisinopril.  No ha/dizziness.  No cough.      CAD - pt denies soa/chest pain.  Pt on ace-I, bb, statin, brilinta, and baby asa.    Allergies - he has been taking claritin d.  Encouraged him to take plan claritin and only take the D for really bad sx.  He has flonase but hasnt used it on a regular basis.      The following portions of the patient's history were reviewed and updated as appropriate: allergies, current medications, past family history, past medical history, past social history, past surgical history and problem list.    Allergies: Codeine    Review of Systems   Constitutional: Negative.    HENT: Negative.    Eyes: Negative.    Respiratory: Negative.    Cardiovascular: Negative.    Gastrointestinal: Negative.    Endocrine: Negative.    Genitourinary: Negative.    Musculoskeletal: Negative.    Skin: Negative.    Allergic/Immunologic: Negative.    Neurological: Negative.     Hematological: Negative.    Psychiatric/Behavioral: Negative.    All other systems reviewed and are negative.            Wt Readings from Last 3 Encounters:   12/01/20 85.2 kg (187 lb 14.4 oz)   10/10/20 81.6 kg (180 lb)   08/11/20 85.8 kg (189 lb 3.2 oz)     Temp Readings from Last 3 Encounters:   12/08/20 98.5 °F (36.9 °C)   12/01/20 98.3 °F (36.8 °C) (Temporal)   10/10/20 97.6 °F (36.4 °C) (Temporal)     BP Readings from Last 3 Encounters:   12/08/20 110/71   12/01/20 131/78   10/10/20 131/86     Pulse Readings from Last 3 Encounters:   12/08/20 82   12/01/20 86   10/10/20 77     There is no height or weight on file to calculate BMI.  @LASTSAO2(3)@    Physical Exam  Vitals signs and nursing note reviewed.   Constitutional:       General: He is not in acute distress.     Appearance: He is well-developed.   HENT:      Head: Normocephalic and atraumatic.      Right Ear: External ear normal.      Left Ear: External ear normal.      Nose: Nose normal.   Eyes:      Conjunctiva/sclera: Conjunctivae normal.   Neck:      Musculoskeletal: Normal range of motion and neck supple.   Pulmonary:      Effort: Pulmonary effort is normal. No respiratory distress.   Skin:     Findings: No rash.   Neurological:      Mental Status: He is alert and oriented to person, place, and time.   Psychiatric:         Behavior: Behavior normal.         Thought Content: Thought content normal.         Judgment: Judgment normal.         Results for orders placed or performed in visit on 12/01/20   Flow Cytometry, Blood    Specimen: Blood   Result Value Ref Range    Reference Lab Report Flow Cytometry,Blood    CBC Auto Differential    Specimen: Blood   Result Value Ref Range    WBC 44.72 (H) 3.40 - 10.80 10*3/mm3    RBC 3.54 (L) 4.14 - 5.80 10*6/mm3    Hemoglobin 12.6 (L) 13.0 - 17.7 g/dL    Hematocrit 38.0 37.5 - 51.0 %    .3 (H) 79.0 - 97.0 fL    MCH 35.6 (H) 26.6 - 33.0 pg    MCHC 33.2 31.5 - 35.7 g/dL    RDW 13.6 12.3 - 15.4 %     RDW-SD 53.6 37.0 - 54.0 fl    MPV 10.1 6.0 - 12.0 fL    Platelets 96 (L) 140 - 450 10*3/mm3    Neutrophil % 5.1 (L) 42.7 - 76.0 %    Lymphocyte % 93.4 (H) 19.6 - 45.3 %    Monocyte % 1.2 (L) 5.0 - 12.0 %    Eosinophil % 0.1 (L) 0.3 - 6.2 %    Basophil % 0.1 0.0 - 1.5 %    Immature Grans % 0.1 0.0 - 0.5 %    Neutrophils, Absolute 2.29 1.70 - 7.00 10*3/mm3    Lymphocytes, Absolute 41.79 (H) 0.70 - 3.10 10*3/mm3    Monocytes, Absolute 0.52 0.10 - 0.90 10*3/mm3    Eosinophils, Absolute 0.04 0.00 - 0.40 10*3/mm3    Basophils, Absolute 0.03 0.00 - 0.20 10*3/mm3    Immature Grans, Absolute 0.05 0.00 - 0.05 10*3/mm3    nRBC 0.0 0.0 - 0.2 /100 WBC           Diagnoses and all orders for this visit:    1. Essential hypertension (Primary)  -     lisinopril (PRINIVIL,ZESTRIL) 5 MG tablet; Take 1 tablet by mouth Daily.  Dispense: 90 tablet; Refill: 3  -     carvedilol (COREG) 6.25 MG tablet; Take 1 tablet by mouth 2 (Two) Times a Day With Meals.  Dispense: 180 tablet; Refill: 3    2. Chronic lymphocytic leukemia (CMS/[facility]) - cont f/u with dr. Cagle    3. Coronary artery disease involving native coronary artery of native heart with angina pectoris (CMS/[facility])  -     ticagrelor (Brilinta) 60 MG tablet tablet; Take 1 tablet by mouth 2 (Two) Times a Day. Told to stop 5 days before surgery  Dispense: 180 tablet; Refill: 3  -     rosuvastatin (CRESTOR) 5 MG tablet; Take 1 tablet by mouth Daily.  Dispense: 90 tablet; Refill: 3  -     lisinopril (PRINIVIL,ZESTRIL) 5 MG tablet; Take 1 tablet by mouth Daily.  Dispense: 90 tablet; Refill: 3  -     carvedilol (COREG) 6.25 MG tablet; Take 1 tablet by mouth 2 (Two) Times a Day With Meals.  Dispense: 180 tablet; Refill: 3    4. Impaired fasting glucose - discussed healthy diet/exercise and lowering carb intake.      5. Dyslipidemia  -     rosuvastatin (CRESTOR) 5 MG tablet; Take 1 tablet by mouth Daily.  Dispense: 90 tablet; Refill: 3    Continue current medications.  Encouraged healthy  diet/exercise.  OV labs in    6 months.  Pt to call sooner if any other issues arise.            Outpatient Medications Prior to Visit   Medication Sig Dispense Refill   • acetaminophen (TYLENOL) 500 MG tablet Take 1,000 mg by mouth Every 6 (Six) Hours As Needed for Mild Pain .     • aspirin 81 MG EC tablet Take 81 mg by mouth Every Other Day.     • fluticasone (FLONASE) 50 MCG/ACT nasal spray 2 sprays into the nostril(s) as directed by provider Daily. 1 bottle 0   • KLS ALLERCLEAR D-12HR 5-120 MG per 12 hr tablet TAKE ONE TABLET BY MOUTH TWICE DAILY  30 tablet 0   • Multiple Vitamins-Minerals (EYE VITAMINS & MINERALS PO) Take 1 tablet by mouth Daily.     • carvedilol (COREG) 6.25 MG tablet TAKE 1 TABLET TWICE DAILY  WITH MEALS. 180 tablet 0   • lisinopril (PRINIVIL,ZESTRIL) 5 MG tablet TAKE 1 TABLET DAILY 30 tablet 0   • rosuvastatin (CRESTOR) 5 MG tablet TAKE 1 TABLET DAILY 30 tablet 0   • ticagrelor (BRILINTA) 60 MG tablet tablet Take 1 tablet by mouth 2 (Two) Times a Day. Told to stop 5 days before surgery 60 tablet      No facility-administered medications prior to visit.      New Medications Ordered This Visit   Medications   • ticagrelor (Brilinta) 60 MG tablet tablet     Sig: Take 1 tablet by mouth 2 (Two) Times a Day. Told to stop 5 days before surgery     Dispense:  180 tablet     Refill:  3   • rosuvastatin (CRESTOR) 5 MG tablet     Sig: Take 1 tablet by mouth Daily.     Dispense:  90 tablet     Refill:  3   • lisinopril (PRINIVIL,ZESTRIL) 5 MG tablet     Sig: Take 1 tablet by mouth Daily.     Dispense:  90 tablet     Refill:  3   • carvedilol (COREG) 6.25 MG tablet     Sig: Take 1 tablet by mouth 2 (Two) Times a Day With Meals.     Dispense:  180 tablet     Refill:  3     [unfilled]  Medications Discontinued During This Encounter   Medication Reason   • ticagrelor (BRILINTA) 60 MG tablet tablet Reorder   • carvedilol (COREG) 6.25 MG tablet Reorder   • rosuvastatin (CRESTOR) 5 MG tablet Reorder   •  lisinopril (PRINIVIL,ZESTRIL) 5 MG tablet Reorder         Return in about 6 months (around 6/8/2021) for Recheck, labs.

## 2020-12-14 DIAGNOSIS — I10 ESSENTIAL HYPERTENSION: ICD-10-CM

## 2020-12-14 DIAGNOSIS — E78.5 DYSLIPIDEMIA: ICD-10-CM

## 2020-12-14 DIAGNOSIS — I25.119 CORONARY ARTERY DISEASE INVOLVING NATIVE CORONARY ARTERY OF NATIVE HEART WITH ANGINA PECTORIS (HCC): ICD-10-CM

## 2020-12-15 RX ORDER — ROSUVASTATIN CALCIUM 5 MG/1
TABLET, COATED ORAL
Qty: 90 TABLET | Refills: 1 | Status: SHIPPED | OUTPATIENT
Start: 2020-12-15 | End: 2020-12-16 | Stop reason: SDUPTHER

## 2020-12-15 RX ORDER — CARVEDILOL 6.25 MG/1
TABLET ORAL
Qty: 180 TABLET | Refills: 1 | Status: SHIPPED | OUTPATIENT
Start: 2020-12-15 | End: 2021-07-12

## 2020-12-16 NOTE — TELEPHONE ENCOUNTER
Caller: Evangelina Espino    Relationship: Emergency Contact    Best call back number: [phone]   Medication needed:   Requested Prescriptions     Pending Prescriptions Disp Refills   • rosuvastatin (CRESTOR) 5 MG tablet 90 tablet 1     Sig: Take 1 tablet by mouth Daily.     Signed Prescriptions Disp Refills   • carvedilol (COREG) 6.25 MG tablet 180 tablet 1     Sig: TAKE 1 TABLET TWICE DAILY  WITH MEALS.     Authorizing Provider: MARY MENSAH   • rosuvastatin (CRESTOR) 5 MG tablet 90 tablet 1     Sig: TAKE 1 TABLET DAILY     Authorizing Provider: MARY MENSAH       When do you need the refill by: TODAY    What details did the patient provide when requesting the medication: MS. ESPINO SAYS THAT HIS ROSUVASTAIN WAS SENT TO [facility] (SHE LEFT THE RX AT PHARMACY), BUT SHOULD  BE SENT TO [facility] INSTEAD.       Does the patient have less than a 3 day supply:  [x] Yes  [x] No    What is the patient's preferred pharmacy: [facility] - [place], [place] - [facility] - [phone] [facility] [phone] FX

## 2020-12-18 RX ORDER — ROSUVASTATIN CALCIUM 5 MG/1
5 TABLET, COATED ORAL DAILY
Qty: 90 TABLET | Refills: 1 | Status: SHIPPED | OUTPATIENT
Start: 2020-12-18 | End: 2021-07-12

## 2021-01-05 ENCOUNTER — TELEPHONE (OUTPATIENT)
Dept: INTERNAL MEDICINE | Facility: CLINIC | Age: 66
End: 2021-01-05

## 2021-01-05 DIAGNOSIS — E78.5 DYSLIPIDEMIA: ICD-10-CM

## 2021-01-05 DIAGNOSIS — I25.119 CORONARY ARTERY DISEASE INVOLVING NATIVE CORONARY ARTERY OF NATIVE HEART WITH ANGINA PECTORIS (HCC): ICD-10-CM

## 2021-01-05 RX ORDER — ROSUVASTATIN CALCIUM 5 MG/1
5 TABLET, COATED ORAL DAILY
Qty: 90 TABLET | Refills: 1 | Status: CANCELLED | OUTPATIENT
Start: 2021-01-05

## 2021-01-05 NOTE — TELEPHONE ENCOUNTER
Caller: Evangelina Espino    Relationship: Emergency Contact    Best call back number:[phone]  Medication needed:   Requested Prescriptions     Pending Prescriptions Disp Refills   • rosuvastatin (CRESTOR) 5 MG tablet 90 tablet 1     Sig: Take 1 tablet by mouth Daily.       When do you need the refill by: asap    What details did the patient provide when requesting the medication:still has plenty, got message saying all medications could be refilled except for  rosuvastatin (CRESTOR) 5 MG tablet  Does the patient have less than a 3 day supply:  [] Yes  [x] No    What is the patient's preferred pharmacy: [facility] - [place], [place] - [facility] - [phone]  - [phone] FX

## 2021-01-05 NOTE — TELEPHONE ENCOUNTER
The message says request for rosuvastatin then says can fill all meds rosuvastatin. Please clarify with patient

## 2021-01-06 NOTE — TELEPHONE ENCOUNTER
Talked with Evangelina Galvan, PT is good on all medication, Evangelina got an alert and not sure why. PT good on refills if anything else needed they will contact office.

## 2021-01-20 ENCOUNTER — TELEPHONE (OUTPATIENT)
Dept: ONCOLOGY | Facility: CLINIC | Age: 66
End: 2021-01-20

## 2021-01-21 ENCOUNTER — TELEPHONE (OUTPATIENT)
Dept: ONCOLOGY | Facility: CLINIC | Age: 66
End: 2021-01-21

## 2021-01-21 NOTE — TELEPHONE ENCOUNTER
Spoke with patient this am to let him know he can get the covid vaccine when it becomes available to him. Patient v/u.

## 2021-03-19 ENCOUNTER — BULK ORDERING (OUTPATIENT)
Dept: CASE MANAGEMENT | Facility: OTHER | Age: 66
End: 2021-03-19

## 2021-03-19 DIAGNOSIS — Z23 IMMUNIZATION DUE: ICD-10-CM

## 2021-03-31 DIAGNOSIS — C91.10 CHRONIC LYMPHOCYTIC LEUKEMIA (HCC): Primary | ICD-10-CM

## 2021-04-08 ENCOUNTER — APPOINTMENT (OUTPATIENT)
Dept: LAB | Facility: HOSPITAL | Age: 66
End: 2021-04-08

## 2021-04-08 ENCOUNTER — TELEPHONE (OUTPATIENT)
Dept: ONCOLOGY | Facility: CLINIC | Age: 66
End: 2021-04-08

## 2021-04-08 NOTE — TELEPHONE ENCOUNTER
Caller: TEGAN    Relationship: WIFE    Best call back number: [phone]    What was the call regarding: TEGAN CALLED TO CANCEL TODAY'S APPT. SHE SAYS LEATHA WILL CALL BACK LATER TO RESCHEDULE.    Do you require a callback: NO

## 2021-06-18 ENCOUNTER — TELEPHONE (OUTPATIENT)
Dept: INTERNAL MEDICINE | Facility: CLINIC | Age: 66
End: 2021-06-18

## 2021-06-18 DIAGNOSIS — I25.119 CORONARY ARTERY DISEASE INVOLVING NATIVE CORONARY ARTERY OF NATIVE HEART WITH ANGINA PECTORIS (HCC): ICD-10-CM

## 2021-06-18 DIAGNOSIS — I10 ESSENTIAL HYPERTENSION: ICD-10-CM

## 2021-06-18 RX ORDER — LISINOPRIL 5 MG/1
5 TABLET ORAL DAILY
Qty: 90 TABLET | Refills: 3 | Status: CANCELLED | OUTPATIENT
Start: 2021-06-18

## 2021-06-18 NOTE — TELEPHONE ENCOUNTER
Hub please inform patient PATIENT WILL NEED TO CONTACT CARDIOLOGIST SINCE THEY INCREASED THE DOSAGE TO GET THE DOSAGE DIALED BACK IF THE PATIENT IS HAVING DIZZY SPELLS.

## 2021-06-18 NOTE — TELEPHONE ENCOUNTER
PATIENTS SPOUSE CALLED IN [place] THE PATIENT HAS CUT BACK ON THE  rosuvastatin (CRESTOR) 5 MG tablet      SHE SAID THE HEART DOCTOR INCREASED IT TO 20 MG, SINCE TAKING THE HIGHER DOSAGE HE'S BEEN DIZZY SO THEY STOPPED THE MEDICATION.      SHE WOULD LIKE TO KNOW WHAT SHOULD BE DONE.    PLEASE CALL BACK TO [facility]    BEST CALL BACK # [phone]

## 2021-06-21 DIAGNOSIS — I10 ESSENTIAL HYPERTENSION: ICD-10-CM

## 2021-06-21 DIAGNOSIS — I25.119 CORONARY ARTERY DISEASE INVOLVING NATIVE CORONARY ARTERY OF NATIVE HEART WITH ANGINA PECTORIS (HCC): ICD-10-CM

## 2021-06-21 RX ORDER — LISINOPRIL 5 MG/1
5 TABLET ORAL DAILY
Qty: 90 TABLET | Refills: 3 | Status: SHIPPED | OUTPATIENT
Start: 2021-06-21 | End: 2021-07-16

## 2021-07-07 ENCOUNTER — TELEPHONE (OUTPATIENT)
Dept: INTERNAL MEDICINE | Facility: CLINIC | Age: 66
End: 2021-07-07

## 2021-07-09 ENCOUNTER — LAB (OUTPATIENT)
Dept: INTERNAL MEDICINE | Facility: CLINIC | Age: 66
End: 2021-07-09

## 2021-07-09 ENCOUNTER — DOCUMENTATION (OUTPATIENT)
Dept: INTERNAL MEDICINE | Facility: CLINIC | Age: 66
End: 2021-07-09

## 2021-07-09 DIAGNOSIS — R79.9 ABNORMAL BLOOD CHEMISTRY: ICD-10-CM

## 2021-07-09 DIAGNOSIS — C92.10 CML (CHRONIC MYELOCYTIC LEUKEMIA) (HCC): ICD-10-CM

## 2021-07-09 DIAGNOSIS — E78.2 HYPERLIPEMIA, MIXED: ICD-10-CM

## 2021-07-09 DIAGNOSIS — C92.10 CML (CHRONIC MYELOCYTIC LEUKEMIA) (HCC): Primary | ICD-10-CM

## 2021-07-10 DIAGNOSIS — I25.119 CORONARY ARTERY DISEASE INVOLVING NATIVE CORONARY ARTERY OF NATIVE HEART WITH ANGINA PECTORIS (HCC): ICD-10-CM

## 2021-07-10 DIAGNOSIS — E78.5 DYSLIPIDEMIA: ICD-10-CM

## 2021-07-10 DIAGNOSIS — I10 ESSENTIAL HYPERTENSION: ICD-10-CM

## 2021-07-10 LAB
ALBUMIN SERPL-MCNC: 5.2 G/DL (ref 3.5–5.2)
ALBUMIN/GLOB SERPL: 2.5 G/DL
ALP SERPL-CCNC: 91 U/L (ref 39–117)
ALT SERPL-CCNC: 24 U/L (ref 1–41)
AST SERPL-CCNC: 33 U/L (ref 1–40)
BASOPHILS # BLD AUTO: ABNORMAL 10*3/UL
BASOPHILS # BLD MANUAL: 0 10*3/MM3 (ref 0–0.2)
BASOPHILS NFR BLD MANUAL: 0 % (ref 0–1.5)
BILIRUB SERPL-MCNC: 1.2 MG/DL (ref 0–1.2)
BUN SERPL-MCNC: 10 MG/DL (ref 8–23)
BUN/CREAT SERPL: 8.1 (ref 7–25)
CALCIUM SERPL-MCNC: 9.7 MG/DL (ref 8.6–10.5)
CHLORIDE SERPL-SCNC: 106 MMOL/L (ref 98–107)
CHOLEST SERPL-MCNC: 174 MG/DL (ref 0–200)
CO2 SERPL-SCNC: 25.9 MMOL/L (ref 22–29)
CREAT SERPL-MCNC: 1.24 MG/DL (ref 0.76–1.27)
DIFFERENTIAL COMMENT: ABNORMAL
EOSINOPHIL # BLD AUTO: ABNORMAL 10*3/UL
EOSINOPHIL # BLD MANUAL: 0 10*3/MM3 (ref 0–0.4)
EOSINOPHIL NFR BLD AUTO: ABNORMAL %
EOSINOPHIL NFR BLD MANUAL: 0 % (ref 0.3–6.2)
ERYTHROCYTE [DISTWIDTH] IN BLOOD BY AUTOMATED COUNT: 13.3 % (ref 12.3–15.4)
GLOBULIN SER CALC-MCNC: 2.1 GM/DL
GLUCOSE SERPL-MCNC: 92 MG/DL (ref 65–99)
HBA1C MFR BLD: 5.9 % (ref 4.8–5.6)
HCT VFR BLD AUTO: 33.9 % (ref 37.5–51)
HDLC SERPL-MCNC: 54 MG/DL (ref 40–60)
HGB BLD-MCNC: 11.3 G/DL (ref 13–17.7)
LDLC SERPL CALC-MCNC: 80 MG/DL (ref 0–100)
LDLC/HDLC SERPL: 1.33 {RATIO}
LYMPHOCYTES # BLD AUTO: ABNORMAL 10*3/UL
LYMPHOCYTES # BLD MANUAL: 64.02 10*3/MM3 (ref 0.7–3.1)
LYMPHOCYTES NFR BLD AUTO: ABNORMAL %
LYMPHOCYTES NFR BLD MANUAL: 88 % (ref 19.6–45.3)
MCH RBC QN AUTO: 34.7 PG (ref 26.6–33)
MCHC RBC AUTO-ENTMCNC: 33.3 G/DL (ref 31.5–35.7)
MCV RBC AUTO: 104 FL (ref 79–97)
MONOCYTES # BLD MANUAL: 0 10*3/MM3 (ref 0.1–0.9)
MONOCYTES NFR BLD AUTO: ABNORMAL %
MONOCYTES NFR BLD MANUAL: 0 % (ref 5–12)
NEUTROPHILS # BLD MANUAL: 8.73 10*3/MM3 (ref 1.7–7)
NEUTROPHILS NFR BLD AUTO: ABNORMAL %
NEUTROPHILS NFR BLD MANUAL: 12 % (ref 42.7–76)
PLATELET # BLD AUTO: 116 10*3/MM3 (ref 140–450)
PLATELET BLD QL SMEAR: ABNORMAL
POTASSIUM SERPL-SCNC: 4.3 MMOL/L (ref 3.5–5.2)
PROT SERPL-MCNC: 7.3 G/DL (ref 6–8.5)
RBC # BLD AUTO: 3.26 10*6/MM3 (ref 4.14–5.8)
RBC MORPH BLD: ABNORMAL
SODIUM SERPL-SCNC: 144 MMOL/L (ref 136–145)
TRIGL SERPL-MCNC: 241 MG/DL (ref 0–150)
VLDLC SERPL CALC-MCNC: 40 MG/DL (ref 5–40)
WBC # BLD AUTO: 72.75 10*3/MM3 (ref 3.4–10.8)

## 2021-07-12 RX ORDER — ROSUVASTATIN CALCIUM 5 MG/1
TABLET, COATED ORAL
Qty: 90 TABLET | Refills: 1 | Status: SHIPPED | OUTPATIENT
Start: 2021-07-12 | End: 2021-07-16

## 2021-07-12 RX ORDER — CARVEDILOL 6.25 MG/1
TABLET ORAL
Qty: 180 TABLET | Refills: 1 | Status: SHIPPED | OUTPATIENT
Start: 2021-07-12 | End: 2022-04-06

## 2021-07-13 DIAGNOSIS — E78.2 MIXED HYPERLIPIDEMIA: ICD-10-CM

## 2021-07-13 DIAGNOSIS — C91.10 CHRONIC LYMPHOCYTIC LEUKEMIA (HCC): Primary | ICD-10-CM

## 2021-07-13 DIAGNOSIS — R19.5 POSITIVE COLORECTAL CANCER SCREENING USING COLOGUARD TEST: ICD-10-CM

## 2021-07-16 ENCOUNTER — OFFICE VISIT (OUTPATIENT)
Dept: INTERNAL MEDICINE | Facility: CLINIC | Age: 66
End: 2021-07-16

## 2021-07-16 VITALS
WEIGHT: 186.5 LBS | RESPIRATION RATE: 19 BRPM | HEIGHT: 74 IN | BODY MASS INDEX: 23.93 KG/M2 | OXYGEN SATURATION: 99 % | HEART RATE: 78 BPM | SYSTOLIC BLOOD PRESSURE: 110 MMHG | TEMPERATURE: 96.4 F | DIASTOLIC BLOOD PRESSURE: 70 MMHG

## 2021-07-16 DIAGNOSIS — I25.119 CORONARY ARTERY DISEASE INVOLVING NATIVE CORONARY ARTERY OF NATIVE HEART WITH ANGINA PECTORIS (HCC): ICD-10-CM

## 2021-07-16 DIAGNOSIS — E78.5 DYSLIPIDEMIA: ICD-10-CM

## 2021-07-16 DIAGNOSIS — C91.10 CHRONIC LYMPHOCYTIC LEUKEMIA (HCC): Primary | ICD-10-CM

## 2021-07-16 PROCEDURE — 99214 OFFICE O/P EST MOD 30 MIN: CPT | Performed by: INTERNAL MEDICINE

## 2021-07-16 RX ORDER — ROSUVASTATIN CALCIUM 10 MG/1
10 TABLET, COATED ORAL DAILY
Qty: 90 TABLET | Refills: 3
Start: 2021-07-16 | End: 2022-07-25

## 2021-07-16 RX ORDER — ROSUVASTATIN CALCIUM 10 MG/1
10 TABLET, COATED ORAL DAILY
COMMUNITY
End: 2021-07-20

## 2021-07-16 NOTE — PROGRESS NOTES
Francisco Espino is a 66 y.o. male, who presents with a chief complaint of   Chief Complaint   Patient presents with   • Annual Exam           HPI   Pt here for f/u today with his wife    CLL - Pt follows with dr. Cagle.  Wbc baseline around 40K and has increased to 72K.  No increased fatigue, increased infections, bleeding issues, soa, weight loss, nausea or other complaints.       Impaired fasting glucose - a1c 5.9->5.8->5.9.  He know he needs to watch how many starches.  He is not exercising.  He has a treadmill he needs to use.       HLD - on crestor.  No cramps or myalgias.      htn - on carvedilol.  No ha/dizziness.  No cough. Lisinopril recently stopped bc of low bp.     CAD - pt denies soa/chest pain.  Pt on ace-I, bb, statin, brilinta, and baby asa.     Allergies - he has been taking claritin d on a very limited basis.       The following portions of the patient's history were reviewed and updated as appropriate: allergies, current medications, past family history, past medical history, past social history, past surgical history and problem list.    Allergies: Codeine    Review of Systems   Constitutional: Negative.    HENT: Negative.    Eyes: Negative.    Respiratory: Negative.    Cardiovascular: Negative.    Gastrointestinal: Negative.    Endocrine: Negative.    Genitourinary: Negative.    Musculoskeletal: Negative.    Skin: Negative.    Allergic/Immunologic: Negative.    Neurological: Negative.    Hematological: Negative.    Psychiatric/Behavioral: Negative.    All other systems reviewed and are negative.            Wt Readings from Last 3 Encounters:   07/16/21 84.6 kg (186 lb 8 oz)   12/01/20 85.2 kg (187 lb 14.4 oz)   10/10/20 81.6 kg (180 lb)     Temp Readings from Last 3 Encounters:   07/16/21 96.4 °F (35.8 °C) (Temporal)   12/08/20 98.5 °F (36.9 °C)   12/01/20 98.3 °F (36.8 °C) (Temporal)     BP Readings from Last 3 Encounters:   07/16/21 110/70   12/08/20 110/71   12/01/20 131/78     Pulse  Readings from Last 3 Encounters:   07/16/21 78   12/08/20 82   12/01/20 86     Body mass index is 23.93 kg/m².  SpO2 Readings from Last 3 Encounters:   07/16/21 99%   12/01/20 97%   10/10/20 99%          Physical Exam  Vitals and nursing note reviewed.   Constitutional:       General: He is not in acute distress.     Appearance: He is well-developed.   HENT:      Head: Normocephalic and atraumatic.      Right Ear: External ear normal.      Left Ear: External ear normal.      Nose: Nose normal.   Eyes:      Conjunctiva/sclera: Conjunctivae normal.      Pupils: Pupils are equal, round, and reactive to light.   Cardiovascular:      Rate and Rhythm: Normal rate and regular rhythm.      Heart sounds: Normal heart sounds.   Pulmonary:      Effort: Pulmonary effort is normal. No respiratory distress.      Breath sounds: Normal breath sounds. No wheezing.   Musculoskeletal:         General: Normal range of motion.      Cervical back: Normal range of motion and neck supple.      Comments: Normal gait   Skin:     General: Skin is warm and dry.   Neurological:      Mental Status: He is alert and oriented to person, place, and time.   Psychiatric:         Behavior: Behavior normal.         Thought Content: Thought content normal.         Judgment: Judgment normal.         Results for orders placed or performed in visit on 07/09/21   Lipid Panel With LDL / HDL Ratio    Specimen: Blood   Result Value Ref Range    Total Cholesterol 174 0 - 200 mg/dL    Triglycerides 241 (H) 0 - 150 mg/dL    HDL Cholesterol 54 40 - 60 mg/dL    VLDL Cholesterol Daniele 40 5 - 40 mg/dL    LDL Chol Calc (NIH) 80 0 - 100 mg/dL    LDL/HDL RATIO 1.33    Comprehensive Metabolic Panel   Result Value Ref Range    Glucose 92 65 - 99 mg/dL    BUN 10 8 - 23 mg/dL    Creatinine 1.24 0.76 - 1.27 mg/dL    eGFR Non African Am 58 (L) >60 mL/min/1.73    eGFR African Am 70 >60 mL/min/1.73    BUN/Creatinine Ratio 8.1 7.0 - 25.0    Sodium 144 136 - 145 mmol/L    Potassium  4.3 3.5 - 5.2 mmol/L    Chloride 106 98 - 107 mmol/L    Total CO2 25.9 22.0 - 29.0 mmol/L    Calcium 9.7 8.6 - 10.5 mg/dL    Total Protein 7.3 6.0 - 8.5 g/dL    Albumin 5.20 3.50 - 5.20 g/dL    Globulin 2.1 gm/dL    A/G Ratio 2.5 g/dL    Total Bilirubin 1.2 0.0 - 1.2 mg/dL    Alkaline Phosphatase 91 39 - 117 U/L    AST (SGOT) 33 1 - 40 U/L    ALT (SGPT) 24 1 - 41 U/L   Manual Differential   Result Value Ref Range    Neutrophil Rel % 12.0 (L) 42.7 - 76.0 %    Lymphocyte Rel % 88.0 (H) 19.6 - 45.3 %    Monocyte Rel % 0.0 (L) 5.0 - 12.0 %    Eosinophil Rel % 0.0 (L) 0.3 - 6.2 %    Basophil Rel % 0.0 0.0 - 1.5 %    Neutrophils Absolute 8.73 (H) 1.70 - 7.00 10*3/mm3    Lymphocytes Absolute 64.02 (H) 0.70 - 3.10 10*3/mm3    Monocytes Absolute 0.00 (L) 0.10 - 0.90 10*3/mm3    Eosinophil Abs 0.00 0.00 - 0.40 10*3/mm3    Basophils Absolute 0.00 0.00 - 0.20 10*3/mm3    Differential Comment Comment     Comment Comment     Plt Comment Comment    Hemoglobin A1c   Result Value Ref Range    Hemoglobin A1C 5.90 (H) 4.80 - 5.60 %   CBC & Differential    Specimen: Blood   Result Value Ref Range    WBC 72.75 (C) 3.40 - 10.80 10*3/mm3    RBC 3.26 (L) 4.14 - 5.80 10*6/mm3    Hemoglobin 11.3 (L) 13.0 - 17.7 g/dL    Hematocrit 33.9 (L) 37.5 - 51.0 %    .0 (H) 79.0 - 97.0 fL    MCH 34.7 (H) 26.6 - 33.0 pg    MCHC 33.3 31.5 - 35.7 g/dL    RDW 13.3 12.3 - 15.4 %    Platelets 116 (L) 140 - 450 10*3/mm3    Neutrophil Rel % CANCELED     Lymphocyte Rel % CANCELED     Monocyte Rel % CANCELED     Eosinophil Rel % CANCELED     Lymphocytes Absolute CANCELED     Eosinophils Absolute CANCELED     Basophils Absolute CANCELED      Result Review :                  Assessment and Plan    Diagnoses and all orders for this visit:    1. Chronic lymphocytic leukemia (CMS/HCC) (Primary) -  Wbc baseline around 40K and has increased to 72K. I notified Dr. Cagle of change in labs when I reviewed them and dr. Cagle has already set up f/u appt with pt for  7/20.     2. Coronary artery disease involving native coronary artery of native heart with angina pectoris (CMS/HCC)  -     rosuvastatin (CRESTOR) 10 MG tablet; Take 1 tablet by mouth Daily.  Dispense: 90 tablet; Refill: 3    3. Dyslipidemia  -     rosuvastatin (CRESTOR) 10 MG tablet; Take 1 tablet by mouth Daily.  Dispense: 90 tablet; Refill: 3    Pt has been vaccinated against covid-19.  Encouraged masking and safety procedures to prevent illness.              Outpatient Medications Prior to Visit   Medication Sig Dispense Refill   • acetaminophen (TYLENOL) 500 MG tablet Take 1,000 mg by mouth Every 6 (Six) Hours As Needed for Mild Pain .     • aspirin 81 MG EC tablet Take 81 mg by mouth Every Other Day.     • carvedilol (COREG) 6.25 MG tablet TAKE 1 TABLET TWICE DAILY  WITH MEALS. 180 tablet 1   • fluticasone (FLONASE) 50 MCG/ACT nasal spray 2 sprays into the nostril(s) as directed by provider Daily. 1 bottle 0   • Multiple Vitamins-Minerals (EYE VITAMINS & MINERALS PO) Take 1 tablet by mouth Daily.     • rosuvastatin (CRESTOR) 10 MG tablet Take 10 mg by mouth Daily.     • ticagrelor (Brilinta) 60 MG tablet tablet Take 1 tablet by mouth 2 (Two) Times a Day. Told to stop 5 days before surgery 180 tablet 3   • rosuvastatin (CRESTOR) 5 MG tablet TAKE 1 TABLET DAILY (Patient taking differently: 10 mg.) 90 tablet 1   • KLS ALLERCLEAR D-12HR 5-120 MG per 12 hr tablet TAKE ONE TABLET BY MOUTH TWICE DAILY  30 tablet 0   • lisinopril (PRINIVIL,ZESTRIL) 5 MG tablet Take 1 tablet by mouth Daily. 90 tablet 3     No facility-administered medications prior to visit.     New Medications Ordered This Visit   Medications   • rosuvastatin (CRESTOR) 10 MG tablet     Sig: Take 1 tablet by mouth Daily.     Dispense:  90 tablet     Refill:  3     [unfilled]  Medications Discontinued During This Encounter   Medication Reason   • KLS ALLERCLEAR D-12HR 5-120 MG per 12 hr tablet *Therapy completed   • lisinopril (PRINIVIL,ZESTRIL) 5  MG tablet *Therapy completed   • rosuvastatin (CRESTOR) 5 MG tablet          Return in about 6 months (around 1/16/2022) for Recheck, labs.    Patient was given instructions and counseling regarding her condition or for health maintenance advice. Please see specific information pulled into the AVS if appropriate.

## 2021-07-20 ENCOUNTER — OFFICE VISIT (OUTPATIENT)
Dept: ONCOLOGY | Facility: CLINIC | Age: 66
End: 2021-07-20

## 2021-07-20 ENCOUNTER — TELEPHONE (OUTPATIENT)
Dept: ONCOLOGY | Facility: CLINIC | Age: 66
End: 2021-07-20

## 2021-07-20 ENCOUNTER — LAB (OUTPATIENT)
Dept: LAB | Facility: HOSPITAL | Age: 66
End: 2021-07-20

## 2021-07-20 VITALS
HEIGHT: 74 IN | WEIGHT: 188.5 LBS | DIASTOLIC BLOOD PRESSURE: 79 MMHG | BODY MASS INDEX: 24.19 KG/M2 | RESPIRATION RATE: 18 BRPM | SYSTOLIC BLOOD PRESSURE: 133 MMHG | HEART RATE: 83 BPM | TEMPERATURE: 97.8 F | OXYGEN SATURATION: 97 %

## 2021-07-20 DIAGNOSIS — E78.2 MIXED HYPERLIPIDEMIA: ICD-10-CM

## 2021-07-20 DIAGNOSIS — R19.5 POSITIVE COLORECTAL CANCER SCREENING USING COLOGUARD TEST: ICD-10-CM

## 2021-07-20 DIAGNOSIS — C91.10 CHRONIC LYMPHOCYTIC LEUKEMIA (HCC): ICD-10-CM

## 2021-07-20 DIAGNOSIS — C91.10 CHRONIC LYMPHOCYTIC LEUKEMIA (HCC): Primary | ICD-10-CM

## 2021-07-20 LAB
ALBUMIN SERPL-MCNC: 5 G/DL (ref 3.5–5.2)
ALBUMIN/GLOB SERPL: 2.4 G/DL
ALP SERPL-CCNC: 84 U/L (ref 39–117)
ALT SERPL W P-5'-P-CCNC: 24 U/L (ref 1–41)
ANION GAP SERPL CALCULATED.3IONS-SCNC: 9.2 MMOL/L (ref 5–15)
AST SERPL-CCNC: 32 U/L (ref 1–40)
B2 MICROGLOB SERPL-MCNC: 3.1 MG/L (ref 0.8–2.2)
BASOPHILS # BLD AUTO: 0.1 10*3/MM3 (ref 0–0.2)
BASOPHILS NFR BLD AUTO: 0.1 % (ref 0–1.5)
BILIRUB SERPL-MCNC: 1.5 MG/DL (ref 0–1.2)
BUN SERPL-MCNC: 15 MG/DL (ref 8–23)
BUN/CREAT SERPL: 11.3 (ref 7–25)
CALCIUM SPEC-SCNC: 10 MG/DL (ref 8.6–10.5)
CHLORIDE SERPL-SCNC: 105 MMOL/L (ref 98–107)
CO2 SERPL-SCNC: 27.8 MMOL/L (ref 22–29)
CREAT SERPL-MCNC: 1.33 MG/DL (ref 0.76–1.27)
DEPRECATED RDW RBC AUTO: 56 FL (ref 37–54)
EOSINOPHIL # BLD AUTO: 0.05 10*3/MM3 (ref 0–0.4)
EOSINOPHIL NFR BLD AUTO: 0.1 % (ref 0.3–6.2)
ERYTHROCYTE [DISTWIDTH] IN BLOOD BY AUTOMATED COUNT: 14.5 % (ref 12.3–15.4)
GFR SERPL CREATININE-BSD FRML MDRD: 54 ML/MIN/1.73
GLOBULIN UR ELPH-MCNC: 2.1 GM/DL
GLUCOSE SERPL-MCNC: 108 MG/DL (ref 65–99)
HCT VFR BLD AUTO: 35.4 % (ref 37.5–51)
HGB BLD-MCNC: 11.2 G/DL (ref 13–17.7)
IMM GRANULOCYTES # BLD AUTO: 0.13 10*3/MM3 (ref 0–0.05)
IMM GRANULOCYTES NFR BLD AUTO: 0.2 % (ref 0–0.5)
LDH SERPL-CCNC: 188 U/L (ref 135–225)
LYMPHOCYTES # BLD AUTO: 70.97 10*3/MM3 (ref 0.7–3.1)
LYMPHOCYTES NFR BLD AUTO: 91.2 % (ref 19.6–45.3)
MCH RBC QN AUTO: 34.4 PG (ref 26.6–33)
MCHC RBC AUTO-ENTMCNC: 31.6 G/DL (ref 31.5–35.7)
MCV RBC AUTO: 108.6 FL (ref 79–97)
MONOCYTES # BLD AUTO: 1.39 10*3/MM3 (ref 0.1–0.9)
MONOCYTES NFR BLD AUTO: 1.8 % (ref 5–12)
NEUTROPHILS NFR BLD AUTO: 5.21 10*3/MM3 (ref 1.7–7)
NEUTROPHILS NFR BLD AUTO: 6.6 % (ref 42.7–76)
NRBC BLD AUTO-RTO: 0 /100 WBC (ref 0–0.2)
PLATELET # BLD AUTO: 123 10*3/MM3 (ref 140–450)
PMV BLD AUTO: 10.1 FL (ref 6–12)
POTASSIUM SERPL-SCNC: 4.5 MMOL/L (ref 3.5–5.2)
PROT SERPL-MCNC: 7.1 G/DL (ref 6–8.5)
RBC # BLD AUTO: 3.26 10*6/MM3 (ref 4.14–5.8)
SODIUM SERPL-SCNC: 142 MMOL/L (ref 136–145)
URATE SERPL-MCNC: 5.1 MG/DL (ref 3.4–7)
WBC # BLD AUTO: 77.85 10*3/MM3 (ref 3.4–10.8)

## 2021-07-20 PROCEDURE — 99214 OFFICE O/P EST MOD 30 MIN: CPT | Performed by: INTERNAL MEDICINE

## 2021-07-20 PROCEDURE — 84550 ASSAY OF BLOOD/URIC ACID: CPT | Performed by: INTERNAL MEDICINE

## 2021-07-20 PROCEDURE — 80053 COMPREHEN METABOLIC PANEL: CPT | Performed by: INTERNAL MEDICINE

## 2021-07-20 PROCEDURE — 83615 LACTATE (LD) (LDH) ENZYME: CPT | Performed by: INTERNAL MEDICINE

## 2021-07-20 PROCEDURE — 82232 ASSAY OF BETA-2 PROTEIN: CPT | Performed by: INTERNAL MEDICINE

## 2021-07-20 PROCEDURE — 85025 COMPLETE CBC W/AUTO DIFF WBC: CPT

## 2021-07-20 PROCEDURE — 36415 COLL VENOUS BLD VENIPUNCTURE: CPT

## 2021-07-20 NOTE — PROGRESS NOTES
Subjective     REASON FOR FOLLOW UP: CLL SINCE 2005 NOT REQUIRING ANY INTERVENTION        History of Present Illness This patient is a 65-year-old white male who I saw in consultation the 1st time in 12/2010 with a chronic history of lymphocytosis and mild leukocytosis with normal hemoglobin, normal platelet count, no organomegalies or lymphadenopathy and no infections or autoimmunity in the background of diagnosis of chronic lymphoid leukemia. I followed him up through the years and then he got lost to follow up given the lack of need for any other intervention. His wife has been insistent to him in coming back for reassessment. Overall the patient continues doing extremely well. He has not had any fever, chills, night sweats, pruritus, lymphadenopathy, fatigue or repetitive infections. He has not developed any autoimmunities. He has not had any clinical bleeding. His energy level is excellent. He continues working for [employer]. He does not smoke, he does not drink at this time. The patient otherwise is busy with his life and with his family.     The patient also has had multiple other testing in between that will be described below.    In the interval of time he presented to [facility] close to a year and a half ago with indigestion and abdominal pain and nausea. He was diagnosed with an inferior acute myocardial infarction, he had a stent in circulation, complete occlusion of the right coronary artery and since then he has not had any further issues.     The patient returned to the office on 07/20/2021 in company of his wife after recent laboratory evaluation has documented a white count in the 70,000 category. The patient in fact has discontinued his blood pressure medication because it was making his blood pressure too low and actually he has been feeling terrific. His appetite is excellent, he is a very healthy eater. He has not gained any weight, his bowel activity and urination is normal. He has  not had any fever, chills, night sweats, pruritus, adenopathy or nocturnal sweats. He has not had any infections and he has not had any obvious autoimmunity. He remains fully functional working from home and with no difficulties whatsoever.         Past Medical History:   Diagnosis Date   • Abnormal liver function test    • Alcohol abuse    • CLL (chronic lymphocytic leukemia) (CMS/HCC)    • Coronary artery disease     stent   • Heart disease    • History of pneumonia     1/2019   • Hyperlipidemia    • Hypertension    • Inguinal hernia     left side to have surgery 3/28/19   • Myocardial infarction (CMS/HCC)    • Screen for colon cancer 09/2016    Negative Cologaurd   • Screening PSA (prostate specific antigen) 02/2013    .5   • Thrombocytopenia (CMS/HCC)         Past Surgical History:   Procedure Laterality Date   • CARDIAC CATHETERIZATION  2017   • CATARACT EXTRACTION Bilateral    • COLONOSCOPY N/A 06/05/2006    Normal, repeat in 10 years, Dr. Preethi Gonzalez   • COLONOSCOPY N/A 7/15/2020    Procedure: COLONOSCOPY TO CECUM & T.I. WITH COLD SNARE POLYPECTOMIES, CLIP PLACEMENT X 2;  Surgeon: Yennifer Salazar MD;  Location: [facility] END[facility];  Service: Gastroenterology;  Laterality: N/A;  PRE- POSITIVE COLOGUARD  POST- COLON POLYPS, DIVERTICULOSIS, HEMORRHOIDS   • CORONARY ANGIOPLASTY WITH STENT PLACEMENT N/A 05/09/2017    Left heart catheterization, Selective native vessel coronary arteriography, Left ventriculography, Successful percutaneous intervention to the 100% occluded right coronary artery with a 3.5 x 38 mm Synergy drug-eluting stent (post-dilated w/ a 4.0 x 20 mm NC Emerge balloon), Selective right femoral angiography, Successful Perclose arteriotomy closure. Dr. James Pierson   • INGUINAL HERNIA REPAIR Left    • INGUINAL HERNIA REPAIR Right     x2   • INGUINAL HERNIA REPAIR Left 3/28/2019    Procedure: LEFT INGUINAL HERNIA REPAIR LAPAROSCOPIC;  Surgeon: Preethi Gonzalez MD;  Location: [facility];   Service: General   • LAPAROSCOPIC INGUINAL HERNIA REPAIR Right 10/03/2002    w/ extra peritoneal Goe-Benton mesh (transperitoneal repair), Dr. Preethi Gonzalez   • REFRACTIVE SURGERY Bilateral    • RETINAL DETACHMENT SURGERY Right 07/02/2013      ONCOLOGY HISTORY SHOLA ENNIS MD:ONCOLOGIC HISTORY:  On 12/22/10 he was reviewed.  He   had a totally normal white count with normal differential, predominance of lymphocytes.  Normal platelet count.  His chemistry profile was normal including LDH.  His beta-2 microglobulin was normal.  His quantitative immunoglobulins were normal.  His jane  p  heral blood flow cytometry documented a typical pattern by flow cytometry of CLL, CD5, CD19 negative, dim positive CD20, ZAP-70 negative and CD38 negative.  His CT scan of the chest, abdomen and pelvis disclosed no peripheral adenopathy or hepatosplenomeg  a  ly.  His level of immunoglobulins was normal.  With this in mind we thought that the patient had very early stage disease with excellent prognostic features and we advised him to remain on observation.  He will be rechecked every 3 months for the first ye  ar and thereafter every 6 months depending on the clinical circumstances.  Appropriate booklet information was given to him and his wife to review and further learn.          Given the excellent characteristics of his disease on clinical grounds, I doubt at this time a bone marrow is needed.        On 1/8/13 his physical exam is unremarkable with no peripheral adenopathy or hepatosplenomegaly, no B symptoms, no infections, no autoimmunity.  On the other hand he had areas of the skin on the left cheek and right cheek bhavani  t look like actinic keratosis.  His white count was up to17,000 with a normal hemoglobin and borderline platelet count of 125,000.  We asked the patient to try to minimize stressors in life and we asked him to return back in four months.  We also asked priscila edwards to be seen by dermatology in order to treat his  multiple actinic keratosis.          On 05/09/13 the patient was asymptomatic with regard to his chronic lymphoid leukemia, no fatigue, fevers, chills, repeated infections or peripheral lymphadenopathy.  His physi  francisco j exam was unremarkable with no palpable lymphadenopathy or hepatosplenomegaly.  His white count has now improved to 12,900, stable hemoglobin of 13.6 and stable platelet count of 121,000.  With this in mind we advised him to remain on observation, retu  rning for reevaluation in six months.         On 05/07/2014 the patient was asymptomatic in regard to his CLL with no autoimmunity, no infections and no progressive disease clinically.  On the other hand we have seen duplication of his white count in one year.  T  he patient and wife are aware that eventually he will require intervention for this if the white count continues changing the way it is.  At this time we do not justify the need of any other therapy or any other testing.        Current Outpatient Medications on File Prior to Visit   Medication Sig Dispense Refill   • acetaminophen (TYLENOL) 500 MG tablet Take 1,000 mg by mouth Every 6 (Six) Hours As Needed for Mild Pain .     • aspirin 81 MG EC tablet Take 81 mg by mouth Every Other Day.     • carvedilol (COREG) 6.25 MG tablet TAKE 1 TABLET TWICE DAILY  WITH MEALS. 180 tablet 1   • fluticasone (FLONASE) 50 MCG/ACT nasal spray 2 sprays into the nostril(s) as directed by provider Daily. 1 bottle 0   • Multiple Vitamins-Minerals (EYE VITAMINS & MINERALS PO) Take 1 tablet by mouth Daily.     • rosuvastatin (CRESTOR) 10 MG tablet Take 1 tablet by mouth Daily. 90 tablet 3   • ticagrelor (Brilinta) 60 MG tablet tablet Take 1 tablet by mouth 2 (Two) Times a Day. Told to stop 5 days before surgery 180 tablet 3   • [DISCONTINUED] rosuvastatin (CRESTOR) 10 MG tablet Take 10 mg by mouth Daily.       No current facility-administered medications on file prior to visit.        ALLERGIES:    Allergies  "  Allergen Reactions   • Codeine Nausea Only        Social History     Socioeconomic History   • Marital status:      Spouse name: Charlotte Espino   • Number of children: Not on file   • Years of education: Not on file   • Highest education level: Not on file   Tobacco Use   • Smoking status: Never Smoker   • Smokeless tobacco: Never Used   Substance and Sexual Activity   • Alcohol use: Yes     Alcohol/week: 21.0 standard drinks     Types: 21 Shots of liquor per week   • Drug use: No   • Sexual activity: Defer        Family History   Problem Relation Age of Onset   • Heart attack Mother    • Heart disease Mother    • Diabetes Mother    • Aortic aneurysm Mother    • Coronary artery disease Mother    • Early death Mother    • No Known Problems Father    • Diabetes type II Other    • Diabetes Brother    • Hyperlipidemia Brother    • Stroke Sister 65   • Diabetes Sister    • Hyperlipidemia Sister    • Hypertension Sister    • No Known Problems Brother    • Stomach cancer Paternal Uncle    • Malig Hyperthermia Neg Hx           Objective     Vitals:    07/20/21 1442   BP: 133/79   Pulse: 83   Resp: 18   Temp: 97.8 °F (36.6 °C)   TempSrc: Temporal   SpO2: 97%   Weight: 85.5 kg (188 lb 8 oz)   Height: 188 cm (74.02\")   PainSc: 0-No pain     Current Status 7/20/2021   ECOG score 0       Physical Exam   I HAVE PERSONALLY REVIEWED THE HISTORY OF THE PRESENT ILLNESS, PAST MEDICAL HISTORY, FAMILY HISTORY, SOCIAL HISTORY, ALLERGIES, MEDICATIONS STATED ABOVE IN THE OFFICE NOTE FROM TODAY.        GENERAL:  Well-developed, well-nourished  Patient  in no acute distress.   SKIN:  Warm, dry ,NO rashes,NO purpura ,NO petechiae.  HEENT:  Pupils were equal and reactive to light and accomodation, conjunctivae noninjected, no pterygium, normal extraocular movements, normal visual acuity.   NECK:  Supple with good range of motion; no thyromegaly or masses, no JVD or bruits, no cervical adenopathies.No carotid artery pain, no carotid " abnormal pulsation , NO arterial dance.  LYMPHATICS:  No cervical, NO supraclavicular, NO axillary,NO epitrochlear , NO inguinal adenopathy.  CARDIAC   normal rate and regular rhythm, without murmur,NO rubs NO S3 NO S4 right or left .   VASCULAR VENOUS: no cyanosis, collateral circulation, varicosities, edema, palpable cords, pain, erythema.  ABDOMEN:  Soft, nontender with no hepatomegaly, no splenomegaly,no masses, no ascites, no collateral circulation,no distention,no Murray sign, no abdominal pain  EXTREMITIES  AND SPINE:  No clubbing, cyanosis or edema, no deformities or pain .No kyphosis, scoliosis, deformities or pain in spine, ribs or pelvic bone.  NEUROLOGICAL:  Patient was awake, alert, oriented to time, person and place.          RECENT LABS:  Hematology WBC   Date Value Ref Range Status   07/20/2021 77.85 (H) 3.40 - 10.80 10*3/mm3 Final   07/09/2021 72.75 (C) 3.40 - 10.80 10*3/mm3 Final     RBC   Date Value Ref Range Status   07/20/2021 3.26 (L) 4.14 - 5.80 10*6/mm3 Final   07/09/2021 3.26 (L) 4.14 - 5.80 10*6/mm3 Final     Hemoglobin   Date Value Ref Range Status   07/20/2021 11.2 (L) 13.0 - 17.7 g/dL Final     Hematocrit   Date Value Ref Range Status   07/20/2021 35.4 (L) 37.5 - 51.0 % Final     Platelets   Date Value Ref Range Status   07/20/2021 123 (L) 140 - 450 10*3/mm3 Final                     Assessment/Plan  In summary this patient has had chronic lymphoid leukemia for the last 15 years. He never has required any intervention since the original definitive diagnosis in 12/2010. At that time his white count was close to 13,000. Obviously the white count has slowly crept up to what it is today but the count today is no different than what it was in 01/2019. The hemoglobin is stable. The platelet count is the only number that has really changed through the years. The patient is not having any clinical bleeding. He has no palpable splenomegaly, he has no peripheral adenopathy, he has no B symptoms,  he has no repetitive infections. He has not had anything that suggests autoimmunity.     The patient is up to date on his vaccinations.     He has had colonoscopy, polyps were removed from the colon. All of them were tubular adenomas with no dysplasia. He had a positive Cologuard test.       I reviewed with the patient and his wife on 07/20/2021 the fact that his white count has further risen to 77,000 today. The hemoglobin has started to drop some 11.3, the platelet count has fluctuating in the low 100,000s. He has no peripheral adenopathy, he has no hepatosplenomegaly. He has no B symptoms. He has no fatigue and he has not had repetitive infections or autoimmunity. I do believe that this patient is getting closer and closer to the need for initiation of therapy for his condition. I pointed out to him that probably the best medicine available nowadays is Calquence that he takes in a pill form with minimal side effects and tremendous benefit. I do believe that I want to watch him a little bit closer and to review him back in a couple of months with another blood count. I asked him to pay attention to symptoms including nocturnal sweats, fatigue, chills, fever or repetitive infections. If any of these situations happen he is supposed to notify us. Radiologically speaking I do not find the need for anything at this point and eventually we will need to reevaluate his disease with FISH analysis in peripheral blood to further classify the disease like nowadays is done.     I made the patient aware of all of these issues and circumstances. I have a chemistry profile pending today, a beta-2 microglobulin, an LDH and uric acid. Monoclonal proteins will be measured to be sure that there is no production of this by the leukemia cells. Also it will give me the proper information in regard to his production of immunoglobulin G.     The patient was in agreement. As usual he is very fearful to come and see me but he has gotten  used to seeing numbers and gotten used to seeing me and he is more comfortable with my conversation. His wife is instrumental in regard his care and she is a terrific individual that is always with him.     The patient is aware that sooner or later we are going to need to pull the trigger and move on into therapy and he feels comfortable that we have new modalities of therapy that will not make him ill.

## 2021-07-20 NOTE — TELEPHONE ENCOUNTER
Caller: TEGAN     Relationship to patient: WIFE     Best call back number: [phone]    WIFE IS WANTING THE NAME OF THE PRESCRIPTION THEY DISCUSSED IN TODAYS APPT WITH DR ENNIS. THEY HAD DISCUSSED STARTING IT AT THE NEXT VISIT. SHE LOST THE CARD.   PLEASE CALL AND ADVISE   I SEE NOTHING IN NOTES  THANK YOU

## 2021-07-21 LAB
ALBUMIN SERPL ELPH-MCNC: 4.4 G/DL (ref 2.9–4.4)
ALBUMIN/GLOB SERPL: 1.5 {RATIO} (ref 0.7–1.7)
ALPHA1 GLOB SERPL ELPH-MCNC: 0.2 G/DL (ref 0–0.4)
ALPHA2 GLOB SERPL ELPH-MCNC: 0.8 G/DL (ref 0.4–1)
B-GLOBULIN SERPL ELPH-MCNC: 1.1 G/DL (ref 0.7–1.3)
GAMMA GLOB SERPL ELPH-MCNC: 1 G/DL (ref 0.4–1.8)
GLOBULIN SER-MCNC: 3 G/DL (ref 2.2–3.9)
IGA SERPL-MCNC: 121 MG/DL (ref 61–437)
IGG SERPL-MCNC: 948 MG/DL (ref 603–1613)
IGM SERPL-MCNC: 35 MG/DL (ref 20–172)
INTERPRETATION SERPL IEP-IMP: ABNORMAL
KAPPA LC FREE SER-MCNC: 27.2 MG/L (ref 3.3–19.4)
KAPPA LC FREE/LAMBDA FREE SER: 1.39 {RATIO} (ref 0.26–1.65)
LABORATORY COMMENT REPORT: ABNORMAL
LAMBDA LC FREE SERPL-MCNC: 19.6 MG/L (ref 5.7–26.3)
M PROTEIN SERPL ELPH-MCNC: ABNORMAL G/DL
PROT SERPL-MCNC: 7.4 G/DL (ref 6–8.5)

## 2021-07-30 ENCOUNTER — TELEPHONE (OUTPATIENT)
Dept: ONCOLOGY | Facility: CLINIC | Age: 66
End: 2021-07-30

## 2021-07-30 NOTE — TELEPHONE ENCOUNTER
Caller: PT    Relationship: SELF    Best call back number: [phone] OK TO LEAVE  MS     What form or medical record are you requesting: LETTER STATING WHAT MEDICATION YOU ARE GOING TO BE PUTTING HIM ON    Who is requesting this form or medical record from you: [employer]    How would you like to receive the form or medical records (pick-up, mail, fax):  OR EMAIL TO PT  If fax, what is the fax number:  If mail, what is the address:   If pick-up, provide patient with address and location details    Timeframe paperwork needed: TODAY     Additional notes: PT CAN EITHER  OR YOU CAN EMAIL TO HIM AT:  [email or web address]    THIS LETTER NEEDS TO STATE THAT HE WILL BE TAKING CALQUENCE. FOR THE TX OF CHRONIC MYELOID LEUKEMIA

## 2021-08-12 ENCOUNTER — TELEPHONE (OUTPATIENT)
Dept: ONCOLOGY | Facility: CLINIC | Age: 66
End: 2021-08-12

## 2021-08-12 NOTE — TELEPHONE ENCOUNTER
Spoke with wife after reviewing concerns about the booster. I let her know that since the covid booster is not available at this time, we can not give a definite answer. I informed her that more than likely it is going to be a good idea to receive the booster considering new strands but to call back once its available to inquire again. She v/u. No other questions.

## 2021-08-12 NOTE — TELEPHONE ENCOUNTER
Caller: Evangelina Espino    Relationship: Emergency Contact    Best call back number:[phone]    What was the call regarding: EVANGELINA CALLED REGARDING A COVID VACCINE BOOSTER. SHE SAYS SHE HAS HEARD THAT ONE MIGHT BE NEEDED. SHE IS WONDERING IF LEATHA SHOULD GET IT OR NOT.    Do you require a callback: YES

## 2021-08-17 ENCOUNTER — TELEPHONE (OUTPATIENT)
Dept: ONCOLOGY | Facility: CLINIC | Age: 66
End: 2021-08-17

## 2021-08-17 NOTE — TELEPHONE ENCOUNTER
Provider: LOLI    Caller: TEGAN    Relationship to Patient: WIFE      Phone Number: [phone]    Reason for Call: PATIENT INQUIRED IF IT IS SAFE FOR HIM TO HAVE THE COVID BOOSTER SHOT. PLEASE CALL BACK TO ADVISE

## 2021-09-29 ENCOUNTER — OFFICE VISIT (OUTPATIENT)
Dept: ONCOLOGY | Facility: CLINIC | Age: 66
End: 2021-09-29

## 2021-09-29 ENCOUNTER — LAB (OUTPATIENT)
Dept: LAB | Facility: HOSPITAL | Age: 66
End: 2021-09-29

## 2021-09-29 VITALS
WEIGHT: 188 LBS | SYSTOLIC BLOOD PRESSURE: 126 MMHG | TEMPERATURE: 97.7 F | RESPIRATION RATE: 18 BRPM | BODY MASS INDEX: 24.13 KG/M2 | OXYGEN SATURATION: 97 % | HEIGHT: 74 IN | HEART RATE: 81 BPM | DIASTOLIC BLOOD PRESSURE: 79 MMHG

## 2021-09-29 DIAGNOSIS — C91.10 CHRONIC LYMPHOCYTIC LEUKEMIA (HCC): Primary | ICD-10-CM

## 2021-09-29 DIAGNOSIS — C91.10 CHRONIC LYMPHOCYTIC LEUKEMIA (HCC): ICD-10-CM

## 2021-09-29 LAB
ALBUMIN SERPL-MCNC: 4.8 G/DL (ref 3.5–5.2)
ALBUMIN/GLOB SERPL: 1.8 G/DL (ref 1.1–2.4)
ALP SERPL-CCNC: 80 U/L (ref 38–116)
ALT SERPL W P-5'-P-CCNC: 50 U/L (ref 0–41)
ANION GAP SERPL CALCULATED.3IONS-SCNC: 10.5 MMOL/L (ref 5–15)
AST SERPL-CCNC: 58 U/L (ref 0–40)
BASOPHILS # BLD AUTO: 0.05 10*3/MM3 (ref 0–0.2)
BASOPHILS NFR BLD AUTO: 0.1 % (ref 0–1.5)
BILIRUB SERPL-MCNC: 1.4 MG/DL (ref 0.2–1.2)
BUN SERPL-MCNC: 13 MG/DL (ref 6–20)
BUN/CREAT SERPL: 8.4 (ref 7.3–30)
CALCIUM SPEC-SCNC: 10.2 MG/DL (ref 8.5–10.2)
CHLORIDE SERPL-SCNC: 99 MMOL/L (ref 98–107)
CO2 SERPL-SCNC: 26.5 MMOL/L (ref 22–29)
CREAT SERPL-MCNC: 1.55 MG/DL (ref 0.7–1.3)
DEPRECATED RDW RBC AUTO: 54.1 FL (ref 37–54)
EOSINOPHIL # BLD AUTO: 0.09 10*3/MM3 (ref 0–0.4)
EOSINOPHIL NFR BLD AUTO: 0.2 % (ref 0.3–6.2)
ERYTHROCYTE [DISTWIDTH] IN BLOOD BY AUTOMATED COUNT: 13.5 % (ref 12.3–15.4)
GFR SERPL CREATININE-BSD FRML MDRD: 45 ML/MIN/1.73
GLOBULIN UR ELPH-MCNC: 2.7 GM/DL (ref 1.8–3.5)
GLUCOSE SERPL-MCNC: 113 MG/DL (ref 74–124)
HCT VFR BLD AUTO: 37.9 % (ref 37.5–51)
HGB BLD-MCNC: 12.3 G/DL (ref 13–17.7)
IMM GRANULOCYTES # BLD AUTO: 0.08 10*3/MM3 (ref 0–0.05)
IMM GRANULOCYTES NFR BLD AUTO: 0.2 % (ref 0–0.5)
LYMPHOCYTES # BLD AUTO: 45.32 10*3/MM3 (ref 0.7–3.1)
LYMPHOCYTES NFR BLD AUTO: 88.9 % (ref 19.6–45.3)
MCH RBC QN AUTO: 35.1 PG (ref 26.6–33)
MCHC RBC AUTO-ENTMCNC: 32.5 G/DL (ref 31.5–35.7)
MCV RBC AUTO: 108.3 FL (ref 79–97)
MONOCYTES # BLD AUTO: 1.48 10*3/MM3 (ref 0.1–0.9)
MONOCYTES NFR BLD AUTO: 2.9 % (ref 5–12)
NEUTROPHILS NFR BLD AUTO: 3.93 10*3/MM3 (ref 1.7–7)
NEUTROPHILS NFR BLD AUTO: 7.7 % (ref 42.7–76)
NRBC BLD AUTO-RTO: 0 /100 WBC (ref 0–0.2)
PLATELET # BLD AUTO: 107 10*3/MM3 (ref 140–450)
PMV BLD AUTO: 10.2 FL (ref 6–12)
POTASSIUM SERPL-SCNC: 4.7 MMOL/L (ref 3.5–4.7)
PROT SERPL-MCNC: 7.5 G/DL (ref 6.3–8)
RBC # BLD AUTO: 3.5 10*6/MM3 (ref 4.14–5.8)
SODIUM SERPL-SCNC: 136 MMOL/L (ref 134–145)
WBC # BLD AUTO: 50.95 10*3/MM3 (ref 3.4–10.8)

## 2021-09-29 PROCEDURE — 99213 OFFICE O/P EST LOW 20 MIN: CPT | Performed by: INTERNAL MEDICINE

## 2021-09-29 PROCEDURE — 36415 COLL VENOUS BLD VENIPUNCTURE: CPT

## 2021-09-29 PROCEDURE — 85025 COMPLETE CBC W/AUTO DIFF WBC: CPT

## 2021-09-29 PROCEDURE — 80053 COMPREHEN METABOLIC PANEL: CPT

## 2021-09-29 RX ORDER — LISINOPRIL 5 MG/1
2.5 TABLET ORAL DAILY
COMMUNITY
Start: 2021-09-07 | End: 2022-08-18

## 2021-09-29 RX ORDER — ROSUVASTATIN CALCIUM 20 MG/1
20 TABLET, COATED ORAL NIGHTLY
COMMUNITY
Start: 2021-09-22

## 2021-09-29 NOTE — PROGRESS NOTES
Subjective     REASON FOR FOLLOW UP: CLL SINCE 2005 NOT REQUIRING ANY INTERVENTION        History of Present Illness DURING THE VISIT WITH THE PATIENT TODAY , PATIENT HAD FACE MASK, I HAD PROPER PROTECTIVE EQUIPMENT, AND I DID HAND HYGIENE WITH SOAP AND WATER BEFORE AND AFTER THE VISIT.  This patient returns today to the office for follow up of the above diagnosis in company of his wife. He has proceeded recently with a booster for COVID vaccination by [employer] having achiness and discomfort for 1 day that resolved. In the meantime he has not had any fever, chills, night sweats, pruritus, adenopathies or changes in performance status. No infections, no autoimmunity. His appetite and weight remain stable, his bowel function and urination are normal. Even his blood pressure has further improved and he has been able to decrease his blood pressure medication dosing. He denies any new cardiovascular or respiratory issues. No bone pain, no joint pain. He wants me to review 3 lesions in the scalp.             Past Medical History:   Diagnosis Date   • Abnormal liver function test    • Alcohol abuse    • CLL (chronic lymphocytic leukemia) (CMS/HCC)    • Coronary artery disease     stent   • Heart disease    • History of pneumonia     1/2019   • Hyperlipidemia    • Hypertension    • Inguinal hernia     left side to have surgery 3/28/19   • Myocardial infarction (CMS/HCC)    • Screen for colon cancer 09/2016    Negative Cologaurd   • Screening PSA (prostate specific antigen) 02/2013    .5   • Thrombocytopenia (CMS/HCC)         Past Surgical History:   Procedure Laterality Date   • CARDIAC CATHETERIZATION  2017   • CATARACT EXTRACTION Bilateral    • COLONOSCOPY N/A 06/05/2006    Normal, repeat in 10 years, Dr. Preethi Gonzalez   • COLONOSCOPY N/A 7/15/2020    Procedure: COLONOSCOPY TO CECUM & T.I. WITH COLD SNARE POLYPECTOMIES, CLIP PLACEMENT X 2;  Surgeon: Yennifer Salazar MD;  Location: [facility] ENDOSCOPY;  Service:  Gastroenterology;  Laterality: N/A;  PRE- POSITIVE COLOGUARD  POST- COLON POLYPS, DIVERTICULOSIS, HEMORRHOIDS   • CORONARY ANGIOPLASTY WITH STENT PLACEMENT N/A 05/09/2017    Left heart catheterization, Selective native vessel coronary arteriography, Left ventriculography, Successful percutaneous intervention to the 100% occluded right coronary artery with a 3.5 x 38 mm Synergy drug-eluting stent (post-dilated w/ a 4.0 x 20 mm NC Emerge balloon), Selective right femoral angiography, Successful Perclose arteriotomy closure. Dr. James Pierson   • INGUINAL HERNIA REPAIR Left    • INGUINAL HERNIA REPAIR Right     x2   • INGUINAL HERNIA REPAIR Left 3/28/2019    Procedure: LEFT INGUINAL HERNIA REPAIR LAPAROSCOPIC;  Surgeon: Preethi Gonzalez MD;  Location: [facility] OR [facility];  Service: General   • LAPAROSCOPIC INGUINAL HERNIA REPAIR Right 10/03/2002    w/ extra peritoneal Goe-Benton mesh (transperitoneal repair), Dr. Preethi Gonzalez   • REFRACTIVE SURGERY Bilateral    • RETINAL DETACHMENT SURGERY Right 07/02/2013      ONCOLOGY HISTORY SHOLA ENNIS MD:ONCOLOGIC HISTORY:  On 12/22/10 he was reviewed.  He   had a totally normal white count with normal differential, predominance of lymphocytes.  Normal platelet count.  His chemistry profile was normal including LDH.  His beta-2 microglobulin was normal.  His quantitative immunoglobulins were normal.  His jane  p  heral blood flow cytometry documented a typical pattern by flow cytometry of CLL, CD5, CD19 negative, dim positive CD20, ZAP-70 negative and CD38 negative.  His CT scan of the chest, abdomen and pelvis disclosed no peripheral adenopathy or hepatosplenomeg  a  ly.  His level of immunoglobulins was normal.  With this in mind we thought that the patient had very early stage disease with excellent prognostic features and we advised him to remain on observation.  He will be rechecked every 3 months for the first ye  ar and thereafter every 6 months depending on the clinical  circumstances.  Appropriate booklet information was given to him and his wife to review and further learn.          Given the excellent characteristics of his disease on clinical grounds, I doubt at this time a bone marrow is needed.        On 1/8/13 his physical exam is unremarkable with no peripheral adenopathy or hepatosplenomegaly, no B symptoms, no infections, no autoimmunity.  On the other hand he had areas of the skin on the left cheek and right cheek bhavani  t look like actinic keratosis.  His white count was up to17,000 with a normal hemoglobin and borderline platelet count of 125,000.  We asked the patient to try to minimize stressors in life and we asked him to return back in four months.  We also asked priscila edwards to be seen by dermatology in order to treat his multiple actinic keratosis.          On 05/09/13 the patient was asymptomatic with regard to his chronic lymphoid leukemia, no fatigue, fevers, chills, repeated infections or peripheral lymphadenopathy.  His physi  francisco j exam was unremarkable with no palpable lymphadenopathy or hepatosplenomegaly.  His white count has now improved to 12,900, stable hemoglobin of 13.6 and stable platelet count of 121,000.  With this in mind we advised him to remain on observation, retu  rning for reevaluation in six months.         On 05/07/2014 the patient was asymptomatic in regard to his CLL with no autoimmunity, no infections and no progressive disease clinically.  On the other hand we have seen duplication of his white count in one year.  T  he patient and wife are aware that eventually he will require intervention for this if the white count continues changing the way it is.  At this time we do not justify the need of any other therapy or any other testing.        Current Outpatient Medications on File Prior to Visit   Medication Sig Dispense Refill   • acetaminophen (TYLENOL) 500 MG tablet Take 1,000 mg by mouth Every 6 (Six) Hours As Needed for Mild Pain .     •  aspirin 81 MG EC tablet Take 81 mg by mouth Every Other Day.     • carvedilol (COREG) 6.25 MG tablet TAKE 1 TABLET TWICE DAILY  WITH MEALS. 180 tablet 1   • fluticasone (FLONASE) 50 MCG/ACT nasal spray 2 sprays into the nostril(s) as directed by provider Daily. 1 bottle 0   • lisinopril (PRINIVIL,ZESTRIL) 5 MG tablet      • Multiple Vitamins-Minerals (EYE VITAMINS & MINERALS PO) Take 1 tablet by mouth Daily.     • rosuvastatin (CRESTOR) 10 MG tablet Take 1 tablet by mouth Daily. 90 tablet 3   • rosuvastatin (CRESTOR) 20 MG tablet      • ticagrelor (Brilinta) 60 MG tablet tablet Take 1 tablet by mouth 2 (Two) Times a Day. Told to stop 5 days before surgery 180 tablet 3     No current facility-administered medications on file prior to visit.        ALLERGIES:    Allergies   Allergen Reactions   • Codeine Nausea Only        Social History     Socioeconomic History   • Marital status:      Spouse name: Charlotte Espino   • Number of children: Not on file   • Years of education: Not on file   • Highest education level: Not on file   Tobacco Use   • Smoking status: Never Smoker   • Smokeless tobacco: Never Used   Substance and Sexual Activity   • Alcohol use: Yes     Alcohol/week: 21.0 standard drinks     Types: 21 Shots of liquor per week   • Drug use: No   • Sexual activity: Defer        Family History   Problem Relation Age of Onset   • Heart attack Mother    • Heart disease Mother    • Diabetes Mother    • Aortic aneurysm Mother    • Coronary artery disease Mother    • Early death Mother    • No Known Problems Father    • Diabetes type II Other    • Diabetes Brother    • Hyperlipidemia Brother    • Stroke Sister 65   • Diabetes Sister    • Hyperlipidemia Sister    • Hypertension Sister    • No Known Problems Brother    • Stomach cancer Paternal Uncle    • Malig Hyperthermia Neg Hx           Objective     Vitals:    09/29/21 0946   BP: 126/79   Pulse: 81   Resp: 18   Temp: 97.7 °F (36.5 °C)   TempSrc: Temporal  "  SpO2: 97%   Weight: 85.3 kg (188 lb)   Height: 188 cm (74.02\")   PainSc: 0-No pain     Current Status 9/29/2021   ECOG score 0       Physical Exam     I HAVE PERSONALLY REVIEWED THE HISTORY OF THE PRESENT ILLNESS, PAST MEDICAL HISTORY, FAMILY HISTORY, SOCIAL HISTORY, ALLERGIES, MEDICATIONS STATED ABOVE IN THE  NOTE FROM TODAY.        GENERAL:  Well-developed, well-nourished  Patient  in no acute distress.   SKIN:  Warm, dry ,NO rashes,NO purpura ,NO petechiae.He has 3 small lesions on the scalp on top of th cranium that suggest squamous cell carcinoma all of them are measuring each one of them no more than 5 mm in size.       HEENT:  Pupils were equal and reactive to light and accomodation, conjunctivae noninjected, no pterygium, normal extraocular movements, normal visual acuity.   NECK:  Supple with good range of motion; no thyromegaly , no other masses, no JVD or bruits, no cervical adenopathies.No carotid artery pain, no carotid abnormal pulsation , NO arterial dance.  LYMPHATICS:  No cervical, NO supraclavicular, NO axillary,NO epitrochlear , NO inguinal adenopathy.  CARDIAC   normal rate and regular rhythm, without murmur,NO rubs NO S3 NO S4 right or left .  LUNGS: normal breath sounds bilateral, no wheezing, rhonchi, crackles or rubs.  VASCULAR VENOUS: no cyanosis, collateral circulation, varicosities, edema, palpable cords, pain, erythema.  ABDOMEN:  Soft, nontender with no hepatomegaly, no splenomegaly,no masses, no ascites, no collateral circulation,no distention,no [place] sign.  EXTREMITIES  AND SPINE:  No clubbing, cyanosis or edema, no deformities , no pain .No kyphosis, scoliosis, no other deformities, no pain in spine, no pain in ribs , no pain inpelvic bone.  NEUROLOGICAL:  Patient was awake, alert, oriented to time, person and place.            RECENT LABS:  Hematology WBC   Date Value Ref Range Status   09/29/2021 50.95 (H) 3.40 - 10.80 10*3/mm3 Final   07/09/2021 72.75 (C) 3.40 - 10.80 10*3/mm3 " Final     RBC   Date Value Ref Range Status   09/29/2021 3.50 (L) 4.14 - 5.80 10*6/mm3 Final   07/09/2021 3.26 (L) 4.14 - 5.80 10*6/mm3 Final     Hemoglobin   Date Value Ref Range Status   09/29/2021 12.3 (L) 13.0 - 17.7 g/dL Final     Hematocrit   Date Value Ref Range Status   09/29/2021 37.9 37.5 - 51.0 % Final     Platelets   Date Value Ref Range Status   09/29/2021 107 (L) 140 - 450 10*3/mm3 Final                     Assessment/Plan  In summary this patient has had chronic lymphoid leukemia for the last 15 years. He never has required any intervention since the original definitive diagnosis in 12/2010. At that time his white count was close to 13,000. Obviously the white count has slowly crept up to what it is today but the count today is no different than what it was in 01/2019. The hemoglobin is stable. The platelet count is the only number that has really changed through the years. The patient is not having any clinical bleeding. He has no palpable splenomegaly, he has no peripheral adenopathy, he has no B symptoms, he has no repetitive infections. He has not had anything that suggests autoimmunity.     The patient is up to date on his vaccinations.     He has had colonoscopy, polyps were removed from the colon. All of them were tubular adenomas with no dysplasia. He had a positive Cologuard test.       I reviewed with the patient and his wife on 07/20/2021 the fact that his white count has further risen to 77,000 today. The hemoglobin has started to drop some 11.3, the platelet count has fluctuating in the low 100,000s. He has no peripheral adenopathy, he has no hepatosplenomegaly. He has no B symptoms. He has no fatigue and he has not had repetitive infections or autoimmunity. I do believe that this patient is getting closer and closer to the need for initiation of therapy for his condition. I pointed out to him that probably the best medicine available nowadays is Calquence that he takes in a pill form  with minimal side effects and tremendous benefit. I do believe that I want to watch him a little bit closer and to review him back in a couple of months with another blood count. I asked him to pay attention to symptoms including nocturnal sweats, fatigue, chills, fever or repetitive infections. If any of these situations happen he is supposed to notify us. Radiologically speaking I do not find the need for anything at this point and eventually we will need to reevaluate his disease with FISH analysis in peripheral blood to further classify the disease like nowadays is done.     I made the patient aware of all of these issues and circumstances. I have a chemistry profile pending today, a beta-2 microglobulin, an LDH and uric acid. Monoclonal proteins will be measured to be sure that there is no production of this by the leukemia cells. Also it will give me the proper information in regard to his production of immunoglobulin G.     The patient was in agreement. As usual he is very fearful to come and see me but he has gotten used to seeing numbers and gotten used to seeing me and he is more comfortable with my conversation. His wife is instrumental in regard his care and she is a terrific individual that is always with him.     The patient is aware that sooner or later we are going to need to pull the trigger and move on into therapy and he feels comfortable that we have new modalities of therapy that will not make him ill.     The patient was further reviewed on 09/29/2021. He has no symptoms pertinent to his CLL. He has not had any fever or infections, no autoimmunity. Surprising enough his white count is down to 50,000 from 77,000 before. Hemoglobin is stable. Platelet count is minimally low, no different than before, no clinical bleeding.     He has no peripheral adenopathy. He has no changes in performance status. He has no hepatosplenomegaly. I do believe that this patient needs to remain in observation from  the point of view of the CLL and he is aware of the symptoms that he could have in case that he has progressive disease. He will let us know if that is the case.     He will return to see us back in 4 months with a CBC.    Given the 3 lesions that he has in the scalp I asked him to be seen by dermatologist. I think one of them is already a squamous carcinoma and the other ones are probably actinic keratosis but it is worth it to treat these issues before they become a problem. He understands that he needs to have sun protection and he understands that skin cancer is most common in patient's with CLL and has the tendency to be more aggressive. A previous lesion documented on his left hand was removed by his dermatologist months ago.    ·

## 2021-12-20 ENCOUNTER — TELEPHONE (OUTPATIENT)
Dept: INTERNAL MEDICINE | Facility: CLINIC | Age: 66
End: 2021-12-20

## 2021-12-20 NOTE — TELEPHONE ENCOUNTER
Caller: Evangelina Espino    Relationship to patient: Emergency Contact    Best call back number:  [phone]     Patient is needing: EVANGELINA CALLED IN AND WANTED TO KNOW IF PATIENT CAN GET A ORDER FOR COLORGUARD TO BE SENT TO HIS RESIANCE .

## 2022-01-05 ENCOUNTER — TELEPHONE (OUTPATIENT)
Dept: ONCOLOGY | Facility: CLINIC | Age: 67
End: 2022-01-05

## 2022-01-05 NOTE — TELEPHONE ENCOUNTER
Caller: Francisco Espino    Relationship: Self    Best call back number: [phone]    What is the best time to reach you: ANYTIME.  LEAVE VM IF NO ANSWER    Who are you requesting to speak with (clinical staff, provider,  specific staff member): NURSE    What was the call regarding: SPOUSE CALLED STATING THAT PATIENT WAS DIAGNOSED WITH COVID ON 12/28/21 AND WAS GIVEN A Z-PACK.  SPOUSE STATES THAT PATIENT HAS PREVIOUSLY RECEIVED PREDNISONE ALONG WITH A Z-PACK.  PATIENT HAS COMPLETED THE Z-PACK BUT THEY WOULD LIKE TO KNOW IF DR. ENNIS MIGHT WANT TO PRESCRIBE THE PREDNISONE, AS PATIENT IS STILL EXPERIENCING A LOW GRADE FEVER EVERY DAY.      PATIENT IS SCHEDULED FOR OFFICE VISIT ON 1/18/22    [id number] - [place], [place] - [address].  [phone]  - [phone] FX    Do you require a callback: YES.

## 2022-01-05 NOTE — TELEPHONE ENCOUNTER
Patient returned my call to report that he continues to have elevated temperatures, 100 degrees to 102.5 degrees daily.He still has a cough, shortness of breath, headache and fatigue.  He is concerned that he is no better after the acute phase of Covid back in December.  He reports that he was given a Z-Reid and a medrol dosepak at the beginning of his symptoms.  He is concerned that with his CLL, he may need to repeat the steroid Dose Pack.  Please advise.

## 2022-01-05 NOTE — TELEPHONE ENCOUNTER
Returned call to patient to ask more questions, I had to leave message for him to call back.  He is requesting Prednisone since he is still experiencing a fever due to Covid diagnosed on 12/28/2021.  Please advise.

## 2022-01-05 NOTE — TELEPHONE ENCOUNTER
Pt advised to be re-evaluated for his persistent fevers despite staying on top of his tylenol per Dr. Cagle. Pt v/u and will seek out care. Deana Samuels RN

## 2022-01-06 ENCOUNTER — HOSPITAL ENCOUNTER (EMERGENCY)
Facility: HOSPITAL | Age: 67
Discharge: HOME | End: 2022-01-06
Attending: EMERGENCY MEDICINE | Admitting: EMERGENCY MEDICINE

## 2022-01-06 ENCOUNTER — APPOINTMENT (OUTPATIENT)
Dept: GENERAL RADIOLOGY | Facility: HOSPITAL | Age: 67
End: 2022-01-06

## 2022-01-06 VITALS
HEIGHT: 74 IN | SYSTOLIC BLOOD PRESSURE: 129 MMHG | TEMPERATURE: 98.9 F | WEIGHT: 175 LBS | DIASTOLIC BLOOD PRESSURE: 83 MMHG | BODY MASS INDEX: 22.46 KG/M2 | HEART RATE: 85 BPM | RESPIRATION RATE: 18 BRPM | OXYGEN SATURATION: 97 %

## 2022-01-06 DIAGNOSIS — R50.81 FEVER IN OTHER DISEASES: ICD-10-CM

## 2022-01-06 DIAGNOSIS — U07.1 COVID-19 VIRUS INFECTION: Primary | ICD-10-CM

## 2022-01-06 DIAGNOSIS — C91.10 CHRONIC LYMPHOCYTIC LEUKEMIA: ICD-10-CM

## 2022-01-06 LAB
ALBUMIN SERPL-MCNC: 4.1 G/DL (ref 3.5–5.2)
ALBUMIN/GLOB SERPL: 1.5 G/DL
ALP SERPL-CCNC: 80 U/L (ref 39–117)
ALT SERPL W P-5'-P-CCNC: 14 U/L (ref 1–41)
ANION GAP SERPL CALCULATED.3IONS-SCNC: 11 MMOL/L (ref 5–15)
AST SERPL-CCNC: 20 U/L (ref 1–40)
BASOPHILS # BLD MANUAL: 0.33 10*3/MM3 (ref 0–0.2)
BASOPHILS NFR BLD MANUAL: 1.1 % (ref 0–1.5)
BILIRUB SERPL-MCNC: 1.4 MG/DL (ref 0–1.2)
BUN SERPL-MCNC: 12 MG/DL (ref 8–23)
BUN/CREAT SERPL: 10 (ref 7–25)
CALCIUM SPEC-SCNC: 9.6 MG/DL (ref 8.6–10.5)
CHLORIDE SERPL-SCNC: 99 MMOL/L (ref 98–107)
CO2 SERPL-SCNC: 26 MMOL/L (ref 22–29)
CREAT SERPL-MCNC: 1.2 MG/DL (ref 0.76–1.27)
D-LACTATE SERPL-SCNC: 0.9 MMOL/L (ref 0.5–2)
DEPRECATED RDW RBC AUTO: 49.2 FL (ref 37–54)
ERYTHROCYTE [DISTWIDTH] IN BLOOD BY AUTOMATED COUNT: 13.4 % (ref 12.3–15.4)
GFR SERPL CREATININE-BSD FRML MDRD: 60 ML/MIN/1.73
GLOBULIN UR ELPH-MCNC: 2.7 GM/DL
GLUCOSE SERPL-MCNC: 125 MG/DL (ref 65–99)
HCT VFR BLD AUTO: 32.4 % (ref 37.5–51)
HGB BLD-MCNC: 11.3 G/DL (ref 13–17.7)
LYMPHOCYTES # BLD MANUAL: 17.44 10*3/MM3 (ref 0.7–3.1)
LYMPHOCYTES NFR BLD MANUAL: 2.2 % (ref 5–12)
MCH RBC QN AUTO: 35.2 PG (ref 26.6–33)
MCHC RBC AUTO-ENTMCNC: 34.9 G/DL (ref 31.5–35.7)
MCV RBC AUTO: 100.9 FL (ref 79–97)
MONOCYTES # BLD: 0.66 10*3/MM3 (ref 0.1–0.9)
NEUTROPHILS # BLD AUTO: 11.74 10*3/MM3 (ref 1.7–7)
NEUTROPHILS NFR BLD MANUAL: 38.9 % (ref 42.7–76)
NT-PROBNP SERPL-MCNC: 53.2 PG/ML (ref 0–900)
PLAT MORPH BLD: NORMAL
PLATELET # BLD AUTO: 119 10*3/MM3 (ref 140–450)
PMV BLD AUTO: 10 FL (ref 6–12)
POTASSIUM SERPL-SCNC: 4.5 MMOL/L (ref 3.5–5.2)
PROCALCITONIN SERPL-MCNC: 0.11 NG/ML (ref 0–0.25)
PROT SERPL-MCNC: 6.8 G/DL (ref 6–8.5)
QT INTERVAL: 333 MS
RBC # BLD AUTO: 3.21 10*6/MM3 (ref 4.14–5.8)
RBC MORPH BLD: NORMAL
SMUDGE CELLS BLD QL SMEAR: ABNORMAL
SODIUM SERPL-SCNC: 136 MMOL/L (ref 136–145)
TROPONIN T SERPL-MCNC: <0.01 NG/ML (ref 0–0.03)
VARIANT LYMPHS NFR BLD MANUAL: 57.8 % (ref 19.6–45.3)
WBC NRBC COR # BLD: 30.17 10*3/MM3 (ref 3.4–10.8)

## 2022-01-06 PROCEDURE — 93005 ELECTROCARDIOGRAM TRACING: CPT | Performed by: EMERGENCY MEDICINE

## 2022-01-06 PROCEDURE — 80053 COMPREHEN METABOLIC PANEL: CPT | Performed by: EMERGENCY MEDICINE

## 2022-01-06 PROCEDURE — 99283 EMERGENCY DEPT VISIT LOW MDM: CPT

## 2022-01-06 PROCEDURE — 84145 PROCALCITONIN (PCT): CPT | Performed by: EMERGENCY MEDICINE

## 2022-01-06 PROCEDURE — 71045 X-RAY EXAM CHEST 1 VIEW: CPT

## 2022-01-06 PROCEDURE — 83880 ASSAY OF NATRIURETIC PEPTIDE: CPT | Performed by: EMERGENCY MEDICINE

## 2022-01-06 PROCEDURE — 85025 COMPLETE CBC W/AUTO DIFF WBC: CPT | Performed by: EMERGENCY MEDICINE

## 2022-01-06 PROCEDURE — 83605 ASSAY OF LACTIC ACID: CPT | Performed by: EMERGENCY MEDICINE

## 2022-01-06 PROCEDURE — 93010 ELECTROCARDIOGRAM REPORT: CPT | Performed by: INTERNAL MEDICINE

## 2022-01-06 PROCEDURE — 85007 BL SMEAR W/DIFF WBC COUNT: CPT | Performed by: EMERGENCY MEDICINE

## 2022-01-06 PROCEDURE — 84484 ASSAY OF TROPONIN QUANT: CPT | Performed by: EMERGENCY MEDICINE

## 2022-01-06 RX ORDER — SODIUM CHLORIDE 0.9 % (FLUSH) 0.9 %
10 SYRINGE (ML) INJECTION AS NEEDED
Status: DISCONTINUED | OUTPATIENT
Start: 2022-01-06 | End: 2022-01-06 | Stop reason: HOSPADM

## 2022-01-06 RX ADMIN — SODIUM CHLORIDE, POTASSIUM CHLORIDE, SODIUM LACTATE AND CALCIUM CHLORIDE 500 ML: 600; 310; 30; 20 INJECTION, SOLUTION INTRAVENOUS at 11:49

## 2022-01-06 NOTE — ED NOTES
Pt arrived by PV reports testing positive for COVID 12/28/21 reports 102 fever unable to keep fever down.     Patient was placed in face mask during first look triage.  Patient was wearing a face mask throughout encounter.  I wore personal protective equipment throughout the encounter.  Hand hygiene was performed before and after patient encounter.        Zee Santiago RN  01/06/22 1024

## 2022-01-06 NOTE — ED NOTES
Pt reports temp was 102 this morning, and he took Tylenol at 0730 this morning, and does have cough.     Patient was placed in face mask during first look triage.  Patient was wearing a face mask throughout encounter.  I wore personal protective equipment throughout the encounter.  Hand hygiene was performed before and after patient encounter.       Tanmay Nunes, RN  01/06/22 2524

## 2022-01-06 NOTE — ED NOTES
Pt O2 sat was 96% and heart rate was 88 while ambulating in room. Stated that he did not experience any worsening symptoms.     Margoth Garzon, PCT  01/06/22 1328       Margoth Garzon, PCT  01/06/22 1334

## 2022-01-06 NOTE — ED PROVIDER NOTES
EMERGENCY DEPARTMENT ENCOUNTER    Room Number:  HALG/G  Date of encounter:  1/6/2022  PCP: Eveline Pealez MD  Historian: Patient     I used full protective equipment while examining this patient.  This includes face mask, gloves and protective eyewear.  I washed my hands before entering the room and immediately upon leaving the room.  Patient was wearing a surgical mask.      HPI:  Chief Complaint: Fever, COVID-positive  A complete HPI/ROS/PMH/PSH/SH/FH are unobtainable due to: None    Context: Francisco Espino is a 66 y.o. male who presents to the ED c/o intermittent fever for the past 9 to 10 days.  Patient tested positive for COVID on 12/28.  States he has fevers daily.  Had a temperature of 102 this morning.  He took Tylenol at around 7:30 AM.  He reports occasional cough with scant green sputum, increased fatigue, and scratchy throat.  He has been taking Robitussin with some relief.  Also reports some mild exertional dyspnea.  Denies chest pain, abdominal pain, vomiting, diarrhea, dysuria, dizziness, or syncope.  Patient is vaccinated and boosted for COVID.  He has a history of CLL but is not currently undergoing any treatment.  He is followed by Dr. Cagle.      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 11/22/2015   • Varicose veins 11/22/2015   • Acute renal failure with tubular necrosis (Formerly [facility]) 05/09/2017   • Alcohol abuse 05/09/2017   • Coronary artery disease involving native coronary artery of native heart with angina pectoris (Formerly [facility]) 05/09/2017   • Dyslipidemia 05/09/2017   • Myocardial infarction (Formerly [facility]) 05/10/2017   • S/P angioplasty with stent 05/10/2017   • ST elevation (STEMI) myocardial infarction involving right coronary artery (Formerly [facility]) 05/09/2017   • Elevated liver  function tests 05/01/2018   • Pneumonia 01/24/2019   • Positive colorectal cancer screening using Cologuard test 02/06/2020     Resolved Ambulatory Problems     Diagnosis Date Noted   • Abnormal creatinine clearance glomerular filtration 11/22/2015   • Left inguinal hernia 02/13/2019     Past Medical History:   Diagnosis Date   • Abnormal liver function test    • CLL (chronic lymphocytic leukemia) (HCC)    • Coronary artery disease    • Heart disease    • History of pneumonia    • Hypertension    • Inguinal hernia    • Screen for colon cancer 09/2016   • Screening PSA (prostate specific antigen) 02/2013   • Thrombocytopenia (HCC)          PAST SURGICAL HISTORY  Past Surgical History:   Procedure Laterality Date   • CARDIAC CATHETERIZATION  2017   • CATARACT EXTRACTION Bilateral    • COLONOSCOPY N/A 06/05/2006    Normal, repeat in 10 years, Dr. Preethi Gonzalez   • COLONOSCOPY N/A 7/15/2020    Procedure: COLONOSCOPY TO CECUM & T.I. WITH COLD SNARE POLYPECTOMIES, CLIP PLACEMENT X 2;  Surgeon: Yennifer Salazar MD;  Location: [facility] ENDOSCOPY;  Service: Gastroenterology;  Laterality: N/A;  PRE- POSITIVE COLOGUARD  POST- COLON POLYPS, DIVERTICULOSIS, HEMORRHOIDS   • CORONARY ANGIOPLASTY WITH STENT PLACEMENT N/A 05/09/2017    Left heart catheterization, Selective native vessel coronary arteriography, Left ventriculography, Successful percutaneous intervention to the 100% occluded right coronary artery with a 3.5 x 38 mm Synergy drug-eluting stent (post-dilated w/ a 4.0 x 20 mm NC Emerge balloon), Selective right femoral angiography, Successful Perclose arteriotomy closure. Dr. James Pierson   • INGUINAL HERNIA REPAIR Left    • INGUINAL HERNIA REPAIR Right     x2   • INGUINAL HERNIA REPAIR Left 3/28/2019    Procedure: LEFT INGUINAL HERNIA REPAIR LAPAROSCOPIC;  Surgeon: Preethi Gonzalez MD;  Location: [facility] OR [facility];  Service: General   • LAPAROSCOPIC INGUINAL HERNIA REPAIR Right 10/03/2002    w/ extra peritoneal  Goe-Benton mesh (transperitoneal repair), Dr. Preethi Gonzalez   • REFRACTIVE SURGERY Bilateral    • RETINAL DETACHMENT SURGERY Right 07/02/2013         FAMILY HISTORY  Family History   Problem Relation Age of Onset   • Heart attack Mother    • Heart disease Mother    • Diabetes Mother    • Aortic aneurysm Mother    • Coronary artery disease Mother    • Early death Mother    • No Known Problems Father    • Diabetes type II Other    • Diabetes Brother    • Hyperlipidemia Brother    • Stroke Sister 65   • Diabetes Sister    • Hyperlipidemia Sister    • Hypertension Sister    • No Known Problems Brother    • Stomach cancer Paternal Uncle    • Malig Hyperthermia Neg Hx          SOCIAL HISTORY  Social History     Socioeconomic History   • Marital status:      Spouse name: Charlotte Espino   Tobacco Use   • Smoking status: Never Smoker   • Smokeless tobacco: Never Used   Substance and Sexual Activity   • Alcohol use: Yes     Alcohol/week: 21.0 standard drinks     Types: 21 Shots of liquor per week   • Drug use: No   • Sexual activity: Defer         ALLERGIES  Codeine       REVIEW OF SYSTEMS  Review of Systems      All systems have been reviewed and are negative except as as discussed in the HPI    PHYSICAL EXAM    I have reviewed the triage vital signs and nursing notes.    ED Triage Vitals   Temp Heart Rate Resp BP SpO2   01/06/22 1025 01/06/22 1025 01/06/22 1025 01/06/22 1122 01/06/22 1025   97.1 °F (36.2 °C) 110 18 111/69 99 %      Temp src Heart Rate Source Patient Position BP Location FiO2 (%)   01/06/22 1025 01/06/22 1025 01/06/22 1122 01/06/22 1122 --   Temporal Monitor Sitting Right arm        Physical Exam  GENERAL: Awake, alert, oriented x3, nontoxic-appearing  HENT: NCAT, nares patent, moist mucous membranes, oropharynx is benign  NECK: supple, no lymphadenopathy  EYES: Extraocular muscles intact, no scleral icterus  CV: regular rhythm, regular rate  RESPIRATORY: normal effort, clear to auscultation  bilaterally  ABDOMEN: soft, nontender  MUSCULOSKELETAL: Extremities are nontender and without obvious deformity.  There is normal range of motion in all extremities.  There is no calf tenderness or pedal edema  NEURO: Strength, sensation, and coordination are grossly intact.  Speech and mentation are unremarkable.  No facial droop.  SKIN: warm, dry, no rash  PSYCH: Normal mood and affect      LAB RESULTS  Recent Results (from the past 24 hour(s))   ECG 12 Lead    Collection Time: 01/06/22 11:42 AM   Result Value Ref Range    QT Interval 333 ms   Comprehensive Metabolic Panel    Collection Time: 01/06/22 11:48 AM    Specimen: Blood   Result Value Ref Range    Glucose 125 (H) 65 - 99 mg/dL    BUN 12 8 - 23 mg/dL    Creatinine 1.20 0.76 - 1.27 mg/dL    Sodium 136 136 - 145 mmol/L    Potassium 4.5 3.5 - 5.2 mmol/L    Chloride 99 98 - 107 mmol/L    CO2 26.0 22.0 - 29.0 mmol/L    Calcium 9.6 8.6 - 10.5 mg/dL    Total Protein 6.8 6.0 - 8.5 g/dL    Albumin 4.10 3.50 - 5.20 g/dL    ALT (SGPT) 14 1 - 41 U/L    AST (SGOT) 20 1 - 40 U/L    Alkaline Phosphatase 80 39 - 117 U/L    Total Bilirubin 1.4 (H) 0.0 - 1.2 mg/dL    eGFR Non African Amer 60 (L) >60 mL/min/1.73    Globulin 2.7 gm/dL    A/G Ratio 1.5 g/dL    BUN/Creatinine Ratio 10.0 7.0 - 25.0    Anion Gap 11.0 5.0 - 15.0 mmol/L   Procalcitonin    Collection Time: 01/06/22 11:48 AM    Specimen: Blood   Result Value Ref Range    Procalcitonin 0.11 0.00 - 0.25 ng/mL   Lactic Acid, Plasma    Collection Time: 01/06/22 11:48 AM    Specimen: Blood   Result Value Ref Range    Lactate 0.9 0.5 - 2.0 mmol/L   BNP    Collection Time: 01/06/22 11:48 AM    Specimen: Blood   Result Value Ref Range    proBNP 53.2 0.0 - 900.0 pg/mL   Troponin    Collection Time: 01/06/22 11:48 AM    Specimen: Blood   Result Value Ref Range    Troponin T <0.010 0.000 - 0.030 ng/mL   CBC Auto Differential    Collection Time: 01/06/22 11:48 AM    Specimen: Blood   Result Value Ref Range    WBC 30.17 (C) 3.40  - 10.80 10*3/mm3    RBC 3.21 (L) 4.14 - 5.80 10*6/mm3    Hemoglobin 11.3 (L) 13.0 - 17.7 g/dL    Hematocrit 32.4 (L) 37.5 - 51.0 %    .9 (H) 79.0 - 97.0 fL    MCH 35.2 (H) 26.6 - 33.0 pg    MCHC 34.9 31.5 - 35.7 g/dL    RDW 13.4 12.3 - 15.4 %    RDW-SD 49.2 37.0 - 54.0 fl    MPV 10.0 6.0 - 12.0 fL    Platelets 119 (L) 140 - 450 10*3/mm3   Manual Differential    Collection Time: 01/06/22 11:48 AM    Specimen: Blood   Result Value Ref Range    Neutrophil % 38.9 (L) 42.7 - 76.0 %    Lymphocyte % 57.8 (H) 19.6 - 45.3 %    Monocyte % 2.2 (L) 5.0 - 12.0 %    Basophil % 1.1 0.0 - 1.5 %    Neutrophils Absolute 11.74 (H) 1.70 - 7.00 10*3/mm3    Lymphocytes Absolute 17.44 (H) 0.70 - 3.10 10*3/mm3    Monocytes Absolute 0.66 0.10 - 0.90 10*3/mm3    Basophils Absolute 0.33 (H) 0.00 - 0.20 10*3/mm3    RBC Morphology Normal Normal    Smudge Cells Large/3+ None Seen    Platelet Morphology Normal Normal       Ordered the above labs and independently reviewed the results.      RADIOLOGY  XR Chest 1 View    Result Date: 1/6/2022  ONE VIEW PORTABLE CHEST  HISTORY: Positive COVID infection. Cough.  FINDINGS: The lungs are well-expanded with some very minimal vague haziness in the periphery of the left lung compared to the study of 08/13/2019. This raises the concern of minimal developing infiltrate and includes the possibility of COVID-19 pneumonia. The heart size remains normal.  This report was finalized on 1/6/2022 2:14 PM by Dr. Dewey Albarado M.D.        I ordered the above noted radiological studies. Reviewed by me and discussed with radiologist.  See dictation for official radiology interpretation.      PROCEDURES  Procedures      MEDICATIONS GIVEN IN ER    Medications   lactated ringers bolus 500 mL (0 mL Intravenous Stopped 1/6/22 1340)         PROGRESS, DATA ANALYSIS, CONSULTS, AND MEDICAL DECISION MAKING    All labs have been independently reviewed by me.  All radiology studies have been reviewed by me and discussed  with radiologist dictating the report.   EKG's independently viewed and interpreted by me.  I have reviewed the nurse's notes, vital signs, past medical history, and medication list.  Discussion below represents my analysis of pertinent findings related to patient's condition, differential diagnosis, treatment plan and final disposition.      ED Course as of 01/06/22 1810   Thu Jan 06, 2022   1119 Old records reviewed.  Patient was seen at urgent care on 12/28/2021 for fever and congestion.  He was diagnosed with pharyngitis and started on a Z-Reid.  COVID test was positive. [WH]   1128 EKG          EKG time: 11:42 AM  Rhythm/Rate: Sinus rhythm, rate 90  P waves and GA: Normal  QRS, axis: Normal  ST and T waves: Normal    Interpreted Contemporaneously by me at 11:45 AM, independently viewed  EKG is not changed compared to prior EKG done on 7/2/2013   [WH]   1236 Chest x-ray shows some very minimal vague haziness in the periphery of the left lung which could represent minimal developing infiltrate including the possibility of COVID-pneumonia. [WH]   1252 WBC(!!): 30.17  51 three months ago [WH]   1253 Procalcitonin: 0.11 [WH]   1253 Lactate: 0.9 [WH]   1310 Case discussed with Michelle ED pharmacist.  Patient does not qualify for monoclonal antibody treatment or remdesivir as his symptoms have been present for greater than 7 days. [WH]   1332 O2 sats remained 96% on room air while ambulating. [WH]   1340 Test results discussed with the patient and his wife.  He is resting and breathing comfortably.  I explained to them that the patient does not qualify for monoclonal antibody treatment.  He was advised to continue taking Tylenol and Robitussin as needed for symptomatic relief.  Return precautions were discussed. [WH]      ED Course User Index  [WH] Jaskaran Sol MD       AS OF 18:10 EST VITALS:    BP - 129/83  HR - 85  TEMP - 98.9 °F (37.2 °C) (Oral)  O2 SATS - 97%      DIAGNOSIS  Final diagnoses:   COVID-19 virus  infection   Chronic lymphocytic leukemia (HCC)   Fever in other diseases         DISPOSITION  Discharge    DISCHARGE    Patient discharged in stable condition.    Reviewed implications of results, diagnosis, meds, responsibility to follow up, warning signs and symptoms of possible worsening, potential complications and reasons to return to ER, including worsening symptoms, persistent fever, increased shortness of breath, chest pain, abdominal pain, vomiting, or other concern..    Patient/Family voiced understanding of above instructions.    Discussed plan for discharge, as there is no emergent indication for admission. Patient referred to primary care provider for BP management due to today's BP. Pt/family is agreeable and understands need for follow up and repeat testing.  Pt is aware that discharge does not mean that nothing is wrong but it indicates no emergency is present that requires admission and they must continue care with follow-up as given below or physician of their choice.     FOLLOW-UP  Eveline Pelaez MD  [address] [facility] [address]  [facility] [id number] [phone]    Schedule an appointment as soon as possible for a visit   If symptoms persist         Medication List      No changes were made to your prescriptions during this visit.           Dictated utilizing Dragon dictation:  Much of this encounter note is an electronic transcription/translation of spoken language to printed text. The electronic translation of spoken language may permit erroneous, or at times, nonsensical words or phrases to be inadvertently transcribed; Although I have reviewed the note for such errors, some may still exist.     Jaskaran Sol MD  01/06/22 0454

## 2022-01-06 NOTE — DISCHARGE INSTRUCTIONS
Continue taking Tylenol as needed for fever.  Make sure you are drinking a normal amount of fluids.  Return to the emergency department for worsening symptoms, increased shortness of breath, chest pain, abdominal pain, vomiting, or other concern.  Follow-up with your primary care doctor if symptoms are not improving in the next several days.

## 2022-01-10 ENCOUNTER — OFFICE VISIT (OUTPATIENT)
Dept: INTERNAL MEDICINE | Facility: CLINIC | Age: 67
End: 2022-01-10

## 2022-01-10 ENCOUNTER — HOSPITAL ENCOUNTER (OUTPATIENT)
Dept: CT IMAGING | Facility: HOSPITAL | Age: 67
Discharge: HOME | End: 2022-01-10

## 2022-01-10 ENCOUNTER — HOSPITAL ENCOUNTER (OUTPATIENT)
Dept: GENERAL RADIOLOGY | Facility: HOSPITAL | Age: 67
Discharge: HOME | End: 2022-01-10

## 2022-01-10 VITALS
TEMPERATURE: 98 F | RESPIRATION RATE: 16 BRPM | WEIGHT: 180 LBS | HEART RATE: 100 BPM | HEIGHT: 74 IN | BODY MASS INDEX: 23.1 KG/M2 | DIASTOLIC BLOOD PRESSURE: 70 MMHG | SYSTOLIC BLOOD PRESSURE: 125 MMHG | OXYGEN SATURATION: 99 %

## 2022-01-10 DIAGNOSIS — U07.1 COVID-19 VIRUS INFECTION: ICD-10-CM

## 2022-01-10 DIAGNOSIS — U07.1 PNEUMONIA DUE TO COVID-19 VIRUS: Primary | ICD-10-CM

## 2022-01-10 DIAGNOSIS — U07.1 COVID-19 VIRUS INFECTION: Primary | ICD-10-CM

## 2022-01-10 DIAGNOSIS — U07.1 PNEUMONIA DUE TO COVID-19 VIRUS: ICD-10-CM

## 2022-01-10 DIAGNOSIS — R06.02 SHORTNESS OF BREATH: ICD-10-CM

## 2022-01-10 DIAGNOSIS — J12.82 PNEUMONIA DUE TO COVID-19 VIRUS: Primary | ICD-10-CM

## 2022-01-10 DIAGNOSIS — J12.82 PNEUMONIA DUE TO COVID-19 VIRUS: ICD-10-CM

## 2022-01-10 PROCEDURE — 71275 CT ANGIOGRAPHY CHEST: CPT

## 2022-01-10 PROCEDURE — 99215 OFFICE O/P EST HI 40 MIN: CPT | Performed by: INTERNAL MEDICINE

## 2022-01-10 PROCEDURE — 0 IOPAMIDOL PER 1 ML: Performed by: INTERNAL MEDICINE

## 2022-01-10 PROCEDURE — 71046 X-RAY EXAM CHEST 2 VIEWS: CPT

## 2022-01-10 RX ORDER — PREDNISONE 20 MG/1
40 TABLET ORAL DAILY
Qty: 10 TABLET | Refills: 0 | Status: SHIPPED | OUTPATIENT
Start: 2022-01-10 | End: 2022-01-15

## 2022-01-10 RX ORDER — CEFDINIR 300 MG/1
300 CAPSULE ORAL 2 TIMES DAILY
Qty: 20 CAPSULE | Refills: 0 | Status: SHIPPED | OUTPATIENT
Start: 2022-01-10 | End: 2022-07-25

## 2022-01-10 RX ORDER — ALBUTEROL SULFATE 90 UG/1
4 AEROSOL, METERED RESPIRATORY (INHALATION) EVERY 4 HOURS PRN
Qty: 18 G | Refills: 0 | Status: SHIPPED | OUTPATIENT
Start: 2022-01-10 | End: 2022-07-25

## 2022-01-10 RX ADMIN — IOPAMIDOL 100 ML: 755 INJECTION, SOLUTION INTRAVENOUS at 18:51

## 2022-01-10 NOTE — PROGRESS NOTES
Francisco Espino is a 66 y.o. male, who presents with a chief complaint of   Chief Complaint   Patient presents with   • Nasal Congestion           HPI   Pt here with his wife.  He has been sick almost 2 weeks.  He went to [facility] on 12/28 and was given a z-pack.  He also tested positve for covid at that time.  He is having lots of soa.  He is having fevers of 103-104.  He went to the ED last week.  He is taking extra strength tylenol regularly.  Last week in the ED (1/6) he got IVF and xray was borderline.  He has been more short of breath and feeling weaker. He is continuing to get more short of breath.  The fevers have not completely resolved during his illness.   O2 sats have been up and down at home.  At rest sats >99%.  He is on brillinta.  No hx dvt or clot but he has a hx CLL    The following portions of the patient's history were reviewed and updated as appropriate: allergies, current medications, past family history, past medical history, past social history, past surgical history and problem list.    Allergies: Codeine    Review of Systems   Constitutional: Positive for fatigue and fever.   HENT: Negative.    Eyes: Negative.    Respiratory: Positive for cough and shortness of breath.    Cardiovascular: Negative.    Gastrointestinal: Negative.    Endocrine: Negative.    Genitourinary: Negative.    Musculoskeletal: Negative.    Skin: Negative.    Allergic/Immunologic: Negative.    Neurological: Negative.    Hematological: Negative.    Psychiatric/Behavioral: Negative.    All other systems reviewed and are negative.            Wt Readings from Last 3 Encounters:   01/10/22 81.6 kg (180 lb)   01/06/22 79.4 kg (175 lb)   09/29/21 85.3 kg (188 lb)     Temp Readings from Last 3 Encounters:   01/10/22 98 °F (36.7 °C)   01/06/22 98.9 °F (37.2 °C) (Oral)   12/28/21 97.4 °F (36.3 °C) (Temporal)     BP Readings from Last 3 Encounters:   01/10/22 125/70   01/06/22 129/83   12/28/21 124/73     Pulse Readings from Last 3  Encounters:   01/10/22 100   01/06/22 85   12/28/21 83     Body mass index is 23.1 kg/m².  SpO2 Readings from Last 3 Encounters:   01/10/22 99%   01/06/22 97%   12/28/21 98%          Physical Exam    Results for orders placed or performed during the hospital encounter of 01/06/22   Comprehensive Metabolic Panel    Specimen: Blood   Result Value Ref Range    Glucose 125 (H) 65 - 99 mg/dL    BUN 12 8 - 23 mg/dL    Creatinine 1.20 0.76 - 1.27 mg/dL    Sodium 136 136 - 145 mmol/L    Potassium 4.5 3.5 - 5.2 mmol/L    Chloride 99 98 - 107 mmol/L    CO2 26.0 22.0 - 29.0 mmol/L    Calcium 9.6 8.6 - 10.5 mg/dL    Total Protein 6.8 6.0 - 8.5 g/dL    Albumin 4.10 3.50 - 5.20 g/dL    ALT (SGPT) 14 1 - 41 U/L    AST (SGOT) 20 1 - 40 U/L    Alkaline Phosphatase 80 39 - 117 U/L    Total Bilirubin 1.4 (H) 0.0 - 1.2 mg/dL    eGFR Non African Amer 60 (L) >60 mL/min/1.73    Globulin 2.7 gm/dL    A/G Ratio 1.5 g/dL    BUN/Creatinine Ratio 10.0 7.0 - 25.0    Anion Gap 11.0 5.0 - 15.0 mmol/L   Procalcitonin    Specimen: Blood   Result Value Ref Range    Procalcitonin 0.11 0.00 - 0.25 ng/mL   Lactic Acid, Plasma    Specimen: Blood   Result Value Ref Range    Lactate 0.9 0.5 - 2.0 mmol/L   BNP    Specimen: Blood   Result Value Ref Range    proBNP 53.2 0.0 - 900.0 pg/mL   Troponin    Specimen: Blood   Result Value Ref Range    Troponin T <0.010 0.000 - 0.030 ng/mL   CBC Auto Differential    Specimen: Blood   Result Value Ref Range    WBC 30.17 (C) 3.40 - 10.80 10*3/mm3    RBC 3.21 (L) 4.14 - 5.80 10*6/mm3    Hemoglobin 11.3 (L) 13.0 - 17.7 g/dL    Hematocrit 32.4 (L) 37.5 - 51.0 %    .9 (H) 79.0 - 97.0 fL    MCH 35.2 (H) 26.6 - 33.0 pg    MCHC 34.9 31.5 - 35.7 g/dL    RDW 13.4 12.3 - 15.4 %    RDW-SD 49.2 37.0 - 54.0 fl    MPV 10.0 6.0 - 12.0 fL    Platelets 119 (L) 140 - 450 10*3/mm3   Manual Differential    Specimen: Blood   Result Value Ref Range    Neutrophil % 38.9 (L) 42.7 - 76.0 %    Lymphocyte % 57.8 (H) 19.6 - 45.3 %     Monocyte % 2.2 (L) 5.0 - 12.0 %    Basophil % 1.1 0.0 - 1.5 %    Neutrophils Absolute 11.74 (H) 1.70 - 7.00 10*3/mm3    Lymphocytes Absolute 17.44 (H) 0.70 - 3.10 10*3/mm3    Monocytes Absolute 0.66 0.10 - 0.90 10*3/mm3    Basophils Absolute 0.33 (H) 0.00 - 0.20 10*3/mm3    RBC Morphology Normal Normal    Smudge Cells Large/3+ None Seen    Platelet Morphology Normal Normal   ECG 12 Lead   Result Value Ref Range    QT Interval 333 ms     Result Review :                  Assessment and Plan    Diagnoses and all orders for this visit:    1. COVID-19 virus infection (Primary) - pt at high risk for pulmonary embolism with hx CLL, recent covid infection, and progressive SOA.  Will check ct angiogram today to look for PE.  If neg will treat for pneumonia.    -     CT angiogram chest w contrast; Future    2. Shortness of breath  -     CT angiogram chest w contrast; Future    Other orders  -     albuterol sulfate  (90 Base) MCG/ACT inhaler; Inhale 4 puffs Every 4 (Four) Hours As Needed for Wheezing.  Dispense: 18 g; Refill: 0  -     predniSONE (DELTASONE) 20 MG tablet; Take 2 tablets by mouth Daily for 5 days.  Dispense: 10 tablet; Refill: 0  -     cefdinir (OMNICEF) 300 MG capsule; Take 1 capsule by mouth 2 (Two) Times a Day.  Dispense: 20 capsule; Refill: 0       CT angio neg for PE.  Will treat for pneumonia.  Rx for albuterol, prednisone, and cefdinir sent to pharmacy.            I spent 40 minutes caring for Francisco on this date of service. This time includes time spent by me in the following activities:preparing for the visit, reviewing tests, obtaining and/or reviewing a separately obtained history, performing a medically appropriate examination and/or evaluation , counseling and educating the patient/family/caregiver, ordering medications, tests, or procedures, documenting information in the medical record and care coordination      Outpatient Medications Prior to Visit   Medication Sig Dispense Refill   •  acetaminophen (TYLENOL) 500 MG tablet Take 1,000 mg by mouth Every 6 (Six) Hours As Needed for Mild Pain .     • aspirin 81 MG EC tablet Take 81 mg by mouth Every Other Day.     • carvedilol (COREG) 6.25 MG tablet TAKE 1 TABLET TWICE DAILY  WITH MEALS. 180 tablet 1   • fluticasone (FLONASE) 50 MCG/ACT nasal spray 2 sprays into the nostril(s) as directed by provider Daily. 1 bottle 0   • lisinopril (PRINIVIL,ZESTRIL) 5 MG tablet      • Multiple Vitamins-Minerals (EYE VITAMINS & MINERALS PO) Take 1 tablet by mouth Daily.     • rosuvastatin (CRESTOR) 10 MG tablet Take 1 tablet by mouth Daily. 90 tablet 3   • rosuvastatin (CRESTOR) 20 MG tablet      • ticagrelor (Brilinta) 60 MG tablet tablet Take 1 tablet by mouth 2 (Two) Times a Day. Told to stop 5 days before surgery 180 tablet 3     No facility-administered medications prior to visit.     New Medications Ordered This Visit   Medications   • albuterol sulfate  (90 Base) MCG/ACT inhaler     Sig: Inhale 4 puffs Every 4 (Four) Hours As Needed for Wheezing.     Dispense:  18 g     Refill:  0   • predniSONE (DELTASONE) 20 MG tablet     Sig: Take 2 tablets by mouth Daily for 5 days.     Dispense:  10 tablet     Refill:  0   • cefdinir (OMNICEF) 300 MG capsule     Sig: Take 1 capsule by mouth 2 (Two) Times a Day.     Dispense:  20 capsule     Refill:  0     [unfilled]  There are no discontinued medications.      No follow-ups on file.    Patient was given instructions and counseling regarding her condition or for health maintenance advice. Please see specific information pulled into the AVS if appropriate.

## 2022-01-12 ENCOUNTER — TELEPHONE (OUTPATIENT)
Dept: INTERNAL MEDICINE | Facility: CLINIC | Age: 67
End: 2022-01-12

## 2022-01-12 NOTE — TELEPHONE ENCOUNTER
Caller: Evangelina Espino    Relationship: Emergency Contact    Best call back number: [phone]    What test was performed: CAT SCAN    When was the test performed: 01/10/2022    Where was the test performed: [facility]    Additional notes: PATIENT'S WIFE STATES PATIENT HAD A CAT SCAN DONE ON Monday AND THEY STILL HAVE NOT RECEIVED RESULTS. RESULTS REQUESTED

## 2022-01-18 ENCOUNTER — APPOINTMENT (OUTPATIENT)
Dept: LAB | Facility: HOSPITAL | Age: 67
End: 2022-01-18

## 2022-02-10 ENCOUNTER — LAB (OUTPATIENT)
Dept: LAB | Facility: HOSPITAL | Age: 67
End: 2022-02-10

## 2022-02-10 ENCOUNTER — OFFICE VISIT (OUTPATIENT)
Dept: ONCOLOGY | Facility: CLINIC | Age: 67
End: 2022-02-10

## 2022-02-10 VITALS
BODY MASS INDEX: 23.66 KG/M2 | WEIGHT: 184.4 LBS | DIASTOLIC BLOOD PRESSURE: 77 MMHG | RESPIRATION RATE: 16 BRPM | HEART RATE: 83 BPM | OXYGEN SATURATION: 95 % | SYSTOLIC BLOOD PRESSURE: 119 MMHG | HEIGHT: 74 IN | TEMPERATURE: 96.6 F

## 2022-02-10 DIAGNOSIS — C91.10 CHRONIC LYMPHOCYTIC LEUKEMIA: ICD-10-CM

## 2022-02-10 DIAGNOSIS — C91.10 CHRONIC LYMPHOCYTIC LEUKEMIA: Primary | ICD-10-CM

## 2022-02-10 LAB
BASOPHILS # BLD AUTO: 0.11 10*3/MM3 (ref 0–0.2)
BASOPHILS NFR BLD AUTO: 0.1 % (ref 0–1.5)
DEPRECATED RDW RBC AUTO: 53.9 FL (ref 37–54)
EOSINOPHIL # BLD AUTO: 0.16 10*3/MM3 (ref 0–0.4)
EOSINOPHIL NFR BLD AUTO: 0.2 % (ref 0.3–6.2)
ERYTHROCYTE [DISTWIDTH] IN BLOOD BY AUTOMATED COUNT: 14.5 % (ref 12.3–15.4)
HCT VFR BLD AUTO: 37.3 % (ref 37.5–51)
HGB BLD-MCNC: 12.3 G/DL (ref 13–17.7)
IMM GRANULOCYTES # BLD AUTO: 0.16 10*3/MM3 (ref 0–0.05)
IMM GRANULOCYTES NFR BLD AUTO: 0.2 % (ref 0–0.5)
LYMPHOCYTES # BLD AUTO: 71.53 10*3/MM3 (ref 0.7–3.1)
LYMPHOCYTES NFR BLD AUTO: 91.5 % (ref 19.6–45.3)
MCH RBC QN AUTO: 34.3 PG (ref 26.6–33)
MCHC RBC AUTO-ENTMCNC: 33 G/DL (ref 31.5–35.7)
MCV RBC AUTO: 103.9 FL (ref 79–97)
MONOCYTES # BLD AUTO: 1.34 10*3/MM3 (ref 0.1–0.9)
MONOCYTES NFR BLD AUTO: 1.7 % (ref 5–12)
NEUTROPHILS NFR BLD AUTO: 4.86 10*3/MM3 (ref 1.7–7)
NEUTROPHILS NFR BLD AUTO: 6.3 % (ref 42.7–76)
NRBC BLD AUTO-RTO: 0 /100 WBC (ref 0–0.2)
PLATELET # BLD AUTO: 120 10*3/MM3 (ref 140–450)
PMV BLD AUTO: 10.3 FL (ref 6–12)
RBC # BLD AUTO: 3.59 10*6/MM3 (ref 4.14–5.8)
WBC NRBC COR # BLD: 78.16 10*3/MM3 (ref 3.4–10.8)

## 2022-02-10 PROCEDURE — 99214 OFFICE O/P EST MOD 30 MIN: CPT | Performed by: INTERNAL MEDICINE

## 2022-02-10 PROCEDURE — 36415 COLL VENOUS BLD VENIPUNCTURE: CPT

## 2022-02-10 PROCEDURE — 85025 COMPLETE CBC W/AUTO DIFF WBC: CPT

## 2022-02-10 NOTE — PROGRESS NOTES
Subjective     REASON FOR FOLLOW UP: CLL SINCE 2005 NOT REQUIRING ANY INTERVENTION        History of Present Illness DURING THE VISIT WITH THE PATIENT TODAY , PATIENT HAD FACE MASK, I HAD PROPER PROTECTIVE EQUIPMENT, AND I DID HAND HYGIENE WITH SOAP AND WATER BEFORE AND AFTER THE VISIT.  This patient returns today to the office for follow up of the above diagnosis. He is here in company of his wife after he has had a severe case of COVID infection. He did not end up in the hospital and medication provided by his Primary Physician, Eveline Pelaez MD, including prednisone and antibiotics was lifesaving for him. He was ill with temperature as high as 105 degrees for many days and the only thing that broke the fever and made him to improve were the medicines posted above. Since the resolution of the symptoms more than 3 weeks ago he has been feeling back to his baseline. He feels better, energy level is better, appetite is normal. He had no alterations in taste or smell. He has good bowel activity, normal urination. He has no fever, chills, night sweats, pruritus, adenopathy. Otherwise he feels well. His wife never developed any infection or symptoms of any nature even though she never was tested for COVID.           Past Medical History:   Diagnosis Date   • Abnormal liver function test    • Alcohol abuse    • CLL (chronic lymphocytic leukemia) (HCC)    • Coronary artery disease     stent   • Heart disease    • History of pneumonia     1/2019   • Hyperlipidemia    • Hypertension    • Inguinal hernia     left side to have surgery 3/28/19   • Myocardial infarction (HCC)    • Screen for colon cancer 09/2016    Negative Cologaurd   • Screening PSA (prostate specific antigen) 02/2013    .5   • Thrombocytopenia (HCC)         Past Surgical History:   Procedure Laterality Date   • CARDIAC CATHETERIZATION  2017   • CATARACT EXTRACTION Bilateral    • COLONOSCOPY N/A 06/05/2006    Normal, repeat in 10 years, Dr. Preethi Gonzalez    • COLONOSCOPY N/A 7/15/2020    Procedure: COLONOSCOPY TO CECUM & T.I. WITH COLD SNARE POLYPECTOMIES, CLIP PLACEMENT X 2;  Surgeon: Yennifer Salazar MD;  Location: [facility] ENDOSCOPY;  Service: Gastroenterology;  Laterality: N/A;  PRE- POSITIVE COLOGUARD  POST- COLON POLYPS, DIVERTICULOSIS, HEMORRHOIDS   • CORONARY ANGIOPLASTY WITH STENT PLACEMENT N/A 05/09/2017    Left heart catheterization, Selective native vessel coronary arteriography, Left ventriculography, Successful percutaneous intervention to the 100% occluded right coronary artery with a 3.5 x 38 mm Synergy drug-eluting stent (post-dilated w/ a 4.0 x 20 mm NC Emerge balloon), Selective right femoral angiography, Successful Perclose arteriotomy closure. Dr. James Pierson   • INGUINAL HERNIA REPAIR Left    • INGUINAL HERNIA REPAIR Right     x2   • INGUINAL HERNIA REPAIR Left 3/28/2019    Procedure: LEFT INGUINAL HERNIA REPAIR LAPAROSCOPIC;  Surgeon: Preethi Gonzalez MD;  Location: [facility] OR [facility] [place];  Service: General   • LAPAROSCOPIC INGUINAL HERNIA REPAIR Right 10/03/2002    w/ extra peritoneal Goe-Benton mesh (transperitoneal repair), Dr. Preethi Gonzalez   • REFRACTIVE SURGERY Bilateral    • RETINAL DETACHMENT SURGERY Right 07/02/2013      ONCOLOGY HISTORY SHOLA ENNIS MD:ONCOLOGIC HISTORY:  On 12/22/10 he was reviewed.  He   had a totally normal white count with normal differential, predominance of lymphocytes.  Normal platelet count.  His chemistry profile was normal including LDH.  His beta-2 microglobulin was normal.  His quantitative immunoglobulins were normal.  His jane  p  heral blood flow cytometry documented a typical pattern by flow cytometry of CLL, CD5, CD19 negative, dim positive CD20, ZAP-70 negative and CD38 negative.  His CT scan of the chest, abdomen and pelvis disclosed no peripheral adenopathy or hepatosplenomeg  a  ly.  His level of immunoglobulins was normal.  With this in mind we thought that the patient had very early stage disease  with excellent prognostic features and we advised him to remain on observation.  He will be rechecked every 3 months for the first ye  ar and thereafter every 6 months depending on the clinical circumstances.  Appropriate booklet information was given to him and his wife to review and further learn.          Given the excellent characteristics of his disease on clinical grounds, I doubt at this time a bone marrow is needed.        On 1/8/13 his physical exam is unremarkable with no peripheral adenopathy or hepatosplenomegaly, no B symptoms, no infections, no autoimmunity.  On the other hand he had areas of the skin on the left cheek and right cheek bhavani  t look like actinic keratosis.  His white count was up to17,000 with a normal hemoglobin and borderline platelet count of 125,000.  We asked the patient to try to minimize stressors in life and we asked him to return back in four months.  We also asked priscila edwards to be seen by dermatology in order to treat his multiple actinic keratosis.          On 05/09/13 the patient was asymptomatic with regard to his chronic lymphoid leukemia, no fatigue, fevers, chills, repeated infections or peripheral lymphadenopathy.  His physi  francisco j exam was unremarkable with no palpable lymphadenopathy or hepatosplenomegaly.  His white count has now improved to 12,900, stable hemoglobin of 13.6 and stable platelet count of 121,000.  With this in mind we advised him to remain on observation, retu  rning for reevaluation in six months.         On 05/07/2014 the patient was asymptomatic in regard to his CLL with no autoimmunity, no infections and no progressive disease clinically.  On the other hand we have seen duplication of his white count in one year.  T  he patient and wife are aware that eventually he will require intervention for this if the white count continues changing the way it is.  At this time we do not justify the need of any other therapy or any other testing.        Current  Outpatient Medications on File Prior to Visit   Medication Sig Dispense Refill   • acetaminophen (TYLENOL) 500 MG tablet Take 1,000 mg by mouth Every 6 (Six) Hours As Needed for Mild Pain .     • albuterol sulfate  (90 Base) MCG/ACT inhaler Inhale 4 puffs Every 4 (Four) Hours As Needed for Wheezing. 18 g 0   • aspirin 81 MG EC tablet Take 81 mg by mouth Every Other Day.     • carvedilol (COREG) 6.25 MG tablet TAKE 1 TABLET TWICE DAILY  WITH MEALS. 180 tablet 1   • cefdinir (OMNICEF) 300 MG capsule Take 1 capsule by mouth 2 (Two) Times a Day. 20 capsule 0   • fluticasone (FLONASE) 50 MCG/ACT nasal spray 2 sprays into the nostril(s) as directed by provider Daily. 1 bottle 0   • lisinopril (PRINIVIL,ZESTRIL) 5 MG tablet      • Multiple Vitamins-Minerals (EYE VITAMINS & MINERALS PO) Take 1 tablet by mouth Daily.     • rosuvastatin (CRESTOR) 10 MG tablet Take 1 tablet by mouth Daily. 90 tablet 3   • rosuvastatin (CRESTOR) 20 MG tablet      • ticagrelor (Brilinta) 60 MG tablet tablet Take 1 tablet by mouth 2 (Two) Times a Day. Told to stop 5 days before surgery 180 tablet 3     No current facility-administered medications on file prior to visit.        ALLERGIES:    Allergies   Allergen Reactions   • Codeine Nausea Only        Social History     Socioeconomic History   • Marital status:      Spouse name: Charlotte Espino   Tobacco Use   • Smoking status: Never Smoker   • Smokeless tobacco: Never Used   Substance and Sexual Activity   • Alcohol use: Yes     Alcohol/week: 21.0 standard drinks     Types: 21 Shots of liquor per week   • Drug use: No   • Sexual activity: Defer        Family History   Problem Relation Age of Onset   • Heart attack Mother    • Heart disease Mother    • Diabetes Mother    • Aortic aneurysm Mother    • Coronary artery disease Mother    • Early death Mother    • No Known Problems Father    • Diabetes type II Other    • Diabetes Brother    • Hyperlipidemia Brother    • Stroke Sister 65   •  "Diabetes Sister    • Hyperlipidemia Sister    • Hypertension Sister    • No Known Problems Brother    • Stomach cancer Paternal Uncle    • Malig Hyperthermia Neg Hx           Objective     Vitals:    02/10/22 1003   BP: 119/77   Pulse: 83   Resp: 16   Temp: 96.6 °F (35.9 °C)   TempSrc: Temporal   SpO2: 95%   Weight: 83.6 kg (184 lb 6.4 oz)   Height: 188 cm (74.02\")   PainSc: 0-No pain     Current Status 9/29/2021   ECOG score 0       Physical Exam     I HAVE PERSONALLY REVIEWED THE HISTORY OF THE PRESENT ILLNESS, PAST MEDICAL HISTORY, FAMILY HISTORY, SOCIAL HISTORY, ALLERGIES, MEDICATIONS STATED ABOVE IN THE  NOTE FROM TODAY.        GENERAL:  Well-developed, well-nourished  Patient  in no acute distress.   SKIN:  Warm, dry ,NO rashes,NO purpura ,NO petechiae.  HEENT:  Pupils were equal and reactive to light and accomodation, conjunctivae noninjected, no pterygium, normal extraocular movements, normal visual acuity.   NECK:  Supple with good range of motion; no thyromegaly , no other masses, no JVD or bruits, no cervical adenopathies.No carotid artery pain, no carotid abnormal pulsation , NO arterial dance.  LYMPHATICS:  No cervical, NO supraclavicular, NO axillary,NO epitrochlear , NO inguinal adenopathy.  CARDIAC   normal rate and regular rhythm, without murmur,NO rubs NO S3 NO S4 right or left .  LUNGS: normal breath sounds bilateral, no wheezing, rhonchi, crackles or rubs.  VASCULAR VENOUS: no cyanosis, collateral circulation, varicosities, edema, palpable cords, pain, erythema.  ABDOMEN:  Soft, nontender with no hepatomegaly, no splenomegaly,no masses, no ascites, no collateral circulation,no distention,no Dallin sign.  EXTREMITIES  AND SPINE:  No clubbing, cyanosis or edema, no deformities , no pain .No kyphosis, scoliosis, no other deformities, no pain in spine, no pain in ribs , no pain inpelvic bone.  NEUROLOGICAL:  Patient was awake, alert, oriented to time, person and place.            RECENT " LABS:  Hematology WBC   Date Value Ref Range Status   02/10/2022 78.16 (H) 3.40 - 10.80 10*3/mm3 Final   07/09/2021 72.75 (C) 3.40 - 10.80 10*3/mm3 Final     RBC   Date Value Ref Range Status   02/10/2022 3.59 (L) 4.14 - 5.80 10*6/mm3 Final   07/09/2021 3.26 (L) 4.14 - 5.80 10*6/mm3 Final     Hemoglobin   Date Value Ref Range Status   02/10/2022 12.3 (L) 13.0 - 17.7 g/dL Final     Hematocrit   Date Value Ref Range Status   02/10/2022 37.3 (L) 37.5 - 51.0 % Final     Platelets   Date Value Ref Range Status   02/10/2022 120 (L) 140 - 450 10*3/mm3 Final                     Assessment/Plan  In summary this patient has had chronic lymphoid leukemia for the last 15 years. He never has required any intervention since the original definitive diagnosis in 12/2010. At that time his white count was close to 13,000. Obviously the white count has slowly crept up to what it is today but the count today is no different than what it was in 01/2019. The hemoglobin is stable. The platelet count is the only number that has really changed through the years. The patient is not having any clinical bleeding. He has no palpable splenomegaly, he has no peripheral adenopathy, he has no B symptoms, he has no repetitive infections. He has not had anything that suggests autoimmunity.     The patient is up to date on his vaccinations.     He has had colonoscopy, polyps were removed from the colon. All of them were tubular adenomas with no dysplasia. He had a positive Cologuard test.       I reviewed with the patient and his wife on 07/20/2021 the fact that his white count has further risen to 77,000 today. The hemoglobin has started to drop some 11.3, the platelet count has fluctuating in the low 100,000s. He has no peripheral adenopathy, he has no hepatosplenomegaly. He has no B symptoms. He has no fatigue and he has not had repetitive infections or autoimmunity. I do believe that this patient is getting closer and closer to the need for  initiation of therapy for his condition. I pointed out to him that probably the best medicine available nowadays is Calquence that he takes in a pill form with minimal side effects and tremendous benefit. I do believe that I want to watch him a little bit closer and to review him back in a couple of months with another blood count. I asked him to pay attention to symptoms including nocturnal sweats, fatigue, chills, fever or repetitive infections. If any of these situations happen he is supposed to notify us. Radiologically speaking I do not find the need for anything at this point and eventually we will need to reevaluate his disease with FISH analysis in peripheral blood to further classify the disease like nowadays is done.     I made the patient aware of all of these issues and circumstances. I have a chemistry profile pending today, a beta-2 microglobulin, an LDH and uric acid. Monoclonal proteins will be measured to be sure that there is no production of this by the leukemia cells. Also it will give me the proper information in regard to his production of immunoglobulin G.     The patient was in agreement. As usual he is very fearful to come and see me but he has gotten used to seeing numbers and gotten used to seeing me and he is more comfortable with my conversation. His wife is instrumental in regard his care and she is a terrific individual that is always with him.     The patient is aware that sooner or later we are going to need to pull the trigger and move on into therapy and he feels comfortable that we have new modalities of therapy that will not make him ill.     The patient was further reviewed on 09/29/2021. He has no symptoms pertinent to his CLL. He has not had any fever or infections, no autoimmunity. Surprising enough his white count is down to 50,000 from 77,000 before. Hemoglobin is stable. Platelet count is minimally low, no different than before, no clinical bleeding.     He has no  peripheral adenopathy. He has no changes in performance status. He has no hepatosplenomegaly. I do believe that this patient needs to remain in observation from the point of view of the CLL and he is aware of the symptoms that he could have in case that he has progressive disease. He will let us know if that is the case.     He will return to see us back in 4 months with a CBC.    Given the 3 lesions that he has in the scalp I asked him to be seen by dermatologist. I think one of them is already a squamous carcinoma and the other ones are probably actinic keratosis but it is worth it to treat these issues before they become a problem. He understands that he needs to have sun protection and he understands that skin cancer is most common in patient's with CLL and has the tendency to be more aggressive. A previous lesion documented on his left hand was removed by his dermatologist months ago.  The patient was reviewed on 02/10/2022. Since the previous visit the patient had a severe case of COVID infection. He was able to stay at home. His Primary Physician, Eveline Pelaez MD, provided prednisone and antibiotic and this made him to turn the corner. He is extremely appreciative of her gesture and her help. He looks terrific today. He has no peripheral adenopathy, hepatosplenomegaly, B symptoms or alteration in performance status. On the other hand his white count remains elevated with a normal hemoglobin and normal platelet count. This is not surprising to me. Many patients after any kind of infection including viral infections in the background of CLL keep a white count elevation for a good while. He has been all over the place in regard to his white count lately and I do not believe that I need to proceed with any radiological assessment on him at this point besides to review him back on clinical grounds in a couple of months and repeat his CBC and CMP.     I had the opportunity to review his CT scan angiogram that  was done at the time of his COVID pneumonia. He had multiple patchy areas in both lungs consistent with this diagnosis. He had no obvious pleural effusion or pericardial effusion, he had no liver enlargement or splenomegaly in that radiological analysis.     I advised him to come back in a couple of months. I did not prescribe any medicines or change any of the medicines that he is receiving at this point.       ·

## 2022-04-06 DIAGNOSIS — I10 ESSENTIAL HYPERTENSION: ICD-10-CM

## 2022-04-06 DIAGNOSIS — I25.119 CORONARY ARTERY DISEASE INVOLVING NATIVE CORONARY ARTERY OF NATIVE HEART WITH ANGINA PECTORIS: ICD-10-CM

## 2022-04-06 RX ORDER — CARVEDILOL 6.25 MG/1
TABLET ORAL
Qty: 180 TABLET | Refills: 1 | Status: SHIPPED | OUTPATIENT
Start: 2022-04-06 | End: 2022-09-12

## 2022-04-21 ENCOUNTER — OFFICE VISIT (OUTPATIENT)
Dept: ONCOLOGY | Facility: CLINIC | Age: 67
End: 2022-04-21

## 2022-04-21 ENCOUNTER — LAB (OUTPATIENT)
Dept: LAB | Facility: HOSPITAL | Age: 67
End: 2022-04-21

## 2022-04-21 VITALS
DIASTOLIC BLOOD PRESSURE: 71 MMHG | WEIGHT: 191.2 LBS | TEMPERATURE: 97.1 F | RESPIRATION RATE: 18 BRPM | HEIGHT: 74 IN | BODY MASS INDEX: 24.54 KG/M2 | HEART RATE: 82 BPM | OXYGEN SATURATION: 98 % | SYSTOLIC BLOOD PRESSURE: 106 MMHG

## 2022-04-21 DIAGNOSIS — C91.10 CHRONIC LYMPHOCYTIC LEUKEMIA OF B-CELL TYPE NOT HAVING ACHIEVED REMISSION: Primary | ICD-10-CM

## 2022-04-21 DIAGNOSIS — C91.10 CHRONIC LYMPHOCYTIC LEUKEMIA: ICD-10-CM

## 2022-04-21 LAB
ALBUMIN SERPL-MCNC: 4.9 G/DL (ref 3.5–5.2)
ALBUMIN/GLOB SERPL: 1.8 G/DL (ref 1.1–2.4)
ALP SERPL-CCNC: 86 U/L (ref 38–116)
ALT SERPL W P-5'-P-CCNC: 64 U/L (ref 0–41)
ANION GAP SERPL CALCULATED.3IONS-SCNC: 9.3 MMOL/L (ref 5–15)
AST SERPL-CCNC: 56 U/L (ref 0–40)
BASOPHILS # BLD AUTO: 0.06 10*3/MM3 (ref 0–0.2)
BASOPHILS NFR BLD AUTO: 0.1 % (ref 0–1.5)
BILIRUB SERPL-MCNC: 1.2 MG/DL (ref 0.2–1.2)
BUN SERPL-MCNC: 12 MG/DL (ref 6–20)
BUN/CREAT SERPL: 10.2 (ref 7.3–30)
CALCIUM SPEC-SCNC: 10.3 MG/DL (ref 8.5–10.2)
CHLORIDE SERPL-SCNC: 103 MMOL/L (ref 98–107)
CO2 SERPL-SCNC: 26.7 MMOL/L (ref 22–29)
CREAT SERPL-MCNC: 1.18 MG/DL (ref 0.7–1.3)
DEPRECATED RDW RBC AUTO: 55.3 FL (ref 37–54)
EGFRCR SERPLBLD CKD-EPI 2021: 67.6 ML/MIN/1.73
EOSINOPHIL # BLD AUTO: 0.07 10*3/MM3 (ref 0–0.4)
EOSINOPHIL NFR BLD AUTO: 0.2 % (ref 0.3–6.2)
ERYTHROCYTE [DISTWIDTH] IN BLOOD BY AUTOMATED COUNT: 14.3 % (ref 12.3–15.4)
GLOBULIN UR ELPH-MCNC: 2.8 GM/DL (ref 1.8–3.5)
GLUCOSE SERPL-MCNC: 109 MG/DL (ref 74–124)
HCT VFR BLD AUTO: 35.1 % (ref 37.5–51)
HGB BLD-MCNC: 11.6 G/DL (ref 13–17.7)
IMM GRANULOCYTES # BLD AUTO: 0.06 10*3/MM3 (ref 0–0.05)
IMM GRANULOCYTES NFR BLD AUTO: 0.1 % (ref 0–0.5)
LDH SERPL-CCNC: 156 U/L (ref 99–259)
LYMPHOCYTES # BLD AUTO: 41.9 10*3/MM3 (ref 0.7–3.1)
LYMPHOCYTES NFR BLD AUTO: 90.1 % (ref 19.6–45.3)
MCH RBC QN AUTO: 34.8 PG (ref 26.6–33)
MCHC RBC AUTO-ENTMCNC: 33 G/DL (ref 31.5–35.7)
MCV RBC AUTO: 105.4 FL (ref 79–97)
MONOCYTES # BLD AUTO: 1.28 10*3/MM3 (ref 0.1–0.9)
MONOCYTES NFR BLD AUTO: 2.8 % (ref 5–12)
NEUTROPHILS NFR BLD AUTO: 3.13 10*3/MM3 (ref 1.7–7)
NEUTROPHILS NFR BLD AUTO: 6.7 % (ref 42.7–76)
NRBC BLD AUTO-RTO: 0.1 /100 WBC (ref 0–0.2)
PLATELET # BLD AUTO: 93 10*3/MM3 (ref 140–450)
PMV BLD AUTO: 10.1 FL (ref 6–12)
POTASSIUM SERPL-SCNC: 4.6 MMOL/L (ref 3.5–4.7)
PROT SERPL-MCNC: 7.7 G/DL (ref 6.3–8)
RBC # BLD AUTO: 3.33 10*6/MM3 (ref 4.14–5.8)
SODIUM SERPL-SCNC: 139 MMOL/L (ref 134–145)
WBC NRBC COR # BLD: 46.5 10*3/MM3 (ref 3.4–10.8)

## 2022-04-21 PROCEDURE — 85025 COMPLETE CBC W/AUTO DIFF WBC: CPT

## 2022-04-21 PROCEDURE — 99213 OFFICE O/P EST LOW 20 MIN: CPT | Performed by: INTERNAL MEDICINE

## 2022-04-21 PROCEDURE — 80053 COMPREHEN METABOLIC PANEL: CPT

## 2022-04-21 PROCEDURE — 36415 COLL VENOUS BLD VENIPUNCTURE: CPT

## 2022-04-21 PROCEDURE — 83615 LACTATE (LD) (LDH) ENZYME: CPT

## 2022-04-21 NOTE — PROGRESS NOTES
Subjective     REASON FOR FOLLOW UP: CLL SINCE 2005 NOT REQUIRING ANY INTERVENTION        History of Present Illness     DURING THE VISIT WITH THE PATIENT TODAY , PATIENT HAD FACE MASK, I HAD PROPER PROTECTIVE EQUIPMENT, AND I DID HAND HYGIENE WITH SOAP AND WATER BEFORE AND AFTER THE VISIT.  This patient returns today to the office for follow up of the above diagnosis in company of his wife. Since the previous visit and after his severe COVID pneumonia the patient has slowly improved and he is back to baseline. It seems to be that he has no cardiac or pulmonary complications from COVID neither chronic fatigue. His appetite is good, his weight is stable, his bowel function and urination are normal. He has more skin lesions in the face on the left side that we need to review today. He has not had any fever, chills, night sweats, pruritus, adenopathies.        Past Medical History:   Diagnosis Date   • Abnormal liver function test    • Alcohol abuse    • CLL (chronic lymphocytic leukemia) (HCC)    • Coronary artery disease     stent   • Heart disease    • History of pneumonia     1/2019   • Hyperlipidemia    • Hypertension    • Inguinal hernia     left side to have surgery 3/28/19   • Myocardial infarction (HCC)    • Screen for colon cancer 09/2016    Negative Cologaurd   • Screening PSA (prostate specific antigen) 02/2013    .5   • Thrombocytopenia (HCC)         Past Surgical History:   Procedure Laterality Date   • CARDIAC CATHETERIZATION  2017   • CATARACT EXTRACTION Bilateral    • COLONOSCOPY N/A 06/05/2006    Normal, repeat in 10 years, Dr. Preethi Gonzalez   • COLONOSCOPY N/A 7/15/2020    Procedure: COLONOSCOPY TO CECUM & T.I. WITH COLD SNARE POLYPECTOMIES, CLIP PLACEMENT X 2;  Surgeon: Yennifer Salazar MD;  Location: [facility] ENDOSCOPY;  Service: Gastroenterology;  Laterality: N/A;  PRE- POSITIVE COLOGUARD  POST- COLON POLYPS, DIVERTICULOSIS, HEMORRHOIDS   • CORONARY ANGIOPLASTY WITH STENT PLACEMENT N/A  05/09/2017    Left heart catheterization, Selective native vessel coronary arteriography, Left ventriculography, Successful percutaneous intervention to the 100% occluded right coronary artery with a 3.5 x 38 mm Synergy drug-eluting stent (post-dilated w/ a 4.0 x 20 mm NC Emerge balloon), Selective right femoral angiography, Successful Perclose arteriotomy closure. Dr. James Pierson   • INGUINAL HERNIA REPAIR Left    • INGUINAL HERNIA REPAIR Right     x2   • INGUINAL HERNIA REPAIR Left 3/28/2019    Procedure: LEFT INGUINAL HERNIA REPAIR LAPAROSCOPIC;  Surgeon: Preethi Gonzalez MD;  Location: [facility] OR [facility];  Service: General   • LAPAROSCOPIC INGUINAL HERNIA REPAIR Right 10/03/2002    w/ extra peritoneal Goe-Benton mesh (transperitoneal repair), Dr. Preethi Gonzalez   • REFRACTIVE SURGERY Bilateral    • RETINAL DETACHMENT SURGERY Right 07/02/2013      ONCOLOGY HISTORY SHOLA ENNIS MD:ONCOLOGIC HISTORY:  On 12/22/10 he was reviewed.  He   had a totally normal white count with normal differential, predominance of lymphocytes.  Normal platelet count.  His chemistry profile was normal including LDH.  His beta-2 microglobulin was normal.  His quantitative immunoglobulins were normal.  His jane  p  heral blood flow cytometry documented a typical pattern by flow cytometry of CLL, CD5, CD19 negative, dim positive CD20, ZAP-70 negative and CD38 negative.  His CT scan of the chest, abdomen and pelvis disclosed no peripheral adenopathy or hepatosplenomeg  a  ly.  His level of immunoglobulins was normal.  With this in mind we thought that the patient had very early stage disease with excellent prognostic features and we advised him to remain on observation.  He will be rechecked every 3 months for the first ye  ar and thereafter every 6 months depending on the clinical circumstances.  Appropriate booklet information was given to him and his wife to review and further learn.          Given the excellent characteristics of his  disease on clinical grounds, I doubt at this time a bone marrow is needed.        On 1/8/13 his physical exam is unremarkable with no peripheral adenopathy or hepatosplenomegaly, no B symptoms, no infections, no autoimmunity.  On the other hand he had areas of the skin on the left cheek and right cheek bhavani  t look like actinic keratosis.  His white count was up to17,000 with a normal hemoglobin and borderline platelet count of 125,000.  We asked the patient to try to minimize stressors in life and we asked him to return back in four months.  We also asked priscila edwards to be seen by dermatology in order to treat his multiple actinic keratosis.          On 05/09/13 the patient was asymptomatic with regard to his chronic lymphoid leukemia, no fatigue, fevers, chills, repeated infections or peripheral lymphadenopathy.  His physi  francisco j exam was unremarkable with no palpable lymphadenopathy or hepatosplenomegaly.  His white count has now improved to 12,900, stable hemoglobin of 13.6 and stable platelet count of 121,000.  With this in mind we advised him to remain on observation, retu  rning for reevaluation in six months.         On 05/07/2014 the patient was asymptomatic in regard to his CLL with no autoimmunity, no infections and no progressive disease clinically.  On the other hand we have seen duplication of his white count in one year.  T  he patient and wife are aware that eventually he will require intervention for this if the white count continues changing the way it is.  At this time we do not justify the need of any other therapy or any other testing.        Current Outpatient Medications on File Prior to Visit   Medication Sig Dispense Refill   • acetaminophen (TYLENOL) 500 MG tablet Take 1,000 mg by mouth Every 6 (Six) Hours As Needed for Mild Pain .     • albuterol sulfate  (90 Base) MCG/ACT inhaler Inhale 4 puffs Every 4 (Four) Hours As Needed for Wheezing. 18 g 0   • aspirin 81 MG EC tablet Take 81 mg by  mouth Every Other Day.     • carvedilol (COREG) 6.25 MG tablet TAKE 1 TABLET TWICE DAILY  WITH MEALS. 180 tablet 1   • cefdinir (OMNICEF) 300 MG capsule Take 1 capsule by mouth 2 (Two) Times a Day. 20 capsule 0   • fluticasone (FLONASE) 50 MCG/ACT nasal spray 2 sprays into the nostril(s) as directed by provider Daily. 1 bottle 0   • lisinopril (PRINIVIL,ZESTRIL) 5 MG tablet      • Multiple Vitamins-Minerals (EYE VITAMINS & MINERALS PO) Take 1 tablet by mouth Daily.     • rosuvastatin (CRESTOR) 10 MG tablet Take 1 tablet by mouth Daily. 90 tablet 3   • rosuvastatin (CRESTOR) 20 MG tablet      • ticagrelor (Brilinta) 60 MG tablet tablet Take 1 tablet by mouth 2 (Two) Times a Day. Told to stop 5 days before surgery 180 tablet 3     No current facility-administered medications on file prior to visit.        ALLERGIES:    Allergies   Allergen Reactions   • Codeine Nausea Only        Social History     Socioeconomic History   • Marital status:      Spouse name: Charlotte Espino   Tobacco Use   • Smoking status: Never Smoker   • Smokeless tobacco: Never Used   Substance and Sexual Activity   • Alcohol use: Yes     Alcohol/week: 21.0 standard drinks     Types: 21 Shots of liquor per week   • Drug use: No   • Sexual activity: Defer        Family History   Problem Relation Age of Onset   • Heart attack Mother    • Heart disease Mother    • Diabetes Mother    • Aortic aneurysm Mother    • Coronary artery disease Mother    • Early death Mother    • No Known Problems Father    • Diabetes type II Other    • Diabetes Brother    • Hyperlipidemia Brother    • Stroke Sister 65   • Diabetes Sister    • Hyperlipidemia Sister    • Hypertension Sister    • No Known Problems Brother    • Stomach cancer Paternal Uncle    • Malig Hyperthermia Neg Hx           Objective     Vitals:    04/21/22 1133   BP: 106/71   Pulse: 82   Resp: 18   Temp: 97.1 °F (36.2 °C)   TempSrc: Temporal   SpO2: 98%   Weight: 86.7 kg (191 lb 3.2 oz)   Height: 188  "cm (74.02\")   PainSc: 0-No pain     Current Status 4/21/2022   ECOG score 0       Physical Exam       I HAVE PERSONALLY REVIEWED THE HISTORY OF THE PRESENT ILLNESS, PAST MEDICAL HISTORY, FAMILY HISTORY, SOCIAL HISTORY, ALLERGIES, MEDICATIONS STATED ABOVE IN THE  NOTE FROM TODAY.        GENERAL:  Well-developed, well-nourished  Patient  in no acute distress.   SKIN:  Warm, dry ,NO purpura ,NO petechiae, no rash.He has multiple areas of actinic keratosis in the forehead and in the face on the left cheek and the left side of the forehead only.       HEENT:  Pupils were equal and reactive to light and accomodation, conjunctivae noninjected, no pterygium, normal extraocular movements, normal visual acuity.   NECK:  Supple with good range of motion; no thyromegaly , no other masses, no JVD or bruits,.No carotid artery pain, no carotid abnormal pulsation , NO arterial dance.  LYMPHATICS:  No cervical, NO supraclavicular, NO axillary,NO epitrochlear , NO inguinal adenopathies.  CARDIAC   normal rate , regular rhythm, without murmur,NO rubs NO S3 NO S4   LUNGS: normal breath sounds bilateral, no wheezing, NO rhonchi, NO crackles ,NO rubs.  VASCULAR VENOUS: no cyanosis, NO collateral circulation, NO varicosities, NO edema, NO palpable cords, NO pain,NO erythema, NO pigmentation of the skin.  ABDOMEN:  Soft, NO pain,no hepatomegaly, no splenomegaly,no masses, no ascites, no collateral circulation,no distention,no Dallin sign.  EXTREMITIES  AND SPINE:  No clubbing, no cyanosis ,no deformities , no pain .No kyphosis,  no pain in spine, no pain in ribs , no pain in pelvic bone.  NEUROLOGICAL:  Patient was awake, alert, oriented to time, person and place.              RECENT LABS:  Hematology WBC   Date Value Ref Range Status   04/21/2022 46.50 (H) 3.40 - 10.80 10*3/mm3 Final   07/09/2021 72.75 (C) 3.40 - 10.80 10*3/mm3 Final     RBC   Date Value Ref Range Status   04/21/2022 3.33 (L) 4.14 - 5.80 10*6/mm3 Final   07/09/2021 3.26 " (L) 4.14 - 5.80 10*6/mm3 Final     Hemoglobin   Date Value Ref Range Status   04/21/2022 11.6 (L) 13.0 - 17.7 g/dL Final     Hematocrit   Date Value Ref Range Status   04/21/2022 35.1 (L) 37.5 - 51.0 % Final     Platelets   Date Value Ref Range Status   04/21/2022 93 (L) 140 - 450 10*3/mm3 Final                     Assessment/Plan  In summary this patient has had chronic lymphoid leukemia for the last 15 years. He never has required any intervention since the original definitive diagnosis in 12/2010. At that time his white count was close to 13,000. Obviously the white count has slowly crept up to what it is today but the count today is no different than what it was in 01/2019. The hemoglobin is stable. The platelet count is the only number that has really changed through the years. The patient is not having any clinical bleeding. He has no palpable splenomegaly, he has no peripheral adenopathy, he has no B symptoms, he has no repetitive infections. He has not had anything that suggests autoimmunity.     The patient is up to date on his vaccinations.     He has had colonoscopy, polyps were removed from the colon. All of them were tubular adenomas with no dysplasia. He had a positive Cologuard test.       I reviewed with the patient and his wife on 07/20/2021 the fact that his white count has further risen to 77,000 today. The hemoglobin has started to drop some 11.3, the platelet count has fluctuating in the low 100,000s. He has no peripheral adenopathy, he has no hepatosplenomegaly. He has no B symptoms. He has no fatigue and he has not had repetitive infections or autoimmunity. I do believe that this patient is getting closer and closer to the need for initiation of therapy for his condition. I pointed out to him that probably the best medicine available nowadays is Calquence that he takes in a pill form with minimal side effects and tremendous benefit. I do believe that I want to watch him a little bit closer and  to review him back in a couple of months with another blood count. I asked him to pay attention to symptoms including nocturnal sweats, fatigue, chills, fever or repetitive infections. If any of these situations happen he is supposed to notify us. Radiologically speaking I do not find the need for anything at this point and eventually we will need to reevaluate his disease with FISH analysis in peripheral blood to further classify the disease like nowadays is done.     I made the patient aware of all of these issues and circumstances. I have a chemistry profile pending today, a beta-2 microglobulin, an LDH and uric acid. Monoclonal proteins will be measured to be sure that there is no production of this by the leukemia cells. Also it will give me the proper information in regard to his production of immunoglobulin G.     The patient was in agreement. As usual he is very fearful to come and see me but he has gotten used to seeing numbers and gotten used to seeing me and he is more comfortable with my conversation. His wife is instrumental in regard his care and she is a terrific individual that is always with him.     The patient is aware that sooner or later we are going to need to pull the trigger and move on into therapy and he feels comfortable that we have new modalities of therapy that will not make him ill.     The patient was further reviewed on 09/29/2021. He has no symptoms pertinent to his CLL. He has not had any fever or infections, no autoimmunity. Surprising enough his white count is down to 50,000 from 77,000 before. Hemoglobin is stable. Platelet count is minimally low, no different than before, no clinical bleeding.     He has no peripheral adenopathy. He has no changes in performance status. He has no hepatosplenomegaly. I do believe that this patient needs to remain in observation from the point of view of the CLL and he is aware of the symptoms that he could have in case that he has progressive  disease. He will let us know if that is the case.     He will return to see us back in 4 months with a CBC.    Given the 3 lesions that he has in the scalp I asked him to be seen by dermatologist. I think one of them is already a squamous carcinoma and the other ones are probably actinic keratosis but it is worth it to treat these issues before they become a problem. He understands that he needs to have sun protection and he understands that skin cancer is most common in patient's with CLL and has the tendency to be more aggressive. A previous lesion documented on his left hand was removed by his dermatologist months ago.  The patient was reviewed on 02/10/2022. Since the previous visit the patient had a severe case of COVID infection. He was able to stay at home. His Primary Physician, Eveline Pelaez MD, provided prednisone and antibiotic and this made him to turn the corner. He is extremely appreciative of her gesture and her help. He looks terrific today. He has no peripheral adenopathy, hepatosplenomegaly, B symptoms or alteration in performance status. On the other hand his white count remains elevated with a normal hemoglobin and normal platelet count. This is not surprising to me. Many patients after any kind of infection including viral infections in the background of CLL keep a white count elevation for a good while. He has been all over the place in regard to his white count lately and I do not believe that I need to proceed with any radiological assessment on him at this point besides to review him back on clinical grounds in a couple of months and repeat his CBC and CMP.     I had the opportunity to review his CT scan angiogram that was done at the time of his COVID pneumonia. He had multiple patchy areas in both lungs consistent with this diagnosis. He had no obvious pleural effusion or pericardial effusion, he had no liver enlargement or splenomegaly in that radiological analysis.     I advised him to  come back in a couple of months. I did not prescribe any medicines or change any of the medicines that he is receiving at this point.   I reviewed the patient on 04/21/2022. From the point of view of his COVID pneumonia he has recovered, he has no post COVID syndrome, no obvious cardiac difficulties.     From the point of view of his CLL actually the white count has dropped by half, the platelet count has minimally dropped, the hemoglobin is stable. He has no peripheral adenopathy or hepatosplenomegaly. He has no B symptoms or infection. I advised him to remain in observation from the point of view of this condition returning to see us back in 4 months with a CBC and CMP.     This individual is extremely white skin color, he has a lot of sun exposure in the car when he is driving his car on the left side of his face. That explains why most of the actinic keratosis activity and even probably basal cell cancer in the left cheek is happening in this anatomical site. I advised him to consider tinting the window in the car or putting a small device that will block off sun in that location. I advised him to use sun protection when he is playing golf and wear a hat. I asked him to use SP50 for his face and skin and SP50 for his lips. I asked him to make an appointment to see his dermatologist.    I will review him back in 4 months as stated.         ·

## 2022-05-05 ENCOUNTER — TELEPHONE (OUTPATIENT)
Dept: INTERNAL MEDICINE | Facility: CLINIC | Age: 67
End: 2022-05-05

## 2022-05-05 NOTE — TELEPHONE ENCOUNTER
Called and spoke with patient and informed him that he would be able to get the booster shot at his local pharmacy he stated his understanding

## 2022-05-05 NOTE — TELEPHONE ENCOUNTER
Caller: Evangelina Espino    Relationship to patient: Emergency Contact    Best call back number: [phone]    Patient is needing:PATIENT WOULD LIKE TO KNOW IF HE SHOULD TAKE THE 2ND BOOSTER SHOT AND IF SO, CAN HE GET IT SCHEDULED

## 2022-07-25 ENCOUNTER — OFFICE VISIT (OUTPATIENT)
Dept: INTERNAL MEDICINE | Facility: CLINIC | Age: 67
End: 2022-07-25

## 2022-07-25 VITALS
RESPIRATION RATE: 17 BRPM | WEIGHT: 188.2 LBS | HEIGHT: 74 IN | TEMPERATURE: 97.5 F | DIASTOLIC BLOOD PRESSURE: 76 MMHG | SYSTOLIC BLOOD PRESSURE: 130 MMHG | OXYGEN SATURATION: 100 % | HEART RATE: 75 BPM | BODY MASS INDEX: 24.15 KG/M2

## 2022-07-25 DIAGNOSIS — Z95.820 S/P ANGIOPLASTY WITH STENT: ICD-10-CM

## 2022-07-25 DIAGNOSIS — I25.119 CORONARY ARTERY DISEASE INVOLVING NATIVE CORONARY ARTERY OF NATIVE HEART WITH ANGINA PECTORIS: ICD-10-CM

## 2022-07-25 DIAGNOSIS — R73.01 IMPAIRED FASTING GLUCOSE: ICD-10-CM

## 2022-07-25 DIAGNOSIS — E78.2 MIXED HYPERLIPIDEMIA: ICD-10-CM

## 2022-07-25 DIAGNOSIS — C91.10 CHRONIC LYMPHOCYTIC LEUKEMIA: ICD-10-CM

## 2022-07-25 DIAGNOSIS — I10 ESSENTIAL HYPERTENSION: Primary | ICD-10-CM

## 2022-07-25 PROCEDURE — 99214 OFFICE O/P EST MOD 30 MIN: CPT | Performed by: INTERNAL MEDICINE

## 2022-07-25 NOTE — PROGRESS NOTES
Francisco Espino is a 67 y.o. male, who presents with a chief complaint of   Chief Complaint   Patient presents with   • Annual Exam           HPI   Pt here for follow up.    HTN - he has cut bck some on his bp meds.  His bp is up a little today but sbp's at home were upper 80-90's.  He is off lisinopril and is still on carvedilol.      HLD - he is on crestor.  No cramps or myalgias    Chronic CAD  - recent stress test neg.  On carvedilol, brilinta, asa, and crestor.     Prediabetes - a1c 5.9 on  Labs last year.  Last cmp showed elevated glucose.        CLL - follows with dr. Cagle    The following portions of the patient's history were reviewed and updated as appropriate: allergies, current medications, past family history, past medical history, past social history, past surgical history and problem list.    Allergies: Codeine    Review of Systems   Constitutional: Negative.    HENT: Negative.    Eyes: Negative.    Respiratory: Negative.    Cardiovascular: Negative.    Gastrointestinal: Negative.    Endocrine: Negative.    Genitourinary: Negative.    Musculoskeletal: Negative.    Skin: Negative.    Allergic/Immunologic: Negative.    Neurological: Negative.    Hematological: Negative.    Psychiatric/Behavioral: Negative.    All other systems reviewed and are negative.            Wt Readings from Last 3 Encounters:   07/25/22 85.4 kg (188 lb 3.2 oz)   04/21/22 86.7 kg (191 lb 3.2 oz)   02/10/22 83.6 kg (184 lb 6.4 oz)     Temp Readings from Last 3 Encounters:   07/25/22 97.5 °F (36.4 °C) (Tympanic)   04/21/22 97.1 °F (36.2 °C) (Temporal)   02/10/22 96.6 °F (35.9 °C) (Temporal)     BP Readings from Last 3 Encounters:   07/25/22 130/76   04/21/22 106/71   02/10/22 119/77     Pulse Readings from Last 3 Encounters:   07/25/22 75   04/21/22 82   02/10/22 83     Body mass index is 24.15 kg/m².  SpO2 Readings from Last 3 Encounters:   07/25/22 100%   04/21/22 98%   02/10/22 95%          Physical Exam  Vitals and nursing  note reviewed.   Constitutional:       General: He is not in acute distress.     Appearance: He is well-developed.   HENT:      Head: Normocephalic and atraumatic.      Right Ear: External ear normal.      Left Ear: External ear normal.      Nose: Nose normal.   Eyes:      Conjunctiva/sclera: Conjunctivae normal.      Pupils: Pupils are equal, round, and reactive to light.   Cardiovascular:      Rate and Rhythm: Normal rate and regular rhythm.      Heart sounds: Normal heart sounds.   Pulmonary:      Effort: Pulmonary effort is normal. No respiratory distress.      Breath sounds: Normal breath sounds. No wheezing.   Musculoskeletal:         General: Normal range of motion.      Cervical back: Normal range of motion and neck supple.      Comments: Normal gait   Skin:     General: Skin is warm and dry.   Neurological:      General: No focal deficit present.      Mental Status: He is alert and oriented to person, place, and time.   Psychiatric:         Mood and Affect: Mood normal.         Behavior: Behavior normal.         Thought Content: Thought content normal.         Judgment: Judgment normal.         Results for orders placed or performed in visit on 04/21/22   Comprehensive Metabolic Panel    Specimen: Blood   Result Value Ref Range    Glucose 109 74 - 124 mg/dL    BUN 12 6 - 20 mg/dL    Creatinine 1.18 0.70 - 1.30 mg/dL    Sodium 139 134 - 145 mmol/L    Potassium 4.6 3.5 - 4.7 mmol/L    Chloride 103 98 - 107 mmol/L    CO2 26.7 22.0 - 29.0 mmol/L    Calcium 10.3 (H) 8.5 - 10.2 mg/dL    Total Protein 7.7 6.3 - 8.0 g/dL    Albumin 4.90 3.50 - 5.20 g/dL    ALT (SGPT) 64 (H) 0 - 41 U/L    AST (SGOT) 56 (H) 0 - 40 U/L    Alkaline Phosphatase 86 38 - 116 U/L    Total Bilirubin 1.2 0.2 - 1.2 mg/dL    Globulin 2.8 1.8 - 3.5 gm/dL    A/G Ratio 1.8 1.1 - 2.4 g/dL    BUN/Creatinine Ratio 10.2 7.3 - 30.0    Anion Gap 9.3 5.0 - 15.0 mmol/L    eGFR 67.6 >60.0 mL/min/1.73   Lactate Dehydrogenase    Specimen: Blood   Result  Value Ref Range     99 - 259 U/L   CBC Auto Differential    Specimen: Blood   Result Value Ref Range    WBC 46.50 (H) 3.40 - 10.80 10*3/mm3    RBC 3.33 (L) 4.14 - 5.80 10*6/mm3    Hemoglobin 11.6 (L) 13.0 - 17.7 g/dL    Hematocrit 35.1 (L) 37.5 - 51.0 %    .4 (H) 79.0 - 97.0 fL    MCH 34.8 (H) 26.6 - 33.0 pg    MCHC 33.0 31.5 - 35.7 g/dL    RDW 14.3 12.3 - 15.4 %    RDW-SD 55.3 (H) 37.0 - 54.0 fl    MPV 10.1 6.0 - 12.0 fL    Platelets 93 (L) 140 - 450 10*3/mm3    Neutrophil % 6.7 (L) 42.7 - 76.0 %    Lymphocyte % 90.1 (H) 19.6 - 45.3 %    Monocyte % 2.8 (L) 5.0 - 12.0 %    Eosinophil % 0.2 (L) 0.3 - 6.2 %    Basophil % 0.1 0.0 - 1.5 %    Immature Grans % 0.1 0.0 - 0.5 %    Neutrophils, Absolute 3.13 1.70 - 7.00 10*3/mm3    Lymphocytes, Absolute 41.90 (H) 0.70 - 3.10 10*3/mm3    Monocytes, Absolute 1.28 (H) 0.10 - 0.90 10*3/mm3    Eosinophils, Absolute 0.07 0.00 - 0.40 10*3/mm3    Basophils, Absolute 0.06 0.00 - 0.20 10*3/mm3    Immature Grans, Absolute 0.06 (H) 0.00 - 0.05 10*3/mm3    nRBC 0.1 0.0 - 0.2 /100 WBC     Result Review :                  Assessment and Plan    Diagnoses and all orders for this visit:    1. Essential hypertension (Primary) - cont carvedilol and lisinopril  -     Comprehensive Metabolic Panel  -     CBC & Differential  -     Lipid Panel With LDL / HDL Ratio  -     TSH  -     T4, Free  -     Hemoglobin A1c    2. Mixed hyperlipidemia - cont statin  -     Comprehensive Metabolic Panel  -     CBC & Differential  -     Lipid Panel With LDL / HDL Ratio  -     TSH  -     T4, Free  -     Hemoglobin A1c    3. S/P angioplasty with stent  -     Comprehensive Metabolic Panel  -     CBC & Differential  -     Lipid Panel With LDL / HDL Ratio  -     TSH  -     T4, Free  -     Hemoglobin A1c    4. Coronary artery disease involving native coronary artery of native heart with angina pectoris (HCC)  -     Comprehensive Metabolic Panel  -     CBC & Differential  -     Lipid Panel With LDL / HDL  Ratio  -     TSH  -     T4, Free  -     Hemoglobin A1c    5. Chronic lymphocytic leukemia (HCC)  -     Comprehensive Metabolic Panel  -     CBC & Differential  -     Lipid Panel With LDL / HDL Ratio  -     TSH  -     T4, Free  -     Hemoglobin A1c    6. Impaired fasting glucose  -     Comprehensive Metabolic Panel  -     CBC & Differential  -     Lipid Panel With LDL / HDL Ratio  -     TSH  -     T4, Free  -     Hemoglobin A1c         BMI is within normal parameters. No other follow-up for BMI required.             Outpatient Medications Prior to Visit   Medication Sig Dispense Refill   • acetaminophen (TYLENOL) 500 MG tablet Take 1,000 mg by mouth Every 6 (Six) Hours As Needed for Mild Pain .     • aspirin 81 MG EC tablet Take 81 mg by mouth Every Other Day.     • carvedilol (COREG) 6.25 MG tablet TAKE 1 TABLET TWICE DAILY  WITH MEALS. 180 tablet 1   • fluticasone (FLONASE) 50 MCG/ACT nasal spray 2 sprays into the nostril(s) as directed by provider Daily. 1 bottle 0   • lisinopril (PRINIVIL,ZESTRIL) 5 MG tablet Take 2.5 mg by mouth Daily.     • Multiple Vitamins-Minerals (EYE VITAMINS & MINERALS PO) Take 1 tablet by mouth Daily.     • rosuvastatin (CRESTOR) 20 MG tablet Take 20 mg by mouth Every Night.     • ticagrelor (Brilinta) 60 MG tablet tablet Take 1 tablet by mouth 2 (Two) Times a Day. Told to stop 5 days before surgery 180 tablet 3   • albuterol sulfate  (90 Base) MCG/ACT inhaler Inhale 4 puffs Every 4 (Four) Hours As Needed for Wheezing. 18 g 0   • cefdinir (OMNICEF) 300 MG capsule Take 1 capsule by mouth 2 (Two) Times a Day. 20 capsule 0   • rosuvastatin (CRESTOR) 10 MG tablet Take 1 tablet by mouth Daily. 90 tablet 3     No facility-administered medications prior to visit.     No orders of the defined types were placed in this encounter.    Medications Discontinued During This Encounter   Medication Reason   • cefdinir (OMNICEF) 300 MG capsule *Therapy completed   • albuterol sulfate  (90  Base) MCG/ACT inhaler *Therapy completed   • rosuvastatin (CRESTOR) 10 MG tablet *Therapy completed         Return in about 6 months (around 1/25/2023) for Recheck, labs.    Patient was given instructions and counseling regarding her condition or for health maintenance advice. Please see specific information pulled into the AVS if appropriate.

## 2022-07-26 DIAGNOSIS — R79.89 ELEVATED SERUM CREATININE: Primary | ICD-10-CM

## 2022-07-26 LAB
ALBUMIN SERPL-MCNC: 4.9 G/DL (ref 3.8–4.8)
ALBUMIN/GLOB SERPL: 2 {RATIO} (ref 1.2–2.2)
ALP SERPL-CCNC: 86 IU/L (ref 44–121)
ALT SERPL-CCNC: 37 IU/L (ref 0–44)
AST SERPL-CCNC: 50 IU/L (ref 0–40)
BASOPHILS # BLD AUTO: 0 X10E3/UL (ref 0–0.2)
BASOPHILS NFR BLD AUTO: 0 %
BILIRUB SERPL-MCNC: 1.4 MG/DL (ref 0–1.2)
BUN SERPL-MCNC: 18 MG/DL (ref 8–27)
BUN/CREAT SERPL: 9 (ref 10–24)
CALCIUM SERPL-MCNC: 10 MG/DL (ref 8.6–10.2)
CHLORIDE SERPL-SCNC: 104 MMOL/L (ref 96–106)
CHOLEST SERPL-MCNC: 153 MG/DL (ref 100–199)
CO2 SERPL-SCNC: 22 MMOL/L (ref 20–29)
CREAT SERPL-MCNC: 1.91 MG/DL (ref 0.76–1.27)
EGFRCR SERPLBLD CKD-EPI 2021: 38 ML/MIN/1.73
EOSINOPHIL # BLD AUTO: 0 X10E3/UL (ref 0–0.4)
EOSINOPHIL NFR BLD AUTO: 0 %
ERYTHROCYTE [DISTWIDTH] IN BLOOD BY AUTOMATED COUNT: 13.9 % (ref 11.6–15.4)
GLOBULIN SER CALC-MCNC: 2.4 G/DL (ref 1.5–4.5)
GLUCOSE SERPL-MCNC: 106 MG/DL (ref 65–99)
HBA1C MFR BLD: 6.2 % (ref 4.8–5.6)
HCT VFR BLD AUTO: 34.8 % (ref 37.5–51)
HDLC SERPL-MCNC: 57 MG/DL
HGB BLD-MCNC: 11.6 G/DL (ref 13–17.7)
LDLC SERPL CALC-MCNC: 80 MG/DL (ref 0–99)
LDLC/HDLC SERPL: 1.4 RATIO (ref 0–3.6)
LYMPHOCYTES # BLD AUTO: 44.9 X10E3/UL (ref 0.7–3.1)
LYMPHOCYTES NFR BLD AUTO: 81 %
MCH RBC QN AUTO: 34.3 PG (ref 26.6–33)
MCHC RBC AUTO-ENTMCNC: 33.3 G/DL (ref 31.5–35.7)
MCV RBC AUTO: 103 FL (ref 79–97)
MONOCYTES # BLD AUTO: 0.6 X10E3/UL (ref 0.1–0.9)
MONOCYTES NFR BLD AUTO: 1 %
MORPHOLOGY BLD-IMP: ABNORMAL
NEUTROPHILS # BLD AUTO: 10 X10E3/UL (ref 1.4–7)
NEUTROPHILS NFR BLD AUTO: 18 %
PLATELET # BLD AUTO: 100 X10E3/UL (ref 150–450)
POTASSIUM SERPL-SCNC: 4.4 MMOL/L (ref 3.5–5.2)
PROT SERPL-MCNC: 7.3 G/DL (ref 6–8.5)
RBC # BLD AUTO: 3.38 X10E6/UL (ref 4.14–5.8)
SODIUM SERPL-SCNC: 141 MMOL/L (ref 134–144)
T4 FREE SERPL-MCNC: 1.16 NG/DL (ref 0.82–1.77)
TRIGL SERPL-MCNC: 86 MG/DL (ref 0–149)
TSH SERPL DL<=0.005 MIU/L-ACNC: 1.88 UIU/ML (ref 0.45–4.5)
VLDLC SERPL CALC-MCNC: 16 MG/DL (ref 5–40)
WBC # BLD AUTO: 55.4 X10E3/UL (ref 3.4–10.8)

## 2022-08-18 ENCOUNTER — OFFICE VISIT (OUTPATIENT)
Dept: ONCOLOGY | Facility: CLINIC | Age: 67
End: 2022-08-18

## 2022-08-18 ENCOUNTER — LAB (OUTPATIENT)
Dept: LAB | Facility: HOSPITAL | Age: 67
End: 2022-08-18

## 2022-08-18 VITALS
OXYGEN SATURATION: 95 % | TEMPERATURE: 97.7 F | SYSTOLIC BLOOD PRESSURE: 126 MMHG | HEART RATE: 81 BPM | RESPIRATION RATE: 16 BRPM | HEIGHT: 74 IN | DIASTOLIC BLOOD PRESSURE: 86 MMHG | BODY MASS INDEX: 24.04 KG/M2 | WEIGHT: 187.3 LBS

## 2022-08-18 DIAGNOSIS — C91.10 CHRONIC LYMPHOCYTIC LEUKEMIA OF B-CELL TYPE NOT HAVING ACHIEVED REMISSION: Primary | ICD-10-CM

## 2022-08-18 DIAGNOSIS — C91.10 CHRONIC LYMPHOCYTIC LEUKEMIA: ICD-10-CM

## 2022-08-18 DIAGNOSIS — C91.10 CHRONIC LYMPHOCYTIC LEUKEMIA OF B-CELL TYPE NOT HAVING ACHIEVED REMISSION: ICD-10-CM

## 2022-08-18 LAB
ALBUMIN SERPL-MCNC: 5.1 G/DL (ref 3.5–5.2)
ALBUMIN/GLOB SERPL: 2 G/DL (ref 1.1–2.4)
ALP SERPL-CCNC: 89 U/L (ref 38–116)
ALT SERPL W P-5'-P-CCNC: 26 U/L (ref 0–41)
ANION GAP SERPL CALCULATED.3IONS-SCNC: 12.7 MMOL/L (ref 5–15)
AST SERPL-CCNC: 30 U/L (ref 0–40)
BASOPHILS # BLD AUTO: 0.04 10*3/MM3 (ref 0–0.2)
BASOPHILS NFR BLD AUTO: 0.1 % (ref 0–1.5)
BILIRUB SERPL-MCNC: 1.6 MG/DL (ref 0.2–1.2)
BUN SERPL-MCNC: 13 MG/DL (ref 6–20)
BUN/CREAT SERPL: 6.5 (ref 7.3–30)
CALCIUM SPEC-SCNC: 10.6 MG/DL (ref 8.5–10.2)
CHLORIDE SERPL-SCNC: 102 MMOL/L (ref 98–107)
CO2 SERPL-SCNC: 23.3 MMOL/L (ref 22–29)
CREAT SERPL-MCNC: 2 MG/DL (ref 0.7–1.3)
DEPRECATED RDW RBC AUTO: 54 FL (ref 37–54)
EGFRCR SERPLBLD CKD-EPI 2021: 35.7 ML/MIN/1.73
EOSINOPHIL # BLD AUTO: 0.08 10*3/MM3 (ref 0–0.4)
EOSINOPHIL NFR BLD AUTO: 0.1 % (ref 0.3–6.2)
ERYTHROCYTE [DISTWIDTH] IN BLOOD BY AUTOMATED COUNT: 14.1 % (ref 12.3–15.4)
GLOBULIN UR ELPH-MCNC: 2.6 GM/DL (ref 1.8–3.5)
GLUCOSE SERPL-MCNC: 119 MG/DL (ref 74–124)
HCT VFR BLD AUTO: 34 % (ref 37.5–51)
HGB BLD-MCNC: 11.2 G/DL (ref 13–17.7)
IMM GRANULOCYTES # BLD AUTO: 0.09 10*3/MM3 (ref 0–0.05)
IMM GRANULOCYTES NFR BLD AUTO: 0.1 % (ref 0–0.5)
LYMPHOCYTES # BLD AUTO: 57.61 10*3/MM3 (ref 0.7–3.1)
LYMPHOCYTES NFR BLD AUTO: 92.5 % (ref 19.6–45.3)
MCH RBC QN AUTO: 34.9 PG (ref 26.6–33)
MCHC RBC AUTO-ENTMCNC: 32.9 G/DL (ref 31.5–35.7)
MCV RBC AUTO: 105.9 FL (ref 79–97)
MONOCYTES # BLD AUTO: 1.22 10*3/MM3 (ref 0.1–0.9)
MONOCYTES NFR BLD AUTO: 2 % (ref 5–12)
NEUTROPHILS NFR BLD AUTO: 3.25 10*3/MM3 (ref 1.7–7)
NEUTROPHILS NFR BLD AUTO: 5.2 % (ref 42.7–76)
NRBC BLD AUTO-RTO: 0 /100 WBC (ref 0–0.2)
PLATELET # BLD AUTO: 86 10*3/MM3 (ref 140–450)
PMV BLD AUTO: 10 FL (ref 6–12)
POTASSIUM SERPL-SCNC: 4.3 MMOL/L (ref 3.5–4.7)
PROT SERPL-MCNC: 7.7 G/DL (ref 6.3–8)
RBC # BLD AUTO: 3.21 10*6/MM3 (ref 4.14–5.8)
SODIUM SERPL-SCNC: 138 MMOL/L (ref 134–145)
WBC NRBC COR # BLD: 62.29 10*3/MM3 (ref 3.4–10.8)

## 2022-08-18 PROCEDURE — 99214 OFFICE O/P EST MOD 30 MIN: CPT | Performed by: NURSE PRACTITIONER

## 2022-08-18 PROCEDURE — 36415 COLL VENOUS BLD VENIPUNCTURE: CPT

## 2022-08-18 PROCEDURE — 80053 COMPREHEN METABOLIC PANEL: CPT

## 2022-08-18 PROCEDURE — 85025 COMPLETE CBC W/AUTO DIFF WBC: CPT

## 2022-08-18 NOTE — PROGRESS NOTES
Subjective     REASON FOR FOLLOW UP: CLL SINCE 2005 NOT REQUIRING ANY INTERVENTION    History of Present Illness   Mr. Espino is a 67 y.o. male whom we continue to follow on observation for longstanding history of CLL.  He is reviewed back today accompanied by his wife.  The patient reports feeling very well.  Denies any weight loss, fever, sweats or palpable adenopathy.  He did recently undergo full cardiac work-up at [facility] due to some questionable chest pain.  Everything checked out normally and ultimately this was felt to be related to indigestion.  We reviewed his blood work together today.  He and his wife deny other new concerns at this time.      Past Medical History:   Diagnosis Date   • Abnormal liver function test    • Alcohol abuse    • CLL (chronic lymphocytic leukemia) (HCC)    • Coronary artery disease     stent   • Heart disease    • History of pneumonia     1/2019   • Hyperlipidemia    • Hypertension    • Inguinal hernia     left side to have surgery 3/28/19   • Myocardial infarction (HCC)    • Screen for colon cancer 09/2016    Negative Cologaurd   • Screening PSA (prostate specific antigen) 02/2013    .5   • Thrombocytopenia (HCC)         Past Surgical History:   Procedure Laterality Date   • CARDIAC CATHETERIZATION  2017   • CATARACT EXTRACTION Bilateral    • COLONOSCOPY N/A 06/05/2006    Normal, repeat in 10 years, Dr. Preehti Gonzalez   • COLONOSCOPY N/A 7/15/2020    Procedure: COLONOSCOPY TO CECUM & T.I. WITH COLD SNARE POLYPECTOMIES, CLIP PLACEMENT X 2;  Surgeon: Yennifer Salazar MD;  Location: [facility] ENDOSCOPY;  Service: Gastroenterology;  Laterality: N/A;  PRE- POSITIVE COLOGUARD  POST- COLON POLYPS, DIVERTICULOSIS, HEMORRHOIDS   • CORONARY ANGIOPLASTY WITH STENT PLACEMENT N/A 05/09/2017    Left heart catheterization, Selective native vessel coronary arteriography, Left ventriculography, Successful percutaneous intervention to the 100% occluded right coronary artery with a 3.5 x 38 mm  Synergy drug-eluting stent (post-dilated w/ a 4.0 x 20 mm NC Emerge balloon), Selective right femoral angiography, Successful Perclose arteriotomy closure. Dr. James Pierson   • INGUINAL HERNIA REPAIR Left    • INGUINAL HERNIA REPAIR Right     x2   • INGUINAL HERNIA REPAIR Left 3/28/2019    Procedure: LEFT INGUINAL HERNIA REPAIR LAPAROSCOPIC;  Surgeon: Preethi Gonzalez MD;  Location: [facility] OR [facility] [place];  Service: General   • LAPAROSCOPIC INGUINAL HERNIA REPAIR Right 10/03/2002    w/ extra peritoneal Goe-Benton mesh (transperitoneal repair), Dr. Preethi Gonzalez   • REFRACTIVE SURGERY Bilateral    • RETINAL DETACHMENT SURGERY Right 07/02/2013      ONCOLOGY HISTORY SHOLA ENNIS MD:ONCOLOGIC HISTORY:  On 12/22/10 he was reviewed.  He   had a totally normal white count with normal differential, predominance of lymphocytes.  Normal platelet count.  His chemistry profile was normal including LDH.  His beta-2 microglobulin was normal.  His quantitative immunoglobulins were normal.  His jane  p  heral blood flow cytometry documented a typical pattern by flow cytometry of CLL, CD5, CD19 negative, dim positive CD20, ZAP-70 negative and CD38 negative.  His CT scan of the chest, abdomen and pelvis disclosed no peripheral adenopathy or hepatosplenomeg  a  ly.  His level of immunoglobulins was normal.  With this in mind we thought that the patient had very early stage disease with excellent prognostic features and we advised him to remain on observation.  He will be rechecked every 3 months for the first ye  ar and thereafter every 6 months depending on the clinical circumstances.  Appropriate booklet information was given to him and his wife to review and further learn.          Given the excellent characteristics of his disease on clinical grounds, I doubt at this time a bone marrow is needed.        On 1/8/13 his physical exam is unremarkable with no peripheral adenopathy or hepatosplenomegaly, no B symptoms, no infections, no  autoimmunity.  On the other hand he had areas of the skin on the left cheek and right cheek bhavani  t look like actinic keratosis.  His white count was up to17,000 with a normal hemoglobin and borderline platelet count of 125,000.  We asked the patient to try to minimize stressors in life and we asked him to return back in four months.  We also asked priscila edwards to be seen by dermatology in order to treat his multiple actinic keratosis.          On 05/09/13 the patient was asymptomatic with regard to his chronic lymphoid leukemia, no fatigue, fevers, chills, repeated infections or peripheral lymphadenopathy.  His physi  francisco j exam was unremarkable with no palpable lymphadenopathy or hepatosplenomegaly.  His white count has now improved to 12,900, stable hemoglobin of 13.6 and stable platelet count of 121,000.  With this in mind we advised him to remain on observation, retu  rning for reevaluation in six months.         On 05/07/2014 the patient was asymptomatic in regard to his CLL with no autoimmunity, no infections and no progressive disease clinically.  On the other hand we have seen duplication of his white count in one year.  T  he patient and wife are aware that eventually he will require intervention for this if the white count continues changing the way it is.  At this time we do not justify the need of any other therapy or any other testing.        Current Outpatient Medications on File Prior to Visit   Medication Sig Dispense Refill   • acetaminophen (TYLENOL) 500 MG tablet Take 1,000 mg by mouth Every 6 (Six) Hours As Needed for Mild Pain .     • aspirin 81 MG EC tablet Take 81 mg by mouth Every Other Day.     • carvedilol (COREG) 6.25 MG tablet TAKE 1 TABLET TWICE DAILY  WITH MEALS. 180 tablet 1   • fluticasone (FLONASE) 50 MCG/ACT nasal spray 2 sprays into the nostril(s) as directed by provider Daily. 1 bottle 0   • Multiple Vitamins-Minerals (EYE VITAMINS & MINERALS PO) Take 1 tablet by mouth Daily.     •  "rosuvastatin (CRESTOR) 20 MG tablet Take 20 mg by mouth Every Night.     • ticagrelor (Brilinta) 60 MG tablet tablet Take 1 tablet by mouth 2 (Two) Times a Day. Told to stop 5 days before surgery 180 tablet 3   • lisinopril (PRINIVIL,ZESTRIL) 5 MG tablet Take 2.5 mg by mouth Daily.       No current facility-administered medications on file prior to visit.        ALLERGIES:    Allergies   Allergen Reactions   • Codeine Nausea Only        Social History     Socioeconomic History   • Marital status:      Spouse name: Charlotte Espino   Tobacco Use   • Smoking status: Never Smoker   • Smokeless tobacco: Never Used   Substance and Sexual Activity   • Alcohol use: Yes     Alcohol/week: 21.0 standard drinks     Types: 21 Shots of liquor per week   • Drug use: No   • Sexual activity: Defer        Family History   Problem Relation Age of Onset   • Heart attack Mother    • Heart disease Mother    • Diabetes Mother    • Aortic aneurysm Mother    • Coronary artery disease Mother    • Early death Mother    • No Known Problems Father    • Diabetes type II Other    • Diabetes Brother    • Hyperlipidemia Brother    • Stroke Sister 65   • Diabetes Sister    • Hyperlipidemia Sister    • Hypertension Sister    • No Known Problems Brother    • Stomach cancer Paternal Uncle    • Malig Hyperthermia Neg Hx           Objective     Vitals:    08/18/22 1037   BP: 126/86   Pulse: 81   Resp: 16   Temp: 97.7 °F (36.5 °C)   TempSrc: Temporal   SpO2: 95%   Weight: 85 kg (187 lb 4.8 oz)   Height: 188 cm (74.02\")   PainSc: 0-No pain     Current Status 8/18/2022   ECOG score 0       Physical Exam   GENERAL:  Well-developed well-nourished female; awake, alert and oriented, in no acute distress.  SKIN:  Warm and dry, without rashes, purpura, or petechiae.  HEENT:  Normocephalic, atraumatic. PERRLA. Extraocular movements intact. Conjunctivae normal.  Hearing intact. Mask in place.  NECK:  Supple with good range of motion; no thyromegaly or masses; " no JVD or bruits.  LYMPHATICS:  No cervical, supraclavicular, axillary, or inguinal lymphadenopathy.  CHEST:  Lungs are clear to auscultation bilaterally.  No wheezes, rales, or rhonchi.  HEART:  Regular rate; normal rhythm.  No murmurs, gallops or rubs.  ABDOMEN:  Soft, non-tender, non-distended.  Normal active bowel sounds.  No organomegaly.  EXTREMITIES:  No clubbing, cyanosis, or edema.  NEUROLOGICAL:  No focal neurologic deficits.      RECENT LABS:  Results from last 7 days   Lab Units 08/18/22  1012   WBC 10*3/mm3 62.29*   NEUTROS ABS 10*3/mm3 3.25   HEMOGLOBIN g/dL 11.2*   HEMATOCRIT % 34.0*   PLATELETS 10*3/mm3 86*                 Assessment & Plan    Mr. Espino is a 67 y.o. male is longstanding history of CLL dating back to initial diagnosis in 2005.  He has remained on a course of observation without any need for active treatment.  Per review today he is feeling well with no symptoms to suggest progression.  He denies any fever, sweats, weight loss.  Energy is good.  No findings of adenopathy or splenomegaly on exam.  We discussed his blood work showing fairly stable hemoglobin and WBC count.  Platelets have dropped somewhat however to 86,000.  For good measure we will keep a little bit closer eye on his counts.  This was reviewed with Dr. Cagle today who is in agreement.    Oncology recommendations:  1. Return in 2 months for CBC, CMP with RN review  2. Return in 4 months for follow-up with Dr. Cagle with CBC, CMP.  3. Patient instructed to call with any changes from baseline including development of B symptoms or new concerns prior to scheduled return.    I spent 36 minutes caring for Francisco on this date of service. This time includes time spent by me in the following activities: preparing for the visit, reviewing tests, obtaining and/or reviewing a separately obtained history, performing a medically appropriate examination and/or evaluation, counseling and educating the patient/family/caregiver,  ordering medications, tests, or procedures, referring and communicating with other health care professionals, documenting information in the medical record and care coordination    ADDENDUM:  Chemistries resulting later in the day, notably with creatinine going up to 2.0, calcium 10.6.  Contacted patient who was not aware of his kidney function trending upward.  He does think he takes a calcium supplement and I have instructed him to stop this.  Patient did recently discontinue lisinopril.  I have forwarded his labs on to Dr. Pelaez along with a message regarding this.  Not sure patient would benefit from nephrology consult.  We will at least recheck a CMP when he returns in 2 months to keep an eye on this for him.  Patient grateful and denies other concerns at this time.    Eveline Barroso, APRN  08/18/22

## 2022-08-29 ENCOUNTER — TELEPHONE (OUTPATIENT)
Dept: ONCOLOGY | Facility: CLINIC | Age: 67
End: 2022-08-29

## 2022-08-29 NOTE — TELEPHONE ENCOUNTER
Caller: Evangelina Espino    Relationship: Emergency Contact    Best call back number: [phone]    What is the best time to reach you: ANYTIME    Who are you requesting to speak with (clinical staff, provider,  specific staff member): SCHEDULING    What was the call regarding: EVANGELINA CALLING TO R/S PTS 10/13 APPT - WOULD LIKE TUES, WEDS, OR FRI THAT WEEK AND AROUND 10 OR 1030 AM APPT TIME.    PLEASE CALL TO R/S.     Do you require a callback: YES

## 2022-09-06 ENCOUNTER — OFFICE VISIT (OUTPATIENT)
Dept: INTERNAL MEDICINE | Facility: CLINIC | Age: 67
End: 2022-09-06

## 2022-09-06 VITALS
DIASTOLIC BLOOD PRESSURE: 82 MMHG | HEIGHT: 74 IN | SYSTOLIC BLOOD PRESSURE: 128 MMHG | WEIGHT: 188.2 LBS | BODY MASS INDEX: 24.15 KG/M2 | OXYGEN SATURATION: 97 % | TEMPERATURE: 97.3 F | HEART RATE: 80 BPM

## 2022-09-06 DIAGNOSIS — Z12.5 PROSTATE CANCER SCREENING: ICD-10-CM

## 2022-09-06 DIAGNOSIS — C91.10 CHRONIC LYMPHOCYTIC LEUKEMIA: ICD-10-CM

## 2022-09-06 DIAGNOSIS — R79.89 ELEVATED SERUM CREATININE: Primary | ICD-10-CM

## 2022-09-06 PROCEDURE — 99214 OFFICE O/P EST MOD 30 MIN: CPT | Performed by: INTERNAL MEDICINE

## 2022-09-06 NOTE — PROGRESS NOTES
"Chief Complaint  Kidney Follow-up (From labs)    Subjective        Francisco Espino presents to [facility]  History of Present Illness     Here for follow-up due to elevated creatinine. Had labs performed with oncology in August and creatinine elevated to 2.0. Previously 1.1 in April 2022. Francisco has never had any treatment for his CLL.    No current symptoms. Denies history of kidney stones. No LUTS including weak stream, difficulty starting stream, or dribbling. Does wake up at night approx two times to urinate. No hematuria or back pain.    Fills up a Yeti with water and sips on it throughout the day.    Patient not on ACEi. Recently discontinued after stress evaluation and echo with cardiology in July 2022.    Objective   Vital Signs:  /82   Pulse 80   Temp 97.3 °F (36.3 °C)   Ht 188 cm (74.02\")   Wt 85.4 kg (188 lb 3.2 oz)   SpO2 97%   BMI 24.15 kg/m²   Estimated body mass index is 24.15 kg/m² as calculated from the following:    Height as of this encounter: 188 cm (74.02\").    Weight as of this encounter: 85.4 kg (188 lb 3.2 oz).    BMI is within normal parameters. No other follow-up for BMI required.      Physical Exam  Vitals reviewed.   Constitutional:       General: He is not in acute distress.     Appearance: Normal appearance. He is normal weight.   HENT:      Head: Normocephalic and atraumatic.      Right Ear: Tympanic membrane, ear canal and external ear normal.      Left Ear: Tympanic membrane, ear canal and external ear normal.      Nose: Nose normal.      Mouth/Throat:      Mouth: Mucous membranes are moist.      Pharynx: Oropharynx is clear.   Eyes:      Extraocular Movements: Extraocular movements intact.      Conjunctiva/sclera: Conjunctivae normal.      Pupils: Pupils are equal, round, and reactive to light.   Cardiovascular:      Rate and Rhythm: Normal rate and regular rhythm.      Pulses: Normal pulses.      Heart sounds: Normal heart sounds. No murmur " heard.    No friction rub. No gallop.   Pulmonary:      Effort: Pulmonary effort is normal. No respiratory distress.      Breath sounds: Normal breath sounds. No wheezing or rhonchi.   Abdominal:      General: Abdomen is flat. Bowel sounds are normal. There is no distension.      Palpations: Abdomen is soft. There is no mass.      Tenderness: There is no abdominal tenderness.   Musculoskeletal:         General: Normal range of motion.      Cervical back: Normal range of motion and neck supple.   Lymphadenopathy:      Cervical: No cervical adenopathy.   Skin:     General: Skin is warm.      Capillary Refill: Capillary refill takes less than 2 seconds.   Neurological:      General: No focal deficit present.      Mental Status: He is alert and oriented to person, place, and time.   Psychiatric:         Mood and Affect: Mood normal.         Behavior: Behavior normal.         Thought Content: Thought content normal.         Judgment: Judgment normal.        Result Review :                Assessment and Plan   Diagnoses and all orders for this visit:    1. Elevated serum creatinine (Primary) - new issue requiring work up.    Cr in April 1.1 -> 1.9 in July -> 2.0 in August. Due to history of CLL, will need more thorough workup. Will obtain serum and urine labs, renal ultrasound, and referral to nephrology for possible kidney biopsy.  -     Comprehensive Metabolic Panel  -     Phosphorus  -     Magnesium  -     PSA SCREENING  -     Protein Elec + Interp, Serum  -     Protein Electrophoresis, 24 Hr Urine - Urine, Clean Catch  -     US Renal Bilateral  -     Protein / Creatinine Ratio, Urine - Urine, Clean Catch  -     Urinalysis With Microscopic - Urine, Clean Catch  -     Ambulatory Referral to Nephrology    2. Chronic lymphocytic leukemia (HCC) - pt has not required chemotherapy in the past but does have significant leukocytosis with last WBC 62k, mild anemia and platelets of 86 on most recent labs.  -     Comprehensive  Metabolic Panel  -     Phosphorus  -     Magnesium  -     PSA SCREENING  -     Protein Elec + Interp, Serum  -     Protein Electrophoresis, 24 Hr Urine - Urine, Clean Catch  -     US Renal Bilateral  -     Protein / Creatinine Ratio, Urine - Urine, Clean Catch  -     Urinalysis With Microscopic - Urine, Clean Catch  -     Ambulatory Referral to Nephrology    3. Prostate cancer screening  -     PSA SCREENING    4. Hypercalcemia  - mildly elevated    5. Elevated bilirubin - intermittenly elevated in the past         Follow Up   No follow-ups on file.  Patient was given instructions and counseling regarding his condition or for health maintenance advice. Please see specific information pulled into the AVS if appropriate.    Bo Perez MD  Internal Medicine/Pediatrics, PGY-2     I have seen and examined the patient independently.  I reviewed oncology notes and past labs.   I discussed pt's renal issues with pt and his wife.  GFR 67 -> 35.  Unclear etiology.  Will proceed with urine and serum labs as above, JAMEY, and nephrology referral.   Discussed that were are looking for anatomy/obstruction issues as well as intrinsic renal issues.  Pt feels fine at this time.  Given his dx of CLL and the fact that CLL treatments can be nephrotoxic we need to aggressively protect his renal function for the future.  Agree with above. F/u based on lab testing/imaging and when pt can get in to see nephrology.     Eveline Pelaez M.D.  Attending physician  Internal Medicine and Pediatrics

## 2022-09-07 ENCOUNTER — TELEPHONE (OUTPATIENT)
Dept: INTERNAL MEDICINE | Facility: CLINIC | Age: 67
End: 2022-09-07

## 2022-09-07 LAB
ALBUMIN SERPL ELPH-MCNC: 4.5 G/DL (ref 2.9–4.4)
ALBUMIN SERPL-MCNC: 5.2 G/DL (ref 3.8–4.8)
ALBUMIN/GLOB SERPL: 1.6 {RATIO} (ref 0.7–1.7)
ALBUMIN/GLOB SERPL: 2.5 {RATIO} (ref 1.2–2.2)
ALP SERPL-CCNC: 84 IU/L (ref 44–121)
ALPHA1 GLOB SERPL ELPH-MCNC: 0.2 G/DL (ref 0–0.4)
ALPHA2 GLOB SERPL ELPH-MCNC: 0.7 G/DL (ref 0.4–1)
ALT SERPL-CCNC: 36 IU/L (ref 0–44)
APPEARANCE UR: ABNORMAL
AST SERPL-CCNC: 44 IU/L (ref 0–40)
B-GLOBULIN SERPL ELPH-MCNC: 1 G/DL (ref 0.7–1.3)
BACTERIA #/AREA URNS HPF: ABNORMAL /[HPF]
BILIRUB SERPL-MCNC: 1.5 MG/DL (ref 0–1.2)
BILIRUB UR QL STRIP: NEGATIVE
BUN SERPL-MCNC: 14 MG/DL (ref 8–27)
BUN/CREAT SERPL: 9 (ref 10–24)
CALCIUM SERPL-MCNC: 9.8 MG/DL (ref 8.6–10.2)
CASTS URNS MICRO: ABNORMAL
CASTS URNS QL MICRO: PRESENT /LPF
CHLORIDE SERPL-SCNC: 103 MMOL/L (ref 96–106)
CO2 SERPL-SCNC: 20 MMOL/L (ref 20–29)
COLOR UR: YELLOW
CREAT SERPL-MCNC: 1.62 MG/DL (ref 0.76–1.27)
CRYSTALS URNS MICRO: ABNORMAL
EGFRCR-CYS SERPLBLD CKD-EPI 2021: 46 ML/MIN/1.73
EPI CELLS #/AREA URNS HPF: ABNORMAL /HPF (ref 0–10)
GAMMA GLOB SERPL ELPH-MCNC: 0.9 G/DL (ref 0.4–1.8)
GLOBULIN SER CALC-MCNC: 2.1 G/DL (ref 1.5–4.5)
GLOBULIN SER CALC-MCNC: 2.8 G/DL (ref 2.2–3.9)
GLUCOSE SERPL-MCNC: 117 MG/DL (ref 65–99)
GLUCOSE UR QL STRIP: NEGATIVE
HGB UR QL STRIP: ABNORMAL
KETONES UR QL STRIP: NEGATIVE
LABORATORY COMMENT REPORT: ABNORMAL
LEUKOCYTE ESTERASE UR QL STRIP: NEGATIVE
M PROTEIN SERPL ELPH-MCNC: ABNORMAL G/DL
MAGNESIUM SERPL-MCNC: 2.2 MG/DL (ref 1.6–2.3)
MICRO URNS: ABNORMAL
MUCOUS THREADS URNS QL MICRO: PRESENT
NITRITE UR QL STRIP: NEGATIVE
PH UR STRIP: 6 [PH] (ref 5–7.5)
PHOSPHATE SERPL-MCNC: 2.8 MG/DL (ref 2.8–4.1)
POTASSIUM SERPL-SCNC: 4.3 MMOL/L (ref 3.5–5.2)
PROT PATTERN SERPL ELPH-IMP: ABNORMAL
PROT SERPL-MCNC: 7.3 G/DL (ref 6–8.5)
PROT UR QL STRIP: ABNORMAL
PSA SERPL-MCNC: 0.2 NG/ML (ref 0–4)
RBC #/AREA URNS HPF: ABNORMAL /HPF (ref 0–2)
SODIUM SERPL-SCNC: 140 MMOL/L (ref 134–144)
SP GR UR STRIP: 1.02 (ref 1–1.03)
UNIDENT CRYS URNS QL MICRO: PRESENT
UROBILINOGEN UR STRIP-MCNC: 0.2 MG/DL (ref 0.2–1)
WBC #/AREA URNS HPF: ABNORMAL /HPF (ref 0–5)

## 2022-09-09 ENCOUNTER — TELEPHONE (OUTPATIENT)
Dept: INTERNAL MEDICINE | Facility: CLINIC | Age: 67
End: 2022-09-09

## 2022-09-09 NOTE — TELEPHONE ENCOUNTER
"Ok for HUB to read    Tried to call pt. No answer and was unable to leave results on VM according to Verbal Release Form. Told pt to call back for results.    If patient calls back please inform of results.  \". Renal function a little better on repeat labs but still needs u/s and f/u with nephrology\"  "

## 2022-09-09 NOTE — TELEPHONE ENCOUNTER
----- Message from Eveline Pelaez MD sent at 9/9/2022  9:57 AM EDT -----  Call pt about labs. Renal function a little better on repeat labs but still needs u/s and f/u with nephrology

## 2022-09-11 DIAGNOSIS — I10 ESSENTIAL HYPERTENSION: ICD-10-CM

## 2022-09-11 DIAGNOSIS — I25.119 CORONARY ARTERY DISEASE INVOLVING NATIVE CORONARY ARTERY OF NATIVE HEART WITH ANGINA PECTORIS: ICD-10-CM

## 2022-09-12 ENCOUNTER — HOSPITAL ENCOUNTER (OUTPATIENT)
Dept: ULTRASOUND IMAGING | Facility: HOSPITAL | Age: 67
Discharge: HOME | End: 2022-09-12
Admitting: INTERNAL MEDICINE

## 2022-09-12 PROCEDURE — 76775 US EXAM ABDO BACK WALL LIM: CPT

## 2022-09-12 RX ORDER — LISINOPRIL 5 MG/1
TABLET ORAL
Qty: 90 TABLET | Refills: 3 | OUTPATIENT
Start: 2022-09-12 | End: 2022-09-20

## 2022-09-12 RX ORDER — CARVEDILOL 6.25 MG/1
TABLET ORAL
Qty: 180 TABLET | Refills: 1 | Status: SHIPPED | OUTPATIENT
Start: 2022-09-12 | End: 2023-03-21

## 2022-09-12 NOTE — TELEPHONE ENCOUNTER
Rx Refill Note  Requested Prescriptions     Pending Prescriptions Disp Refills    lisinopril (PRINIVIL,ZESTRIL) 5 MG tablet [Pharmacy Med Name: LISINOPRIL TAB 5MG] 90 tablet 3     Sig: TAKE 1 TABLET DAILY    carvedilol (COREG) 6.25 MG tablet [Pharmacy Med Name: CARVEDILOL TAB 6.25MG] 180 tablet 1     Sig: TAKE 1 TABLET TWICE DAILY  WITH MEALS.      Last office visit with prescribing clinician: 9/6/2022      Next office visit with prescribing clinician: 1/23/2023            POAL PICKETT MA  09/12/22, 16:22 EDT

## 2022-09-14 ENCOUNTER — TELEPHONE (OUTPATIENT)
Dept: INTERNAL MEDICINE | Facility: CLINIC | Age: 67
End: 2022-09-14

## 2022-09-14 NOTE — TELEPHONE ENCOUNTER
Pt has appt with nephrology (not urology).  The ultrasound was normal but not his bloodwork.  YES, He does need to keep this appointment.  We need to protect his kidneys as poor kidney function in the future could limit treatment options for his CLL.

## 2022-09-14 NOTE — TELEPHONE ENCOUNTER
Caller: Francisco Espino    Relationship: Self    Best call back number: [phone]       What is the best time to reach you: ANYTIME     Who are you requesting to speak with (clinical staff, provider,  specific staff member): CLINICAL STAFF       What was the call regarding: PATIENT STATES HE RECEIVED A CALL STATING HIS KIDNEY FUNCTIONS CAME BACK NORMAL. PATIENT STATES HE HAS AN APPOINTMENT WITH A UROLOGIST ON 9/28/22. PATIENT WOULD LIKE TO KNOW IF HE NEEDS TO KEEP THAT APPOINTMENT.     PLEASE ADVISE         Do you require a callback: YES

## 2022-09-14 NOTE — TELEPHONE ENCOUNTER
Hub to share with pt, Pt has appt with nephrology (not urology).  The ultrasound was normal but not his bloodwork.  YES, He does need to keep this appointment.  We need to protect his kidneys as poor kidney function in the future could limit treatment options for his CLL.

## 2022-10-10 ENCOUNTER — TELEPHONE (OUTPATIENT)
Dept: ONCOLOGY | Facility: CLINIC | Age: 67
End: 2022-10-10

## 2022-10-10 NOTE — TELEPHONE ENCOUNTER
Caller: Evangelina Espino    Relationship: Emergency Contact    Best call back number: [phone]        Who are you requesting to speak with (clinical staff, provider,  specific staff member): SCHEDULING       What was the call regarding: REQUEST TO CANCEL LAB AND RN REVIEW 10/11 DOES NOT WISH TO RESCHEDULE AT THIS TIME     Do you require a callback:  NO UNLESS QUESTIONS OR CONCERNS

## 2022-10-11 ENCOUNTER — APPOINTMENT (OUTPATIENT)
Dept: LAB | Facility: HOSPITAL | Age: 67
End: 2022-10-11

## 2022-10-11 ENCOUNTER — APPOINTMENT (OUTPATIENT)
Dept: ONCOLOGY | Facility: HOSPITAL | Age: 67
End: 2022-10-11

## 2022-11-22 ENCOUNTER — TELEPHONE (OUTPATIENT)
Dept: INTERNAL MEDICINE | Facility: CLINIC | Age: 67
End: 2022-11-22

## 2022-11-22 NOTE — TELEPHONE ENCOUNTER
Caller: Evangelina Espino    Relationship to patient: Emergency Contact    Best call back number: [phone]     Patient is needing: PATIENT'S WIFE IS CALLING TO CHECK THE STATUS OF IF OR WHEN THE PATIENT HAD HIS SHINGLES VACCINES.    PLEASE ADVISE.

## 2022-12-16 ENCOUNTER — TELEPHONE (OUTPATIENT)
Dept: ONCOLOGY | Facility: CLINIC | Age: 67
End: 2022-12-16

## 2022-12-16 NOTE — TELEPHONE ENCOUNTER
Patient says he cannot come in for his appointment today and wants to re-schedule it out until January

## 2023-01-11 ENCOUNTER — TELEPHONE (OUTPATIENT)
Dept: INTERNAL MEDICINE | Facility: CLINIC | Age: 68
End: 2023-01-11
Payer: COMMERCIAL

## 2023-01-11 DIAGNOSIS — I25.119 CORONARY ARTERY DISEASE INVOLVING NATIVE CORONARY ARTERY OF NATIVE HEART WITH ANGINA PECTORIS: ICD-10-CM

## 2023-01-11 DIAGNOSIS — E78.2 MIXED HYPERLIPIDEMIA: ICD-10-CM

## 2023-01-11 DIAGNOSIS — R73.03 PREDIABETES: ICD-10-CM

## 2023-01-11 DIAGNOSIS — I10 ESSENTIAL HYPERTENSION: Primary | ICD-10-CM

## 2023-01-18 LAB
ALBUMIN SERPL-MCNC: 5.4 G/DL (ref 3.5–5.2)
ALBUMIN/GLOB SERPL: 2.8 G/DL
ALP SERPL-CCNC: 88 U/L (ref 39–117)
ALT SERPL-CCNC: 57 U/L (ref 1–41)
AST SERPL-CCNC: 53 U/L (ref 1–40)
BASOPHILS # BLD AUTO: ABNORMAL 10*3/UL
BILIRUB SERPL-MCNC: 1.3 MG/DL (ref 0–1.2)
BUN SERPL-MCNC: 22 MG/DL (ref 8–23)
BUN/CREAT SERPL: 15.3 (ref 7–25)
CALCIUM SERPL-MCNC: 10.1 MG/DL (ref 8.6–10.5)
CHLORIDE SERPL-SCNC: 103 MMOL/L (ref 98–107)
CHOLEST SERPL-MCNC: 164 MG/DL (ref 0–200)
CO2 SERPL-SCNC: 29 MMOL/L (ref 22–29)
CREAT SERPL-MCNC: 1.44 MG/DL (ref 0.76–1.27)
DIFFERENTIAL COMMENT: ABNORMAL
EGFRCR SERPLBLD CKD-EPI 2021: 52.9 ML/MIN/1.73
EOSINOPHIL # BLD AUTO: ABNORMAL 10*3/UL
EOSINOPHIL NFR BLD AUTO: ABNORMAL %
ERYTHROCYTE [DISTWIDTH] IN BLOOD BY AUTOMATED COUNT: 13.9 % (ref 12.3–15.4)
GLOBULIN SER CALC-MCNC: 1.9 GM/DL
GLUCOSE SERPL-MCNC: 103 MG/DL (ref 65–99)
HBA1C MFR BLD: 6 % (ref 4.8–5.6)
HCT VFR BLD AUTO: 35.9 % (ref 37.5–51)
HDLC SERPL-MCNC: 69 MG/DL (ref 40–60)
HGB BLD-MCNC: 11.5 G/DL (ref 13–17.7)
LDLC SERPL CALC-MCNC: 72 MG/DL (ref 0–100)
LDLC/HDLC SERPL: 0.98 {RATIO}
LYMPHOCYTES # BLD AUTO: ABNORMAL 10*3/UL
LYMPHOCYTES # BLD MANUAL: 107.96 10*3/MM3 (ref 0.7–3.1)
LYMPHOCYTES NFR BLD AUTO: ABNORMAL %
LYMPHOCYTES NFR BLD MANUAL: 92.6 % (ref 19.6–45.3)
MCH RBC QN AUTO: 35.2 PG (ref 26.6–33)
MCHC RBC AUTO-ENTMCNC: 32 G/DL (ref 31.5–35.7)
MCV RBC AUTO: 109.8 FL (ref 79–97)
MONOCYTES # BLD MANUAL: 0 10*3/MM3 (ref 0.1–0.9)
MONOCYTES NFR BLD AUTO: ABNORMAL %
MONOCYTES NFR BLD MANUAL: 0 % (ref 5–12)
NEUTROPHILS # BLD MANUAL: 8.63 10*3/MM3 (ref 1.7–7)
NEUTROPHILS NFR BLD AUTO: ABNORMAL %
NEUTROPHILS NFR BLD MANUAL: 7.4 % (ref 42.7–76)
PLATELET # BLD AUTO: 136 10*3/MM3 (ref 140–450)
PLATELET BLD QL SMEAR: ABNORMAL
POTASSIUM SERPL-SCNC: 4.5 MMOL/L (ref 3.5–5.2)
PROT SERPL-MCNC: 7.3 G/DL (ref 6–8.5)
RBC # BLD AUTO: 3.27 10*6/MM3 (ref 4.14–5.8)
RBC MORPH BLD: ABNORMAL
SODIUM SERPL-SCNC: 140 MMOL/L (ref 136–145)
T4 FREE SERPL-MCNC: 1.47 NG/DL (ref 0.93–1.7)
TRIGL SERPL-MCNC: 137 MG/DL (ref 0–150)
TSH SERPL DL<=0.005 MIU/L-ACNC: 2.46 UIU/ML (ref 0.27–4.2)
VLDLC SERPL CALC-MCNC: 23 MG/DL (ref 5–40)
WBC # BLD AUTO: 116.59 10*3/MM3 (ref 3.4–10.8)

## 2023-01-19 ENCOUNTER — TELEPHONE (OUTPATIENT)
Dept: INTERNAL MEDICINE | Facility: CLINIC | Age: 68
End: 2023-01-19
Payer: COMMERCIAL

## 2023-01-19 DIAGNOSIS — C91.10 CHRONIC LYMPHOCYTIC LEUKEMIA OF B-CELL TYPE NOT HAVING ACHIEVED REMISSION: Primary | ICD-10-CM

## 2023-01-19 NOTE — TELEPHONE ENCOUNTER
----- Message from Eveline Pelaez MD sent at 1/18/2023 10:34 PM EST -----  WBC significantly up. Make sure dr huerta with [facility] gets a copy.  Pt has known chronic leukemia

## 2023-01-19 NOTE — TELEPHONE ENCOUNTER
Called and informed Pt of results.  Patient advised that he has an appt wit  coming up and they will discuss these results. I forwarded these results to .

## 2023-01-23 ENCOUNTER — OFFICE VISIT (OUTPATIENT)
Dept: INTERNAL MEDICINE | Facility: CLINIC | Age: 68
End: 2023-01-23
Payer: COMMERCIAL

## 2023-01-23 VITALS
HEIGHT: 74 IN | WEIGHT: 194 LBS | HEART RATE: 98 BPM | SYSTOLIC BLOOD PRESSURE: 94 MMHG | BODY MASS INDEX: 24.9 KG/M2 | TEMPERATURE: 97.5 F | DIASTOLIC BLOOD PRESSURE: 60 MMHG | OXYGEN SATURATION: 99 %

## 2023-01-23 DIAGNOSIS — I25.119 CORONARY ARTERY DISEASE INVOLVING NATIVE CORONARY ARTERY OF NATIVE HEART WITH ANGINA PECTORIS: ICD-10-CM

## 2023-01-23 DIAGNOSIS — N18.31 STAGE 3A CHRONIC KIDNEY DISEASE: ICD-10-CM

## 2023-01-23 DIAGNOSIS — I10 ESSENTIAL HYPERTENSION: Primary | ICD-10-CM

## 2023-01-23 DIAGNOSIS — E78.2 MIXED HYPERLIPIDEMIA: ICD-10-CM

## 2023-01-23 DIAGNOSIS — C91.10 CHRONIC LYMPHOCYTIC LEUKEMIA: ICD-10-CM

## 2023-01-23 DIAGNOSIS — R73.03 PREDIABETES: ICD-10-CM

## 2023-01-23 PROCEDURE — 99214 OFFICE O/P EST MOD 30 MIN: CPT | Performed by: INTERNAL MEDICINE

## 2023-01-23 RX ORDER — CETIRIZINE HYDROCHLORIDE 10 MG/1
10 TABLET ORAL DAILY
COMMUNITY

## 2023-01-23 RX ORDER — LISINOPRIL 5 MG/1
TABLET ORAL
COMMUNITY
Start: 2022-12-09 | End: 2023-01-23

## 2023-01-23 NOTE — PROGRESS NOTES
"Chief Complaint  Results    Subjective        Francisco Espino presents to [facility]  History of Present Illness     67 year old male with CLL, CAD, HLD, HTN who presents for follow-up. States he was diagnosed with an ear infection two weeks ago and prescribed azithromycin and prednisone taper. He thinks the infection likely caused increase in his WBC.    HTN - occasionally checks BP at home and states they have been lower recently. He denies any dizziness or lightheadedness. Off lisinopril bc of low bp     HLD - on statin. No side effects per patient    CAD - on ASA for secondary prevention, Brilinta, statin, and beta blocker.     Prediabetes - A1c 6.2 -> 6.0.  A1c some better from last office visit. States he remains active, but does eat some sweets.    Objective   Vital Signs:  BP 94/60   Pulse 98   Temp 97.5 °F (36.4 °C)   Ht 188 cm (74.02\")   Wt 88 kg (194 lb)   SpO2 99%   BMI 24.90 kg/m²   Estimated body mass index is 24.9 kg/m² as calculated from the following:    Height as of this encounter: 188 cm (74.02\").    Weight as of this encounter: 88 kg (194 lb).       BMI is within normal parameters. No other follow-up for BMI required.      Physical Exam  Vitals reviewed.   Constitutional:       General: He is not in acute distress.     Appearance: Normal appearance. He is normal weight.   HENT:      Head: Normocephalic and atraumatic.      Right Ear: Tympanic membrane, ear canal and external ear normal.      Left Ear: Tympanic membrane, ear canal and external ear normal.      Nose: Nose normal.      Mouth/Throat:      Mouth: Mucous membranes are moist.      Pharynx: Oropharynx is clear.   Eyes:      Extraocular Movements: Extraocular movements intact.      Conjunctiva/sclera: Conjunctivae normal.      Pupils: Pupils are equal, round, and reactive to light.   Cardiovascular:      Rate and Rhythm: Normal rate and regular rhythm.      Pulses: Normal pulses.      Heart sounds: " Normal heart sounds. No murmur heard.    No friction rub. No gallop.   Pulmonary:      Effort: Pulmonary effort is normal. No respiratory distress.      Breath sounds: Normal breath sounds. No wheezing or rhonchi.   Abdominal:      General: Abdomen is flat. Bowel sounds are normal. There is no distension.      Palpations: Abdomen is soft. There is no mass.      Tenderness: There is no abdominal tenderness.   Musculoskeletal:         General: Normal range of motion.      Cervical back: Normal range of motion and neck supple.   Lymphadenopathy:      Cervical: No cervical adenopathy.   Skin:     General: Skin is warm.      Capillary Refill: Capillary refill takes less than 2 seconds.   Neurological:      General: No focal deficit present.      Mental Status: He is alert and oriented to person, place, and time.   Psychiatric:         Mood and Affect: Mood normal.         Behavior: Behavior normal.         Thought Content: Thought content normal.         Judgment: Judgment normal.        Result Review :  The following data was reviewed by: Eveline Pelaez MD on 01/23/2023:  CMP    CMP 8/18/22 9/6/22 9/6/22 1/17/23     1214 1214    Glucose 119 117 (A)  103 (A)   BUN 13 14  22   Creatinine 2.00 (A) 1.62 (A)  1.44 (A)   eGFR 35.7 (A)      Sodium 138 140  140   Potassium 4.3 4.3  4.5   Chloride 102 103  103   Calcium 10.6 (A) 9.8  10.1   Total Protein  7.3  7.3   Total Protein 7.7      Albumin 5.10 5.2 (A) 4.5 (A) 5.4 (A)   Globulin  2.1 2.8 1.9   Globulin 2.6      Total Bilirubin 1.6 (A) 1.5 (A)  1.3 (A)   Alkaline Phosphatase 89 84  88   AST (SGOT) 30 44 (A)  53 (A)   ALT (SGPT) 26 36  57 (A)   Albumin/Globulin Ratio 2.0      BUN/Creatinine Ratio 6.5 (A) 9 (A)  15.3   Anion Gap 12.7      (A) Abnormal value       Comments are available for some flowsheets but are not being displayed.           CBC w/diff    CBC w/Diff 7/25/22 8/18/22 1/17/23   WBC 55.4 (A) 62.29 (A) 116.59 (A)   RBC 3.38 (A) 3.21 (A) 3.27 (A)   Hemoglobin  11.6 (A) 11.2 (A) 11.5 (A)   Hematocrit 34.8 (A) 34.0 (A) 35.9 (A)    (A) 105.9 (A) 109.8 (A)   MCH 34.3 (A) 34.9 (A) 35.2 (A)   MCHC 33.3 32.9 32.0   RDW 13.9 14.1 13.9   Platelets 100 (A) 86 (A) 136 (A)   Neutrophil Rel % 18 5.2 (A) CANCELED   Immature Granulocyte Rel %  0.1    Lymphocyte Rel % 81 92.5 (A) CANCELED   Monocyte Rel % 1 2.0 (A) CANCELED   Eosinophil Rel % 0 0.1 (A) CANCELED   Basophil Rel % 0 0.1    (A) Abnormal value       Comments are available for some flowsheets but are not being displayed.           Lipid Panel    Lipid Panel 7/25/22 1/17/23   Total Cholesterol 153 164   Triglycerides 86 137   HDL Cholesterol 57 69 (A)   VLDL Cholesterol 16 23   LDL Cholesterol  80 72   LDL/HDL Ratio 1.4 0.98   (A) Abnormal value       Comments are available for some flowsheets but are not being displayed.           Most Recent A1C    HGBA1C Most Recent 1/17/23   Hemoglobin A1C 6.00 (A)   (A) Abnormal value       Comments are available for some flowsheets but are not being displayed.           Data reviewed: Consultant notes Nephrology from 9/28/22             Assessment and Plan   Diagnoses and all orders for this visit:    1. Essential hypertension (Primary)    2. Mixed hyperlipidemia    3. Coronary artery disease involving native coronary artery of native heart with angina pectoris (HCC)    4. Chronic lymphocytic leukemia (HCC)    5. Stage 3a chronic kidney disease (HCC)    6. Prediabetes    1 - HTN: had some lower Bps at home, will continue to monitor with home BP monitoring. If continues to be low, will consider switching carvedilol to propranolol to decrease alpha blockade    2 - HLD - labs acceptable, continue statin    3 - CAD - BP monitoring as above, continue ASA, statin, Brilinta, and beta blocker    4 - CLL - has appointment with Dr. Cagle tomorrow    5 - CKD 3a - Creatinine improving from last year. If worsens, will re-engage with nephrology for possibility of kidney biopsy    6 -  Prediabetes - A1c improving. Continue lifestyle changes         Follow Up   Return in about 6 months (around 7/23/2023) for Recheck, labs.  Patient was given instructions and counseling regarding his condition or for health maintenance advice. Please see specific information pulled into the AVS if appropriate.     Bo Perez MD  Internal Medicine/Pediatrics, PGY-2    I have seen and examined the patient independently.  I have reviewed the resident's findings as noted above and added to the note where appropriate.  I rechecked the patient's blood pressure personally today.  Repeat blood pressure was 126/82.  He has a history of STEMI so would continue carvedilol if at all possible.  Patient's white blood cell count was up to 116,000.  This was in the setting of a viral illness that was treated with steroids at urgent care.  Patient has follow-up with oncology tomorrow and will have labs rechecked in their office at that time.  Creatinine improved from last labs.  Agree with above.    Eveline Pelaez MD  Attending Physician  Internal Medicine and Pediatrics

## 2023-01-24 ENCOUNTER — OFFICE VISIT (OUTPATIENT)
Dept: ONCOLOGY | Facility: CLINIC | Age: 68
End: 2023-01-24
Payer: COMMERCIAL

## 2023-01-24 ENCOUNTER — LAB (OUTPATIENT)
Dept: LAB | Facility: HOSPITAL | Age: 68
End: 2023-01-24
Payer: COMMERCIAL

## 2023-01-24 VITALS
SYSTOLIC BLOOD PRESSURE: 111 MMHG | HEIGHT: 74 IN | TEMPERATURE: 96.9 F | RESPIRATION RATE: 16 BRPM | HEART RATE: 85 BPM | WEIGHT: 193.9 LBS | DIASTOLIC BLOOD PRESSURE: 75 MMHG | BODY MASS INDEX: 24.88 KG/M2 | OXYGEN SATURATION: 98 %

## 2023-01-24 DIAGNOSIS — C91.10 CHRONIC LYMPHOCYTIC LEUKEMIA OF B-CELL TYPE NOT HAVING ACHIEVED REMISSION: Primary | ICD-10-CM

## 2023-01-24 DIAGNOSIS — C91.10 CHRONIC LYMPHOCYTIC LEUKEMIA OF B-CELL TYPE NOT HAVING ACHIEVED REMISSION: ICD-10-CM

## 2023-01-24 LAB
ALBUMIN SERPL-MCNC: 4.9 G/DL (ref 3.5–5.2)
ALBUMIN/GLOB SERPL: 1.8 G/DL (ref 1.1–2.4)
ALP SERPL-CCNC: 86 U/L (ref 38–116)
ALT SERPL W P-5'-P-CCNC: 30 U/L (ref 0–41)
ANION GAP SERPL CALCULATED.3IONS-SCNC: 11.1 MMOL/L (ref 5–15)
AST SERPL-CCNC: 32 U/L (ref 0–40)
B2 MICROGLOB SERPL-MCNC: 4.2 MG/L (ref 0.8–2.2)
BASOPHILS # BLD AUTO: 0.03 10*3/MM3 (ref 0–0.2)
BASOPHILS NFR BLD AUTO: 0 % (ref 0–1.5)
BILIRUB SERPL-MCNC: 1.3 MG/DL (ref 0.2–1.2)
BUN SERPL-MCNC: 12 MG/DL (ref 6–20)
BUN/CREAT SERPL: 8.3 (ref 7.3–30)
CALCIUM SPEC-SCNC: 10.2 MG/DL (ref 8.5–10.2)
CHLORIDE SERPL-SCNC: 104 MMOL/L (ref 98–107)
CO2 SERPL-SCNC: 25.9 MMOL/L (ref 22–29)
CREAT SERPL-MCNC: 1.44 MG/DL (ref 0.7–1.3)
DEPRECATED RDW RBC AUTO: 57.6 FL (ref 37–54)
EGFRCR SERPLBLD CKD-EPI 2021: 52.9 ML/MIN/1.73
EOSINOPHIL # BLD AUTO: 0.06 10*3/MM3 (ref 0–0.4)
EOSINOPHIL NFR BLD AUTO: 0.1 % (ref 0.3–6.2)
ERYTHROCYTE [DISTWIDTH] IN BLOOD BY AUTOMATED COUNT: 14.2 % (ref 12.3–15.4)
GLOBULIN UR ELPH-MCNC: 2.8 GM/DL (ref 1.8–3.5)
GLUCOSE SERPL-MCNC: 111 MG/DL (ref 74–124)
HCT VFR BLD AUTO: 35.7 % (ref 37.5–51)
HGB BLD-MCNC: 11.5 G/DL (ref 13–17.7)
IMM GRANULOCYTES # BLD AUTO: 0.12 10*3/MM3 (ref 0–0.05)
IMM GRANULOCYTES NFR BLD AUTO: 0.1 % (ref 0–0.5)
LDH SERPL-CCNC: 141 U/L (ref 99–259)
LYMPHOCYTES # BLD AUTO: 82.17 10*3/MM3 (ref 0.7–3.1)
LYMPHOCYTES NFR BLD AUTO: 94.5 % (ref 19.6–45.3)
MCH RBC QN AUTO: 35.6 PG (ref 26.6–33)
MCHC RBC AUTO-ENTMCNC: 32.2 G/DL (ref 31.5–35.7)
MCV RBC AUTO: 110.5 FL (ref 79–97)
MONOCYTES # BLD AUTO: 0.42 10*3/MM3 (ref 0.1–0.9)
MONOCYTES NFR BLD AUTO: 0.5 % (ref 5–12)
NEUTROPHILS NFR BLD AUTO: 4.18 10*3/MM3 (ref 1.7–7)
NEUTROPHILS NFR BLD AUTO: 4.8 % (ref 42.7–76)
NRBC BLD AUTO-RTO: 0 /100 WBC (ref 0–0.2)
PLATELET # BLD AUTO: 114 10*3/MM3 (ref 140–450)
PMV BLD AUTO: 9.7 FL (ref 6–12)
POTASSIUM SERPL-SCNC: 4 MMOL/L (ref 3.5–4.7)
PROT SERPL-MCNC: 7.7 G/DL (ref 6.3–8)
RBC # BLD AUTO: 3.23 10*6/MM3 (ref 4.14–5.8)
SODIUM SERPL-SCNC: 141 MMOL/L (ref 134–145)
WBC NRBC COR # BLD: 86.98 10*3/MM3 (ref 3.4–10.8)

## 2023-01-24 PROCEDURE — 85025 COMPLETE CBC W/AUTO DIFF WBC: CPT

## 2023-01-24 PROCEDURE — 83615 LACTATE (LD) (LDH) ENZYME: CPT

## 2023-01-24 PROCEDURE — 36415 COLL VENOUS BLD VENIPUNCTURE: CPT

## 2023-01-24 PROCEDURE — 82232 ASSAY OF BETA-2 PROTEIN: CPT | Performed by: INTERNAL MEDICINE

## 2023-01-24 PROCEDURE — 99214 OFFICE O/P EST MOD 30 MIN: CPT | Performed by: INTERNAL MEDICINE

## 2023-01-24 PROCEDURE — 80053 COMPREHEN METABOLIC PANEL: CPT

## 2023-01-24 NOTE — PROGRESS NOTES
Subjective     REASON FOR FOLLOW UP: CLL SINCE 2005 NOT REQUIRING ANY INTERVENTION    History of Present Illness     On 01/24/2023 this 67-year-old white male returns to the office after he was seen by Dr. Pelaez a few days ago in regard to general care and at that point after receiving prednisone his white count was in the 110 category. Obviously given the history of chronic lymphoid leukemia we brought the patient back today for reassessment. Clinically the patient states that he had severe pain in the right ear a couple of weeks ago. He was seen in the urgent care center and he was given prednisone and decongestant. He was not told that he had any infection. He never received antibiotic therapy. The symptoms improved very quickly after prednisone administration and he has completed this package. Now days he has no pain or sensation of stuffiness in his ear canals or any other alteration to speak of. His appetite is normal. His bowel activity and urination are normal. He has no fevers, chills, night sweats, pruritus, adenopathies or fatigue and he has not had any other infections or deterioration in performance status. He has not developed any autoimmunity. In the interim he has developed multiple skin lesions in the scalp and face and these are representation of actinic keratoses and very likely early skin cancers.     Besides this he has not had any other heart issues. No cough or shortness of breath. Excellent appetite, stable weight. Good bowel activity, proper urination. No bone pain. No swelling in his lower extremities. No neuropathy.        Past Medical History:   Diagnosis Date   • Abnormal liver function test    • Alcohol abuse    • CLL (chronic lymphocytic leukemia) (HCC)    • Coronary artery disease     stent   • Heart disease    • History of pneumonia     1/2019   • Hyperlipidemia    • Hypertension    • Inguinal hernia     left side to have surgery 3/28/19   • Myocardial infarction (HCC)    • Screen  for colon cancer 09/2016    Negative Cologaurd   • Screening PSA (prostate specific antigen) 02/2013    .5   • Thrombocytopenia (HCC)         Past Surgical History:   Procedure Laterality Date   • CARDIAC CATHETERIZATION  2017   • CATARACT EXTRACTION Bilateral    • COLONOSCOPY N/A 06/05/2006    Normal, repeat in 10 years, Dr. Preethi Gonzalez   • COLONOSCOPY N/A 7/15/2020    Procedure: COLONOSCOPY TO CECUM & T.I. WITH COLD SNARE POLYPECTOMIES, CLIP PLACEMENT X 2;  Surgeon: Yennifer Salazar MD;  Location: [facility] ENDOSCOPY;  Service: Gastroenterology;  Laterality: N/A;  PRE- POSITIVE COLOGUARD  POST- COLON POLYPS, DIVERTICULOSIS, HEMORRHOIDS   • CORONARY ANGIOPLASTY WITH STENT PLACEMENT N/A 05/09/2017    Left heart catheterization, Selective native vessel coronary arteriography, Left ventriculography, Successful percutaneous intervention to the 100% occluded right coronary artery with a 3.5 x 38 mm Synergy drug-eluting stent (post-dilated w/ a 4.0 x 20 mm NC Emerge balloon), Selective right femoral angiography, Successful Perclose arteriotomy closure. Dr. James Pierson   • INGUINAL HERNIA REPAIR Left    • INGUINAL HERNIA REPAIR Right     x2   • INGUINAL HERNIA REPAIR Left 3/28/2019    Procedure: LEFT INGUINAL HERNIA REPAIR LAPAROSCOPIC;  Surgeon: Preethi Gonzalez MD;  [place] [facility] [place] [facility] [place]: [place]   • LAPAROSCOPIC INGUINAL HERNIA REPAIR Right 10/03/2002    w/ extra peritoneal Goe-Benton mesh (transperitoneal repair), Dr. Preethi Gonzalez   • REFRACTIVE SURGERY Bilateral    • RETINAL DETACHMENT SURGERY Right 07/02/2013      ONCOLOGY HISTORY SHOLA ENNIS MD:ONCOLOGIC HISTORY:  On 12/22/10 he was reviewed.  He   had a totally normal white count with normal differential, predominance of lymphocytes.  Normal platelet count.  His chemistry profile was normal including LDH.  His beta-2 microglobulin was normal.  His quantitative immunoglobulins were normal.  His jane  p  heral blood flow cytometry documented a  typical pattern by flow cytometry of CLL, CD5, CD19 negative, dim positive CD20, ZAP-70 negative and CD38 negative.  His CT scan of the chest, abdomen and pelvis disclosed no peripheral adenopathy or hepatosplenomeg  a  ly.  His level of immunoglobulins was normal.  With this in mind we thought that the patient had very early stage disease with excellent prognostic features and we advised him to remain on observation.  He will be rechecked every 3 months for the first ye  ar and thereafter every 6 months depending on the clinical circumstances.  Appropriate booklet information was given to him and his wife to review and further learn.          Given the excellent characteristics of his disease on clinical grounds, I doubt at this time a bone marrow is needed.        On 1/8/13 his physical exam is unremarkable with no peripheral adenopathy or hepatosplenomegaly, no B symptoms, no infections, no autoimmunity.  On the other hand he had areas of the skin on the left cheek and right cheek bhavani  t look like actinic keratosis.  His white count was up to17,000 with a normal hemoglobin and borderline platelet count of 125,000.  We asked the patient to try to minimize stressors in life and we asked him to return back in four months.  We also asked priscila edwards to be seen by dermatology in order to treat his multiple actinic keratosis.          On 05/09/13 the patient was asymptomatic with regard to his chronic lymphoid leukemia, no fatigue, fevers, chills, repeated infections or peripheral lymphadenopathy.  His physi  francisco j exam was unremarkable with no palpable lymphadenopathy or hepatosplenomegaly.  His white count has now improved to 12,900, stable hemoglobin of 13.6 and stable platelet count of 121,000.  With this in mind we advised him to remain on observation, retu  rning for reevaluation in six months.         On 05/07/2014 the patient was asymptomatic in regard to his CLL with no autoimmunity, no infections and no progressive  disease clinically.  On the other hand we have seen duplication of his white count in one year.  T  he patient and wife are aware that eventually he will require intervention for this if the white count continues changing the way it is.  At this time we do not justify the need of any other therapy or any other testing.        Current Outpatient Medications on File Prior to Visit   Medication Sig Dispense Refill   • acetaminophen (TYLENOL) 500 MG tablet Take 1,000 mg by mouth Every 6 (Six) Hours As Needed for Mild Pain .     • aspirin 81 MG EC tablet Take 81 mg by mouth Every Other Day.     • carvedilol (COREG) 6.25 MG tablet TAKE 1 TABLET TWICE DAILY  WITH MEALS 180 tablet 1   • cetirizine (zyrTEC) 10 MG tablet Take 10 mg by mouth Daily.     • fluticasone (FLONASE) 50 MCG/ACT nasal spray 2 sprays into the nostril(s) as directed by provider Daily. 1 bottle 0   • Multiple Vitamins-Minerals (EYE VITAMINS & MINERALS PO) Take 1 tablet by mouth Daily.     • rosuvastatin (CRESTOR) 20 MG tablet Take 20 mg by mouth Every Night.     • ticagrelor (Brilinta) 60 MG tablet tablet Take 1 tablet by mouth 2 (Two) Times a Day. Told to stop 5 days before surgery 180 tablet 3     No current facility-administered medications on file prior to visit.        ALLERGIES:    Allergies   Allergen Reactions   • Codeine Nausea Only and Nausea And Vomiting        Social History     Socioeconomic History   • Marital status:      Spouse name: Charlotte Espino   Tobacco Use   • Smoking status: Never   • Smokeless tobacco: Never   Vaping Use   • Vaping Use: Never used   Substance and Sexual Activity   • Alcohol use: Yes     Alcohol/week: 21.0 standard drinks     Types: 21 Shots of liquor per week   • Drug use: No   • Sexual activity: Defer        Family History   Problem Relation Age of Onset   • Heart attack Mother    • Heart disease Mother    • Diabetes Mother    • Aortic aneurysm Mother    • Coronary artery disease Mother    • Early death  "Mother    • No Known Problems Father    • Diabetes type II Other    • Diabetes Brother    • Hyperlipidemia Brother    • Stroke Sister 65   • Diabetes Sister    • Hyperlipidemia Sister    • Hypertension Sister    • No Known Problems Brother    • Stomach cancer Paternal Uncle    • Malig Hyperthermia Neg Hx           Objective     Vitals:    01/24/23 1112   BP: 111/75   Pulse: 85   Resp: 16   Temp: 96.9 °F (36.1 °C)   TempSrc: Temporal   SpO2: 98%   Weight: 88 kg (193 lb 14.4 oz)   Height: 188 cm (74.02\")   PainSc: 0-No pain     Current Status 1/24/2023   ECOG score 0       Physical Exam           GENERAL:  Well-developed, Patient  in no acute distress.   SKIN:  Warm, dry ,NO purpura ,no rash.He has multiple areas in the skin of the scalp and his face consistent with actinic keratoses or early basal cell carcinomas.      HEENT:  Pupils were equal and reactive to light and accomodation, conjunctivae noninjected,  normal visual acuity.   NECK:  Supple with good range of motion; no thyromegaly , no JVD or bruits,.No carotid artery pain, no carotid abnormal pulsation   LYMPHATICS:  No cervical, NO supraclavicular, NO axillary, NO inguinal adenopathies.  CARDIAC   normal rate , regular rhythm, without murmur,NO rubs NO S3 NO S4   LUNGS: normal breath sounds bilateral, no wheezing, NO rhonchi, NO crackles ,NO rubs.  VASCULAR VENOUS: no cyanosis, NO collateral circulation, NO varicosities, NO edema, NO palpable cords, NO pain,NO erythema, NO pigmentation of the skin.  ABDOMEN:  Soft, NO pain,no hepatomegaly, no splenomegaly,no masses, no ascites, no collateral circulation,no distention.  EXTREMITIES  AND SPINE:  No clubbing, no cyanosis ,no deformities , no pain .No kyphosis,  no pain in spine, no pain in ribs , no pain in pelvic bone.  NEUROLOGICAL:  Patient was awake, alert, oriented to time, person and place.        RECENT LABS:  Results from last 7 days   Lab Units 01/24/23  1058   WBC 10*3/mm3 86.98*   NEUTROS ABS " 10*3/mm3 4.18   HEMOGLOBIN g/dL 11.5*   HEMATOCRIT % 35.7*   PLATELETS 10*3/mm3 114*                 Assessment & Plan    Mr. Espino is a 67 y.o. male is longstanding history of CLL dating back to initial diagnosis in 2005.  He has remained on a course of observation without any need for active treatment.    On 01/24/2023 the patient was further reviewed. His recent laboratory assessment by Dr. Pelaez after the patient received prednisone for dysfunction of the Eustachian tube sylvain his white count to 110,000. Today his white count is down to 88,000. The hemoglobin remains minimally low. The platelet count is normal. The patient has no peripheral adenopathy or hepatosplenomegaly. He has no B symptoms. He has no infection. He has no autoimmunity. Given these numbers I do not believe that I need to pull the trigger for this patient to be treated for his leukemia yet and I would like to review him back in 6 weeks and see how he is doing. I have not advised him to take any other medication or any other issue at this time.     In regard to his skin lesions I advised him to make an appointment to be seen by his dermatologist. He has so many in the scalp and face that he will require multiple areas of therapy at the same time.     In 6 weeks when he returns he will have a repeat CBC and a CMP. I am pending to review a beta-2 microglobulin, chemistry profile today.      Russell Cagle MD  01/24/23

## 2023-01-25 ENCOUNTER — TELEPHONE (OUTPATIENT)
Dept: ONCOLOGY | Facility: CLINIC | Age: 68
End: 2023-01-25
Payer: COMMERCIAL

## 2023-01-25 DIAGNOSIS — C91.10 CHRONIC LYMPHOCYTIC LEUKEMIA OF B-CELL TYPE NOT HAVING ACHIEVED REMISSION: Primary | ICD-10-CM

## 2023-01-25 NOTE — TELEPHONE ENCOUNTER
----- Message from Russell Cagle MD sent at 1/25/2023 11:41 AM EST -----  CALL HIM HIS CREATININE IS UP SOME, BRING HIM FOR UA I WANT TO KNOW IF HE HAS PROTEIN IN THE URINE

## 2023-01-26 ENCOUNTER — LAB (OUTPATIENT)
Dept: LAB | Facility: HOSPITAL | Age: 68
End: 2023-01-26
Payer: COMMERCIAL

## 2023-01-26 DIAGNOSIS — C91.10 CHRONIC LYMPHOCYTIC LEUKEMIA OF B-CELL TYPE NOT HAVING ACHIEVED REMISSION: ICD-10-CM

## 2023-01-26 DIAGNOSIS — R80.9 PROTEINURIA, UNSPECIFIED TYPE: ICD-10-CM

## 2023-01-26 LAB
BACTERIA UR QL AUTO: NEGATIVE /HPF
BILIRUB UR QL STRIP: NEGATIVE
CLARITY UR: CLEAR
COLOR UR: YELLOW
GLUCOSE UR STRIP-MCNC: NEGATIVE MG/DL
HGB UR QL STRIP.AUTO: ABNORMAL
KETONES UR QL STRIP: NEGATIVE
LEUKOCYTE ESTERASE UR QL STRIP.AUTO: NEGATIVE
NITRITE UR QL STRIP: NEGATIVE
PH UR STRIP.AUTO: 7 [PH] (ref 4.5–8)
PROT UR QL STRIP: ABNORMAL
RBC # UR STRIP: ABNORMAL /HPF
REF LAB TEST METHOD: ABNORMAL
SP GR UR STRIP: 1.02 (ref 1–1.03)
SQUAMOUS #/AREA URNS HPF: ABNORMAL /HPF
UROBILINOGEN UR QL STRIP: ABNORMAL
WBC # UR STRIP: ABNORMAL /HPF

## 2023-01-26 PROCEDURE — 81001 URINALYSIS AUTO W/SCOPE: CPT

## 2023-01-27 ENCOUNTER — TELEPHONE (OUTPATIENT)
Dept: ONCOLOGY | Facility: CLINIC | Age: 68
End: 2023-01-27
Payer: COMMERCIAL

## 2023-01-27 DIAGNOSIS — R80.9 PROTEINURIA, UNSPECIFIED TYPE: ICD-10-CM

## 2023-01-27 DIAGNOSIS — C91.10 CHRONIC LYMPHOCYTIC LEUKEMIA OF B-CELL TYPE NOT HAVING ACHIEVED REMISSION: Primary | ICD-10-CM

## 2023-01-27 NOTE — TELEPHONE ENCOUNTER
Called patientand he will be in Monday to  24hour urine container. Order placed. Pt v/u. Deana Samuels RN

## 2023-01-27 NOTE — TELEPHONE ENCOUNTER
----- Message from Russell Cagle MD sent at 1/26/2023  2:52 PM EST -----  Call his ua is abnormal with too much protein, he needs urine collection of 24 for protein electrophoresis and immunofixation, bring him back for the jug

## 2023-02-02 ENCOUNTER — TELEPHONE (OUTPATIENT)
Dept: ONCOLOGY | Facility: CLINIC | Age: 68
End: 2023-02-02
Payer: COMMERCIAL

## 2023-02-02 LAB
ALBUMIN 24H MFR UR ELPH: 23.8 %
ALPHA1 GLOB 24H MFR UR ELPH: 5.3 %
ALPHA2 GLOB 24H MFR UR ELPH: 26.4 %
B-GLOBULIN MFR UR ELPH: 27.2 %
GAMMA GLOB 24H MFR UR ELPH: 17.3 %
HIV 1 & 2 AB SER-IMP: ABNORMAL
INTERPRETATION UR IFE-IMP: ABNORMAL
M PROTEIN 24H MFR UR ELPH: 3.9 %
M PROTEIN 24H UR ELPH-MRATE: 18 MG/24 HR
PROT 24H UR-MRATE: 469 MG/24 HR (ref 30–150)
PROT UR-MCNC: 17.7 MG/DL

## 2023-02-02 NOTE — TELEPHONE ENCOUNTER
S/w patient. Message sent to scheduling. Patient has Bence-Dawson protein in 24hour urine. Appt w/ Cagle next week. Patient to bring wife. Deana Samuels RN

## 2023-02-10 ENCOUNTER — LAB (OUTPATIENT)
Dept: LAB | Facility: HOSPITAL | Age: 68
End: 2023-02-10
Payer: COMMERCIAL

## 2023-02-10 ENCOUNTER — OFFICE VISIT (OUTPATIENT)
Dept: ONCOLOGY | Facility: CLINIC | Age: 68
End: 2023-02-10
Payer: COMMERCIAL

## 2023-02-10 VITALS
RESPIRATION RATE: 16 BRPM | HEIGHT: 74 IN | OXYGEN SATURATION: 98 % | DIASTOLIC BLOOD PRESSURE: 75 MMHG | BODY MASS INDEX: 25.01 KG/M2 | SYSTOLIC BLOOD PRESSURE: 116 MMHG | WEIGHT: 194.9 LBS | HEART RATE: 92 BPM | TEMPERATURE: 97.1 F

## 2023-02-10 DIAGNOSIS — C91.10 CHRONIC LYMPHOCYTIC LEUKEMIA: Primary | ICD-10-CM

## 2023-02-10 DIAGNOSIS — C91.10 CHRONIC LYMPHOCYTIC LEUKEMIA OF B-CELL TYPE NOT HAVING ACHIEVED REMISSION: ICD-10-CM

## 2023-02-10 DIAGNOSIS — R80.3: ICD-10-CM

## 2023-02-10 PROBLEM — N28.9 KIDNEY DISEASE: Status: ACTIVE | Noted: 2023-02-10

## 2023-02-10 LAB
ALBUMIN SERPL-MCNC: 4.8 G/DL (ref 3.5–5.2)
ALBUMIN/GLOB SERPL: 1.8 G/DL (ref 1.1–2.4)
ALP SERPL-CCNC: 77 U/L (ref 38–116)
ALT SERPL W P-5'-P-CCNC: 42 U/L (ref 0–41)
ANION GAP SERPL CALCULATED.3IONS-SCNC: 12.8 MMOL/L (ref 5–15)
AST SERPL-CCNC: 48 U/L (ref 0–40)
BASOPHILS # BLD AUTO: 0.02 10*3/MM3 (ref 0–0.2)
BASOPHILS NFR BLD AUTO: 0 % (ref 0–1.5)
BILIRUB SERPL-MCNC: 1.3 MG/DL (ref 0.2–1.2)
BUN SERPL-MCNC: 13 MG/DL (ref 6–20)
BUN/CREAT SERPL: 8.6 (ref 7.3–30)
CALCIUM SPEC-SCNC: 10 MG/DL (ref 8.5–10.2)
CHLORIDE SERPL-SCNC: 106 MMOL/L (ref 98–107)
CO2 SERPL-SCNC: 23.2 MMOL/L (ref 22–29)
CREAT SERPL-MCNC: 1.51 MG/DL (ref 0.7–1.3)
DEPRECATED RDW RBC AUTO: 55.1 FL (ref 37–54)
EGFRCR SERPLBLD CKD-EPI 2021: 50 ML/MIN/1.73
EOSINOPHIL # BLD AUTO: 0.07 10*3/MM3 (ref 0–0.4)
EOSINOPHIL NFR BLD AUTO: 0.1 % (ref 0.3–6.2)
ERYTHROCYTE [DISTWIDTH] IN BLOOD BY AUTOMATED COUNT: 13.9 % (ref 12.3–15.4)
GLOBULIN UR ELPH-MCNC: 2.6 GM/DL (ref 1.8–3.5)
GLUCOSE SERPL-MCNC: 105 MG/DL (ref 74–124)
HCT VFR BLD AUTO: 34.2 % (ref 37.5–51)
HGB BLD-MCNC: 11.1 G/DL (ref 13–17.7)
IMM GRANULOCYTES # BLD AUTO: 0.06 10*3/MM3 (ref 0–0.05)
IMM GRANULOCYTES NFR BLD AUTO: 0.1 % (ref 0–0.5)
LYMPHOCYTES # BLD AUTO: 71.64 10*3/MM3 (ref 0.7–3.1)
LYMPHOCYTES NFR BLD AUTO: 95.3 % (ref 19.6–45.3)
MCH RBC QN AUTO: 35.8 PG (ref 26.6–33)
MCHC RBC AUTO-ENTMCNC: 32.5 G/DL (ref 31.5–35.7)
MCV RBC AUTO: 110.3 FL (ref 79–97)
MONOCYTES # BLD AUTO: 0.79 10*3/MM3 (ref 0.1–0.9)
MONOCYTES NFR BLD AUTO: 1.1 % (ref 5–12)
NEUTROPHILS NFR BLD AUTO: 2.61 10*3/MM3 (ref 1.7–7)
NEUTROPHILS NFR BLD AUTO: 3.4 % (ref 42.7–76)
NRBC BLD AUTO-RTO: 0 /100 WBC (ref 0–0.2)
PLATELET # BLD AUTO: 113 10*3/MM3 (ref 140–450)
PMV BLD AUTO: 9.9 FL (ref 6–12)
POTASSIUM SERPL-SCNC: 4.2 MMOL/L (ref 3.5–4.7)
PROT SERPL-MCNC: 7.4 G/DL (ref 6.3–8)
RBC # BLD AUTO: 3.1 10*6/MM3 (ref 4.14–5.8)
SODIUM SERPL-SCNC: 142 MMOL/L (ref 134–145)
WBC NRBC COR # BLD: 75.19 10*3/MM3 (ref 3.4–10.8)

## 2023-02-10 PROCEDURE — 80053 COMPREHEN METABOLIC PANEL: CPT

## 2023-02-10 PROCEDURE — 85025 COMPLETE CBC W/AUTO DIFF WBC: CPT

## 2023-02-10 PROCEDURE — 36415 COLL VENOUS BLD VENIPUNCTURE: CPT

## 2023-02-10 PROCEDURE — 99215 OFFICE O/P EST HI 40 MIN: CPT | Performed by: INTERNAL MEDICINE

## 2023-02-10 NOTE — PROGRESS NOTES
Subjective     REASON FOR FOLLOW UP: CLL SINCE 2005 NOT REQUIRING ANY INTERVENTION    History of Present Illness     On 01/24/2023 this 67-year-old white male returns to the office after he was seen by Dr. Pelaez a few days ago in regard to general care and at that point after receiving prednisone his white count was in the 110 category. Obviously given the history of chronic lymphoid leukemia we brought the patient back today for reassessment. Clinically the patient states that he had severe pain in the right ear a couple of weeks ago. He was seen in the urgent care center and he was given prednisone and decongestant. He was not told that he had any infection. He never received antibiotic therapy. The symptoms improved very quickly after prednisone administration and he has completed this package. Now days he has no pain or sensation of stuffiness in his ear canals or any other alteration to speak of. His appetite is normal. His bowel activity and urination are normal. He has no fevers, chills, night sweats, pruritus, adenopathies or fatigue and he has not had any other infections or deterioration in performance status. He has not developed any autoimmunity. In the interim he has developed multiple skin lesions in the scalp and face and these are representation of actinic keratoses and very likely early skin cancers.     Besides this he has not had any other heart issues. No cough or shortness of breath. Excellent appetite, stable weight. Good bowel activity, proper urination. No bone pain. No swelling in his lower extremities. No neuropathy.    On 02/10/2023 I brought the patient back to the office because at the visit he had with me 2 weeks ago it turned out that the patient had an abnormal creatinine of 1.4. We called him back and we requested a urine specimen that documented 300 mg of protein in a random urine sample. This was very abnormal. 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 This tells me that this Bence-Dawson protein is very likely the reason for an abnormal creatinine very likely triggered by the production of this protein by his leukemia phenomenon that is very unusual and obviously this will obligate me in regard to the protection of his kidney function to initiate therapy for his leukemia at this time. That is the purpose of this visit. The patient remains asymptomatic with no other new respiratory issues. As we discussed the other day he is going to see his dermatologist in regard to his multiple areas of actinic keratosis and very likely skin cancers. Otherwise he does not have any new symptoms.     ·     Past Medical History:   Diagnosis Date   • Abnormal liver function test    • Alcohol abuse    • CLL (chronic lymphocytic leukemia) (HCC)    • Coronary artery disease     stent   • Heart disease    • History of pneumonia     1/2019   • Hyperlipidemia    • Hypertension    • Inguinal hernia     left side to have surgery 3/28/19   • Myocardial infarction (HCC)    • Screen for colon cancer 09/2016    Negative Cologaurd   • Screening PSA (prostate specific antigen) 02/2013    .5   • Thrombocytopenia (HCC)         Past Surgical History:   Procedure Laterality Date   • CARDIAC CATHETERIZATION  2017   • CATARACT EXTRACTION Bilateral    • COLONOSCOPY N/A 06/05/2006    Normal, repeat in 10 years, Dr. Preethi Gonzalez   • COLONOSCOPY N/A 7/15/2020    Procedure: COLONOSCOPY TO CECUM & T.I. WITH COLD SNARE POLYPECTOMIES, CLIP PLACEMENT X 2;  Surgeon: Yennifer Salazar MD;  Location: [facility] ENDOSCOPY;  Service: Gastroenterology;  Laterality: N/A;  PRE- POSITIVE COLOGUARD  POST- COLON POLYPS, DIVERTICULOSIS, HEMORRHOIDS   • CORONARY ANGIOPLASTY WITH STENT PLACEMENT N/A 05/09/2017    Left heart catheterization, Selective native vessel coronary arteriography, Left ventriculography, Successful  percutaneous intervention to the 100% occluded right coronary artery with a 3.5 x 38 mm Synergy drug-eluting stent (post-dilated w/ a 4.0 x 20 mm NC Emerge balloon), Selective right femoral angiography, Successful Perclose arteriotomy closure. Dr. James Pierson   • INGUINAL HERNIA REPAIR Left    • INGUINAL HERNIA REPAIR Right     x2   • INGUINAL HERNIA REPAIR Left 3/28/2019    Procedure: LEFT INGUINAL HERNIA REPAIR LAPAROSCOPIC;  Surgeon: Preethi Gonzalez MD;  [facility] [facility] [place] [facility] [place]  Service: General   • LAPAROSCOPIC INGUINAL HERNIA REPAIR Right 10/03/2002    w/ extra peritoneal Goe-Benton mesh (transperitoneal repair), Dr. Preethi Gonzalez   • REFRACTIVE SURGERY Bilateral    • RETINAL DETACHMENT SURGERY Right 07/02/2013      ONCOLOGY HISTORY SHOLA ENNIS MD:ONCOLOGIC HISTORY:  On 12/22/10 he was reviewed.  He   had a totally normal white count with normal differential, predominance of lymphocytes.  Normal platelet count.  His chemistry profile was normal including LDH.  His beta-2 microglobulin was normal.  His quantitative immunoglobulins were normal.  His jane  p  heral blood flow cytometry documented a typical pattern by flow cytometry of CLL, CD5, CD19 negative, dim positive CD20, ZAP-70 negative and CD38 negative.  His CT scan of the chest, abdomen and pelvis disclosed no peripheral adenopathy or hepatosplenomeg  a  ly.  His level of immunoglobulins was normal.  With this in mind we thought that the patient had very early stage disease with excellent prognostic features and we advised him to remain on observation.  He will be rechecked every 3 months for the first ye  ar and thereafter every 6 months depending on the clinical circumstances.  Appropriate booklet information was given to him and his wife to review and further learn.          Given the excellent characteristics of his disease on clinical grounds, I doubt at this time a bone marrow is needed.        On 1/8/13 his physical exam is unremarkable  with no peripheral adenopathy or hepatosplenomegaly, no B symptoms, no infections, no autoimmunity.  On the other hand he had areas of the skin on the left cheek and right cheek bhavani  t look like actinic keratosis.  His white count was up to17,000 with a normal hemoglobin and borderline platelet count of 125,000.  We asked the patient to try to minimize stressors in life and we asked him to return back in four months.  We also asked priscila edwards to be seen by dermatology in order to treat his multiple actinic keratosis.          On 05/09/13 the patient was asymptomatic with regard to his chronic lymphoid leukemia, no fatigue, fevers, chills, repeated infections or peripheral lymphadenopathy.  His physi  francisco j exam was unremarkable with no palpable lymphadenopathy or hepatosplenomegaly.  His white count has now improved to 12,900, stable hemoglobin of 13.6 and stable platelet count of 121,000.  With this in mind we advised him to remain on observation, retu  rning for reevaluation in six months.         On 05/07/2014 the patient was asymptomatic in regard to his CLL with no autoimmunity, no infections and no progressive disease clinically.  On the other hand we have seen duplication of his white count in one year.  T  he patient and wife are aware that eventually he will require intervention for this if the white count continues changing the way it is.  At this time we do not justify the need of any other therapy or any other testing.        Current Outpatient Medications on File Prior to Visit   Medication Sig Dispense Refill   • acetaminophen (TYLENOL) 500 MG tablet Take 1,000 mg by mouth Every 6 (Six) Hours As Needed for Mild Pain .     • aspirin 81 MG EC tablet Take 81 mg by mouth Every Other Day.     • carvedilol (COREG) 6.25 MG tablet TAKE 1 TABLET TWICE DAILY  WITH MEALS 180 tablet 1   • cetirizine (zyrTEC) 10 MG tablet Take 10 mg by mouth Daily.     • fluticasone (FLONASE) 50 MCG/ACT nasal spray 2 sprays into the  "nostril(s) as directed by provider Daily. 1 bottle 0   • Multiple Vitamins-Minerals (EYE VITAMINS & MINERALS PO) Take 1 tablet by mouth Daily.     • rosuvastatin (CRESTOR) 20 MG tablet Take 20 mg by mouth Every Night.     • ticagrelor (Brilinta) 60 MG tablet tablet Take 1 tablet by mouth 2 (Two) Times a Day. Told to stop 5 days before surgery 180 tablet 3     No current facility-administered medications on file prior to visit.        ALLERGIES:    Allergies   Allergen Reactions   • Codeine Nausea Only and Nausea And Vomiting        Social History     Socioeconomic History   • Marital status:      Spouse name: Charlotte Espino   Tobacco Use   • Smoking status: Never   • Smokeless tobacco: Never   Vaping Use   • Vaping Use: Never used   Substance and Sexual Activity   • Alcohol use: Yes     Alcohol/week: 21.0 standard drinks     Types: 21 Shots of liquor per week   • Drug use: No   • Sexual activity: Defer        Family History   Problem Relation Age of Onset   • Heart attack Mother    • Heart disease Mother    • Diabetes Mother    • Aortic aneurysm Mother    • Coronary artery disease Mother    • Early death Mother    • No Known Problems Father    • Diabetes type II Other    • Diabetes Brother    • Hyperlipidemia Brother    • Stroke Sister 65   • Diabetes Sister    • Hyperlipidemia Sister    • Hypertension Sister    • No Known Problems Brother    • Stomach cancer Paternal Uncle    • Malig Hyperthermia Neg Hx           Objective     Vitals:    02/10/23 0916   BP: 116/75   Pulse: 92   Resp: 16   Temp: 97.1 °F (36.2 °C)   TempSrc: Temporal   SpO2: 98%   Weight: 88.4 kg (194 lb 14.4 oz)   Height: 188 cm (74.02\")   PainSc: 0-No pain     Current Status 2/10/2023   ECOG score 0       Physical Exam                 GENERAL:  Well-developed, Patient  in no acute distress.   SKIN:  Warm, dry ,NO purpura ,no rash.Multiple areas of actinic keratosis in the skin and probably early skin cancer on the left side of the " face.      HEENT:  Pupils were equal and reactive to light and accomodation, conjunctivae noninjected,  normal visual acuity.   NECK:  Supple with good range of motion; no thyromegaly , no JVD or bruits,.No carotid artery pain, no carotid abnormal pulsation   LYMPHATICS:  No cervical, NO supraclavicular, NO axillary, NO inguinal adenopathies.  CARDIAC   normal rate , regular rhythm, without murmur,NO rubs NO S3 NO S4   LUNGS: normal breath sounds bilateral, no wheezing, NO rhonchi, NO crackles ,NO rubs.  VASCULAR VENOUS: no cyanosis, NO collateral circulation, NO varicosities, NO edema, NO palpable cords, NO pain,NO erythema, NO pigmentation of the skin.  ABDOMEN:  Soft, NO pain,no hepatomegaly, no splenomegaly,no masses, no ascites, no collateral circulation,no distention.  EXTREMITIES  AND SPINE:  No clubbing, no cyanosis ,no deformities , no pain .No kyphosis,  no pain in spine, no pain in ribs , no pain in pelvic bone.  NEUROLOGICAL:  Patient was awake, alert, oriented to time, person and place.      RECENT LABS:  Results from last 7 days   Lab Units 02/10/23  0908   WBC 10*3/mm3 75.19*   NEUTROS ABS 10*3/mm3 2.61   HEMOGLOBIN g/dL 11.1*   HEMATOCRIT % 34.2*   PLATELETS 10*3/mm3 113*               Contains abnormal data ELISA+Protein Electro, 24-Hr Urine - Urine, Clean Catch  Order: [id number]   Status: Final result      Visible to patient: Yes (seen)      Next appt: 02/17/2023 at 01:30 PM in Lab (VITALS ONLY [facility])      Dx: Chronic lymphocytic leukemia of B-mónica...     Specimen Information: Urine, Clean Catch; 24 Hour Urine    0 Result Notes      Component  Ref Range & Units 10 d ago   Total Protein, Urine  Not Estab. mg/dL 17.7    Protein, 24H Urine  30 - 150 mg/24 hr 469 High     Albumin, U  % 23.8    Alpha-1-Globulin, U  % 5.3    Alpha-2-Globulin, U  % 26.4    Beta Globulin, U  % 27.2    Gamma Globulin, Urine  % 17.3    M-Venkata, % U  Not Observed % 3.9 High     M-Venkata, mg/24 hr  Not Observed mg/24 hr 18 High      Immunofixation Result, Urine Comment Abnormal     Comment: Bence Dawson Protein positive; kappa type.   ELISA also shows an asymmetrical IgG.   Note: Comment    Comment: Protein electrophoresis scan will follow via computer, mail, or    delivery.   Resulting Agency [facility]          Assessment & Plan    Mr. Espino is a 67 y.o. male is longstanding history of CLL dating back to initial diagnosis in 2005.  He has remained on a course of observation without any need for active treatment.    On 01/24/2023 the patient was further reviewed. His recent laboratory assessment by Dr. Pelaez after the patient received prednisone for dysfunction of the Eustachian tube sylvain his white count to 110,000. Today his white count is down to 88,000. The hemoglobin remains minimally low. The platelet count is normal. The patient has no peripheral adenopathy or hepatosplenomegaly. He has no B symptoms. He has no infection. He has no autoimmunity. Given these numbers I do not believe that I need to pull the trigger for this patient to be treated for his leukemia yet and I would like to review him back in 6 weeks and see how he is doing. I have not advised him to take any other medication or any other issue at this time.     In regard to his skin lesions I advised him to make an appointment to be seen by his dermatologist. He has so many in the scalp and face that he will require multiple areas of therapy at the same time.     On 02/10/2023 the patient returned to the office for follow up. As I pointed out above we documented that he had an abnormal creatinine the day of the visit on 01/24/2023 at 1.4, then we requested a urine specimen that showed 300 mg of protein in a random specimen and triggered a urine collection of 24 hours for protein immunoelectrophoresis that disclosed a proteinuria posted above that is abnormal and also with the presence of Bence-Dawson protein. Obviously this tells me that his creatinine is abnormal because  of glomerular disease by abnormal protein Bence-Dawson. Today his white count is further improved, the hemoglobin is 11.2, the platelet count is 112,000 and the patient has no peripheral adenopathy or B symptoms. Therefore the presence of Bence-Dawson proteinuria obligates me to trigger the need for initiation of therapy on this patient. I had a lengthy discussion with him in this regard and this is the summary of this:    The patient will require to be educated in regard to chemotherapy administration with the medication Brukinsa that he will initiate probably in the next 10 days. He will have the typical dose of this medicine given to him twice a day and he will require 2 days before the initiation of Brukinsa the initiation of allopurinol at a dose of 150 mg a day. The dose is adjusted according to his creatinine clearance.    The patient will require proper oral hydration taking this medicine for the next several weeks drinking probably 10 glasses of water a day. He will require measurement on a weekly basis of CBC and CMP.     I expect that the patient will require formal education and consent for the medicine and he will be educated by nurses in this regard in the next few days. I pointed out to the patient that this medicine will come from a speciality pharmacy and the medicine will be delivered to him probably in the next week or 10 days once that the approval is done. Once that all of these logistics are in place the patient will initiate his therapy. My expectation is that the patient will be having significant improvement in his blood counts in several weeks from now but I warned him that sometimes the patients have a rise in the white count at the time of the initiation of the medicine that typically improves in a question of 6-8 weeks.    I also expect in the future probably in 2-3 months from now that the patient's hemoglobin and platelet count will improve. I think he has mild degree of anemia and mild  degree of thrombocytopenia on the basis of clonal leukemia cells that is modifying the production of red cells and platelets in the bone marrow.     Obviously after undergoing therapy for this in the next 2-3 months we will repeat monoclonal protein analysis in the urine to be sure that his Bence-Dawson proteinuria disappears and I will expect some improvement or stability of his creatinine level.     Obviously we will need to see what happens in regard to his total white count. In order to do a parallel study in regard to monoclonal protein analysis in peripheral blood I went ahead today and obtained a serum protein immunoelectrophoresis. I will expect to find some similar protein in his peripheral blood.     I discussed all of these facts with the patient and his wife in the room. We will get the ball rolling for him to initiate his therapy and he will require laboratory testing on a weekly basis initiated in 2 weeks with a CBC, CMP and uric acid.     ·   Russell Cagle MD  02/10/23

## 2023-02-13 ENCOUNTER — DOCUMENTATION (OUTPATIENT)
Dept: PHARMACY | Facility: HOSPITAL | Age: 68
End: 2023-02-13
Payer: COMMERCIAL

## 2023-02-13 ENCOUNTER — SPECIALTY PHARMACY (OUTPATIENT)
Dept: PHARMACY | Facility: HOSPITAL | Age: 68
End: 2023-02-13
Payer: COMMERCIAL

## 2023-02-13 LAB
ALBUMIN SERPL ELPH-MCNC: 4.1 G/DL (ref 2.9–4.4)
ALBUMIN/GLOB SERPL: 1.4 {RATIO} (ref 0.7–1.7)
ALPHA1 GLOB SERPL ELPH-MCNC: 0.2 G/DL (ref 0–0.4)
ALPHA2 GLOB SERPL ELPH-MCNC: 0.7 G/DL (ref 0.4–1)
B-GLOBULIN SERPL ELPH-MCNC: 1 G/DL (ref 0.7–1.3)
GAMMA GLOB SERPL ELPH-MCNC: 1.1 G/DL (ref 0.4–1.8)
GLOBULIN SER-MCNC: 3 G/DL (ref 2.2–3.9)
IGA SERPL-MCNC: 105 MG/DL (ref 61–437)
IGG SERPL-MCNC: 1001 MG/DL (ref 603–1613)
IGM SERPL-MCNC: 35 MG/DL (ref 20–172)
INTERPRETATION SERPL IEP-IMP: ABNORMAL
KAPPA LC FREE SER-MCNC: 40.3 MG/L (ref 3.3–19.4)
KAPPA LC FREE/LAMBDA FREE SER: 1.88 {RATIO} (ref 0.26–1.65)
LABORATORY COMMENT REPORT: ABNORMAL
LAMBDA LC FREE SERPL-MCNC: 21.4 MG/L (ref 5.7–26.3)
M PROTEIN SERPL ELPH-MCNC: 0.3 G/DL
PROT SERPL-MCNC: 7.1 G/DL (ref 6–8.5)

## 2023-02-13 NOTE — PROGRESS NOTES
Staff message rec from Dr Cagle-Pt will need to start Brukinsa ASAP. No dose provided in message.    I have submitted the PA (for 160 mg BID) to [facility] through covermymeds. Waiting for a decision.    Russell Cagle MD sent to Marta Jansen.  He needs to start brukinsa in days chemo ed     Marta Jansen  Specialty Pharmacy Technician

## 2023-02-14 ENCOUNTER — DOCUMENTATION (OUTPATIENT)
Dept: PHARMACY | Facility: HOSPITAL | Age: 68
End: 2023-02-14
Payer: COMMERCIAL

## 2023-02-14 NOTE — PROGRESS NOTES
Fax rec from [place]-They have denied the request for Brukinsa. It states in the letter that the denial reason is: Experimental/Investigational for CLL.    I have printed the approval for CLL from the [employer] Website stating as of 1/19/2023 it IS approved for CLL in the [place] and faxed this to [phone].      Marta Jansen  Specialty Pharmacy Technician

## 2023-02-15 ENCOUNTER — TELEPHONE (OUTPATIENT)
Dept: ONCOLOGY | Facility: CLINIC | Age: 68
End: 2023-02-15
Payer: COMMERCIAL

## 2023-02-15 NOTE — TELEPHONE ENCOUNTER
----- Message from Russell Cagle MD sent at 2/14/2023  5:02 PM EST -----  CALL HI SAME PROTEIN IN THE URINE IS PRESENT IN THE BLOOD THE TREATMENT WILL TAKE CARE OF IT

## 2023-02-16 ENCOUNTER — TELEPHONE (OUTPATIENT)
Dept: ONCOLOGY | Facility: CLINIC | Age: 68
End: 2023-02-16

## 2023-02-16 NOTE — TELEPHONE ENCOUNTER
Caller: Francisco Espino    Relationship: Self    Best call back number: [phone]    What is the best time to reach you: ANYTIME    Who are you requesting to speak with (clinical staff, provider,  specific staff member): CLINICAL    What was the call regarding: PT CALLING TO INFORM DR ENNIS HE HAD A HEART ATTACK IN 2017, AND WASN'T  FOR SURE IF HE HAD LET DR ENNIS KNOW IN CASE THAT INFORMATION WAS NEEDED BEFORE STARTING TREATMENTS     Do you require a callback: IF QUESTIONS

## 2023-02-17 ENCOUNTER — DOCUMENTATION (OUTPATIENT)
Dept: PHARMACY | Facility: HOSPITAL | Age: 68
End: 2023-02-17
Payer: COMMERCIAL

## 2023-02-17 ENCOUNTER — SPECIALTY PHARMACY (OUTPATIENT)
Dept: PHARMACY | Facility: HOSPITAL | Age: 68
End: 2023-02-17
Payer: COMMERCIAL

## 2023-02-17 ENCOUNTER — OFFICE VISIT (OUTPATIENT)
Dept: ONCOLOGY | Facility: CLINIC | Age: 68
End: 2023-02-17
Payer: COMMERCIAL

## 2023-02-17 ENCOUNTER — SPECIALTY PHARMACY (OUTPATIENT)
Dept: ONCOLOGY | Facility: HOSPITAL | Age: 68
End: 2023-02-17
Payer: COMMERCIAL

## 2023-02-17 VITALS
HEART RATE: 88 BPM | HEIGHT: 74 IN | RESPIRATION RATE: 18 BRPM | BODY MASS INDEX: 25.22 KG/M2 | SYSTOLIC BLOOD PRESSURE: 116 MMHG | WEIGHT: 196.5 LBS | DIASTOLIC BLOOD PRESSURE: 76 MMHG | TEMPERATURE: 97.1 F | OXYGEN SATURATION: 98 %

## 2023-02-17 VITALS — BODY MASS INDEX: 25.22 KG/M2 | WEIGHT: 196.5 LBS

## 2023-02-17 DIAGNOSIS — C91.10 CHRONIC LYMPHOCYTIC LEUKEMIA: Primary | ICD-10-CM

## 2023-02-17 PROCEDURE — 99212 OFFICE O/P EST SF 10 MIN: CPT | Performed by: NURSE PRACTITIONER

## 2023-02-17 NOTE — PROGRESS NOTES
I contacted Jessica (PBM for pts [place] plan) [phone] to check on the Appeal for the Brukinsa. I spoke with Audrey who states it is under review and could take 30-60 days. I questioned the time frame as this was requested to be expedited. This was not entered into their system as urgent.    I have verbally requested for the appeal to be marked urgent as this is a cancer medication. Decision should be made within 24-72 hours.         Marta Jansen  Specialty Pharmacy Technician

## 2023-02-17 NOTE — PROGRESS NOTES
TREATMENT  PREPARATION    Francisco Espnio  [id number]  1955    Chief Complaint: Treatment preparation and needs assessment    History of present illness:  Francisco Espino is a 67 y.o. year old male who is here today for treatment preparation and needs assessment.  The patient has been diagnosed with   Encounter Diagnosis   Name Primary?   • Chronic lymphocytic leukemia (HCC) Yes    and is scheduled to begin treatment with:     Oncology History:    Oncology/Hematology History   Chronic lymphocytic leukemia (HCC)   11/22/2015 Initial Diagnosis    Chronic lymphocytic leukemia (HCC)     2/20/2023 -  Chemotherapy    OP LYMPHOMA (CLL) Zanubrutinib         The current medication list and allergy list were reviewed and reconciled.     Past Medical History, Past Surgical History, Social History, Family History have been reviewed and are without significant changes except as mentioned.    Physical Exam:    Vitals:    02/17/23 1347   BP: 116/76   Pulse: 88   Resp: 18   Temp: 97.1 °F (36.2 °C)   SpO2: 98%     Vitals:    02/17/23 1347   PainSc: 0-No pain        ECOG score: 0         Physical Exam  Vitals reviewed.   Constitutional:       General: He is not in acute distress.     Appearance: Normal appearance. He is normal weight. He is not ill-appearing, toxic-appearing or diaphoretic.   Pulmonary:      Effort: Pulmonary effort is normal.   Musculoskeletal:         General: Normal range of motion.   Neurological:      Mental Status: He is alert. Mental status is at baseline.   Psychiatric:         Mood and Affect: Mood normal.         Judgment: Judgment normal.       NEEDS ASSESSMENTS    Genetics  The patient's new diagnosis and family history have been reviewed for genetic counseling needs. A genetic referral is not recommended.     Psychosocial and Barriers to care  The patient has completed a PHQ-9 Depression Screening and the Distress Thermometer (DT) today.  PHQ-9 results show PHQ-2 Total Score: 0 PHQ-9 Total Score: PHQ-9  Total Score: 0     The patient scored their distress today as Distress Level: 0-No distress on a scale of 0-10 with 0 being no distress and 10 being extreme distress. Problems marked by the patient as being an issue for them within the last week include   .      Results were reviewed along with psychosocial resources offered by our cancer center.  Our Supportive Oncology team will be flagged for a score of 4 or above, and a same day call will be made for a score of 9 or 10.  A mental health referral is offered at that time. Patients who score less than 4 have been educated on our support services and can be referred to our  upon request.  The patient will not be referred to our .       Nutrition  The patient has completed the malnutrition screening today. They scored Malnutrition Screening Tool  Have you recently lost weight without trying?  If yes, how much weight have you lost?: 0--> No  Have you been eating poorly because of a decreased appetite?: 0--> No  MST score: 0   with a score of 0-1 meaning not at risk in a score of 2 or greater meaning at risk.  Patients with a score of 3 or higher will be referred to our oncology dietitian for support. Patients beginning at risk treatment regimens or who have dietary concerns will also be referred to our oncology dietitian. The patient will not be referred.    Functional Assessment  Persons who are age 70 or greater will be screened for qualification of a comprehensive geriatric assessment by our survivorship nurse practitioner.  Older adults with cancer face unique challenges. These may include an increased risk of drug reactions, financial burdens, and caregiver stress. The patient scored   . Patients scoring 14 or lower will referred for an older adult functional assessment with the survivorship advanced practice registered nurse to ensure all needed support is provided as patients plan for their treatments. NOT APPLICABLE    Intravenous  "Access Assessment  The patient and I discussed planned intravenous chemo/biotherapy as well as other IV treatments that are often needed throughout the course of treatment. These may include, but are not limited to blood transfusions, antibiotics, and IV hydration. Discussed that depending on selected treatment and vein assessment, patient may require venous access device (VAD) which could include but not limited to a Mediport or PICC line. Risks and benefits of VADs reviewed. The patient is receiving oral treatment.    Reproductive/Sexual Activity   People should avoid becoming pregnant and should not get a partner pregnant while undergoing chemo/biotherapy.  People of childbearing age should use effective contraception during active therapy. The best recommendation for all people is to use a barrier method for a minimum of 1 week after the last infusion of chemo/biotherapy to prevent your partner being exposed to byproducts from treatment medications in bodily fluids. Effective contraception should be discussed with your oncology team to make sure it is safe to take based on your diagnosis. Possible options include oral contraceptives, barrier methods. Chemo/biotherapy can change your ability to reproduce children in the future.  There are options for fertility preservation. NOT APPLICABLE    Advanced Care Planning  Advance Care Planning   The patient and I discussed advanced care planning, \"Conversations that Matter\".   This service is offered for development of advance directives with a certified ACP facilitator.  The patient does have an up-to-date advanced directive. This document is on file with our office. The patient is not interested in an appointment with one of our facilitators to create or update their advanced directives.     Smoking cessation  Tobacco Use: Low Risk    • Smoking Tobacco Use: Never   • Smokeless Tobacco Use: Never   • Passive Exposure: Not on file       Patient and I discussed their " tobacco use history. Referral will not be made for smoking cessation.      Palliative Care  When appropriate, the patient and I discussed the availability palliative care services and when appropriate Hospice care. Palliative care is not the same as Hospice care which was explained to the patient.  The patient is not interested in additional information from our  on these services.     Survivorship   When appropriate, we discussed that we will refer the patient to survivorship clinic to discuss next steps following completion of planned treatment.  Reviewed this visit will include assessment of your physical, psychological, functional, and spiritual needs as a survivor and the need at attend this visit when scheduled.      Assessment and Plan:    Diagnoses and all orders for this visit:    1. Chronic lymphocytic leukemia (HCC) (Primary)      No orders of the defined types were placed in this encounter.    1. Needs assessment was completed where applicable including genetics, psychosocial needs, barriers to care, VAD evaluation, advanced care planning, survivorship, and palliative care services where indicated. Referrals have been ordered as appropriate based upon evaluation today and patient desires.   2. Adequate time was given to answer questions.  Patient made aware of their care team members and contact information if they have questions or problems throughout the treatment course.  3. Discussion held and written information provided describing frequency of office visits and ongoing monitoring throughout the treatment plan.     4. Reviewed with patient any prescribed medication sent to pharmacy.  Education provided regarding proper storage, safe handling, and proper disposal of unused medication.  5. Proper handling of body fluids and waste discussed and written information provided.  6. If appropriate, patient had pretreatment labs drawn today.    Learning assessment completed at initial patient  encounter. See separate flowsheet.     I spent 12 minutes caring for Francisco on this date of service. This time includes time spent by me in the following activities: preparing for the visit, obtaining and/or reviewing a separately obtained history, performing a medically appropriate examination and/or evaluation, counseling and educating the patient/family/caregiver and documenting information in the medical record.     Kourtney Flores, APRN   02/17/23

## 2023-02-17 NOTE — PROGRESS NOTES
Specialty Pharmacy Patient Management Program  Oncology Initial Assessment       Francisco Espino is a 67 y.o. male with CLL seen by an Oncology provider and enrolled in the Oncology Patient Management program offered by [facility].  An initial outreach was conducted, including assessment of therapy appropriateness and specialty medication education for Brukinsa/Zanubrutinib. The patient was introduced to services offered by [facility], including: regular assessments, refill coordination,  mail order delivery options, prior authorization maintenance, and financial assistance programs as applicable. The patient was also provided with contact information for the pharmacy team.     Regimen: Brukinsa 160 mg po bid    Start date of oral specialty medication: As soon as oral specialty medication is available. We have appealed the original insurance denial and are awaiting a decision.     Relevant Past Medical History, Comorbidities, and Vaccines  Relevant medical history and concomitant health conditions were discussed with the patient. The patient's chart has been reviewed for relevant past medical history and comorbid health conditions and updated as necessary.  Vaccines are coordinated by the patient's oncologist and primary care provider.  Past Medical History:   Diagnosis Date   • Abnormal liver function test    • Alcohol abuse    • CLL (chronic lymphocytic leukemia) (HCC)    • Coronary artery disease     stent   • Heart disease    • History of pneumonia     1/2019   • Hyperlipidemia    • Hypertension    • Inguinal hernia     left side to have surgery 3/28/19   • Myocardial infarction (HCC)    • Screen for colon cancer 09/2016    Negative Cologaurd   • Screening PSA (prostate specific antigen) 02/2013    .5   • Thrombocytopenia (HCC)      Social History     Socioeconomic History   • Marital status:      Spouse name: Charlotte Espino   Tobacco Use   • Smoking status:  Never   • Smokeless tobacco: Never   Vaping Use   • Vaping Use: Never used   Substance and Sexual Activity   • Alcohol use: Yes     Alcohol/week: 21.0 standard drinks     Types: 21 Shots of liquor per week   • Drug use: No   • Sexual activity: Defer       Allergies  Known allergies and reactions were discussed with the patient. The patient's chart has been reviewed for allergy information and updated as necessary.   Allergies   Allergen Reactions   • Codeine Nausea Only and Nausea And Vomiting        Current Medication List  This medication list has been reviewed with the patient and evaluated for any interactions or necessary modifications/recommendations, and updated to include all prescription medications, OTC medications, and supplements the patient is currently taking.  This list reflects what is contained in the patient's profile, which has also been marked as reviewed to communicate to other providers it is the most up to date version of the patient's current medication therapy.   Prior to Admission medications    Medication Sig Start Date End Date Taking? Authorizing Provider   acetaminophen (TYLENOL) 500 MG tablet Take 1,000 mg by mouth Every 6 (Six) Hours As Needed for Mild Pain .   Yes Monet Richardson MD   aspirin 81 MG EC tablet Take 81 mg by mouth Every Other Day.   Yes Monet Richardson MD   carvedilol (COREG) 6.25 MG tablet TAKE 1 TABLET TWICE DAILY  WITH MEALS 9/12/22  Yes Eveline Pelaez MD   cetirizine (zyrTEC) 10 MG tablet Take 10 mg by mouth Daily.   Yes Monet Richardson MD   fluticasone (FLONASE) 50 MCG/ACT nasal spray 2 sprays into the nostril(s) as directed by provider Daily.  Patient taking differently: 2 sprays into the nostril(s) as directed by provider Daily. Rarely used 8/16/19  Yes Jaskaran Quiros MD   rosuvastatin (CRESTOR) 20 MG tablet Take 20 mg by mouth Every Night. 9/22/21  Yes Monet Richardson MD   ticagrelor (Brilinta) 60 MG tablet tablet Take 1  tablet by mouth 2 (Two) Times a Day. Told to stop 5 days before surgery 12/8/20  Yes Eveline Pelaez MD   Multiple Vitamins-Minerals (EYE VITAMINS & MINERALS PO) Take 1 tablet by mouth Daily.    Provider, MD Monet   lisinopril (PRINIVIL,ZESTRIL) 5 MG tablet  12/9/22 1/23/23  ProviderMonet MD   loratadine-pseudoephedrine (CLARITIN-D 12-hour) 5-120 MG per 12 hr tablet Take 1 tablet by mouth Daily.  1/23/23  ProviderMonet MD       Drug Interactions  • Reviewed concomitant medications, allergies, labs, comorbidities/medical history, quality of life , and immunization history.   • Drug-drug interactions noted and discussed during education: level c ddi per Up to Date with Lázaro- patient instructed to observe for excessive bruising, nosebleeds or blood in urine/stool.  He will report any findings to Dr Cagle. Reminded the patient to let us know before making any changes or starting any new prescription or OTC medications so we can first assess drug interactions.  • Drug-food interactions noted and discussed during education: Patient was instructed to avoid eating grapefruit and drinking grapefruit juice    Recommended Medications Assessment  • Bone Health (such as calcium/vitamin D, bisphosphonate, RANKL inhibitor) - Not Indicated   • VTE prophylaxis - Currently Taking The patient is currently on Other: Brilinta 60 mg po bid  • Prophylactic antimicrobials -  The patient will start taking Antiviral: Acyclovir 400 mg po bid and PJP Prophylaxis: Bactrim DS three times weekly on Monday, Wednesday and Friday  • Tumor lysis syndrome prophylaxis - Risk for TLS Intermediate. The patient will start taking allopurinol 150 mg PO daily. Adequate hydration was discussed during education.    Relevant Laboratory Values  Lab Results   Component Value Date    GLUCOSE 105 02/10/2023    CALCIUM 10.0 02/10/2023     02/10/2023    K 4.2 02/10/2023    CO2 23.2 02/10/2023      02/10/2023    BUN 13 02/10/2023    CREATININE 1.51 (H) 02/10/2023    EGFRIFAFRI 70 07/09/2021    EGFRIFNONA 60 (L) 01/06/2022    BCR 8.6 02/10/2023    ANIONGAP 12.8 02/10/2023     Lab Results   Component Value Date    WBC 75.19 (H) 02/10/2023    RBC 3.10 (L) 02/10/2023    HGB 11.1 (L) 02/10/2023    HCT 34.2 (L) 02/10/2023    .3 (H) 02/10/2023    MCH 35.8 (H) 02/10/2023    MCHC 32.5 02/10/2023    RDW 13.9 02/10/2023    RDWSD 55.1 (H) 02/10/2023    MPV 9.9 02/10/2023     (L) 02/10/2023    NEUTRORELPCT 3.4 (L) 02/10/2023    LYMPHORELPCT 95.3 (H) 02/10/2023    MONORELPCT 1.1 (L) 02/10/2023    EOSRELPCT 0.1 (L) 02/10/2023    BASORELPCT 0.0 02/10/2023    AUTOIGPER 0.1 02/10/2023    NEUTROABS 2.61 02/10/2023    LYMPHSABS 71.64 (H) 02/10/2023    MONOSABS 0.79 02/10/2023    EOSABS 0.07 02/10/2023    BASOSABS 0.02 02/10/2023    AUTOIGNUM 0.06 (H) 02/10/2023    NRBC 0.0 02/10/2023       The above labs have been reviewed. No dose adjustments are needed for the oral specialty medication(s) based on the labs.    Initial Education Provided for Specialty Medication  The patient has been provided with the following education. All questions and concerns have been addressed prior to the patient receiving the medication, and the patient has verbalized understanding of the education and any materials provided.  Additional patient education shall be provided and documented upon request by the patient, provider or payer.      Provided patient with:   Education sheets about the medication, 24-hour clinic phone number and my contact information and instructions to call should additional questions arise.     Medication Education Sheets Provided: (select all that apply)  • Oral Specialty Medication: Brukinsa (zanubrutinib)          TOPICS COMMENTS   Storage and Handling of Oral Specialty Medication Store in the original container, in a dry location out of direct sunlight, and out of reach of children or pets.  Discussed safe  handling and what to do with any unused medication.   Administration of Oral Specialty Medication Take with or without food at the same time(s) each day.   Adherence to Oral Specialty Regimen and Handling Missed Doses Patient is likely to have good treatment adherence; reinforced the importance of adherence. Reviewed how to address missed doses and to let us know of any missed doses.   Anemia: role of RBC, cause, s/s, ways to manage, role of transfusion Reviewed the role of RBC and the use of transfusions if hemoglobin decreases too much.  Patient to notify us if they experience shortness of breath, dizziness, or palpitations.  Also let patient know they could feel more tired than usual and to try to stay active, but rest if they need to.    Thrombocytopenia: role of platelet, cause, s/s, ways to prevent bleeding, things to avoid, when to seek help Reviewed the role of platelets in blood clotting and when to call clinic (bloody nose that bleeds for 5 mins despite pressure, a cut that won't stop bleeding despite pressure, gums that bleed excessively with brushing or flossing). Recommended using an electric razor, soft bristle toothbrush, and blowing your nose gently.    Changes in   electrolytes and other   laboratory values :  • High glucose levels  • Increased serum   creatinine levels Notify provider of increased hunger, increased thirst, increased urination, weakness, fatigue, headaches, or dizziness   Secondary malignancies Monitor for skin cancers   Diarrhea: causes, s/s of dehydration, ways to manage, dietary changes, when to call MD    Nausea/Vomiting: cause, use of antiemetics, dietary changes, when to call MD • Emetic risk: Low-Minimal  • PRN home meds: Ondansetron  • Pharmacy home meds sent to: will send once Brukinsa has been approved    Instructed the patient to take a dose of the PRN medication at the first onset of nausea and if it's not working to call us for additional medications.  Also provided  non-drug measures to mitigate nausea.   Rash/Itchy skin • Keep your skin moisturized with creams and moisturizing lotions to decrease the risk of rash   or itchiness, and wear loose-fitting clothing.   • Avoid using perfumes and cologne as these products may increase rash symptoms.   • Avoid being in the heat for long periods of time.   • Your provider may recommend an over-the-counter antihistamine or a topical cream.   Sunlight can make symptoms worse.   • Avoid sun exposure as much as possible to decrease the risk of sunburn. The highest   exposure to ultraviolet (UV) radiation occurs from 10 am to 4 pm.   • Wear long-sleeved clothing, with UV protection if possible.  • Wear broad-brimmed hats.   • Apply broad-spectrum sunscreen (UVA/UVB) with at least SPF 30 as often as directed on the   bottle.   • Use lip balm with at least SPF 30.  If your rash or itching continues to worsen, contact your care provider   Organ Toxicities: cause, s/s, need for diagnostic tests, labs, when to notify MD Discussed potential effects on organ systems, monitoring, diagnostic tests, labs, and when to notify their MD. Discussed the signs/symptoms of the following: cardiotoxicity - with possible arrythmias   Respiratory infections • Wash your hands often, especially before eating and after using the bathroom.  • Avoid people with fevers, flu, or other infections.  • Maintain good personal hygiene.  • Report symptoms of a respiratory infection, like cough, sneezing, runny nose, fever, and   • scratchy or sore throat, to your provider   Low WBCs • Verified he has a thermometer at home and he was instructed to call for temp exceeding 100.4.  He also was instructed that an initial leukocytosis can occur when starting Brukinsa.    Miscellaneous • Financial Issues: PA was denied and appeal has been submitted   Infertility and Sexuality:  causes, fertility preservation options, sexuality changes, ways to manage, importance of birth control  Effective contraception could include one or more of the following: oral contraceptive, barrier methods, etc   Home Care: how to manage bodily fluids Counseled on management of soiled linens and proper flush technique.  Discussed how to manage all the side effects at home and advised when to contact the MD office     Adherence and Self-Administration  • Barriers to Patient Adherence and/or Self-Administration: none  • Methods for Supporting Patient Adherence and/or Self-Administration: reinforced education and toxicity calls  • Expected duration of therapy: Until disease progression or intolerable toxicity    Goals of Therapy  • Patient Goals of Therapy:   o Consistently take medications as prescribed  o Patient will adhere to medication regimen  o Patient will report any medication side effects to healthcare provider  • Clinical Goals:    Goals     • Specialty Pharmacy General Goal      Reduction of Bence Dawson proteinuria          o Support patient understanding of medication regimen  o Ensure patient knows the pharmacy contact information  o Schedule regular follow-up to monitor the treatment serious adverse events  o Schedule regular follow-up to confirm medication adherence  o Schedule regular follow-up to monitor disease progression or stability    Quality of Life Assessment   The patient's current (pre-therapy) quality of life was discussed with the patient. The QOL segment of this outreach has been reviewed and updated.   • Quality of Life Score: 9/10    Reassessment Plan & Follow-Up  1. Pharmacist to perform regular reassessments no more than (6) months from the previous assessment.  2. Welcome information and patient satisfaction survey to be sent by retail team with patient's initial fill.  3. Care Coordinator to set up future refill outreaches, coordinate prescription delivery, and escalate clinical questions to pharmacist.        Attestation  I attest that the initiated specialty medication(s) are  appropriate for the patient based on my assessment.  If the prescribed therapy is at any point deemed not appropriate based on the current or future assessments, a consultation will be initiated with the patient's specialty care provider to determine the best course of action. The revised plan of therapy will be documented along with any additional patient education provided.     Name/Credentials:  Jazz Saenz RPh, VIGNESH    In person visit    Date and Time: 2/17/2023  14:31 EST

## 2023-02-20 ENCOUNTER — DOCUMENTATION (OUTPATIENT)
Dept: PHARMACY | Facility: HOSPITAL | Age: 68
End: 2023-02-20
Payer: COMMERCIAL

## 2023-02-20 RX ORDER — SULFAMETHOXAZOLE AND TRIMETHOPRIM 800; 160 MG/1; MG/1
1 TABLET ORAL 3 TIMES WEEKLY
Qty: 12 TABLET | Refills: 7 | Status: SHIPPED | OUTPATIENT
Start: 2023-02-20

## 2023-02-20 RX ORDER — ONDANSETRON HYDROCHLORIDE 8 MG/1
8 TABLET, FILM COATED ORAL 3 TIMES DAILY PRN
Qty: 30 TABLET | Refills: 5 | Status: SHIPPED | OUTPATIENT
Start: 2023-02-20 | End: 2023-02-22 | Stop reason: ALTCHOICE

## 2023-02-20 RX ORDER — ACYCLOVIR 400 MG/1
400 TABLET ORAL 2 TIMES DAILY
Qty: 60 TABLET | Refills: 7 | Status: SHIPPED | OUTPATIENT
Start: 2023-02-20

## 2023-02-20 RX ORDER — ALLOPURINOL 300 MG/1
150 TABLET ORAL DAILY
Qty: 15 TABLET | Refills: 0 | Status: SHIPPED | OUTPATIENT
Start: 2023-02-20 | End: 2023-03-21

## 2023-02-20 NOTE — PROGRESS NOTES
Pts insurance still has not made a decision on the [employer] appeal that was submitted. I will utilize the FREE month voucher for his first month and start the process for the free drug application as well. Rx was sent to [facility]. Pt will be contacted to coordinate the delivery.           Marta Jansen  Specialty Pharmacy Technician

## 2023-02-22 ENCOUNTER — SPECIALTY PHARMACY (OUTPATIENT)
Dept: PHARMACY | Facility: HOSPITAL | Age: 68
End: 2023-02-22
Payer: COMMERCIAL

## 2023-02-22 ENCOUNTER — TELEPHONE (OUTPATIENT)
Dept: ONCOLOGY | Facility: CLINIC | Age: 68
End: 2023-02-22
Payer: COMMERCIAL

## 2023-02-22 RX ORDER — ONDANSETRON 8 MG/1
8 TABLET, ORALLY DISINTEGRATING ORAL EVERY 8 HOURS PRN
Qty: 30 TABLET | Refills: 5 | Status: SHIPPED | OUTPATIENT
Start: 2023-02-22

## 2023-02-22 NOTE — TELEPHONE ENCOUNTER
Reviewed Dr. Cagle notes and returned call to patient's wife with the information that he will be on his current therapy for 2-3 months, lab work will be checked at each visit.  Gave her the dates and times of all the appointments.  She v/u.

## 2023-02-22 NOTE — PROGRESS NOTES
Specialty Pharmacy Refill Coordination Note     Francisco is a 67 y.o. male contacted today regarding refills of Brukinsa specialty medication(s).    Reviewed and verified with patient:       Specialty medication(s) and dose(s) confirmed: yes        Delivery Questions    Flowsheet Row Most Recent Value   Delivery method  at Pharmacy   Number of medications in delivery 1   Medication being filled and delivered Brukinsa   Doses left of specialty medications 0-[facility]   Is there any medication that is due not being filled? No   Supplies needed? No supplies needed   Cooler needed? No   Do any medications need mixed or dated? No   Copay form of payment Need to discuss financial assistance   Additional comments For first fill a free month voucher was used   Questions or concerns for the pharmacist? No   Are any medications first time fills? Yes  [[facility]]        Pt picked up his first fill of Brukinsa [facility].       Follow-up: 21 day(s)     Marta Jansen  Specialty Pharmacy Technician

## 2023-02-22 NOTE — TELEPHONE ENCOUNTER
Caller: Evangelina Espino    Relationship: Emergency Contact    Best call back number: [phone]    What is the best time to reach you: ANYTIME    Who are you requesting to speak with (clinical staff, provider,  specific staff member): CLINICAL     What was the call regarding: EVANGELINA CALLING SHE PICKED UP LEATHA'S MEDS AND THE ONDANSETRON (ZOFRAN) 8MG TABLET, HE CANT SWALLOW CAN YOU SEND A SCRIPT FOR THE ONES THAT WILL DISSOLVE?    CALL TO DISCUSS    PHARMACY: [facility] [address] [place] [address]    Do you require a callback: YES           Please call the patients mother.  They need a note for school allowing her to carry a bag during the day due to her CRPS condition.      Holly Moser on 2/2/2023 at 12:49 PM

## 2023-02-22 NOTE — TELEPHONE ENCOUNTER
Caller: Evangelina Espino    Relationship: Emergency Contact    Best call back number:[phone]      What was the call regarding: PT WANTED TO KNOW HOW LONG HE WILL BE ON THE CHEMO MEDICATION     Do you require a callback: YES

## 2023-02-28 ENCOUNTER — TELEPHONE (OUTPATIENT)
Dept: ONCOLOGY | Facility: CLINIC | Age: 68
End: 2023-02-28
Payer: COMMERCIAL

## 2023-03-01 ENCOUNTER — TELEPHONE (OUTPATIENT)
Dept: ONCOLOGY | Facility: CLINIC | Age: 68
End: 2023-03-01
Payer: COMMERCIAL

## 2023-03-01 NOTE — TELEPHONE ENCOUNTER
Caller: Evangelina Espino    Relationship: Emergency Contact    Best call back number: [phone]    What is the best time to reach you: ANYTIME    Who are you requesting to speak with (clinical staff, provider,  specific staff member): CLINICAL    What was the call regarding: PT'S WIFE IS ASKING FOR A NURSE TO CALL BACK TO LET THEM KNOW WHAT TYPE OF SUNSCREEN THE PT CAN USE DUE TO SKIN BREAKING OUT     Do you require a callback: YES

## 2023-03-01 NOTE — TELEPHONE ENCOUNTER
Returned call to wife, who is reporting that the rash on his face is actually the skin cancer.  Advised her to make sure that he keeps his skin well moisturized and he can use Neutrogena sunscreen with a SPF of 30 or 50.  Best to use non alcohol based lotions.  She v/u.

## 2023-03-01 NOTE — TELEPHONE ENCOUNTER
Called patient to inform him letter was sent via email. No answer. Left voicemail. Deana Samuels RN

## 2023-03-02 ENCOUNTER — SPECIALTY PHARMACY (OUTPATIENT)
Dept: PHARMACY | Facility: HOSPITAL | Age: 68
End: 2023-03-02
Payer: COMMERCIAL

## 2023-03-02 ENCOUNTER — DOCUMENTATION (OUTPATIENT)
Dept: PHARMACY | Facility: HOSPITAL | Age: 68
End: 2023-03-02
Payer: COMMERCIAL

## 2023-03-02 DIAGNOSIS — C91.10 CHRONIC LYMPHOCYTIC LEUKEMIA: ICD-10-CM

## 2023-03-02 NOTE — PROGRESS NOTES
Re: Refills of Oral Specialty Medication - Brukinsa (zanubrutinib)    • Drug-Drug Interactions: The current medication list was reviewed and there are no relevant drug-drug interactions.  • Medication Allergies: The patient has no relevant allergies as it relates to their oral specialty medication  • Review of Labs/Dose Adjustments: NO DOSE CHANGE - I reviewed the most recent note and labs and the patient will continue without any dose changes.  I sent refills as described below.    Drug: Brukinsa (zanubrutinib)  Strength: 80 mg  Directions: Take 2 capsules by mouth twice a day  Quantity: 120   Refills: 3  Pharmacy prescription sent to: [facility]    Name/Credentials: Chastity Carbajal, Elizabeth, BCPS    3/2/2023  14:30 EST         Completed independent double check on medication order/RX.    Noel Billy, SelinaD, BCOP

## 2023-03-06 ENCOUNTER — LAB (OUTPATIENT)
Dept: LAB | Facility: HOSPITAL | Age: 68
End: 2023-03-06
Payer: COMMERCIAL

## 2023-03-06 ENCOUNTER — OFFICE VISIT (OUTPATIENT)
Dept: ONCOLOGY | Facility: CLINIC | Age: 68
End: 2023-03-06
Payer: COMMERCIAL

## 2023-03-06 VITALS
OXYGEN SATURATION: 98 % | BODY MASS INDEX: 25.49 KG/M2 | SYSTOLIC BLOOD PRESSURE: 119 MMHG | HEART RATE: 85 BPM | TEMPERATURE: 98.1 F | WEIGHT: 198.6 LBS | HEIGHT: 74 IN | DIASTOLIC BLOOD PRESSURE: 74 MMHG | RESPIRATION RATE: 16 BRPM

## 2023-03-06 DIAGNOSIS — R80.3: ICD-10-CM

## 2023-03-06 DIAGNOSIS — C91.10 CHRONIC LYMPHOCYTIC LEUKEMIA: Primary | ICD-10-CM

## 2023-03-06 DIAGNOSIS — C91.10 CHRONIC LYMPHOCYTIC LEUKEMIA: ICD-10-CM

## 2023-03-06 LAB
ALBUMIN SERPL-MCNC: 4.9 G/DL (ref 3.5–5.2)
ALBUMIN/GLOB SERPL: 2 G/DL
ALP SERPL-CCNC: 67 U/L (ref 39–117)
ALT SERPL W P-5'-P-CCNC: 30 U/L (ref 1–41)
ANION GAP SERPL CALCULATED.3IONS-SCNC: 9.4 MMOL/L (ref 5–15)
AST SERPL-CCNC: 29 U/L (ref 1–40)
BASOPHILS # BLD AUTO: 0.03 10*3/MM3 (ref 0–0.2)
BASOPHILS NFR BLD AUTO: 0 % (ref 0–1.5)
BILIRUB SERPL-MCNC: 1.4 MG/DL (ref 0–1.2)
BUN SERPL-MCNC: 17 MG/DL (ref 8–23)
BUN/CREAT SERPL: 9.6 (ref 7–25)
CALCIUM SPEC-SCNC: 9.7 MG/DL (ref 8.6–10.5)
CHLORIDE SERPL-SCNC: 101 MMOL/L (ref 98–107)
CO2 SERPL-SCNC: 26.6 MMOL/L (ref 22–29)
CREAT SERPL-MCNC: 1.78 MG/DL (ref 0.76–1.27)
DEPRECATED RDW RBC AUTO: 59.2 FL (ref 37–54)
EGFRCR SERPLBLD CKD-EPI 2021: 41 ML/MIN/1.73
EOSINOPHIL # BLD AUTO: 0.08 10*3/MM3 (ref 0–0.4)
EOSINOPHIL NFR BLD AUTO: 0.1 % (ref 0.3–6.2)
ERYTHROCYTE [DISTWIDTH] IN BLOOD BY AUTOMATED COUNT: 15.7 % (ref 12.3–15.4)
GLOBULIN UR ELPH-MCNC: 2.4 GM/DL
GLUCOSE SERPL-MCNC: 111 MG/DL (ref 65–99)
HCT VFR BLD AUTO: 33 % (ref 37.5–51)
HGB BLD-MCNC: 10 G/DL (ref 13–17.7)
IMM GRANULOCYTES # BLD AUTO: 0.14 10*3/MM3 (ref 0–0.05)
IMM GRANULOCYTES NFR BLD AUTO: 0.1 % (ref 0–0.5)
LYMPHOCYTES # BLD AUTO: 150.77 10*3/MM3 (ref 0.7–3.1)
LYMPHOCYTES NFR BLD AUTO: 97.5 % (ref 19.6–45.3)
MCH RBC QN AUTO: 34.6 PG (ref 26.6–33)
MCHC RBC AUTO-ENTMCNC: 30.3 G/DL (ref 31.5–35.7)
MCV RBC AUTO: 114.2 FL (ref 79–97)
MONOCYTES # BLD AUTO: 0.39 10*3/MM3 (ref 0.1–0.9)
MONOCYTES NFR BLD AUTO: 0.3 % (ref 5–12)
NEUTROPHILS NFR BLD AUTO: 2 % (ref 42.7–76)
NEUTROPHILS NFR BLD AUTO: 3.17 10*3/MM3 (ref 1.7–7)
NRBC BLD AUTO-RTO: 0 /100 WBC (ref 0–0.2)
PHOSPHATE SERPL-MCNC: 2.9 MG/DL (ref 2.5–4.5)
PLATELET # BLD AUTO: 130 10*3/MM3 (ref 140–450)
PMV BLD AUTO: 10.8 FL (ref 6–12)
POTASSIUM SERPL-SCNC: 3.7 MMOL/L (ref 3.5–5.2)
PROT SERPL-MCNC: 7.3 G/DL (ref 6–8.5)
RBC # BLD AUTO: 2.89 10*6/MM3 (ref 4.14–5.8)
SODIUM SERPL-SCNC: 137 MMOL/L (ref 136–145)
URATE SERPL-MCNC: 2.2 MG/DL (ref 3.4–7)
WBC NRBC COR # BLD: 154.58 10*3/MM3 (ref 3.4–10.8)

## 2023-03-06 PROCEDURE — 84100 ASSAY OF PHOSPHORUS: CPT | Performed by: INTERNAL MEDICINE

## 2023-03-06 PROCEDURE — 84550 ASSAY OF BLOOD/URIC ACID: CPT | Performed by: INTERNAL MEDICINE

## 2023-03-06 PROCEDURE — 36415 COLL VENOUS BLD VENIPUNCTURE: CPT

## 2023-03-06 PROCEDURE — 80053 COMPREHEN METABOLIC PANEL: CPT | Performed by: INTERNAL MEDICINE

## 2023-03-06 PROCEDURE — 99214 OFFICE O/P EST MOD 30 MIN: CPT | Performed by: INTERNAL MEDICINE

## 2023-03-06 PROCEDURE — 85025 COMPLETE CBC W/AUTO DIFF WBC: CPT

## 2023-03-06 NOTE — PROGRESS NOTES
Subjective     REASON FOR FOLLOW UP: CLL SINCE 2005 NOT REQUIRING ANY INTERVENTION    History of Present Illness     On 01/24/2023 this 67-year-old white male returns to the office after he was seen by Dr. Pelaez a few days ago in regard to general care and at that point after receiving prednisone his white count was in the 110 category. Obviously given the history of chronic lymphoid leukemia we brought the patient back today for reassessment. Clinically the patient states that he had severe pain in the right ear a couple of weeks ago. He was seen in the urgent care center and he was given prednisone and decongestant. He was not told that he had any infection. He never received antibiotic therapy. The symptoms improved very quickly after prednisone administration and he has completed this package. Now days he has no pain or sensation of stuffiness in his ear canals or any other alteration to speak of. His appetite is normal. His bowel activity and urination are normal. He has no fevers, chills, night sweats, pruritus, adenopathies or fatigue and he has not had any other infections or deterioration in performance status. He has not developed any autoimmunity. In the interim he has developed multiple skin lesions in the scalp and face and these are representation of actinic keratoses and very likely early skin cancers.     Besides this he has not had any other heart issues. No cough or shortness of breath. Excellent appetite, stable weight. Good bowel activity, proper urination. No bone pain. No swelling in his lower extremities. No neuropathy.    On 02/10/2023 I brought the patient back to the office because at the visit he had with me 2 weeks ago it turned out that the patient had an abnormal creatinine of 1.4. We called him back and we requested a urine specimen that documented 300 mg of protein in a random urine sample. This was very abnormal. 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 This tells me that this Bence-Dawson protein is very likely the reason for an abnormal creatinine very likely triggered by the production of this protein by his leukemia phenomenon that is very unusual and obviously this will obligate me in regard to the protection of his kidney function to initiate therapy for his leukemia at this time. That is the purpose of this visit. The patient remains asymptomatic with no other new respiratory issues. As we discussed the other day he is going to see his dermatologist in regard to his multiple areas of actinic keratosis and very likely skin cancers. Otherwise he does not have any new symptoms.   On 03/06/2023 the patient returned to the office after taking his medication for his CLL now for almost 2 weeks. He has not encountered any side effects of the medicine including no irregular heartbeat, no abnormal bleeding, minimal dizziness when he first stands up that goes away in question of 1 or 2 seconds. He has been monitoring his heart rate through his special watch with no issues of any nature. His bowel activity has remained normal. His appetite has remained normal. He has been drinking plenty of liquids excellent urinary output. Today we have documented that his white count has duplicated not uncommon phenomenon for the initiation of this medication. Please review below. In spite of this he is not having any new problems.      ·     Past Medical History:   Diagnosis Date   • Abnormal liver function test    • Alcohol abuse    • CLL (chronic lymphocytic leukemia) (HCC)    • Coronary artery disease     stent   • Heart disease    • History of pneumonia     1/2019   • Hyperlipidemia    • Hypertension    • Inguinal hernia     left side to have surgery 3/28/19   • Myocardial infarction (HCC)    • Screen for colon cancer 09/2016    Negative Cologaurd   • Screening PSA (prostate specific  antigen) 02/2013    .5   • Thrombocytopenia (HCC)         Past Surgical History:   Procedure Laterality Date   • CARDIAC CATHETERIZATION  2017   • CATARACT EXTRACTION Bilateral    • COLONOSCOPY N/A 06/05/2006    Normal, repeat in 10 years, Dr. Preethi Gonzalez   • COLONOSCOPY N/A 7/15/2020    Procedure: COLONOSCOPY TO CECUM & T.I. WITH COLD SNARE POLYPECTOMIES, CLIP PLACEMENT X 2;  Surgeon: Yennifer Salazar MD;  Location: [facility] ENDOSCOPY;  Service: Gastroenterology;  Laterality: N/A;  PRE- POSITIVE COLOGUARD  POST- COLON POLYPS, DIVERTICULOSIS, HEMORRHOIDS   • CORONARY ANGIOPLASTY WITH STENT PLACEMENT N/A 05/09/2017    Left heart catheterization, Selective native vessel coronary arteriography, Left ventriculography, Successful percutaneous intervention to the 100% occluded right coronary artery with a 3.5 x 38 mm Synergy drug-eluting stent (post-dilated w/ a 4.0 x 20 mm NC Emerge balloon), Selective right femoral angiography, Successful Perclose arteriotomy closure. Dr. James Pierson   • INGUINAL HERNIA REPAIR Left    • INGUINAL HERNIA REPAIR Right     x2   • INGUINAL HERNIA REPAIR Left 3/28/2019    Procedure: LEFT INGUINAL HERNIA REPAIR LAPAROSCOPIC;  Surgeon: Preethi Gonzalez MD;  Location: [facility] OR [facility];  Service: General   • LAPAROSCOPIC INGUINAL HERNIA REPAIR Right 10/03/2002    w/ extra peritoneal Goe-Benton mesh (transperitoneal repair), Dr. Preethi Gonzalez   • REFRACTIVE SURGERY Bilateral    • RETINAL DETACHMENT SURGERY Right 07/02/2013      ONCOLOGY HISTORY SHOLA ENNIS MD:ONCOLOGIC HISTORY:  On 12/22/10 he was reviewed.  He   had a totally normal white count with normal differential, predominance of lymphocytes.  Normal platelet count.  His chemistry profile was normal including LDH.  His beta-2 microglobulin was normal.  His quantitative immunoglobulins were normal.  His jane  p  heral blood flow cytometry documented a typical pattern by flow cytometry of CLL, CD5, CD19 negative, dim positive CD20,  ZAP-70 negative and CD38 negative.  His CT scan of the chest, abdomen and pelvis disclosed no peripheral adenopathy or hepatosplenomeg  a  ly.  His level of immunoglobulins was normal.  With this in mind we thought that the patient had very early stage disease with excellent prognostic features and we advised him to remain on observation.  He will be rechecked every 3 months for the first ye  ar and thereafter every 6 months depending on the clinical circumstances.  Appropriate booklet information was given to him and his wife to review and further learn.          Given the excellent characteristics of his disease on clinical grounds, I doubt at this time a bone marrow is needed.        On 1/8/13 his physical exam is unremarkable with no peripheral adenopathy or hepatosplenomegaly, no B symptoms, no infections, no autoimmunity.  On the other hand he had areas of the skin on the left cheek and right cheek bhavani  t look like actinic keratosis.  His white count was up to17,000 with a normal hemoglobin and borderline platelet count of 125,000.  We asked the patient to try to minimize stressors in life and we asked him to return back in four months.  We also asked priscila edwards to be seen by dermatology in order to treat his multiple actinic keratosis.          On 05/09/13 the patient was asymptomatic with regard to his chronic lymphoid leukemia, no fatigue, fevers, chills, repeated infections or peripheral lymphadenopathy.  His physi  francisco j exam was unremarkable with no palpable lymphadenopathy or hepatosplenomegaly.  His white count has now improved to 12,900, stable hemoglobin of 13.6 and stable platelet count of 121,000.  With this in mind we advised him to remain on observation, retu  rning for reevaluation in six months.         On 05/07/2014 the patient was asymptomatic in regard to his CLL with no autoimmunity, no infections and no progressive disease clinically.  On the other hand we have seen duplication of his white count  in one year.  T  he patient and wife are aware that eventually he will require intervention for this if the white count continues changing the way it is.  At this time we do not justify the need of any other therapy or any other testing.        Current Outpatient Medications on File Prior to Visit   Medication Sig Dispense Refill   • acetaminophen (TYLENOL) 500 MG tablet Take 2 tablets by mouth Every 6 (Six) Hours As Needed for Mild Pain.     • acyclovir (Zovirax) 400 MG tablet Take 1 tablet by mouth 2 (Two) Times a Day. 60 tablet 7   • allopurinol (Zyloprim) 300 MG tablet Take 0.5 tablets by mouth Daily. 15 tablet 0   • aspirin 81 MG EC tablet Take 1 tablet by mouth Every Other Day.     • carvedilol (COREG) 6.25 MG tablet TAKE 1 TABLET TWICE DAILY  WITH MEALS 180 tablet 1   • cetirizine (zyrTEC) 10 MG tablet Take 1 tablet by mouth Daily.     • fluticasone (FLONASE) 50 MCG/ACT nasal spray 2 sprays into the nostril(s) as directed by provider Daily. (Patient taking differently: 2 sprays into the nostril(s) as directed by provider Daily. Rarely used) 1 bottle 0   • Multiple Vitamins-Minerals (EYE VITAMINS & MINERALS PO) Take 1 tablet by mouth Daily.     • ondansetron ODT (ZOFRAN-ODT) 8 MG disintegrating tablet Place 1 tablet on the tongue Every 8 (Eight) Hours As Needed for Nausea or Vomiting. 30 tablet 5   • rosuvastatin (CRESTOR) 20 MG tablet Take 1 tablet by mouth Every Night.     • sulfamethoxazole-trimethoprim (BACTRIM DS,SEPTRA DS) 800-160 MG per tablet Take 1 tablet by mouth 3 (Three) Times a Week. 12 tablet 7   • ticagrelor (Brilinta) 60 MG tablet tablet Take 1 tablet by mouth 2 (Two) Times a Day. Told to stop 5 days before surgery 180 tablet 3   • Zanubrutinib (BRUKINSA) 80 MG chemo capsule Take 2 capsules by mouth 2 (Two) Times a Day. 120 capsule 3     No current facility-administered medications on file prior to visit.        ALLERGIES:    Allergies   Allergen Reactions   • Codeine Nausea Only and Nausea And  "Vomiting        Social History     Socioeconomic History   • Marital status:      Spouse name: Charlotte Espino   Tobacco Use   • Smoking status: Never   • Smokeless tobacco: Never   Vaping Use   • Vaping Use: Never used   Substance and Sexual Activity   • Alcohol use: Yes     Alcohol/week: 21.0 standard drinks     Types: 21 Shots of liquor per week   • Drug use: No   • Sexual activity: Defer        Family History   Problem Relation Age of Onset   • Heart attack Mother    • Heart disease Mother    • Diabetes Mother    • Aortic aneurysm Mother    • Coronary artery disease Mother    • Early death Mother    • No Known Problems Father    • Diabetes type II Other    • Diabetes Brother    • Hyperlipidemia Brother    • Stroke Sister 65   • Diabetes Sister    • Hyperlipidemia Sister    • Hypertension Sister    • No Known Problems Brother    • Stomach cancer Paternal Uncle    • Malig Hyperthermia Neg Hx           Objective     Vitals:    03/06/23 1629   BP: 119/74   Pulse: 85   Resp: 16   Temp: 98.1 °F (36.7 °C)   TempSrc: Oral   SpO2: 98%   Weight: 90.1 kg (198 lb 9.6 oz)   Height: 188 cm (74.02\")   PainSc: 0-No pain     Current Status 3/6/2023   ECOG score 0       Physical Exam                     GENERAL:  Well-developed, Patient  in no acute distress.   SKIN:  Warm, dry ,NO purpura ,no rash.ACTINIC KERATOSIS IN FACE  HEENT:  Pupils were equal and reactive to light and accomodation, conjunctivae noninjected,  normal visual acuity.   NECK:  Supple with good range of motion; no thyromegaly , no JVD or bruits,.No carotid artery pain, no carotid abnormal pulsation   LYMPHATICS:  No cervical, NO supraclavicular, NO axillary, NO inguinal adenopathies.  CARDIAC   normal rate , regular rhythm, without murmur,NO rubs NO S3 NO S4   LUNGS: normal breath sounds bilateral, no wheezing, NO rhonchi, NO crackles ,NO rubs.  VASCULAR VENOUS: no cyanosis, NO collateral circulation, NO varicosities, NO edema, NO palpable cords, NO pain,NO " erythema, NO pigmentation of the skin.  ABDOMEN:  Soft, NO pain,no hepatomegaly, no splenomegaly,no masses, no ascites, no collateral circulation,no distention.  EXTREMITIES  AND SPINE:  No clubbing, no cyanosis ,no deformities , no pain .No kyphosis,  no pain in spine, no pain in ribs , no pain in pelvic bone.  NEUROLOGICAL:  Patient was awake, alert, oriented to time, person and place.        RECENT LABS:  Lab Results   Component Value Date    .58 (C) 03/06/2023    HGB 10.0 (L) 03/06/2023    HCT 33.0 (L) 03/06/2023    .2 (H) 03/06/2023     (L) 03/06/2023                    Assessment & Plan    Mr. Espino is a 67 y.o. male is longstanding history of CLL dating back to initial diagnosis in 2005.  He has remained on a course of observation without any need for active treatment.    On 01/24/2023 the patient was further reviewed. His recent laboratory assessment by Dr. Pelaez after the patient received prednisone for dysfunction of the Eustachian tube sylvain his white count to 110,000. Today his white count is down to 88,000. The hemoglobin remains minimally low. The platelet count is normal. The patient has no peripheral adenopathy or hepatosplenomegaly. He has no B symptoms. He has no infection. He has no autoimmunity. Given these numbers I do not believe that I need to pull the trigger for this patient to be treated for his leukemia yet and I would like to review him back in 6 weeks and see how he is doing. I have not advised him to take any other medication or any other issue at this time.     In regard to his skin lesions I advised him to make an appointment to be seen by his dermatologist. He has so many in the scalp and face that he will require multiple areas of therapy at the same time.     On 02/10/2023 the patient returned to the office for follow up. As I pointed out above we documented that he had an abnormal creatinine the day of the visit on 01/24/2023 at 1.4, then we requested a urine  specimen that showed 300 mg of protein in a random specimen and triggered a urine collection of 24 hours for protein immunoelectrophoresis that disclosed a proteinuria posted above that is abnormal and also with the presence of Bence-Dawson protein. Obviously this tells me that his creatinine is abnormal because of glomerular disease by abnormal protein Bence-Dawson. Today his white count is further improved, the hemoglobin is 11.2, the platelet count is 112,000 and the patient has no peripheral adenopathy or B symptoms. Therefore the presence of Bence-Dawson proteinuria obligates me to trigger the need for initiation of therapy on this patient. I had a lengthy discussion with him in this regard and this is the summary of this:    The patient will require to be educated in regard to chemotherapy administration with the medication Brukinsa that he will initiate probably in the next 10 days. He will have the typical dose of this medicine given to him twice a day and he will require 2 days before the initiation of Brukinsa the initiation of allopurinol at a dose of 150 mg a day. The dose is adjusted according to his creatinine clearance.    The patient will require proper oral hydration taking this medicine for the next several weeks drinking probably 10 glasses of water a day. He will require measurement on a weekly basis of CBC and CMP.     I expect that the patient will require formal education and consent for the medicine and he will be educated by nurses in this regard in the next few days. I pointed out to the patient that this medicine will come from a speciality pharmacy and the medicine will be delivered to him probably in the next week or 10 days once that the approval is done. Once that all of these logistics are in place the patient will initiate his therapy. My expectation is that the patient will be having significant improvement in his blood counts in several weeks from now but I warned him that sometimes  the patients have a rise in the white count at the time of the initiation of the medicine that typically improves in a question of 6-8 weeks.    I also expect in the future probably in 2-3 months from now that the patient's hemoglobin and platelet count will improve. I think he has mild degree of anemia and mild degree of thrombocytopenia on the basis of clonal leukemia cells that is modifying the production of red cells and platelets in the bone marrow.     Obviously after undergoing therapy for this in the next 2-3 months we will repeat monoclonal protein analysis in the urine to be sure that his Bence-Dawson proteinuria disappears and I will expect some improvement or stability of his creatinine level.     Obviously we will need to see what happens in regard to his total white count. In order to do a parallel study in regard to monoclonal protein analysis in peripheral blood I went ahead today and obtained a serum protein immunoelectrophoresis. I will expect to find some similar protein in his peripheral blood.     I discussed all of these facts with the patient and his wife in the room. We will get the ball rolling for him to initiate his therapy and he will require laboratory testing on a weekly basis initiated in 2 weeks with a CBC, CMP and uric acid.   On 03/06/2023 I discussed with the patient and his wife the fact that even though he has not encountered any side effects of the medication his white blood cell count has duplicated today in the 150,000. This is not an uncommon phenomenon with these medications, Brukinsa for example. It can take 4-6 weeks for these numbers to come down. It is just the natural process of the medication to trigger decrease in the production of the leukemic clone and also favor for the leukemic clone to commit suicide. This per se has not had any issues for the patient. Typically CLL patients do not have hyperleukocytosis syndrome because the size of the white cells is not large  enough to trigger these alterations. On the other hand the patient will require continued monitoring on weekly basis. He will continue having a CBC on weekly basis, visit by me in 2 weeks and he will require as well continuation of CMP, LDH, uric acid and phosphorus level on weekly basis as well.     He will remain on allopurinol for the time being. With these kind of numbers it is difficult to predict uric acid levels in the close future. Hopefully with allopurinol on hand this will be controlled and with proper hydration that he is doing extremely well.     I am glad that I made the patient aware this his white count could raise, otherwise he will be having a heart attack here in front of me. Therefore, I provided the support that he needed and I discussed this with his wife present in the room.      ·   Russell Cagle MD  03/06/23

## 2023-03-07 ENCOUNTER — TELEPHONE (OUTPATIENT)
Dept: ONCOLOGY | Facility: CLINIC | Age: 68
End: 2023-03-07
Payer: COMMERCIAL

## 2023-03-07 NOTE — TELEPHONE ENCOUNTER
Caller: Evangelina Espino    Relationship: Emergency Contact    Best call back number: [phone]    What is the best time to reach you: ANYTIME    Who are you requesting to speak with (clinical staff, provider, specific staff member): CLINICAL TEAM    What was the call regarding: EVANGELINA STATED THAT PT HAS A SMALL BLOODY SORE ON THE ROOF OF HIS MOUTH. SHE IS CONCERNED ABOUT THIS BUT PT STATED IT ISN'T BOTHERING HIM. SHOULD THEY DO ANYTHING FOR THIS? PLEASE CALL TO ADVISE.     PHARMACY: [id number] - [place] [id number] [facility].  [phone] [facility] [phone]   [phone]    Do you require a callback: YES

## 2023-03-07 NOTE — TELEPHONE ENCOUNTER
Reviewed labs and note. Discussed w/ Pharmacist. Patient can try baking soda and salt water mixture or we'd be happy to send in Azra Magic. At this time pt's wife reports they will try the home mix and call back if ineffective. Deana Samuels RN

## 2023-03-10 ENCOUNTER — SPECIALTY PHARMACY (OUTPATIENT)
Dept: PHARMACY | Facility: HOSPITAL | Age: 68
End: 2023-03-10
Payer: COMMERCIAL

## 2023-03-10 ENCOUNTER — DOCUMENTATION (OUTPATIENT)
Dept: PHARMACY | Facility: HOSPITAL | Age: 68
End: 2023-03-10
Payer: COMMERCIAL

## 2023-03-10 NOTE — PROGRESS NOTES
Issue with the Brukinsa copay card rejection has been corrected. I will contact pt on 3/13/2023 to coordinate delivery. He picked up his last supply on 2/22/23 from [facility].       Marta Jansen  Specialty Pharmacy Technician

## 2023-03-14 ENCOUNTER — LAB (OUTPATIENT)
Dept: LAB | Facility: HOSPITAL | Age: 68
End: 2023-03-14
Payer: COMMERCIAL

## 2023-03-14 ENCOUNTER — DOCUMENTATION (OUTPATIENT)
Dept: PHARMACY | Facility: HOSPITAL | Age: 68
End: 2023-03-14
Payer: COMMERCIAL

## 2023-03-14 ENCOUNTER — CLINICAL SUPPORT (OUTPATIENT)
Dept: ONCOLOGY | Facility: HOSPITAL | Age: 68
End: 2023-03-14
Payer: COMMERCIAL

## 2023-03-14 DIAGNOSIS — R80.3: ICD-10-CM

## 2023-03-14 DIAGNOSIS — C91.10 CHRONIC LYMPHOCYTIC LEUKEMIA: ICD-10-CM

## 2023-03-14 DIAGNOSIS — C91.10 CHRONIC LYMPHOCYTIC LEUKEMIA OF B-CELL TYPE NOT HAVING ACHIEVED REMISSION: ICD-10-CM

## 2023-03-14 LAB
ALBUMIN SERPL-MCNC: 4.8 G/DL (ref 3.5–5.2)
ALBUMIN/GLOB SERPL: 1.9 G/DL (ref 1.1–2.4)
ALP SERPL-CCNC: 65 U/L (ref 38–116)
ALT SERPL W P-5'-P-CCNC: 23 U/L (ref 0–41)
ANION GAP SERPL CALCULATED.3IONS-SCNC: 12.3 MMOL/L (ref 5–15)
AST SERPL-CCNC: 25 U/L (ref 0–40)
BASOPHILS # BLD AUTO: 0.03 10*3/MM3 (ref 0–0.2)
BASOPHILS NFR BLD AUTO: 0 % (ref 0–1.5)
BILIRUB SERPL-MCNC: 0.9 MG/DL (ref 0.2–1.2)
BUN SERPL-MCNC: 12 MG/DL (ref 6–20)
BUN/CREAT SERPL: 7.5 (ref 7.3–30)
CALCIUM SPEC-SCNC: 10.1 MG/DL (ref 8.5–10.2)
CHLORIDE SERPL-SCNC: 101 MMOL/L (ref 98–107)
CO2 SERPL-SCNC: 23.7 MMOL/L (ref 22–29)
CREAT SERPL-MCNC: 1.6 MG/DL (ref 0.7–1.3)
DEPRECATED RDW RBC AUTO: 58.5 FL (ref 37–54)
EGFRCR SERPLBLD CKD-EPI 2021: 46.6 ML/MIN/1.73
EOSINOPHIL # BLD AUTO: 0.09 10*3/MM3 (ref 0–0.4)
EOSINOPHIL NFR BLD AUTO: 0.1 % (ref 0.3–6.2)
ERYTHROCYTE [DISTWIDTH] IN BLOOD BY AUTOMATED COUNT: 15.3 % (ref 12.3–15.4)
GLOBULIN UR ELPH-MCNC: 2.5 GM/DL (ref 1.8–3.5)
GLUCOSE SERPL-MCNC: 113 MG/DL (ref 74–124)
HCT VFR BLD AUTO: 33.7 % (ref 37.5–51)
HGB BLD-MCNC: 10.4 G/DL (ref 13–17.7)
IMM GRANULOCYTES # BLD AUTO: 0.11 10*3/MM3 (ref 0–0.05)
IMM GRANULOCYTES NFR BLD AUTO: 0.1 % (ref 0–0.5)
LYMPHOCYTES # BLD AUTO: 127.15 10*3/MM3 (ref 0.7–3.1)
LYMPHOCYTES NFR BLD AUTO: 97.4 % (ref 19.6–45.3)
MCH RBC QN AUTO: 34.9 PG (ref 26.6–33)
MCHC RBC AUTO-ENTMCNC: 30.9 G/DL (ref 31.5–35.7)
MCV RBC AUTO: 113.1 FL (ref 79–97)
MONOCYTES # BLD AUTO: 0.39 10*3/MM3 (ref 0.1–0.9)
MONOCYTES NFR BLD AUTO: 0.3 % (ref 5–12)
NEUTROPHILS NFR BLD AUTO: 2.1 % (ref 42.7–76)
NEUTROPHILS NFR BLD AUTO: 2.78 10*3/MM3 (ref 1.7–7)
NRBC BLD AUTO-RTO: 0 /100 WBC (ref 0–0.2)
PHOSPHATE SERPL-MCNC: 3.8 MG/DL (ref 2.5–4.5)
PLATELET # BLD AUTO: 104 10*3/MM3 (ref 140–450)
PMV BLD AUTO: 10.8 FL (ref 6–12)
POTASSIUM SERPL-SCNC: 4.3 MMOL/L (ref 3.5–4.7)
PROT SERPL-MCNC: 7.3 G/DL (ref 6.3–8)
RBC # BLD AUTO: 2.98 10*6/MM3 (ref 4.14–5.8)
SODIUM SERPL-SCNC: 137 MMOL/L (ref 134–145)
URATE SERPL-MCNC: 2.3 MG/DL (ref 2.8–7.4)
WBC NRBC COR # BLD: 130.55 10*3/MM3 (ref 3.4–10.8)

## 2023-03-14 PROCEDURE — 84100 ASSAY OF PHOSPHORUS: CPT

## 2023-03-14 PROCEDURE — 80053 COMPREHEN METABOLIC PANEL: CPT

## 2023-03-14 PROCEDURE — 36415 COLL VENOUS BLD VENIPUNCTURE: CPT

## 2023-03-14 PROCEDURE — 85025 COMPLETE CBC W/AUTO DIFF WBC: CPT

## 2023-03-14 PROCEDURE — 84550 ASSAY OF BLOOD/URIC ACID: CPT | Performed by: INTERNAL MEDICINE

## 2023-03-14 PROCEDURE — G0463 HOSPITAL OUTPT CLINIC VISIT: HCPCS

## 2023-03-14 NOTE — PROGRESS NOTES
I rec a call from Antonio at [facility]-She was inquiring about the copay assistance for Brukinsa for pt. The information from the copay card was provided and she was informed that the copay card is covering all cost after insurance pays ($2111.90).    She provided tertiary information should pt have a copay in the future.     ID-[id number]  GRP-*  [id number]  PCN-[id number]    Marta Jansen  Specialty Pharmacy Technician

## 2023-03-14 NOTE — NURSING NOTE
Patient is here for lab review with RN.  CBC reviewed, counts are stable for this patient at this time. Patient has no complaints. He continues on Brukinsa w/ good tolerance. WBC shows improvement today. Copy of labs given to patient and follow up appointment reviewed. Pt didn't stay for CMP/PO4/uric acid and understands he will be called w/ any concerning results. Patient is instructed to call the office with any concerns or new symptoms prior to next visit. Patient verbalized understanding and discharged in stable condition.    Lab Results   Component Value Date    .55 (C) 03/14/2023    HGB 10.4 (L) 03/14/2023    HCT 33.7 (L) 03/14/2023    .1 (H) 03/14/2023     (L) 03/14/2023

## 2023-03-15 ENCOUNTER — SPECIALTY PHARMACY (OUTPATIENT)
Dept: PHARMACY | Facility: HOSPITAL | Age: 68
End: 2023-03-15
Payer: COMMERCIAL

## 2023-03-15 NOTE — PROGRESS NOTES
Specialty Pharmacy Refill Coordination Note     Francisco is a 67 y.o. male contacted today regarding refills of Brukinsa specialty medication(s).    Reviewed and verified with patient:       Specialty medication(s) and dose(s) confirmed: yes  Brukinsa 80 mg caps-2 caps twice per day  (takes 8 am and 8 pm)    Refill Questions    Flowsheet Row Most Recent Value   Changes to allergies? No   Changes to medications? No   New conditions since last clinic visit No   Unplanned office visit, urgent care, ED, or hospital admission in the last 4 weeks  No   How does patient/caregiver feel medication is working? Good   Financial problems or insurance changes  No   Since the previous refill, were any specialty medication doses or scheduled injections missed or delayed?  No   Does this patient require a clinical escalation to a pharmacist? No          Delivery Questions    Flowsheet Row Most Recent Value   Delivery method [employer]  [[employer] Priority-Ship 3/16 for delivery 3/17-$0 copay with copay card-Address confirmed]   Delivery address correct? Yes  [Ship to home address]   Delivery phone number [phone]   Preferred delivery time? AM   Number of medications in delivery 1   Medication being filled and delivered Brukinsa   Doses left of specialty medications 4-5 days   Is there any medication that is due not being filled? No   Supplies needed? No supplies needed   Cooler needed? No   Do any medications need mixed or dated? No   Copay form of payment Payment plan already set up   Additional comments $0 copay with Copay card   Questions or concerns for the pharmacist? No   Explain any questions or concerns for the pharmacist N/A   Are any medications first time fills? No        Brukinsa delivery coordinated with pt for 3/17/2023 to his home address. $0 copay with the copay card. He picked up his last delivery at the [facility]. No questions or concerns to report to MTM Team today.     Follow-up: 21 day(s)     Marta TALLEY  Inderjit  Specialty Pharmacy Technician

## 2023-03-17 ENCOUNTER — DOCUMENTATION (OUTPATIENT)
Dept: PHARMACY | Facility: HOSPITAL | Age: 68
End: 2023-03-17
Payer: COMMERCIAL

## 2023-03-17 NOTE — PROGRESS NOTES
Brukinsa supply from [facility] delivered to pt on 3/17/2023.    [employer] tracking # [id number]     Marta Jansen  Specialty Pharmacy Technician

## 2023-03-19 DIAGNOSIS — I10 ESSENTIAL HYPERTENSION: ICD-10-CM

## 2023-03-19 DIAGNOSIS — I25.119 CORONARY ARTERY DISEASE INVOLVING NATIVE CORONARY ARTERY OF NATIVE HEART WITH ANGINA PECTORIS: ICD-10-CM

## 2023-03-21 ENCOUNTER — LAB (OUTPATIENT)
Dept: LAB | Facility: HOSPITAL | Age: 68
End: 2023-03-21
Payer: COMMERCIAL

## 2023-03-21 ENCOUNTER — OFFICE VISIT (OUTPATIENT)
Dept: ONCOLOGY | Facility: CLINIC | Age: 68
End: 2023-03-21
Payer: COMMERCIAL

## 2023-03-21 VITALS
SYSTOLIC BLOOD PRESSURE: 118 MMHG | TEMPERATURE: 98 F | OXYGEN SATURATION: 97 % | DIASTOLIC BLOOD PRESSURE: 73 MMHG | HEIGHT: 74 IN | WEIGHT: 197.2 LBS | HEART RATE: 82 BPM | RESPIRATION RATE: 16 BRPM | BODY MASS INDEX: 25.31 KG/M2

## 2023-03-21 DIAGNOSIS — C91.10 CHRONIC LYMPHOCYTIC LEUKEMIA: ICD-10-CM

## 2023-03-21 DIAGNOSIS — N28.9 KIDNEY DISEASE: ICD-10-CM

## 2023-03-21 DIAGNOSIS — C91.10 CHRONIC LYMPHOCYTIC LEUKEMIA: Primary | ICD-10-CM

## 2023-03-21 DIAGNOSIS — Z79.899 HIGH RISK MEDICATION USE: ICD-10-CM

## 2023-03-21 DIAGNOSIS — R80.3 BENCE JONES PROTEINURIA: ICD-10-CM

## 2023-03-21 LAB
ALBUMIN SERPL-MCNC: 4.7 G/DL (ref 3.5–5.2)
ALBUMIN/GLOB SERPL: 1.7 G/DL (ref 1.1–2.4)
ALP SERPL-CCNC: 63 U/L (ref 38–116)
ALT SERPL W P-5'-P-CCNC: 31 U/L (ref 0–41)
ANION GAP SERPL CALCULATED.3IONS-SCNC: 12.4 MMOL/L (ref 5–15)
AST SERPL-CCNC: 36 U/L (ref 0–40)
BASOPHILS # BLD AUTO: 0.04 10*3/MM3 (ref 0–0.2)
BASOPHILS NFR BLD AUTO: 0 % (ref 0–1.5)
BILIRUB SERPL-MCNC: 1 MG/DL (ref 0.2–1.2)
BUN SERPL-MCNC: 10 MG/DL (ref 6–20)
BUN/CREAT SERPL: 6.8 (ref 7.3–30)
CALCIUM SPEC-SCNC: 10 MG/DL (ref 8.5–10.2)
CHLORIDE SERPL-SCNC: 106 MMOL/L (ref 98–107)
CO2 SERPL-SCNC: 23.6 MMOL/L (ref 22–29)
CREAT SERPL-MCNC: 1.47 MG/DL (ref 0.7–1.3)
DEPRECATED RDW RBC AUTO: 58.4 FL (ref 37–54)
EGFRCR SERPLBLD CKD-EPI 2021: 51.6 ML/MIN/1.73
EOSINOPHIL # BLD AUTO: 0.05 10*3/MM3 (ref 0–0.4)
EOSINOPHIL NFR BLD AUTO: 0 % (ref 0.3–6.2)
ERYTHROCYTE [DISTWIDTH] IN BLOOD BY AUTOMATED COUNT: 15 % (ref 12.3–15.4)
GLOBULIN UR ELPH-MCNC: 2.7 GM/DL (ref 1.8–3.5)
GLUCOSE SERPL-MCNC: 97 MG/DL (ref 74–124)
HCT VFR BLD AUTO: 34.7 % (ref 37.5–51)
HGB BLD-MCNC: 10.8 G/DL (ref 13–17.7)
IMM GRANULOCYTES # BLD AUTO: 0.09 10*3/MM3 (ref 0–0.05)
IMM GRANULOCYTES NFR BLD AUTO: 0.1 % (ref 0–0.5)
LYMPHOCYTES # BLD AUTO: 113.15 10*3/MM3 (ref 0.7–3.1)
LYMPHOCYTES NFR BLD AUTO: 97.5 % (ref 19.6–45.3)
MCH RBC QN AUTO: 35.3 PG (ref 26.6–33)
MCHC RBC AUTO-ENTMCNC: 31.1 G/DL (ref 31.5–35.7)
MCV RBC AUTO: 113.4 FL (ref 79–97)
MONOCYTES # BLD AUTO: 0.39 10*3/MM3 (ref 0.1–0.9)
MONOCYTES NFR BLD AUTO: 0.3 % (ref 5–12)
NEUTROPHILS NFR BLD AUTO: 2.1 % (ref 42.7–76)
NEUTROPHILS NFR BLD AUTO: 2.38 10*3/MM3 (ref 1.7–7)
NRBC BLD AUTO-RTO: 0 /100 WBC (ref 0–0.2)
PHOSPHATE SERPL-MCNC: 3.6 MG/DL (ref 2.5–4.5)
PLATELET # BLD AUTO: 98 10*3/MM3 (ref 140–450)
PMV BLD AUTO: 10.9 FL (ref 6–12)
POTASSIUM SERPL-SCNC: 4.4 MMOL/L (ref 3.5–4.7)
PROT SERPL-MCNC: 7.4 G/DL (ref 6.3–8)
RBC # BLD AUTO: 3.06 10*6/MM3 (ref 4.14–5.8)
SODIUM SERPL-SCNC: 142 MMOL/L (ref 134–145)
URATE SERPL-MCNC: 2.7 MG/DL (ref 2.8–7.4)
WBC NRBC COR # BLD: 116.1 10*3/MM3 (ref 3.4–10.8)

## 2023-03-21 PROCEDURE — 99214 OFFICE O/P EST MOD 30 MIN: CPT | Performed by: INTERNAL MEDICINE

## 2023-03-21 PROCEDURE — 36415 COLL VENOUS BLD VENIPUNCTURE: CPT

## 2023-03-21 PROCEDURE — 84100 ASSAY OF PHOSPHORUS: CPT

## 2023-03-21 PROCEDURE — 84550 ASSAY OF BLOOD/URIC ACID: CPT

## 2023-03-21 PROCEDURE — 80053 COMPREHEN METABOLIC PANEL: CPT

## 2023-03-21 PROCEDURE — 85025 COMPLETE CBC W/AUTO DIFF WBC: CPT

## 2023-03-21 RX ORDER — ALLOPURINOL 300 MG/1
TABLET ORAL
Qty: 15 TABLET | Refills: 0 | Status: SHIPPED | OUTPATIENT
Start: 2023-03-21 | End: 2023-03-21 | Stop reason: SDUPTHER

## 2023-03-21 RX ORDER — ALLOPURINOL 300 MG/1
150 TABLET ORAL DAILY
Qty: 30 TABLET | Refills: 0 | Status: SHIPPED | OUTPATIENT
Start: 2023-03-21

## 2023-03-21 RX ORDER — CARVEDILOL 6.25 MG/1
TABLET ORAL
Qty: 180 TABLET | Refills: 1 | Status: SHIPPED | OUTPATIENT
Start: 2023-03-21

## 2023-03-21 NOTE — PROGRESS NOTES
Subjective     REASON FOR FOLLOW UP: CLL SINCE 2005 NOT REQUIRING ANY INTERVENTION    History of Present Illness     On 01/24/2023 this 67-year-old white male returns to the office after he was seen by Dr. Pelaez a few days ago in regard to general care and at that point after receiving prednisone his white count was in the 110 category. Obviously given the history of chronic lymphoid leukemia we brought the patient back today for reassessment. Clinically the patient states that he had severe pain in the right ear a couple of weeks ago. He was seen in the urgent care center and he was given prednisone and decongestant. He was not told that he had any infection. He never received antibiotic therapy. The symptoms improved very quickly after prednisone administration and he has completed this package. Now days he has no pain or sensation of stuffiness in his ear canals or any other alteration to speak of. His appetite is normal. His bowel activity and urination are normal. He has no fevers, chills, night sweats, pruritus, adenopathies or fatigue and he has not had any other infections or deterioration in performance status. He has not developed any autoimmunity. In the interim he has developed multiple skin lesions in the scalp and face and these are representation of actinic keratoses and very likely early skin cancers.     Besides this he has not had any other heart issues. No cough or shortness of breath. Excellent appetite, stable weight. Good bowel activity, proper urination. No bone pain. No swelling in his lower extremities. No neuropathy.    On 02/10/2023 I brought the patient back to the office because at the visit he had with me 2 weeks ago it turned out that the patient had an abnormal creatinine of 1.4. We called him back and we requested a urine specimen that documented 300 mg of protein in a random urine sample. This was very abnormal. 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 This tells me that this Bence-Dawson protein is very likely the reason for an abnormal creatinine very likely triggered by the production of this protein by his leukemia phenomenon that is very unusual and obviously this will obligate me in regard to the protection of his kidney function to initiate therapy for his leukemia at this time. That is the purpose of this visit. The patient remains asymptomatic with no other new respiratory issues. As we discussed the other day he is going to see his dermatologist in regard to his multiple areas of actinic keratosis and very likely skin cancers. Otherwise he does not have any new symptoms.   On 03/06/2023 the patient returned to the office after taking his medication for his CLL now for almost 2 weeks. He has not encountered any side effects of the medicine including no irregular heartbeat, no abnormal bleeding, minimal dizziness when he first stands up that goes away in question of 1 or 2 seconds. He has been monitoring his heart rate through his special watch with no issues of any nature. His bowel activity has remained normal. His appetite has remained normal. He has been drinking plenty of liquids excellent urinary output. Today we have documented that his white count has duplicated not uncommon phenomenon for the initiation of this medication. Please review below. In spite of this he is not having any new problems.  The patient returned to the office on 03/21/2023. Since the previous visit he has remained on his high risk medication with no palpitations, no chest pain, no abnormal bleeding. Tolerance to the medicine has been excellent with no nausea, vomiting, diarrhea. Significant enough his white count has dramatically dropped today and the hemoglobin keeps improving. The patient has not encountered any difficulties with the uric acid and he remains on allopurinol. His  phosphorus level has remained normal, his potassium level is normal and his creatinine has leveled off. His skin cancer is becoming worse on the forehead and in the left temple and I encouraged him to be seen by his dermatologist now.    He has not had any fever, chills or infection. He has not had any abnormal bleeding, no pain. His energy level and appetite remain normal with no cardiovascular or respiratory issues.        ·     Past Medical History:   Diagnosis Date   • Abnormal liver function test    • Alcohol abuse    • CLL (chronic lymphocytic leukemia) (HCC)    • Coronary artery disease     stent   • Heart disease    • History of pneumonia     1/2019   • Hyperlipidemia    • Hypertension    • Inguinal hernia     left side to have surgery 3/28/19   • Myocardial infarction (HCC)    • Screen for colon cancer 09/2016    Negative Cologaurd   • Screening PSA (prostate specific antigen) 02/2013    .5   • Thrombocytopenia (HCC)         Past Surgical History:   Procedure Laterality Date   • CARDIAC CATHETERIZATION  2017   • CATARACT EXTRACTION Bilateral    • COLONOSCOPY N/A 06/05/2006    Normal, repeat in 10 years, Dr. Preethi Gonzalez   • COLONOSCOPY N/A 7/15/2020    Procedure: COLONOSCOPY TO CECUM & T.I. WITH COLD SNARE POLYPECTOMIES, CLIP PLACEMENT X 2;  Surgeon: Yennifer Salazar MD;  Location: [facility] ENDOSCOPY;  Service: Gastroenterology;  Laterality: N/A;  PRE- POSITIVE COLOGUARD  POST- COLON POLYPS, DIVERTICULOSIS, HEMORRHOIDS   • CORONARY ANGIOPLASTY WITH STENT PLACEMENT N/A 05/09/2017    Left heart catheterization, Selective native vessel coronary arteriography, Left ventriculography, Successful percutaneous intervention to the 100% occluded right coronary artery with a 3.5 x 38 mm Synergy drug-eluting stent (post-dilated w/ a 4.0 x 20 mm NC Emerge balloon), Selective right femoral angiography, Successful Perclose arteriotomy closure. Dr. James Pierson   • INGUINAL HERNIA REPAIR Left    • INGUINAL HERNIA  REPAIR Right     x2   • INGUINAL HERNIA REPAIR Left 3/28/2019    Procedure: LEFT INGUINAL HERNIA REPAIR LAPAROSCOPIC;  Surgeon: Preethi Gonzalez MD;  [place] [facility] [place] [facility] [place]  Service: General   • LAPAROSCOPIC INGUINAL HERNIA REPAIR Right 10/03/2002    w/ extra peritoneal Goe-Benton mesh (transperitoneal repair), Dr. Preethi Gonzalez   • REFRACTIVE SURGERY Bilateral    • RETINAL DETACHMENT SURGERY Right 07/02/2013      ONCOLOGY HISTORY SHOLA ENNIS MD:ONCOLOGIC HISTORY:  On 12/22/10 he was reviewed.  He   had a totally normal white count with normal differential, predominance of lymphocytes.  Normal platelet count.  His chemistry profile was normal including LDH.  His beta-2 microglobulin was normal.  His quantitative immunoglobulins were normal.  His jane  p  heral blood flow cytometry documented a typical pattern by flow cytometry of CLL, CD5, CD19 negative, dim positive CD20, ZAP-70 negative and CD38 negative.  His CT scan of the chest, abdomen and pelvis disclosed no peripheral adenopathy or hepatosplenomeg  a  ly.  His level of immunoglobulins was normal.  With this in mind we thought that the patient had very early stage disease with excellent prognostic features and we advised him to remain on observation.  He will be rechecked every 3 months for the first ye  ar and thereafter every 6 months depending on the clinical circumstances.  Appropriate booklet information was given to him and his wife to review and further learn.          Given the excellent characteristics of his disease on clinical grounds, I doubt at this time a bone marrow is needed.        On 1/8/13 his physical exam is unremarkable with no peripheral adenopathy or hepatosplenomegaly, no B symptoms, no infections, no autoimmunity.  On the other hand he had areas of the skin on the left cheek and right cheek bhavani  t look like actinic keratosis.  His white count was up to17,000 with a normal hemoglobin and borderline platelet count of 125,000.  We  asked the patient to try to minimize stressors in life and we asked him to return back in four months.  We also asked priscila edwards to be seen by dermatology in order to treat his multiple actinic keratosis.          On 05/09/13 the patient was asymptomatic with regard to his chronic lymphoid leukemia, no fatigue, fevers, chills, repeated infections or peripheral lymphadenopathy.  His physi  francisco j exam was unremarkable with no palpable lymphadenopathy or hepatosplenomegaly.  His white count has now improved to 12,900, stable hemoglobin of 13.6 and stable platelet count of 121,000.  With this in mind we advised him to remain on observation, retu  rning for reevaluation in six months.         On 05/07/2014 the patient was asymptomatic in regard to his CLL with no autoimmunity, no infections and no progressive disease clinically.  On the other hand we have seen duplication of his white count in one year.  T  he patient and wife are aware that eventually he will require intervention for this if the white count continues changing the way it is.  At this time we do not justify the need of any other therapy or any other testing.        Current Outpatient Medications on File Prior to Visit   Medication Sig Dispense Refill   • acetaminophen (TYLENOL) 500 MG tablet Take 2 tablets by mouth Every 6 (Six) Hours As Needed for Mild Pain.     • acyclovir (Zovirax) 400 MG tablet Take 1 tablet by mouth 2 (Two) Times a Day. 60 tablet 7   • allopurinol (Zyloprim) 300 MG tablet Take 0.5 tablets by mouth Daily. 15 tablet 0   • aspirin 81 MG EC tablet Take 1 tablet by mouth Every Other Day.     • carvedilol (COREG) 6.25 MG tablet TAKE 1 TABLET TWICE DAILY  WITH MEALS 180 tablet 1   • cetirizine (zyrTEC) 10 MG tablet Take 1 tablet by mouth Daily.     • fluticasone (FLONASE) 50 MCG/ACT nasal spray 2 sprays into the nostril(s) as directed by provider Daily. (Patient taking differently: 2 sprays into the nostril(s) as directed by provider Daily. Rarely  used) 1 bottle 0   • Multiple Vitamins-Minerals (EYE VITAMINS & MINERALS PO) Take 1 tablet by mouth Daily.     • ondansetron ODT (ZOFRAN-ODT) 8 MG disintegrating tablet Place 1 tablet on the tongue Every 8 (Eight) Hours As Needed for Nausea or Vomiting. 30 tablet 5   • rosuvastatin (CRESTOR) 20 MG tablet Take 1 tablet by mouth Every Night.     • sulfamethoxazole-trimethoprim (BACTRIM DS,SEPTRA DS) 800-160 MG per tablet Take 1 tablet by mouth 3 (Three) Times a Week. 12 tablet 7   • ticagrelor (Brilinta) 60 MG tablet tablet Take 1 tablet by mouth 2 (Two) Times a Day. Told to stop 5 days before surgery 180 tablet 3   • Zanubrutinib (BRUKINSA) 80 MG chemo capsule Take 2 capsules by mouth 2 (Two) Times a Day. 120 capsule 3     No current facility-administered medications on file prior to visit.        ALLERGIES:    Allergies   Allergen Reactions   • Codeine Nausea Only and Nausea And Vomiting        Social History     Socioeconomic History   • Marital status:      Spouse name: Charlotte Espino   Tobacco Use   • Smoking status: Never   • Smokeless tobacco: Never   Vaping Use   • Vaping Use: Never used   Substance and Sexual Activity   • Alcohol use: Yes     Alcohol/week: 21.0 standard drinks     Types: 21 Shots of liquor per week   • Drug use: No   • Sexual activity: Defer        Family History   Problem Relation Age of Onset   • Heart attack Mother    • Heart disease Mother    • Diabetes Mother    • Aortic aneurysm Mother    • Coronary artery disease Mother    • Early death Mother    • No Known Problems Father    • Diabetes type II Other    • Diabetes Brother    • Hyperlipidemia Brother    • Stroke Sister 65   • Diabetes Sister    • Hyperlipidemia Sister    • Hypertension Sister    • No Known Problems Brother    • Stomach cancer Paternal Uncle    • Malig Hyperthermia Neg Hx           Objective     Vitals:    03/21/23 0855   BP: 118/73   Pulse: 82   Resp: 16   Temp: 98 °F (36.7 °C)   TempSrc: Temporal   SpO2: 97%  "  Weight: 89.4 kg (197 lb 3.2 oz)   Height: 188 cm (74.02\")   PainSc: 0-No pain     Current Status 3/21/2023   ECOG score 0       Physical Exam                       GENERAL:  Well-developed, Patient  in no acute distress.   SKIN:  Warm, dry ,NO purpura ,no rash.Multiple areas of actinic keratosis in the scalp and also skin cancer in the left temple area.      HEENT:  Pupils were equal and reactive to light and accomodation, conjunctivae noninjected,  normal visual acuity.   NECK:  Supple with good range of motion; no thyromegaly , no JVD or bruits,.No carotid artery pain, no carotid abnormal pulsation   LYMPHATICS:  No cervical, NO supraclavicular, NO axillary, NO inguinal adenopathies.  CARDIAC   normal rate , regular rhythm, without murmur,NO rubs NO S3 NO S4   LUNGS: normal breath sounds bilateral, no wheezing, NO rhonchi, NO crackles ,NO rubs.  VASCULAR VENOUS: no cyanosis, NO collateral circulation, NO varicosities, NO edema, NO palpable cords, NO pain,NO erythema, NO pigmentation of the skin.  ABDOMEN:  Soft, NO pain,no hepatomegaly, no splenomegaly,no masses, no ascites, no collateral circulation,no distention.  EXTREMITIES  AND SPINE:  No clubbing, no cyanosis ,no deformities , no pain .No kyphosis,  no pain in spine, no pain in ribs , no pain in pelvic bone.  NEUROLOGICAL:  Patient was awake, alert, oriented to time, person and place.          RECENT LABS:  Lab Results   Component Value Date    .10 (C) 03/21/2023    HGB 10.8 (L) 03/21/2023    HCT 34.7 (L) 03/21/2023    .4 (H) 03/21/2023    PLT 98 (L) 03/21/2023     Results from last 7 days   Lab Units 03/21/23  0835 03/14/23  1104   WBC 10*3/mm3 116.10* 130.55*   NEUTROS ABS 10*3/mm3 2.38 2.78   HEMOGLOBIN g/dL 10.8* 10.4*   HEMATOCRIT % 34.7* 33.7*   PLATELETS 10*3/mm3 98* 104*     Results from last 7 days   Lab Units 03/14/23  1104   SODIUM mmol/L 137   POTASSIUM mmol/L 4.3   CHLORIDE mmol/L 101   CO2 mmol/L 23.7   BUN mg/dL 12   CREATININE " mg/dL 1.60*   CALCIUM mg/dL 10.1   ALBUMIN g/dL 4.8   BILIRUBIN mg/dL 0.9   ALK PHOS U/L 65   ALT (SGPT) U/L 23   AST (SGOT) U/L 25   GLUCOSE mg/dL 113            Assessment & Plan    Mr. Espino is a 67 y.o. male is longstanding history of CLL dating back to initial diagnosis in 2005.  He has remained on a course of observation without any need for active treatment.    On 01/24/2023 the patient was further reviewed. His recent laboratory assessment by Dr. Pelaez after the patient received prednisone for dysfunction of the Eustachian tube sylvain his white count to 110,000. Today his white count is down to 88,000. The hemoglobin remains minimally low. The platelet count is normal. The patient has no peripheral adenopathy or hepatosplenomegaly. He has no B symptoms. He has no infection. He has no autoimmunity. Given these numbers I do not believe that I need to pull the trigger for this patient to be treated for his leukemia yet and I would like to review him back in 6 weeks and see how he is doing. I have not advised him to take any other medication or any other issue at this time.     In regard to his skin lesions I advised him to make an appointment to be seen by his dermatologist. He has so many in the scalp and face that he will require multiple areas of therapy at the same time.     On 02/10/2023 the patient returned to the office for follow up. As I pointed out above we documented that he had an abnormal creatinine the day of the visit on 01/24/2023 at 1.4, then we requested a urine specimen that showed 300 mg of protein in a random specimen and triggered a urine collection of 24 hours for protein immunoelectrophoresis that disclosed a proteinuria posted above that is abnormal and also with the presence of Bence-Dawson protein. Obviously this tells me that his creatinine is abnormal because of glomerular disease by abnormal protein Bence-Dawson. Today his white count is further improved, the hemoglobin is 11.2, the  platelet count is 112,000 and the patient has no peripheral adenopathy or B symptoms. Therefore the presence of Bence-Dawson proteinuria obligates me to trigger the need for initiation of therapy on this patient. I had a lengthy discussion with him in this regard and this is the summary of this:    The patient will require to be educated in regard to chemotherapy administration with the medication Brukinsa that he will initiate probably in the next 10 days. He will have the typical dose of this medicine given to him twice a day and he will require 2 days before the initiation of Brukinsa the initiation of allopurinol at a dose of 150 mg a day. The dose is adjusted according to his creatinine clearance.    The patient will require proper oral hydration taking this medicine for the next several weeks drinking probably 10 glasses of water a day. He will require measurement on a weekly basis of CBC and CMP.     I expect that the patient will require formal education and consent for the medicine and he will be educated by nurses in this regard in the next few days. I pointed out to the patient that this medicine will come from a speciality pharmacy and the medicine will be delivered to him probably in the next week or 10 days once that the approval is done. Once that all of these logistics are in place the patient will initiate his therapy. My expectation is that the patient will be having significant improvement in his blood counts in several weeks from now but I warned him that sometimes the patients have a rise in the white count at the time of the initiation of the medicine that typically improves in a question of 6-8 weeks.    I also expect in the future probably in 2-3 months from now that the patient's hemoglobin and platelet count will improve. I think he has mild degree of anemia and mild degree of thrombocytopenia on the basis of clonal leukemia cells that is modifying the production of red cells and platelets  in the bone marrow.     Obviously after undergoing therapy for this in the next 2-3 months we will repeat monoclonal protein analysis in the urine to be sure that his Bence-Dawson proteinuria disappears and I will expect some improvement or stability of his creatinine level.     Obviously we will need to see what happens in regard to his total white count. In order to do a parallel study in regard to monoclonal protein analysis in peripheral blood I went ahead today and obtained a serum protein immunoelectrophoresis. I will expect to find some similar protein in his peripheral blood.     I discussed all of these facts with the patient and his wife in the room. We will get the ball rolling for him to initiate his therapy and he will require laboratory testing on a weekly basis initiated in 2 weeks with a CBC, CMP and uric acid.   On 03/06/2023 I discussed with the patient and his wife the fact that even though he has not encountered any side effects of the medication his white blood cell count has duplicated today in the 150,000. This is not an uncommon phenomenon with these medications, Brukinsa for example. It can take 4-6 weeks for these numbers to come down. It is just the natural process of the medication to trigger decrease in the production of the leukemic clone and also favor for the leukemic clone to commit suicide. This per se has not had any issues for the patient. Typically CLL patients do not have hyperleukocytosis syndrome because the size of the white cells is not large enough to trigger these alterations. On the other hand the patient will require continued monitoring on weekly basis. He will continue having a CBC on weekly basis, visit by me in 2 weeks and he will require as well continuation of CMP, LDH, uric acid and phosphorus level on weekly basis as well.     He will remain on allopurinol for the time being. With these kind of numbers it is difficult to predict uric acid levels in the close  future. Hopefully with allopurinol on hand this will be controlled and with proper hydration that he is doing extremely well.     I am glad that I made the patient aware this his white count could raise, otherwise he will be having a heart attack here in front of me. Therefore, I provided the support that he needed and I discussed this with his wife present in the room.  The patient was further reviewed on 03/21/2023. So far tolerance to his medication is excellent. He has not encountered any side effects including no cardiac irregularity and no clinical bleeding. Finally today his white count has dropped by 40,000 in comparison to 2 weeks ago. The hemoglobin has improved. His creatinine is starting to come down and I think the patient is on the right track in regard to improvement in his hematological parameters. I encouraged him to remain on his medication for the time being Brukinsa. The dose will not require to be changed.     We will continue monitoring him every 2 weeks.    He will require CBC, CMP, uric acid every 2 weeks as well.    Given the fact that his skin cancer in the left temple has progressed in the last several weeks I advised him to be seen by his dermatologist now.    I went ahead and renewed his allopurinol that I want him to take until his white count completely normalizes. I will continue monitoring his creatinine and once that his white count normalizes we will do a urine collection for 24 hours to recheck monoclonal protein studies. By then this should be disappearing. By then also we will expect creatinine level to be better.     Otherwise no other issues pertinent to his care. Discussed with him and his wife present in the room.    High risk medication use    ·   Russell Cagle MD  03/21/23

## 2023-04-06 ENCOUNTER — LAB (OUTPATIENT)
Dept: LAB | Facility: HOSPITAL | Age: 68
End: 2023-04-06
Payer: COMMERCIAL

## 2023-04-06 ENCOUNTER — OFFICE VISIT (OUTPATIENT)
Dept: ONCOLOGY | Facility: CLINIC | Age: 68
End: 2023-04-06
Payer: COMMERCIAL

## 2023-04-06 VITALS
SYSTOLIC BLOOD PRESSURE: 130 MMHG | BODY MASS INDEX: 25.55 KG/M2 | HEIGHT: 74 IN | WEIGHT: 199.1 LBS | TEMPERATURE: 98.2 F | OXYGEN SATURATION: 97 % | HEART RATE: 85 BPM | DIASTOLIC BLOOD PRESSURE: 84 MMHG

## 2023-04-06 DIAGNOSIS — C91.10 CHRONIC LYMPHOCYTIC LEUKEMIA: Primary | ICD-10-CM

## 2023-04-06 DIAGNOSIS — R80.3 BENCE JONES PROTEINURIA: ICD-10-CM

## 2023-04-06 DIAGNOSIS — Z79.899 HIGH RISK MEDICATION USE: ICD-10-CM

## 2023-04-06 DIAGNOSIS — C91.10 CHRONIC LYMPHOCYTIC LEUKEMIA: ICD-10-CM

## 2023-04-06 LAB
ALBUMIN SERPL-MCNC: 4.7 G/DL (ref 3.5–5.2)
ALBUMIN/GLOB SERPL: 1.9 G/DL (ref 1.1–2.4)
ALP SERPL-CCNC: 68 U/L (ref 38–116)
ALT SERPL W P-5'-P-CCNC: 27 U/L (ref 0–41)
ANION GAP SERPL CALCULATED.3IONS-SCNC: 9.9 MMOL/L (ref 5–15)
AST SERPL-CCNC: 29 U/L (ref 0–40)
BASOPHILS # BLD AUTO: 0.02 10*3/MM3 (ref 0–0.2)
BASOPHILS NFR BLD AUTO: 0 % (ref 0–1.5)
BILIRUB SERPL-MCNC: 1.4 MG/DL (ref 0.2–1.2)
BUN SERPL-MCNC: 14 MG/DL (ref 6–20)
BUN/CREAT SERPL: 9 (ref 7.3–30)
CALCIUM SPEC-SCNC: 10 MG/DL (ref 8.5–10.2)
CHLORIDE SERPL-SCNC: 102 MMOL/L (ref 98–107)
CO2 SERPL-SCNC: 26.1 MMOL/L (ref 22–29)
CREAT SERPL-MCNC: 1.56 MG/DL (ref 0.7–1.3)
DEPRECATED RDW RBC AUTO: 54.7 FL (ref 37–54)
EGFRCR SERPLBLD CKD-EPI 2021: 48.1 ML/MIN/1.73
EOSINOPHIL # BLD AUTO: 0.03 10*3/MM3 (ref 0–0.4)
EOSINOPHIL NFR BLD AUTO: 0 % (ref 0.3–6.2)
ERYTHROCYTE [DISTWIDTH] IN BLOOD BY AUTOMATED COUNT: 13.6 % (ref 12.3–15.4)
GLOBULIN UR ELPH-MCNC: 2.5 GM/DL (ref 1.8–3.5)
GLUCOSE SERPL-MCNC: 131 MG/DL (ref 74–124)
HCT VFR BLD AUTO: 36.1 % (ref 37.5–51)
HGB BLD-MCNC: 11.7 G/DL (ref 13–17.7)
IMM GRANULOCYTES # BLD AUTO: 0.08 10*3/MM3 (ref 0–0.05)
IMM GRANULOCYTES NFR BLD AUTO: 0.1 % (ref 0–0.5)
LYMPHOCYTES # BLD AUTO: 70.87 10*3/MM3 (ref 0.7–3.1)
LYMPHOCYTES NFR BLD AUTO: 94.4 % (ref 19.6–45.3)
MCH RBC QN AUTO: 36.1 PG (ref 26.6–33)
MCHC RBC AUTO-ENTMCNC: 32.4 G/DL (ref 31.5–35.7)
MCV RBC AUTO: 111.4 FL (ref 79–97)
MONOCYTES # BLD AUTO: 0.63 10*3/MM3 (ref 0.1–0.9)
MONOCYTES NFR BLD AUTO: 0.8 % (ref 5–12)
NEUTROPHILS NFR BLD AUTO: 3.47 10*3/MM3 (ref 1.7–7)
NEUTROPHILS NFR BLD AUTO: 4.7 % (ref 42.7–76)
NRBC BLD AUTO-RTO: 0 /100 WBC (ref 0–0.2)
PHOSPHATE SERPL-MCNC: 2.8 MG/DL (ref 2.5–4.5)
PLATELET # BLD AUTO: 106 10*3/MM3 (ref 140–450)
PMV BLD AUTO: 10.8 FL (ref 6–12)
POTASSIUM SERPL-SCNC: 4.4 MMOL/L (ref 3.5–4.7)
PROT SERPL-MCNC: 7.2 G/DL (ref 6.3–8)
RBC # BLD AUTO: 3.24 10*6/MM3 (ref 4.14–5.8)
SODIUM SERPL-SCNC: 138 MMOL/L (ref 134–145)
URATE SERPL-MCNC: 2.5 MG/DL (ref 2.8–7.4)
WBC NRBC COR # BLD: 75.1 10*3/MM3 (ref 3.4–10.8)

## 2023-04-06 PROCEDURE — 84100 ASSAY OF PHOSPHORUS: CPT

## 2023-04-06 PROCEDURE — 36415 COLL VENOUS BLD VENIPUNCTURE: CPT

## 2023-04-06 PROCEDURE — 80053 COMPREHEN METABOLIC PANEL: CPT

## 2023-04-06 PROCEDURE — 99214 OFFICE O/P EST MOD 30 MIN: CPT | Performed by: NURSE PRACTITIONER

## 2023-04-06 PROCEDURE — 84550 ASSAY OF BLOOD/URIC ACID: CPT

## 2023-04-06 PROCEDURE — 85025 COMPLETE CBC W/AUTO DIFF WBC: CPT

## 2023-04-06 NOTE — PROGRESS NOTES
Subjective     REASON FOR FOLLOW UP:   1. CLL     HISTORY OF PRESENT ILLNESS:   The patient is a 67 y.o. male with the above-mentioned history, who returns the office today for 2-week follow-up and lab review.  He continues on Brukinsa 160 mg twice daily.  He has taken this approximately 6 weeks.  Thankfully, he reports excellent tolerance.  He is eating and drinking adequately with normal bowel and bladder function.  He is drinking plenty of fluids.  He denies signs or symptoms of bleeding.  He denies any new pain.  He has no B symptoms including peripheral adenopathy.  He continues on prophylaxis with acyclovir, allopurinol and Bactrim.  He is afebrile and denies signs or symptoms of infection.    Past Medical History:   Diagnosis Date   • Abnormal liver function test    • Alcohol abuse    • CLL (chronic lymphocytic leukemia)    • Coronary artery disease     stent   • Heart disease    • History of pneumonia     1/2019   • Hyperlipidemia    • Hypertension    • Inguinal hernia     left side to have surgery 3/28/19   • Myocardial infarction    • Screen for colon cancer 09/2016    Negative Cologaurd   • Screening PSA (prostate specific antigen) 02/2013    .5   • Thrombocytopenia         Past Surgical History:   Procedure Laterality Date   • CARDIAC CATHETERIZATION  2017   • CATARACT EXTRACTION Bilateral    • COLONOSCOPY N/A 06/05/2006    Normal, repeat in 10 years, Dr. Preethi Gonzalez   • COLONOSCOPY N/A 7/15/2020    Procedure: COLONOSCOPY TO CECUM & T.I. WITH COLD SNARE POLYPECTOMIES, CLIP PLACEMENT X 2;  Surgeon: Yennifer Salazar MD;  Location: [facility] ENDOSCOPY;  Service: Gastroenterology;  Laterality: N/A;  PRE- POSITIVE COLOGUARD  POST- COLON POLYPS, DIVERTICULOSIS, HEMORRHOIDS   • CORONARY ANGIOPLASTY WITH STENT PLACEMENT N/A 05/09/2017    Left heart catheterization, Selective native vessel coronary arteriography, Left ventriculography, Successful percutaneous intervention to the 100% occluded right coronary  artery with a 3.5 x 38 mm Synergy drug-eluting stent (post-dilated w/ a 4.0 x 20 mm NC Emerge balloon), Selective right femoral angiography, Successful Perclose arteriotomy closure. Dr. James Pierson   • INGUINAL HERNIA REPAIR Left    • INGUINAL HERNIA REPAIR Right     x2   • INGUINAL HERNIA REPAIR Left 3/28/2019    Procedure: LEFT INGUINAL HERNIA REPAIR LAPAROSCOPIC;  Surgeon: Preethi Gonzalez MD;  Location: [facility] OR [facility] [place], [place];  Service: General   • LAPAROSCOPIC INGUINAL HERNIA REPAIR Right 10/03/2002    w/ extra peritoneal Goe-Benton mesh (transperitoneal repair), Dr. Preethi Gonzalez   • REFRACTIVE SURGERY Bilateral    • RETINAL DETACHMENT SURGERY Right 07/02/2013      ONCOLOGY HISTORY SHOLA ENNIS MD:ONCOLOGIC HISTORY:  On 12/22/10 he was reviewed.  He   had a totally normal white count with normal differential, predominance of lymphocytes.  Normal platelet count.  His chemistry profile was normal including LDH.  His beta-2 microglobulin was normal.  His quantitative immunoglobulins were normal.  His jane  p  heral blood flow cytometry documented a typical pattern by flow cytometry of CLL, CD5, CD19 negative, dim positive CD20, ZAP-70 negative and CD38 negative.  His CT scan of the chest, abdomen and pelvis disclosed no peripheral adenopathy or hepatosplenomeg  a  ly.  His level of immunoglobulins was normal.  With this in mind we thought that the patient had very early stage disease with excellent prognostic features and we advised him to remain on observation.  He will be rechecked every 3 months for the first ye  ar and thereafter every 6 months depending on the clinical circumstances.  Appropriate booklet information was given to him and his wife to review and further learn.          Given the excellent characteristics of his disease on clinical grounds, I doubt at this time a bone marrow is needed.        On 1/8/13 his physical exam is unremarkable with no peripheral adenopathy or hepatosplenomegaly, no B  symptoms, no infections, no autoimmunity.  On the other hand he had areas of the skin on the left cheek and right cheek bhavani  t look like actinic keratosis.  His white count was up to17,000 with a normal hemoglobin and borderline platelet count of 125,000.  We asked the patient to try to minimize stressors in life and we asked him to return back in four months.  We also asked priscila edwards to be seen by dermatology in order to treat his multiple actinic keratosis.          On 05/09/13 the patient was asymptomatic with regard to his chronic lymphoid leukemia, no fatigue, fevers, chills, repeated infections or peripheral lymphadenopathy.  His physi  francisco j exam was unremarkable with no palpable lymphadenopathy or hepatosplenomegaly.  His white count has now improved to 12,900, stable hemoglobin of 13.6 and stable platelet count of 121,000.  With this in mind we advised him to remain on observation, retu  rning for reevaluation in six months.         On 05/07/2014 the patient was asymptomatic in regard to his CLL with no autoimmunity, no infections and no progressive disease clinically.  On the other hand we have seen duplication of his white count in one year.  T  he patient and wife are aware that eventually he will require intervention for this if the white count continues changing the way it is.  At this time we do not justify the need of any other therapy or any other testing.        Current Outpatient Medications on File Prior to Visit   Medication Sig Dispense Refill   • acetaminophen (TYLENOL) 500 MG tablet Take 2 tablets by mouth Every 6 (Six) Hours As Needed for Mild Pain.     • acyclovir (Zovirax) 400 MG tablet Take 1 tablet by mouth 2 (Two) Times a Day. 60 tablet 7   • allopurinol (ZYLOPRIM) 300 MG tablet Take 0.5 tablets by mouth Daily. 30 tablet 0   • aspirin 81 MG EC tablet Take 1 tablet by mouth Every Other Day.     • carvedilol (COREG) 6.25 MG tablet TAKE 1 TABLET TWICE DAILY  WITH MEALS 180 tablet 1   •  cetirizine (zyrTEC) 10 MG tablet Take 1 tablet by mouth Daily.     • fluticasone (FLONASE) 50 MCG/ACT nasal spray 2 sprays into the nostril(s) as directed by provider Daily. (Patient taking differently: 2 sprays into the nostril(s) as directed by provider Daily. Rarely used) 1 bottle 0   • Multiple Vitamins-Minerals (EYE VITAMINS & MINERALS PO) Take 1 tablet by mouth Daily.     • ondansetron ODT (ZOFRAN-ODT) 8 MG disintegrating tablet Place 1 tablet on the tongue Every 8 (Eight) Hours As Needed for Nausea or Vomiting. 30 tablet 5   • rosuvastatin (CRESTOR) 20 MG tablet Take 1 tablet by mouth Every Night.     • sulfamethoxazole-trimethoprim (BACTRIM DS,SEPTRA DS) 800-160 MG per tablet Take 1 tablet by mouth 3 (Three) Times a Week. 12 tablet 7   • ticagrelor (Brilinta) 60 MG tablet tablet Take 1 tablet by mouth 2 (Two) Times a Day. Told to stop 5 days before surgery 180 tablet 3   • Zanubrutinib (BRUKINSA) 80 MG chemo capsule Take 2 capsules by mouth 2 (Two) Times a Day. 120 capsule 3     No current facility-administered medications on file prior to visit.        ALLERGIES:    Allergies   Allergen Reactions   • Codeine Nausea Only and Nausea And Vomiting        Social History     Socioeconomic History   • Marital status:      Spouse name: Charlotte Espino   Tobacco Use   • Smoking status: Never   • Smokeless tobacco: Never   Vaping Use   • Vaping Use: Never used   Substance and Sexual Activity   • Alcohol use: Yes     Alcohol/week: 21.0 standard drinks     Types: 21 Shots of liquor per week   • Drug use: No   • Sexual activity: Defer        Family History   Problem Relation Age of Onset   • Heart attack Mother    • Heart disease Mother    • Diabetes Mother    • Aortic aneurysm Mother    • Coronary artery disease Mother    • Early death Mother    • No Known Problems Father    • Diabetes type II Other    • Diabetes Brother    • Hyperlipidemia Brother    • Stroke Sister 65   • Diabetes Sister    • Hyperlipidemia  "Sister    • Hypertension Sister    • No Known Problems Brother    • Stomach cancer Paternal Uncle    • Malig Hyperthermia Neg Hx         Objective     Vitals:    04/06/23 1626   BP: 130/84   Pulse: 85   Temp: 98.2 °F (36.8 °C)   TempSrc: Temporal   SpO2: 97%   Weight: 90.3 kg (199 lb 1.6 oz)   Height: 188 cm (74.02\")   PainSc: 0-No pain     Current Status 4/6/2023   ECOG score 0       Physical Exam   GENERAL:  Well-developed, well-nourished in no acute distress.   SKIN:  Warm, dry without rashes, purpura or petechiae.  HEAD:  Normocephalic.  EYES:  Pupils equal, round.  EOMs intact.  Conjunctivae normal.  EARS:  Hearing intact.  NOSE:  Septum midline.  No excoriations or nasal discharge.  LYMPHATICS:  No cervical, supraclavicular adenopathy.  CHEST:  Lungs clear to auscultation. Good airflow.  CARDIAC:  Regular rate and rhythm without murmurs. Normal S1,S2.  ABDOMEN:  Soft, nontender with no organomegaly or masses. Bowel sounds present  EXTREMITIES:  No clubbing, cyanosis or edema.  NEUROLOGICAL: No focal neurological deficits.  PSYCHIATRIC:  Normal affect and mood.      I have reexamined the patient and the results are consistent with the previously documented exam. WINNIE Germain     RESULTS REVIEWED:  Results from last 7 days   Lab Units 04/06/23  1621   WBC 10*3/mm3 75.10*   NEUTROS ABS 10*3/mm3 3.47   HEMOGLOBIN g/dL 11.7*   HEMATOCRIT % 36.1*   PLATELETS 10*3/mm3 106*                 Assessment & Plan      1. CLL  · CLL dating back to initial diagnosis in 2005.  He has remained on a course of observation without any need for active treatment.    · 12/22/2010 peripheral blood flow cytometry documented a typical pattern by flow cytometry of CLL, CD5, CD19 negative, dim positive CD20, ZAP-70 negative and CD38 negative.  His CT scan of the chest, abdomen and pelvis disclosed no peripheral adenopathy or hepatosplenomegaly.  His level of immunoglobulins was normal.  With this in mind we thought that " the patient had very early stage disease with excellent prognostic features and we advised him to remain on observation  · 1/24/2023, leukocytosis worsened, though the patient was recently treated with prednisone for eustachian tube dysfunction.  WBC sylvain to 110,000, though then improved to 88,000.  He had no peripheral adenopathy or hepatosplenomegaly.  · 1/24/2023, creatinine elevated at 1.4 with urinalysis showing 300 mg of protein.  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  Absolute lymphocyte count 70.87 which is improvement compared to 113.15 2 weeks ago.    2. Prophylaxis  · Prophylactic acyclovir 150 mg daily until white count is normal  · Acyclovir 400 mg twice daily and Bactrim DS Monday, Wednesday, Friday    PLAN:  1. Continue Brukinsa 160 mg twice daily  2. Continue prophylaxis including allopurinol 150 mg daily  3. Continue prophylactic acyclovir 400 mg twice daily  4. Continue Bactrim DS 3 times weekly  5. Return in 2 weeks for CBC, CMP, LDH, uric acid, MD follow-up with Dr. Cagle.  Timing of repeat 24-hour urine electrophoresis will be discussed at that time  6. We have reviewed to monitor closely for signs or symptoms of bleeding.  The patient voiced understanding    The patient continues on high risk medication requiring close monitoring for toxicity.    Anyi Madera, APRN  04/06/23

## 2023-04-07 ENCOUNTER — TELEPHONE (OUTPATIENT)
Dept: ONCOLOGY | Facility: CLINIC | Age: 68
End: 2023-04-07
Payer: COMMERCIAL

## 2023-04-07 NOTE — TELEPHONE ENCOUNTER
Can you please call this patient and let him know his labs look good, he is drinking plenty of fluids.  Uric acid remains stable     No med changes     Thanks     Anyi     CALLED PATIENT AND RELAYED ABOVE MESSAGE. PT V/U. Deana Samuels RN

## 2023-04-10 ENCOUNTER — SPECIALTY PHARMACY (OUTPATIENT)
Dept: PHARMACY | Facility: HOSPITAL | Age: 68
End: 2023-04-10
Payer: COMMERCIAL

## 2023-04-10 NOTE — PROGRESS NOTES
Specialty Pharmacy Refill Coordination Note     Francisco is a 67 y.o. male contacted today regarding refills of Brukinsa specialty medication(s).    Reviewed and verified with patient:       Specialty medication(s) and dose(s) confirmed: yes  Brukinsa 80 mg caps-2 caps twice per day    Refill Questions    Flowsheet Row Most Recent Value   Changes to allergies? No   Changes to medications? No   New conditions since last clinic visit No   Unplanned office visit, urgent care, ED, or hospital admission in the last 4 weeks  No   How does patient/caregiver feel medication is working? Good   Financial problems or insurance changes  No   Since the previous refill, were any specialty medication doses or scheduled injections missed or delayed?  No   Does this patient require a clinical escalation to a pharmacist? No          Delivery Questions    Flowsheet Row Most Recent Value   Delivery method --  [[employer] Priority-Ship 4/11 for delivery 4/12-$0 copay wtih Copay Card-Address Confirmed]   Delivery address correct? Yes  [Ship to home address]   Delivery phone number [phone]   Preferred delivery time? AM   Number of medications in delivery 1   Medication being filled and delivered Brukinsa   Doses left of specialty medications 7 days   Is there any medication that is due not being filled? No   Supplies needed? No supplies needed   Cooler needed? No   Do any medications need mixed or dated? No   Copay form of payment Payment plan already set up   Additional comments $0 copay with Copay card   Questions or concerns for the pharmacist? No   Explain any questions or concerns for the pharmacist N/A   Are any medications first time fills? No        Brukinsa delivery coordinated with pt for 4/12/2023 to his home address. $0 copay with insurance and the copay card. His last delivery was on 3/17/2023. No questions or concerns to report to MTM Team today.     Follow-up: 21 day(s)     Marta Jansen  Specialty Pharmacy Technician

## 2023-04-18 ENCOUNTER — OFFICE VISIT (OUTPATIENT)
Dept: ONCOLOGY | Facility: CLINIC | Age: 68
End: 2023-04-18
Payer: COMMERCIAL

## 2023-04-18 ENCOUNTER — LAB (OUTPATIENT)
Dept: LAB | Facility: HOSPITAL | Age: 68
End: 2023-04-18
Payer: COMMERCIAL

## 2023-04-18 VITALS
BODY MASS INDEX: 25.51 KG/M2 | TEMPERATURE: 97.3 F | OXYGEN SATURATION: 97 % | HEIGHT: 74 IN | DIASTOLIC BLOOD PRESSURE: 82 MMHG | SYSTOLIC BLOOD PRESSURE: 128 MMHG | HEART RATE: 88 BPM | WEIGHT: 198.8 LBS | RESPIRATION RATE: 16 BRPM

## 2023-04-18 DIAGNOSIS — N28.9 KIDNEY DISEASE: ICD-10-CM

## 2023-04-18 DIAGNOSIS — C91.10 CHRONIC LYMPHOCYTIC LEUKEMIA: ICD-10-CM

## 2023-04-18 DIAGNOSIS — Z79.899 HIGH RISK MEDICATION USE: ICD-10-CM

## 2023-04-18 DIAGNOSIS — R80.3 BENCE JONES PROTEINURIA: ICD-10-CM

## 2023-04-18 DIAGNOSIS — C91.10 CHRONIC LYMPHOCYTIC LEUKEMIA: Primary | ICD-10-CM

## 2023-04-18 LAB
ALBUMIN SERPL-MCNC: 4.6 G/DL (ref 3.5–5.2)
ALBUMIN/GLOB SERPL: 1.8 G/DL
ALP SERPL-CCNC: 69 U/L (ref 39–117)
ALT SERPL W P-5'-P-CCNC: 36 U/L (ref 1–41)
ANION GAP SERPL CALCULATED.3IONS-SCNC: 10 MMOL/L (ref 5–15)
AST SERPL-CCNC: 38 U/L (ref 1–40)
BASOPHILS # BLD AUTO: 0.02 10*3/MM3 (ref 0–0.2)
BASOPHILS NFR BLD AUTO: 0 % (ref 0–1.5)
BILIRUB SERPL-MCNC: 1.2 MG/DL (ref 0–1.2)
BUN SERPL-MCNC: 13 MG/DL (ref 8–23)
BUN/CREAT SERPL: 7.8 (ref 7–25)
CALCIUM SPEC-SCNC: 10.1 MG/DL (ref 8.6–10.5)
CHLORIDE SERPL-SCNC: 103 MMOL/L (ref 98–107)
CO2 SERPL-SCNC: 30 MMOL/L (ref 22–29)
CREAT SERPL-MCNC: 1.67 MG/DL (ref 0.76–1.27)
DEPRECATED RDW RBC AUTO: 54.6 FL (ref 37–54)
EGFRCR SERPLBLD CKD-EPI 2021: 44.3 ML/MIN/1.73
EOSINOPHIL # BLD AUTO: 0.04 10*3/MM3 (ref 0–0.4)
EOSINOPHIL NFR BLD AUTO: 0.1 % (ref 0.3–6.2)
ERYTHROCYTE [DISTWIDTH] IN BLOOD BY AUTOMATED COUNT: 13.2 % (ref 12.3–15.4)
GLOBULIN UR ELPH-MCNC: 2.5 GM/DL
GLUCOSE SERPL-MCNC: 118 MG/DL (ref 65–99)
HCT VFR BLD AUTO: 37.6 % (ref 37.5–51)
HGB BLD-MCNC: 12.1 G/DL (ref 13–17.7)
IMM GRANULOCYTES # BLD AUTO: 0.06 10*3/MM3 (ref 0–0.05)
IMM GRANULOCYTES NFR BLD AUTO: 0.1 % (ref 0–0.5)
LYMPHOCYTES # BLD AUTO: 55.75 10*3/MM3 (ref 0.7–3.1)
LYMPHOCYTES NFR BLD AUTO: 92.3 % (ref 19.6–45.3)
MCH RBC QN AUTO: 35.9 PG (ref 26.6–33)
MCHC RBC AUTO-ENTMCNC: 32.2 G/DL (ref 31.5–35.7)
MCV RBC AUTO: 111.6 FL (ref 79–97)
MONOCYTES # BLD AUTO: 0.63 10*3/MM3 (ref 0.1–0.9)
MONOCYTES NFR BLD AUTO: 1 % (ref 5–12)
NEUTROPHILS NFR BLD AUTO: 3.88 10*3/MM3 (ref 1.7–7)
NEUTROPHILS NFR BLD AUTO: 6.5 % (ref 42.7–76)
NRBC BLD AUTO-RTO: 0 /100 WBC (ref 0–0.2)
PHOSPHATE SERPL-MCNC: 3.7 MG/DL (ref 2.5–4.5)
PLATELET # BLD AUTO: 105 10*3/MM3 (ref 140–450)
PMV BLD AUTO: 10.9 FL (ref 6–12)
POTASSIUM SERPL-SCNC: 4.1 MMOL/L (ref 3.5–5.2)
PROT SERPL-MCNC: 7.1 G/DL (ref 6–8.5)
RBC # BLD AUTO: 3.37 10*6/MM3 (ref 4.14–5.8)
SODIUM SERPL-SCNC: 143 MMOL/L (ref 136–145)
URATE SERPL-MCNC: 2.6 MG/DL (ref 3.4–7)
WBC NRBC COR # BLD: 60.38 10*3/MM3 (ref 3.4–10.8)

## 2023-04-18 PROCEDURE — 85025 COMPLETE CBC W/AUTO DIFF WBC: CPT

## 2023-04-18 PROCEDURE — 84100 ASSAY OF PHOSPHORUS: CPT | Performed by: INTERNAL MEDICINE

## 2023-04-18 PROCEDURE — 80053 COMPREHEN METABOLIC PANEL: CPT | Performed by: INTERNAL MEDICINE

## 2023-04-18 PROCEDURE — 84550 ASSAY OF BLOOD/URIC ACID: CPT | Performed by: INTERNAL MEDICINE

## 2023-04-18 PROCEDURE — 36415 COLL VENOUS BLD VENIPUNCTURE: CPT

## 2023-04-18 RX ORDER — ALLOPURINOL 300 MG/1
150 TABLET ORAL DAILY
Qty: 60 TABLET | Refills: 0 | Status: SHIPPED | OUTPATIENT
Start: 2023-04-18

## 2023-04-18 NOTE — PROGRESS NOTES
Subjective     REASON FOR FOLLOW UP: CLL SINCE 2005 NOT REQUIRING ANY INTERVENTION    History of Present Illness     On 01/24/2023 this 67-year-old white male returns to the office after he was seen by Dr. Pelaez a few days ago in regard to general care and at that point after receiving prednisone his white count was in the 110 category. Obviously given the history of chronic lymphoid leukemia we brought the patient back today for reassessment. Clinically the patient states that he had severe pain in the right ear a couple of weeks ago. He was seen in the urgent care center and he was given prednisone and decongestant. He was not told that he had any infection. He never received antibiotic therapy. The symptoms improved very quickly after prednisone administration and he has completed this package. Now days he has no pain or sensation of stuffiness in his ear canals or any other alteration to speak of. His appetite is normal. His bowel activity and urination are normal. He has no fevers, chills, night sweats, pruritus, adenopathies or fatigue and he has not had any other infections or deterioration in performance status. He has not developed any autoimmunity. In the interim he has developed multiple skin lesions in the scalp and face and these are representation of actinic keratoses and very likely early skin cancers.     Besides this he has not had any other heart issues. No cough or shortness of breath. Excellent appetite, stable weight. Good bowel activity, proper urination. No bone pain. No swelling in his lower extremities. No neuropathy.    On 02/10/2023 I brought the patient back to the office because at the visit he had with me 2 weeks ago it turned out that the patient had an abnormal creatinine of 1.4. We called him back and we requested a urine specimen that documented 300 mg of protein in a random urine sample. This was very abnormal. 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 This tells me that this Bence-Dawson protein is very likely the reason for an abnormal creatinine very likely triggered by the production of this protein by his leukemia phenomenon that is very unusual and obviously this will obligate me in regard to the protection of his kidney function to initiate therapy for his leukemia at this time. That is the purpose of this visit. The patient remains asymptomatic with no other new respiratory issues. As we discussed the other day he is going to see his dermatologist in regard to his multiple areas of actinic keratosis and very likely skin cancers. Otherwise he does not have any new symptoms.   On 03/06/2023 the patient returned to the office after taking his medication for his CLL now for almost 2 weeks. He has not encountered any side effects of the medicine including no irregular heartbeat, no abnormal bleeding, minimal dizziness when he first stands up that goes away in question of 1 or 2 seconds. He has been monitoring his heart rate through his special watch with no issues of any nature. His bowel activity has remained normal. His appetite has remained normal. He has been drinking plenty of liquids excellent urinary output. Today we have documented that his white count has duplicated not uncommon phenomenon for the initiation of this medication. Please review below. In spite of this he is not having any new problems.  The patient returned to the office on 03/21/2023. Since the previous visit he has remained on his high risk medication with no palpitations, no chest pain, no abnormal bleeding. Tolerance to the medicine has been excellent with no nausea, vomiting, diarrhea. Significant enough his white count has dramatically dropped today and the hemoglobin keeps improving. The patient has not encountered any difficulties with the uric acid and he remains on allopurinol. His  phosphorus level has remained normal, his potassium level is normal and his creatinine has leveled off. His skin cancer is becoming worse on the forehead and in the left temple and I encouraged him to be seen by his dermatologist now.    He has not had any fever, chills or infection. He has not had any abnormal bleeding, no pain. His energy level and appetite remain normal with no cardiovascular or respiratory issues.  On 04/18/2023 the patient has continued doing extremely well from the point of view of his treatment for his CLL with no difficulties with the medicine with the exception that he feels slightly dizzy when he stands up for 5 seconds or so but no syncopal episode. He has not had any chest pain, palpitations or abnormal bleeding. His appetite has remained good. He has an element of fatigue but nothing to keep him from doing what he is supposed to do. He has proper bowel activity, proper urination. He has not had any fluid accumulation in his legs. He has not developed any toxicity for allopurinol and he has not had any clinical bleeding in spite of remaining on Brilinta and aspirin per Cardiology.          ·     Past Medical History:   Diagnosis Date   • Abnormal liver function test    • Alcohol abuse    • CLL (chronic lymphocytic leukemia)    • Coronary artery disease     stent   • Heart disease    • History of pneumonia     1/2019   • Hyperlipidemia    • Hypertension    • Inguinal hernia     left side to have surgery 3/28/19   • Myocardial infarction    • Screen for colon cancer 09/2016    Negative Cologaurd   • Screening PSA (prostate specific antigen) 02/2013    .5   • Thrombocytopenia         Past Surgical History:   Procedure Laterality Date   • CARDIAC CATHETERIZATION  2017   • CATARACT EXTRACTION Bilateral    • COLONOSCOPY N/A 06/05/2006    Normal, repeat in 10 years, Dr. Preethi Gonzalez   • COLONOSCOPY N/A 7/15/2020    Procedure: COLONOSCOPY TO CECUM & T.I. WITH COLD SNARE POLYPECTOMIES, CLIP  PLACEMENT X 2;  Surgeon: Yennifer Salazar MD;  Location: [facility] ENDOSCOPY;  Service: Gastroenterology;  Laterality: N/A;  PRE- POSITIVE COLOGUARD  POST- COLON POLYPS, DIVERTICULOSIS, HEMORRHOIDS   • CORONARY ANGIOPLASTY WITH STENT PLACEMENT N/A 05/09/2017    Left heart catheterization, Selective native vessel coronary arteriography, Left ventriculography, Successful percutaneous intervention to the 100% occluded right coronary artery with a 3.5 x 38 mm Synergy drug-eluting stent (post-dilated w/ a 4.0 x 20 mm NC Emerge balloon), Selective right femoral angiography, Successful Perclose arteriotomy closure. Dr. James Pierson   • INGUINAL HERNIA REPAIR Left    • INGUINAL HERNIA REPAIR Right     x2   • INGUINAL HERNIA REPAIR Left 3/28/2019    Procedure: LEFT INGUINAL HERNIA REPAIR LAPAROSCOPIC;  Surgeon: Preethi Gonzalez MD;  Location: [facility] OR [facility];  Service: General   • LAPAROSCOPIC INGUINAL HERNIA REPAIR Right 10/03/2002    w/ extra peritoneal Goe-Benton mesh (transperitoneal repair), Dr. Preethi Gonzalez   • REFRACTIVE SURGERY Bilateral    • RETINAL DETACHMENT SURGERY Right 07/02/2013      ONCOLOGY HISTORY SHOLA ENNIS MD:ONCOLOGIC HISTORY:  On 12/22/10 he was reviewed.  He   had a totally normal white count with normal differential, predominance of lymphocytes.  Normal platelet count.  His chemistry profile was normal including LDH.  His beta-2 microglobulin was normal.  His quantitative immunoglobulins were normal.  His jane  p  heral blood flow cytometry documented a typical pattern by flow cytometry of CLL, CD5, CD19 negative, dim positive CD20, ZAP-70 negative and CD38 negative.  His CT scan of the chest, abdomen and pelvis disclosed no peripheral adenopathy or hepatosplenomeg  a  ly.  His level of immunoglobulins was normal.  With this in mind we thought that the patient had very early stage disease with excellent prognostic features and we advised him to remain on observation.  He will be rechecked every  3 months for the first ye  ar and thereafter every 6 months depending on the clinical circumstances.  Appropriate booklet information was given to him and his wife to review and further learn.          Given the excellent characteristics of his disease on clinical grounds, I doubt at this time a bone marrow is needed.        On 1/8/13 his physical exam is unremarkable with no peripheral adenopathy or hepatosplenomegaly, no B symptoms, no infections, no autoimmunity.  On the other hand he had areas of the skin on the left cheek and right cheek bhavani  t look like actinic keratosis.  His white count was up to17,000 with a normal hemoglobin and borderline platelet count of 125,000.  We asked the patient to try to minimize stressors in life and we asked him to return back in four months.  We also asked priscila edwards to be seen by dermatology in order to treat his multiple actinic keratosis.          On 05/09/13 the patient was asymptomatic with regard to his chronic lymphoid leukemia, no fatigue, fevers, chills, repeated infections or peripheral lymphadenopathy.  His physi  francisco j exam was unremarkable with no palpable lymphadenopathy or hepatosplenomegaly.  His white count has now improved to 12,900, stable hemoglobin of 13.6 and stable platelet count of 121,000.  With this in mind we advised him to remain on observation, retu  rning for reevaluation in six months.         On 05/07/2014 the patient was asymptomatic in regard to his CLL with no autoimmunity, no infections and no progressive disease clinically.  On the other hand we have seen duplication of his white count in one year.  T  he patient and wife are aware that eventually he will require intervention for this if the white count continues changing the way it is.  At this time we do not justify the need of any other therapy or any other testing.        Current Outpatient Medications on File Prior to Visit   Medication Sig Dispense Refill   • acetaminophen (TYLENOL) 500 MG  tablet Take 2 tablets by mouth Every 6 (Six) Hours As Needed for Mild Pain.     • acyclovir (Zovirax) 400 MG tablet Take 1 tablet by mouth 2 (Two) Times a Day. 60 tablet 7   • aspirin 81 MG EC tablet Take 1 tablet by mouth Every Other Day.     • carvedilol (COREG) 6.25 MG tablet TAKE 1 TABLET TWICE DAILY  WITH MEALS 180 tablet 1   • cetirizine (zyrTEC) 10 MG tablet Take 1 tablet by mouth Daily.     • fluticasone (FLONASE) 50 MCG/ACT nasal spray 2 sprays into the nostril(s) as directed by provider Daily. (Patient taking differently: 2 sprays into the nostril(s) as directed by provider Daily. Rarely used) 1 bottle 0   • Multiple Vitamins-Minerals (EYE VITAMINS & MINERALS PO) Take 1 tablet by mouth Daily.     • ondansetron ODT (ZOFRAN-ODT) 8 MG disintegrating tablet Place 1 tablet on the tongue Every 8 (Eight) Hours As Needed for Nausea or Vomiting. 30 tablet 5   • rosuvastatin (CRESTOR) 20 MG tablet Take 1 tablet by mouth Every Night.     • sulfamethoxazole-trimethoprim (BACTRIM DS,SEPTRA DS) 800-160 MG per tablet Take 1 tablet by mouth 3 (Three) Times a Week. 12 tablet 7   • ticagrelor (Brilinta) 60 MG tablet tablet Take 1 tablet by mouth 2 (Two) Times a Day. Told to stop 5 days before surgery 180 tablet 3   • Zanubrutinib (BRUKINSA) 80 MG chemo capsule Take 2 capsules by mouth 2 (Two) Times a Day. 120 capsule 3   • [DISCONTINUED] allopurinol (ZYLOPRIM) 300 MG tablet Take 0.5 tablets by mouth Daily. 30 tablet 0     No current facility-administered medications on file prior to visit.        ALLERGIES:    Allergies   Allergen Reactions   • Codeine Nausea Only and Nausea And Vomiting        Social History     Socioeconomic History   • Marital status:      Spouse name: Charlotte Espino   Tobacco Use   • Smoking status: Never   • Smokeless tobacco: Never   Vaping Use   • Vaping Use: Never used   Substance and Sexual Activity   • Alcohol use: Yes     Alcohol/week: 21.0 standard drinks     Types: 21 Shots of liquor per  "week   • Drug use: No   • Sexual activity: Defer        Family History   Problem Relation Age of Onset   • Heart attack Mother    • Heart disease Mother    • Diabetes Mother    • Aortic aneurysm Mother    • Coronary artery disease Mother    • Early death Mother    • No Known Problems Father    • Diabetes type II Other    • Diabetes Brother    • Hyperlipidemia Brother    • Stroke Sister 65   • Diabetes Sister    • Hyperlipidemia Sister    • Hypertension Sister    • No Known Problems Brother    • Stomach cancer Paternal Uncle    • Malig Hyperthermia Neg Hx           Objective     Vitals:    04/18/23 1617   BP: 128/82   Pulse: 88   Resp: 16   Temp: 97.3 °F (36.3 °C)   TempSrc: Temporal   SpO2: 97%   Weight: 90.2 kg (198 lb 12.8 oz)   Height: 188 cm (74.02\")   PainSc: 0-No pain         4/18/2023     4:18 PM   Current Status   ECOG score 0       Physical Exam         Blood pressure in right arm measured by Dr. Cagle in a baseline condition sitting, 126/85, standing up 106/82. Pulse did not change, stayed around 85 in both situations.      GENERAL:  Well-developed, Patient  in no acute distress.   SKIN:  Warm, dry ,NO purpura ,no rash.On 04/18/2023 a lot of improvement in his skin lesions after being seen by dermatologist, especially in the left cheek and scalp. He has received Efudex.      HEENT:  Pupils were equal and reactive to light and accomodation, conjunctivae noninjected,  normal visual acuity.   NECK:  Supple with good range of motion; no thyromegaly , no JVD or bruits,.No carotid artery pain, no carotid abnormal pulsation   LYMPHATICS:  No cervical, NO supraclavicular, NO axillary, NO inguinal adenopathies.  CARDIAC   normal rate , regular rhythm, without murmur,NO rubs NO S3 NO S4   LUNGS: normal breath sounds bilateral, no wheezing, NO rhonchi, NO crackles ,NO rubs.  VASCULAR VENOUS: no cyanosis, NO collateral circulation, NO varicosities, NO edema, NO palpable cords, NO pain,NO erythema, NO pigmentation of " the skin.  ABDOMEN:  Soft, NO pain,no hepatomegaly, no splenomegaly,no masses, no ascites, no collateral circulation,no distention.  EXTREMITIES  AND SPINE:  No clubbing, no cyanosis ,no deformities , no pain .No kyphosis,  no pain in spine, no pain in ribs , no pain in pelvic bone.  NEUROLOGICAL:  Patient was awake, alert, oriented to time, person and place.          RECENT LABS:          Results from last 7 days   Lab Units 04/18/23  1612   WBC 10*3/mm3 60.38*   NEUTROS ABS 10*3/mm3 3.88   HEMOGLOBIN g/dL 12.1*   HEMATOCRIT % 37.6   PLATELETS 10*3/mm3 105*          Lab Results   Component Value Date    GLUCOSE 131 (H) 04/06/2023    BUN 14 04/06/2023    CREATININE 1.56 (H) 04/06/2023    EGFRRESULT 52.9 (L) 01/17/2023    EGFR 48.1 (L) 04/06/2023    BCR 9.0 04/06/2023    K 4.4 04/06/2023    CO2 26.1 04/06/2023    CALCIUM 10.0 04/06/2023    PROTENTOTREF 7.1 02/10/2023    ALBUMIN 4.7 04/06/2023    BILITOT 1.4 (H) 04/06/2023    AST 29 04/06/2023    ALT 27 04/06/2023     Lab Results   Component Value Date    URICACID 2.5 (L) 04/06/2023            Assessment & Plan    Mr. Espino is a 67 y.o. male is longstanding history of CLL dating back to initial diagnosis in 2005.  He has remained on a course of observation without any need for active treatment.    On 01/24/2023 the patient was further reviewed. His recent laboratory assessment by Dr. Pelaez after the patient received prednisone for dysfunction of the Eustachian tube sylvain his white count to 110,000. Today his white count is down to 88,000. The hemoglobin remains minimally low. The platelet count is normal. The patient has no peripheral adenopathy or hepatosplenomegaly. He has no B symptoms. He has no infection. He has no autoimmunity. Given these numbers I do not believe that I need to pull the trigger for this patient to be treated for his leukemia yet and I would like to review him back in 6 weeks and see how he is doing. I have not advised him to take any other  medication or any other issue at this time.     In regard to his skin lesions I advised him to make an appointment to be seen by his dermatologist. He has so many in the scalp and face that he will require multiple areas of therapy at the same time.     On 02/10/2023 the patient returned to the office for follow up. As I pointed out above we documented that he had an abnormal creatinine the day of the visit on 01/24/2023 at 1.4, then we requested a urine specimen that showed 300 mg of protein in a random specimen and triggered a urine collection of 24 hours for protein immunoelectrophoresis that disclosed a proteinuria posted above that is abnormal and also with the presence of Bence-Dawson protein. Obviously this tells me that his creatinine is abnormal because of glomerular disease by abnormal protein Bence-Dawson. Today his white count is further improved, the hemoglobin is 11.2, the platelet count is 112,000 and the patient has no peripheral adenopathy or B symptoms. Therefore the presence of Bence-Dawson proteinuria obligates me to trigger the need for initiation of therapy on this patient. I had a lengthy discussion with him in this regard and this is the summary of this:    The patient will require to be educated in regard to chemotherapy administration with the medication Brukinsa that he will initiate probably in the next 10 days. He will have the typical dose of this medicine given to him twice a day and he will require 2 days before the initiation of Brukinsa the initiation of allopurinol at a dose of 150 mg a day. The dose is adjusted according to his creatinine clearance.    The patient will require proper oral hydration taking this medicine for the next several weeks drinking probably 10 glasses of water a day. He will require measurement on a weekly basis of CBC and CMP.     I expect that the patient will require formal education and consent for the medicine and he will be educated by nurses in this  regard in the next few days. I pointed out to the patient that this medicine will come from a speciality pharmacy and the medicine will be delivered to him probably in the next week or 10 days once that the approval is done. Once that all of these logistics are in place the patient will initiate his therapy. My expectation is that the patient will be having significant improvement in his blood counts in several weeks from now but I warned him that sometimes the patients have a rise in the white count at the time of the initiation of the medicine that typically improves in a question of 6-8 weeks.    I also expect in the future probably in 2-3 months from now that the patient's hemoglobin and platelet count will improve. I think he has mild degree of anemia and mild degree of thrombocytopenia on the basis of clonal leukemia cells that is modifying the production of red cells and platelets in the bone marrow.     Obviously after undergoing therapy for this in the next 2-3 months we will repeat monoclonal protein analysis in the urine to be sure that his Bence-Dawson proteinuria disappears and I will expect some improvement or stability of his creatinine level.     Obviously we will need to see what happens in regard to his total white count. In order to do a parallel study in regard to monoclonal protein analysis in peripheral blood I went ahead today and obtained a serum protein immunoelectrophoresis. I will expect to find some similar protein in his peripheral blood.     I discussed all of these facts with the patient and his wife in the room. We will get the ball rolling for him to initiate his therapy and he will require laboratory testing on a weekly basis initiated in 2 weeks with a CBC, CMP and uric acid.   On 03/06/2023 I discussed with the patient and his wife the fact that even though he has not encountered any side effects of the medication his white blood cell count has duplicated today in the 150,000. This  is not an uncommon phenomenon with these medications, Brukinsa for example. It can take 4-6 weeks for these numbers to come down. It is just the natural process of the medication to trigger decrease in the production of the leukemic clone and also favor for the leukemic clone to commit suicide. This per se has not had any issues for the patient. Typically CLL patients do not have hyperleukocytosis syndrome because the size of the white cells is not large enough to trigger these alterations. On the other hand the patient will require continued monitoring on weekly basis. He will continue having a CBC on weekly basis, visit by me in 2 weeks and he will require as well continuation of CMP, LDH, uric acid and phosphorus level on weekly basis as well.     He will remain on allopurinol for the time being. With these kind of numbers it is difficult to predict uric acid levels in the close future. Hopefully with allopurinol on hand this will be controlled and with proper hydration that he is doing extremely well.     I am glad that I made the patient aware this his white count could raise, otherwise he will be having a heart attack here in front of me. Therefore, I provided the support that he needed and I discussed this with his wife present in the room.  The patient was further reviewed on 03/21/2023. So far tolerance to his medication is excellent. He has not encountered any side effects including no cardiac irregularity and no clinical bleeding. Finally today his white count has dropped by 40,000 in comparison to 2 weeks ago. The hemoglobin has improved. His creatinine is starting to come down and I think the patient is on the right track in regard to improvement in his hematological parameters. I encouraged him to remain on his medication for the time being Brukinsa. The dose will not require to be changed.     We will continue monitoring him every 2 weeks.    He will require CBC, CMP, uric acid every 2 weeks as  well.    Given the fact that his skin cancer in the left temple has progressed in the last several weeks I advised him to be seen by his dermatologist now.    I went ahead and renewed his allopurinol that I want him to take until his white count completely normalizes. I will continue monitoring his creatinine and once that his white count normalizes we will do a urine collection for 24 hours to recheck monoclonal protein studies. By then this should be disappearing. By then also we will expect creatinine level to be better.     Otherwise no other issues pertinent to his care. Discussed with him and his wife present in the room.  The patient was further reviewed on 04/18/2023. Since the previous visit he has not had any issues pertinent to the utilization of Brukinsa into his therapy of his CLL. In one months' time his total lymphocyte count has dropped by 50%. It was 113,000 a month ago, it is 60,000 today. The patient's hemoglobin has improved and the platelet count has remained sustained at 106,000. He has not had any clinical bleeding.     Therefore I advised him to remain on his medicine at the same dosing for the time being, returning to see us back in a couple of weeks with a CBC, CMP and also in 4 weeks and 6 weeks along with a uric acid. So far his uric acid has remained stable. He is receiving an adjusted dose of allopurinol to minimize any potentiality for tumor lysis syndrome.     I renewed his allopurinol dose today at the dose of 150 mg a day. He will have another 60 tablets. I expect that in this period of time he will not require any more of this medicine.     My attention is now focused in regard to the lightheadedness when he stands up. Indeed his systolic blood pressure changes when he stands up and raises the question about hydration status. The patient is drinking at least 10 glasses of water a day. He is not having any fever or excessive perspiration and I do not believe that he is volume  contracted. This raises the question though about the medicines that he takes for his blood pressure including Coreg. The other question that I have is the continuation of utilization of aspirin and Brilinta per the Cardiology team at [facility] in his case 6 years after his cardiac stenting. I will place a phone call to discuss these issues with his cardiologist tomorrow and see if the patient could come off Brilinta at this time.     The patient also stated that he has not had any lipid profile tested in a good while. I reviewed and he has not had any testing in almost 2 years. I would like for him to have a lipid profile done in a couple of weeks in a fasting fashion in the morning. This will be shared with his cardiologist as well.     The patient will have follow-up appointments in 2 and 4 weeks, nurse practitioner and me in 6 weeks. I expect in 6 weeks very likely his white count will be very close to normality.     I pointed out to him that I will not recheck his urine for monoclonal proteins, Bence-Dawson specifically until we see complete resolution of his leukemia. This can take until July.    I discussed all these facts with the patient and his wife in the room.      High risk medication use    ·   Russell Cagle MD  04/18/23

## 2023-04-20 ENCOUNTER — TELEPHONE (OUTPATIENT)
Dept: ONCOLOGY | Facility: CLINIC | Age: 68
End: 2023-04-20
Payer: COMMERCIAL

## 2023-04-20 NOTE — TELEPHONE ENCOUNTER
Caller: Francisco Espino    Relationship: Self    Best call back number: [phone]  What is the best time to reach you: ANYTIME. LEAVE VM IF NEEDED.    Who are you requesting to speak with (clinical staff, provider,  specific staff member):WALLACE/DB  Do you know the name of the person who called: DB    What was the call regarding:PATIENT FRANCISCO WAS CALLING DB BACK FROM A MISSED PHONE CALL. HE WAS ALSO    Do you require a callback:YES        DELETE AFTER READING TO PATIENT: “ Thank you for sharing this information with me. I will send a message to the . Please allow up to 48 hours for the  to follow up on this request.”

## 2023-04-21 ENCOUNTER — TELEPHONE (OUTPATIENT)
Dept: ONCOLOGY | Facility: CLINIC | Age: 68
End: 2023-04-21
Payer: COMMERCIAL

## 2023-04-21 NOTE — TELEPHONE ENCOUNTER
Called patient and relayed message that Dr. Cagle and Cardiology PA have discussed plan. Patient will discontinue Brilinta, continue ASA, and f/u with Cardiologists office. Pt v/u.  Deana Samuels RN

## 2023-04-21 NOTE — TELEPHONE ENCOUNTER
Caller: Evangelina Espino    Relationship to patient: Emergency Contact    Best call back number: [phone] (LEATHA - PATIENT) [phone] (EVANGELINA)    Patient is needing: TO KNOW IF HE CAN BE AROUND GRANDKIDS SINCE HIS LEVELS WERE LOW LAST TIME.

## 2023-04-21 NOTE — TELEPHONE ENCOUNTER
Called and relayed message okay to be around grandchildren as long as no one is sick. Deana Samuels RN    No

## 2023-05-02 ENCOUNTER — LAB (OUTPATIENT)
Dept: LAB | Facility: HOSPITAL | Age: 68
End: 2023-05-02
Payer: COMMERCIAL

## 2023-05-02 DIAGNOSIS — N28.9 KIDNEY DISEASE: ICD-10-CM

## 2023-05-02 DIAGNOSIS — Z79.899 HIGH RISK MEDICATION USE: ICD-10-CM

## 2023-05-02 DIAGNOSIS — R80.3 BENCE JONES PROTEINURIA: ICD-10-CM

## 2023-05-02 DIAGNOSIS — C91.10 CHRONIC LYMPHOCYTIC LEUKEMIA: ICD-10-CM

## 2023-05-02 LAB
ALBUMIN SERPL-MCNC: 4.7 G/DL (ref 3.5–5.2)
ALBUMIN/GLOB SERPL: 1.9 G/DL (ref 1.1–2.4)
ALP SERPL-CCNC: 66 U/L (ref 38–116)
ALT SERPL W P-5'-P-CCNC: 35 U/L (ref 0–41)
ANION GAP SERPL CALCULATED.3IONS-SCNC: 14 MMOL/L (ref 5–15)
AST SERPL-CCNC: 41 U/L (ref 0–40)
BASOPHILS # BLD AUTO: 0.03 10*3/MM3 (ref 0–0.2)
BASOPHILS NFR BLD AUTO: 0.1 % (ref 0–1.5)
BILIRUB SERPL-MCNC: 0.9 MG/DL (ref 0.2–1.2)
BUN SERPL-MCNC: 11 MG/DL (ref 6–20)
BUN/CREAT SERPL: 7.9 (ref 7.3–30)
CALCIUM SPEC-SCNC: 10 MG/DL (ref 8.5–10.2)
CHLORIDE SERPL-SCNC: 102 MMOL/L (ref 98–107)
CHOLEST SERPL-MCNC: 155 MG/DL (ref 0–200)
CO2 SERPL-SCNC: 23 MMOL/L (ref 22–29)
CREAT SERPL-MCNC: 1.4 MG/DL (ref 0.7–1.3)
DEPRECATED RDW RBC AUTO: 53.3 FL (ref 37–54)
EGFRCR SERPLBLD CKD-EPI 2021: 54.7 ML/MIN/1.73
EOSINOPHIL # BLD AUTO: 0.04 10*3/MM3 (ref 0–0.4)
EOSINOPHIL NFR BLD AUTO: 0.1 % (ref 0.3–6.2)
ERYTHROCYTE [DISTWIDTH] IN BLOOD BY AUTOMATED COUNT: 13 % (ref 12.3–15.4)
GLOBULIN UR ELPH-MCNC: 2.5 GM/DL (ref 1.8–3.5)
GLUCOSE SERPL-MCNC: 101 MG/DL (ref 74–124)
HCT VFR BLD AUTO: 39.6 % (ref 37.5–51)
HDLC SERPL-MCNC: 90 MG/DL (ref 40–60)
HGB BLD-MCNC: 12.9 G/DL (ref 13–17.7)
IMM GRANULOCYTES # BLD AUTO: 0.06 10*3/MM3 (ref 0–0.05)
IMM GRANULOCYTES NFR BLD AUTO: 0.1 % (ref 0–0.5)
LDLC SERPL CALC-MCNC: 48 MG/DL (ref 0–100)
LDLC/HDLC SERPL: 0.51 {RATIO}
LYMPHOCYTES # BLD AUTO: 46.99 10*3/MM3 (ref 0.7–3.1)
LYMPHOCYTES NFR BLD AUTO: 92.3 % (ref 19.6–45.3)
MCH RBC QN AUTO: 36.2 PG (ref 26.6–33)
MCHC RBC AUTO-ENTMCNC: 32.6 G/DL (ref 31.5–35.7)
MCV RBC AUTO: 111.2 FL (ref 79–97)
MONOCYTES # BLD AUTO: 0.63 10*3/MM3 (ref 0.1–0.9)
MONOCYTES NFR BLD AUTO: 1.2 % (ref 5–12)
NEUTROPHILS NFR BLD AUTO: 3.15 10*3/MM3 (ref 1.7–7)
NEUTROPHILS NFR BLD AUTO: 6.2 % (ref 42.7–76)
NRBC BLD AUTO-RTO: 0 /100 WBC (ref 0–0.2)
PLATELET # BLD AUTO: 107 10*3/MM3 (ref 140–450)
PMV BLD AUTO: 10.7 FL (ref 6–12)
POTASSIUM SERPL-SCNC: 4.6 MMOL/L (ref 3.5–4.7)
PROT SERPL-MCNC: 7.2 G/DL (ref 6.3–8)
RBC # BLD AUTO: 3.56 10*6/MM3 (ref 4.14–5.8)
SODIUM SERPL-SCNC: 139 MMOL/L (ref 134–145)
TRIGL SERPL-MCNC: 95 MG/DL (ref 0–150)
URATE SERPL-MCNC: 2.6 MG/DL (ref 2.8–7.4)
VLDLC SERPL-MCNC: 17 MG/DL (ref 5–40)
WBC NRBC COR # BLD: 50.9 10*3/MM3 (ref 3.4–10.8)

## 2023-05-02 PROCEDURE — 80053 COMPREHEN METABOLIC PANEL: CPT

## 2023-05-02 PROCEDURE — 85025 COMPLETE CBC W/AUTO DIFF WBC: CPT

## 2023-05-02 PROCEDURE — 36415 COLL VENOUS BLD VENIPUNCTURE: CPT

## 2023-05-02 PROCEDURE — 80061 LIPID PANEL: CPT | Performed by: INTERNAL MEDICINE

## 2023-05-02 PROCEDURE — 84550 ASSAY OF BLOOD/URIC ACID: CPT

## 2023-05-04 ENCOUNTER — TELEPHONE (OUTPATIENT)
Dept: ONCOLOGY | Facility: CLINIC | Age: 68
End: 2023-05-04
Payer: COMMERCIAL

## 2023-05-04 NOTE — TELEPHONE ENCOUNTER
----- Message from Russell Cagle MD sent at 5/3/2023  5:22 PM EDT -----  Call his wbc keeps getting better continue med the same

## 2023-05-08 ENCOUNTER — SPECIALTY PHARMACY (OUTPATIENT)
Dept: PHARMACY | Facility: HOSPITAL | Age: 68
End: 2023-05-08
Payer: COMMERCIAL

## 2023-05-08 NOTE — PROGRESS NOTES
Specialty Pharmacy Refill Coordination Note     Francisoc is a 67 y.o. male contacted today regarding refills of Brukinsa specialty medication(s).    Reviewed and verified with patient:       Specialty medication(s) and dose(s) confirmed: yes  Brukinsa 80 mg caps-2 caps twice per day    Refill Questions    Flowsheet Row Most Recent Value   Changes to allergies? No   Changes to medications? No   New conditions since last clinic visit No   Unplanned office visit, urgent care, ED, or hospital admission in the last 4 weeks  No   How does patient/caregiver feel medication is working? Good   Financial problems or insurance changes  No   Since the previous refill, were any specialty medication doses or scheduled injections missed or delayed?  No   Does this patient require a clinical escalation to a pharmacist? No          Delivery Questions    Flowsheet Row Most Recent Value   Delivery method Other (Comment)  [Ship to home-Ship 5/9 for delivery 5/10-$0 copay with copay card-Address Confirmed]   Delivery address correct? Yes  [Ship to home address]   Delivery phone number [phone]   Preferred delivery time? AM   Number of medications in delivery 1   Medication being filled and delivered Brukinsa   Doses left of specialty medications 4 days   Is there any medication that is due not being filled? No   Supplies needed? No supplies needed   Cooler needed? No   Do any medications need mixed or dated? No   Copay form of payment Payment plan already set up   Additional comments $0 copay with Copay card   Questions or concerns for the pharmacist? No   Explain any questions or concerns for the pharmacist N/A   Are any medications first time fills? No        Brukinsa delivery coordinated with pt for 5/10/2023 to his home address. $0 copay with copay card. His last delivery was on 4/12/2023. No questions or concerns to report to MTM Team today.     Follow-up: 21 day(s)     Marta Jansen  Specialty Pharmacy Technician

## 2023-05-15 DIAGNOSIS — C91.10 CHRONIC LYMPHOCYTIC LEUKEMIA: ICD-10-CM

## 2023-05-15 RX ORDER — SULFAMETHOXAZOLE AND TRIMETHOPRIM 800; 160 MG/1; MG/1
1 TABLET ORAL 3 TIMES WEEKLY
Qty: 12 TABLET | Refills: 7 | Status: SHIPPED | OUTPATIENT
Start: 2023-05-15

## 2023-05-15 NOTE — TELEPHONE ENCOUNTER
Caller: Evangelina Espino    Relationship: Emergency Contact    Best call back number: [phone]    Requested Prescriptions:   Requested Prescriptions     Pending Prescriptions Disp Refills   • sulfamethoxazole-trimethoprim (BACTRIM DS,SEPTRA DS) 800-160 MG per tablet 12 tablet 7     Sig: Take 1 tablet by mouth 3 (Three) Times a Week.        Pharmacy where request should be sent:    [facility] AT [address] [place] [address]# [phone]    Last office visit with prescribing clinician: 4/18/2023   Last telemedicine visit with prescribing clinician: 5/4/2023   Next office visit with prescribing clinician: 6/8/2023     Additional details provided by patient: THIS WAS SENT TO [facility] , BUT INSURANCE SAYS NEEDS TO BE SENT TO [facility] INSTEAD.  THIS IS NEEDED TODAY, PT ONLY HAD 1 PILL.  PLEASE CALL BACK TO VERIFY WHEN THIS HAS BEEN SENT TO [facility].  [facility] COULD NOT TRANSFER IT.    Does the patient have less than a 3 day supply:  [x] Yes  [] No    Would you like a call back once the refill request has been completed: [x] Yes [] No    If the office needs to give you a call back, can they leave a voicemail: [x] Yes [] No    Milana Herrera Rep   05/15/23 10:54 EDT

## 2023-05-16 ENCOUNTER — OFFICE VISIT (OUTPATIENT)
Dept: ONCOLOGY | Facility: CLINIC | Age: 68
End: 2023-05-16
Payer: COMMERCIAL

## 2023-05-16 ENCOUNTER — LAB (OUTPATIENT)
Dept: LAB | Facility: HOSPITAL | Age: 68
End: 2023-05-16
Payer: COMMERCIAL

## 2023-05-16 VITALS
OXYGEN SATURATION: 97 % | SYSTOLIC BLOOD PRESSURE: 132 MMHG | TEMPERATURE: 98 F | WEIGHT: 198.3 LBS | HEART RATE: 75 BPM | BODY MASS INDEX: 25.45 KG/M2 | RESPIRATION RATE: 16 BRPM | HEIGHT: 74 IN | DIASTOLIC BLOOD PRESSURE: 86 MMHG

## 2023-05-16 DIAGNOSIS — R80.3 BENCE JONES PROTEINURIA: ICD-10-CM

## 2023-05-16 DIAGNOSIS — C91.10 CHRONIC LYMPHOCYTIC LEUKEMIA: Primary | ICD-10-CM

## 2023-05-16 DIAGNOSIS — Z79.899 HIGH RISK MEDICATION USE: ICD-10-CM

## 2023-05-16 DIAGNOSIS — N28.9 KIDNEY DISEASE: ICD-10-CM

## 2023-05-16 DIAGNOSIS — C91.10 CHRONIC LYMPHOCYTIC LEUKEMIA: ICD-10-CM

## 2023-05-16 LAB
ALBUMIN SERPL-MCNC: 4.8 G/DL (ref 3.5–5.2)
ALBUMIN/GLOB SERPL: 1.8 G/DL (ref 1.1–2.4)
ALP SERPL-CCNC: 69 U/L (ref 38–116)
ALT SERPL W P-5'-P-CCNC: 61 U/L (ref 0–41)
ANION GAP SERPL CALCULATED.3IONS-SCNC: 10.6 MMOL/L (ref 5–15)
AST SERPL-CCNC: 58 U/L (ref 0–40)
BASOPHILS # BLD AUTO: 0.03 10*3/MM3 (ref 0–0.2)
BASOPHILS NFR BLD AUTO: 0.1 % (ref 0–1.5)
BILIRUB SERPL-MCNC: 0.9 MG/DL (ref 0.2–1.2)
BUN SERPL-MCNC: 12 MG/DL (ref 6–20)
BUN/CREAT SERPL: 9.2 (ref 7.3–30)
CALCIUM SPEC-SCNC: 10.2 MG/DL (ref 8.5–10.2)
CHLORIDE SERPL-SCNC: 101 MMOL/L (ref 98–107)
CO2 SERPL-SCNC: 26.4 MMOL/L (ref 22–29)
CREAT SERPL-MCNC: 1.3 MG/DL (ref 0.7–1.3)
DEPRECATED RDW RBC AUTO: 51 FL (ref 37–54)
EGFRCR SERPLBLD CKD-EPI 2021: 59.8 ML/MIN/1.73
EOSINOPHIL # BLD AUTO: 0.05 10*3/MM3 (ref 0–0.4)
EOSINOPHIL NFR BLD AUTO: 0.1 % (ref 0.3–6.2)
ERYTHROCYTE [DISTWIDTH] IN BLOOD BY AUTOMATED COUNT: 12.9 % (ref 12.3–15.4)
GLOBULIN UR ELPH-MCNC: 2.6 GM/DL (ref 1.8–3.5)
GLUCOSE SERPL-MCNC: 113 MG/DL (ref 74–124)
HCT VFR BLD AUTO: 40.8 % (ref 37.5–51)
HGB BLD-MCNC: 13.4 G/DL (ref 13–17.7)
IMM GRANULOCYTES # BLD AUTO: 0.07 10*3/MM3 (ref 0–0.05)
IMM GRANULOCYTES NFR BLD AUTO: 0.2 % (ref 0–0.5)
LYMPHOCYTES # BLD AUTO: 37.48 10*3/MM3 (ref 0.7–3.1)
LYMPHOCYTES NFR BLD AUTO: 90.8 % (ref 19.6–45.3)
MCH RBC QN AUTO: 35.8 PG (ref 26.6–33)
MCHC RBC AUTO-ENTMCNC: 32.8 G/DL (ref 31.5–35.7)
MCV RBC AUTO: 109.1 FL (ref 79–97)
MONOCYTES # BLD AUTO: 0.67 10*3/MM3 (ref 0.1–0.9)
MONOCYTES NFR BLD AUTO: 1.6 % (ref 5–12)
NEUTROPHILS NFR BLD AUTO: 2.98 10*3/MM3 (ref 1.7–7)
NEUTROPHILS NFR BLD AUTO: 7.2 % (ref 42.7–76)
NRBC BLD AUTO-RTO: 0 /100 WBC (ref 0–0.2)
PLATELET # BLD AUTO: 104 10*3/MM3 (ref 140–450)
PMV BLD AUTO: 11 FL (ref 6–12)
POTASSIUM SERPL-SCNC: 4.5 MMOL/L (ref 3.5–4.7)
PROT SERPL-MCNC: 7.4 G/DL (ref 6.3–8)
RBC # BLD AUTO: 3.74 10*6/MM3 (ref 4.14–5.8)
SODIUM SERPL-SCNC: 138 MMOL/L (ref 134–145)
URATE SERPL-MCNC: 2.4 MG/DL (ref 2.8–7.4)
WBC NRBC COR # BLD: 41.28 10*3/MM3 (ref 3.4–10.8)

## 2023-05-16 PROCEDURE — 36415 COLL VENOUS BLD VENIPUNCTURE: CPT

## 2023-05-16 PROCEDURE — 85025 COMPLETE CBC W/AUTO DIFF WBC: CPT

## 2023-05-16 PROCEDURE — 80053 COMPREHEN METABOLIC PANEL: CPT

## 2023-05-16 NOTE — PROGRESS NOTES
Subjective     REASON FOR FOLLOW UP:   1. CLL     HISTORY OF PRESENT ILLNESS:   The patient is a 67 y.o. male with the above mentioned history here today for lab review and evaluation continuing on Brukinsa 160 mg twice daily.  He feels like, overall he is tolerating this quite well.  He does note a little bit of fatigue but otherwise denies issues with nausea, vomiting, diarrhea, or constipation.  He denies issues with fevers, chills, night sweats.  He denies any new palpable adenopathy.    Past Medical History:   Diagnosis Date   • Abnormal liver function test    • Alcohol abuse    • CLL (chronic lymphocytic leukemia)    • Coronary artery disease     stent   • Heart disease    • History of pneumonia     1/2019   • Hyperlipidemia    • Hypertension    • Inguinal hernia     left side to have surgery 3/28/19   • Myocardial infarction    • Screen for colon cancer 09/2016    Negative Cologaurd   • Screening PSA (prostate specific antigen) 02/2013    .5   • Thrombocytopenia         Past Surgical History:   Procedure Laterality Date   • CARDIAC CATHETERIZATION  2017   • CATARACT EXTRACTION Bilateral    • COLONOSCOPY N/A 06/05/2006    Normal, repeat in 10 years, Dr. Preethi Gonzalez   • COLONOSCOPY N/A 7/15/2020    Procedure: COLONOSCOPY TO CECUM & T.I. WITH COLD SNARE POLYPECTOMIES, CLIP PLACEMENT X 2;  Surgeon: Yennifer Salazar MD;  Location: [facility] ENDOSCOPY;  Service: Gastroenterology;  Laterality: N/A;  PRE- POSITIVE COLOGUARD  POST- COLON POLYPS, DIVERTICULOSIS, HEMORRHOIDS   • CORONARY ANGIOPLASTY WITH STENT PLACEMENT N/A 05/09/2017    Left heart catheterization, Selective native vessel coronary arteriography, Left ventriculography, Successful percutaneous intervention to the 100% occluded right coronary artery with a 3.5 x 38 mm Synergy drug-eluting stent (post-dilated w/ a 4.0 x 20 mm NC Emerge balloon), Selective right femoral angiography, Successful Perclose arteriotomy closure. Dr. James Pierson   •  INGUINAL HERNIA REPAIR Left    • INGUINAL HERNIA REPAIR Right     x2   • INGUINAL HERNIA REPAIR Left 3/28/2019    Procedure: LEFT INGUINAL HERNIA REPAIR LAPAROSCOPIC;  Surgeon: Preethi Gonzalez MD;  Location: [facility] OR [facility];  Service: General   • LAPAROSCOPIC INGUINAL HERNIA REPAIR Right 10/03/2002    w/ extra peritoneal Goe-Benton mesh (transperitoneal repair), Dr. Preethi Gonzalez   • REFRACTIVE SURGERY Bilateral    • RETINAL DETACHMENT SURGERY Right 07/02/2013      ONCOLOGY HISTORY SHOLA ENNIS MD:ONCOLOGIC HISTORY:  On 12/22/10 he was reviewed.  He   had a totally normal white count with normal differential, predominance of lymphocytes.  Normal platelet count.  His chemistry profile was normal including LDH.  His beta-2 microglobulin was normal.  His quantitative immunoglobulins were normal.  His jane  p  heral blood flow cytometry documented a typical pattern by flow cytometry of CLL, CD5, CD19 negative, dim positive CD20, ZAP-70 negative and CD38 negative.  His CT scan of the chest, abdomen and pelvis disclosed no peripheral adenopathy or hepatosplenomeg  a  ly.  His level of immunoglobulins was normal.  With this in mind we thought that the patient had very early stage disease with excellent prognostic features and we advised him to remain on observation.  He will be rechecked every 3 months for the first ye  ar and thereafter every 6 months depending on the clinical circumstances.  Appropriate booklet information was given to him and his wife to review and further learn.          Given the excellent characteristics of his disease on clinical grounds, I doubt at this time a bone marrow is needed.        On 1/8/13 his physical exam is unremarkable with no peripheral adenopathy or hepatosplenomegaly, no B symptoms, no infections, no autoimmunity.  On the other hand he had areas of the skin on the left cheek and right cheek bhavani  t look like actinic keratosis.  His white count was up to17,000 with a normal hemoglobin  and borderline platelet count of 125,000.  We asked the patient to try to minimize stressors in life and we asked him to return back in four months.  We also asked priscila edwards to be seen by dermatology in order to treat his multiple actinic keratosis.          On 05/09/13 the patient was asymptomatic with regard to his chronic lymphoid leukemia, no fatigue, fevers, chills, repeated infections or peripheral lymphadenopathy.  His physi  francisco j exam was unremarkable with no palpable lymphadenopathy or hepatosplenomegaly.  His white count has now improved to 12,900, stable hemoglobin of 13.6 and stable platelet count of 121,000.  With this in mind we advised him to remain on observation, retu  rning for reevaluation in six months.         On 05/07/2014 the patient was asymptomatic in regard to his CLL with no autoimmunity, no infections and no progressive disease clinically.  On the other hand we have seen duplication of his white count in one year.  T  he patient and wife are aware that eventually he will require intervention for this if the white count continues changing the way it is.  At this time we do not justify the need of any other therapy or any other testing.        Current Outpatient Medications on File Prior to Visit   Medication Sig Dispense Refill   • acetaminophen (TYLENOL) 500 MG tablet Take 2 tablets by mouth Every 6 (Six) Hours As Needed for Mild Pain.     • acyclovir (Zovirax) 400 MG tablet Take 1 tablet by mouth 2 (Two) Times a Day. 60 tablet 7   • allopurinol (ZYLOPRIM) 300 MG tablet Take 0.5 tablets by mouth Daily. 60 tablet 0   • aspirin 81 MG EC tablet Take 1 tablet by mouth Every Other Day.     • carvedilol (COREG) 6.25 MG tablet TAKE 1 TABLET TWICE DAILY  WITH MEALS 180 tablet 1   • cetirizine (zyrTEC) 10 MG tablet Take 1 tablet by mouth Daily.     • fluticasone (FLONASE) 50 MCG/ACT nasal spray 2 sprays into the nostril(s) as directed by provider Daily. (Patient taking differently: 2 sprays into the  nostril(s) as directed by provider Daily. Rarely used) 1 bottle 0   • Multiple Vitamins-Minerals (EYE VITAMINS & MINERALS PO) Take 1 tablet by mouth Daily.     • ondansetron ODT (ZOFRAN-ODT) 8 MG disintegrating tablet Place 1 tablet on the tongue Every 8 (Eight) Hours As Needed for Nausea or Vomiting. 30 tablet 5   • rosuvastatin (CRESTOR) 20 MG tablet Take 1 tablet by mouth Every Night.     • sulfamethoxazole-trimethoprim (BACTRIM DS,SEPTRA DS) 800-160 MG per tablet Take 1 tablet by mouth 3 (Three) Times a Week. 12 tablet 7   • Zanubrutinib (BRUKINSA) 80 MG chemo capsule Take 2 capsules by mouth 2 (Two) Times a Day. 120 capsule 3   • [DISCONTINUED] ticagrelor (Brilinta) 60 MG tablet tablet Take 1 tablet by mouth 2 (Two) Times a Day. Told to stop 5 days before surgery 180 tablet 3     No current facility-administered medications on file prior to visit.        ALLERGIES:    Allergies   Allergen Reactions   • Codeine Nausea Only and Nausea And Vomiting        Social History     Socioeconomic History   • Marital status:      Spouse name: Charlotte Espino   Tobacco Use   • Smoking status: Never   • Smokeless tobacco: Never   Vaping Use   • Vaping Use: Never used   Substance and Sexual Activity   • Alcohol use: Yes     Alcohol/week: 21.0 standard drinks     Types: 21 Shots of liquor per week   • Drug use: No   • Sexual activity: Defer        Family History   Problem Relation Age of Onset   • Heart attack Mother    • Heart disease Mother    • Diabetes Mother    • Aortic aneurysm Mother    • Coronary artery disease Mother    • Early death Mother    • No Known Problems Father    • Diabetes type II Other    • Diabetes Brother    • Hyperlipidemia Brother    • Stroke Sister 65   • Diabetes Sister    • Hyperlipidemia Sister    • Hypertension Sister    • No Known Problems Brother    • Stomach cancer Paternal Uncle    • Malig Hyperthermia Neg Hx         Objective     Vitals:    05/16/23 1126   BP: 132/86   Pulse: 75   Resp: 16  "  Temp: 98 °F (36.7 °C)   TempSrc: Temporal   SpO2: 97%   Weight: 89.9 kg (198 lb 4.8 oz)   Height: 188 cm (74.02\")   PainSc: 0-No pain         5/16/2023    11:22 AM   Current Status   ECOG score 0       Physical Exam   Constitutional: He is oriented to person, place, and time. He appears well-developed. No distress.   HENT:   Head: Normocephalic and atraumatic.   Eyes: Pupils are equal, round, and reactive to light.   Cardiovascular: Normal rate, regular rhythm and normal heart sounds.   No murmur heard.  Pulmonary/Chest: Effort normal and breath sounds normal. No respiratory distress. He has no wheezes. He has no rhonchi. He has no rales.   Abdominal: Soft. Normal appearance and bowel sounds are normal. He exhibits no distension.   Musculoskeletal: Normal range of motion.   Neurological: He is alert and oriented to person, place, and time.   Skin: Skin is warm and dry. No rash noted.   Vitals reviewed.       RESULTS REVIEWED:  Results from last 7 days   Lab Units 05/16/23  1116   WBC 10*3/mm3 41.28*   NEUTROS ABS 10*3/mm3 2.98   HEMOGLOBIN g/dL 13.4   HEMATOCRIT % 40.8   PLATELETS 10*3/mm3 104*     Results from last 7 days   Lab Units 05/16/23  1116   SODIUM mmol/L 138   POTASSIUM mmol/L 4.5   CHLORIDE mmol/L 101   CO2 mmol/L 26.4   BUN mg/dL 12   CREATININE mg/dL 1.30   CALCIUM mg/dL 10.2   ALBUMIN g/dL 4.8   BILIRUBIN mg/dL 0.9   ALK PHOS U/L 69   ALT (SGPT) U/L 61*   AST (SGOT) U/L 58*   GLUCOSE mg/dL 113             Assessment & Plan      1. CLL  · CLL dating back to initial diagnosis in 2005.  He has remained on a course of observation without any need for active treatment.    · 12/22/2010 peripheral blood flow cytometry documented a typical pattern by flow cytometry of CLL, CD5, CD19 negative, dim positive CD20, ZAP-70 negative and CD38 negative.  His CT scan of the chest, abdomen and pelvis disclosed no peripheral adenopathy or hepatosplenomegaly.  His level of immunoglobulins was normal.  With this in mind " we thought that the patient had very early stage disease with excellent prognostic features and we advised him to remain on observation  · 1/24/2023, leukocytosis worsened, though the patient was recently treated with prednisone for eustachian tube dysfunction.  WBC sylvain to 110,000, though then improved to 88,000.  He had no peripheral adenopathy or hepatosplenomegaly.  · 1/24/2023, creatinine elevated at 1.4 with urinalysis showing 300 mg of protein.  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  Absolute lymphocyte count 70.87 which is improvement compared to 113.15 2 weeks ago.  4/18/2023 WBC 60,000.  5/16/2023 WBC 41.28, hgb 13.4, platelet count 104,000.    2. Prophylaxis  · Prophylactic acyclovir 150 mg daily until white count is normal  · Acyclovir 400 mg twice daily and Bactrim DS Monday, Wednesday, Friday    3.  Orthostasis:    PLAN:  1. Continue Brukinsa 160 mg twice daily.  2. Continue allopurinol 150 mg daily.  3. Continue acyclovir 400 mg twice daily.  4. Continue Bactrim DS 3 times weekly.  5. Return in 2 weeks for CBC with RN review.  6. Return in 4 weeks for follow-up visit with Dr. Cagle with repeat labs reevaluation.  Timing of repeat 24-hour urine electrophoresis will be discussed at that time  7. Call/ return sooner should the patient develop any new concerns or problems.    Patient is on a high risk medication requiring close monitoring for toxicity.    Julianne Arvizu, APRN  05/16/23

## 2023-05-30 ENCOUNTER — LAB (OUTPATIENT)
Dept: LAB | Facility: HOSPITAL | Age: 68
End: 2023-05-30

## 2023-05-30 DIAGNOSIS — C91.10 CHRONIC LYMPHOCYTIC LEUKEMIA: ICD-10-CM

## 2023-05-30 DIAGNOSIS — Z79.899 HIGH RISK MEDICATION USE: ICD-10-CM

## 2023-05-30 DIAGNOSIS — N28.9 KIDNEY DISEASE: ICD-10-CM

## 2023-05-30 DIAGNOSIS — R80.3 BENCE JONES PROTEINURIA: ICD-10-CM

## 2023-05-30 LAB
ALBUMIN SERPL-MCNC: 4.6 G/DL (ref 3.5–5.2)
ALBUMIN/GLOB SERPL: 2 G/DL
ALP SERPL-CCNC: 65 U/L (ref 39–117)
ALT SERPL W P-5'-P-CCNC: 40 U/L (ref 1–41)
ANION GAP SERPL CALCULATED.3IONS-SCNC: 12.9 MMOL/L (ref 5–15)
AST SERPL-CCNC: 39 U/L (ref 1–40)
BASOPHILS # BLD AUTO: 0.08 10*3/MM3 (ref 0–0.2)
BASOPHILS NFR BLD AUTO: 0.3 % (ref 0–1.5)
BILIRUB SERPL-MCNC: 0.9 MG/DL (ref 0–1.2)
BUN SERPL-MCNC: 10 MG/DL (ref 8–23)
BUN/CREAT SERPL: 7.2 (ref 7–25)
CALCIUM SPEC-SCNC: 9.8 MG/DL (ref 8.6–10.5)
CHLORIDE SERPL-SCNC: 103 MMOL/L (ref 98–107)
CO2 SERPL-SCNC: 25.1 MMOL/L (ref 22–29)
CREAT SERPL-MCNC: 1.39 MG/DL (ref 0.76–1.27)
DEPRECATED RDW RBC AUTO: 52.2 FL (ref 37–54)
EGFRCR SERPLBLD CKD-EPI 2021: 55.2 ML/MIN/1.73
EOSINOPHIL # BLD AUTO: 0.06 10*3/MM3 (ref 0–0.4)
EOSINOPHIL NFR BLD AUTO: 0.2 % (ref 0.3–6.2)
ERYTHROCYTE [DISTWIDTH] IN BLOOD BY AUTOMATED COUNT: 13 % (ref 12.3–15.4)
GLOBULIN UR ELPH-MCNC: 2.3 GM/DL
GLUCOSE SERPL-MCNC: 101 MG/DL (ref 65–99)
HCT VFR BLD AUTO: 40.5 % (ref 37.5–51)
HGB BLD-MCNC: 13.5 G/DL (ref 13–17.7)
IMM GRANULOCYTES # BLD AUTO: 0.03 10*3/MM3 (ref 0–0.05)
IMM GRANULOCYTES NFR BLD AUTO: 0.1 % (ref 0–0.5)
LYMPHOCYTES # BLD AUTO: 27.08 10*3/MM3 (ref 0.7–3.1)
LYMPHOCYTES NFR BLD AUTO: 88.2 % (ref 19.6–45.3)
MCH RBC QN AUTO: 36.4 PG (ref 26.6–33)
MCHC RBC AUTO-ENTMCNC: 33.3 G/DL (ref 31.5–35.7)
MCV RBC AUTO: 109.2 FL (ref 79–97)
MONOCYTES # BLD AUTO: 0.62 10*3/MM3 (ref 0.1–0.9)
MONOCYTES NFR BLD AUTO: 2 % (ref 5–12)
NEUTROPHILS NFR BLD AUTO: 2.83 10*3/MM3 (ref 1.7–7)
NEUTROPHILS NFR BLD AUTO: 9.2 % (ref 42.7–76)
NRBC BLD AUTO-RTO: 0 /100 WBC (ref 0–0.2)
PLATELET # BLD AUTO: 96 10*3/MM3 (ref 140–450)
PMV BLD AUTO: 11.4 FL (ref 6–12)
POTASSIUM SERPL-SCNC: 4.8 MMOL/L (ref 3.5–5.2)
PROT SERPL-MCNC: 6.9 G/DL (ref 6–8.5)
RBC # BLD AUTO: 3.71 10*6/MM3 (ref 4.14–5.8)
SODIUM SERPL-SCNC: 141 MMOL/L (ref 136–145)
URATE SERPL-MCNC: 2.9 MG/DL (ref 3.4–7)
WBC NRBC COR # BLD: 30.7 10*3/MM3 (ref 3.4–10.8)

## 2023-05-30 PROCEDURE — 36415 COLL VENOUS BLD VENIPUNCTURE: CPT

## 2023-05-30 PROCEDURE — 85025 COMPLETE CBC W/AUTO DIFF WBC: CPT

## 2023-05-30 PROCEDURE — 84550 ASSAY OF BLOOD/URIC ACID: CPT | Performed by: INTERNAL MEDICINE

## 2023-05-30 PROCEDURE — 80053 COMPREHEN METABOLIC PANEL: CPT | Performed by: INTERNAL MEDICINE

## 2023-06-05 DIAGNOSIS — C91.10 CHRONIC LYMPHOCYTIC LEUKEMIA: ICD-10-CM

## 2023-06-05 RX ORDER — ACYCLOVIR 400 MG/1
TABLET ORAL
Qty: 180 TABLET | Refills: 1 | Status: SHIPPED | OUTPATIENT
Start: 2023-06-05

## 2023-06-05 RX ORDER — ALLOPURINOL 300 MG/1
TABLET ORAL
Qty: 15 TABLET | Refills: 1 | Status: SHIPPED | OUTPATIENT
Start: 2023-06-05

## 2023-06-06 ENCOUNTER — SPECIALTY PHARMACY (OUTPATIENT)
Dept: PHARMACY | Facility: HOSPITAL | Age: 68
End: 2023-06-06
Payer: COMMERCIAL

## 2023-06-06 NOTE — PROGRESS NOTES
"Specialty Pharmacy Refill Coordination Note     Francisco is a 68 y.o. male contacted today regarding refills of Brukinsa specialty medication(s).    Reviewed and verified with patient:       Specialty medication(s) and dose(s) confirmed: yes  Brukinsa 80 mgcaps-2 caps twice per day    Refill Questions      Flowsheet Row Most Recent Value   Changes to allergies? No   Changes to medications? No   New conditions since last clinic visit No   Unplanned office visit, urgent care, ED, or hospital admission in the last 4 weeks  No   How does patient/caregiver feel medication is working? Good   Financial problems or insurance changes  No   Since the previous refill, were any specialty medication doses or scheduled injections missed or delayed?  No   Does this patient require a clinical escalation to a pharmacist? No            Delivery Questions      Flowsheet Row Most Recent Value   Delivery method Other (Comment)  [Ship to home address-Ship 6/7 for delivery 6/8-$0 copay with copay card-Address Confirmed]   Delivery address correct? Yes  [Ship to home address]   Delivery phone number [phone]   Preferred delivery time? AM   Number of medications in delivery 1   Medication being filled and delivered Brukinsa   Doses left of specialty medications \"A few days\"   Is there any medication that is due not being filled? No   Supplies needed? No supplies needed   Cooler needed? No   Do any medications need mixed or dated? No   Copay form of payment Payment plan already set up   Additional comments $0 copay with Copay card   Questions or concerns for the pharmacist? No   Explain any questions or concerns for the pharmacist N/A   Are any medications first time fills? No          Brukinsa delivery coordinated with pt for 6/8/2023 to his home address. $0 copay with the Brukinsa copay card. His last delivery was on 5/10/2023. No questions or concerns to report to MTM Team today.     Follow-up: 21 day(s)     Marta Jansen  Specialty " Pharmacy Technician

## 2023-06-08 ENCOUNTER — OFFICE VISIT (OUTPATIENT)
Dept: ONCOLOGY | Facility: CLINIC | Age: 68
End: 2023-06-08
Payer: COMMERCIAL

## 2023-06-08 ENCOUNTER — DOCUMENTATION (OUTPATIENT)
Dept: PHARMACY | Facility: HOSPITAL | Age: 68
End: 2023-06-08
Payer: COMMERCIAL

## 2023-06-08 ENCOUNTER — LAB (OUTPATIENT)
Dept: LAB | Facility: HOSPITAL | Age: 68
End: 2023-06-08
Payer: COMMERCIAL

## 2023-06-08 VITALS
OXYGEN SATURATION: 95 % | WEIGHT: 199.2 LBS | DIASTOLIC BLOOD PRESSURE: 78 MMHG | HEIGHT: 74 IN | RESPIRATION RATE: 18 BRPM | TEMPERATURE: 98.4 F | BODY MASS INDEX: 25.57 KG/M2 | HEART RATE: 80 BPM | SYSTOLIC BLOOD PRESSURE: 124 MMHG

## 2023-06-08 DIAGNOSIS — Z79.899 HIGH RISK MEDICATION USE: ICD-10-CM

## 2023-06-08 DIAGNOSIS — C91.10 CHRONIC LYMPHOCYTIC LEUKEMIA: ICD-10-CM

## 2023-06-08 DIAGNOSIS — C91.10 CLL (CHRONIC LYMPHOCYTIC LEUKEMIA): Primary | ICD-10-CM

## 2023-06-08 DIAGNOSIS — C91.10 CHRONIC LYMPHOCYTIC LEUKEMIA: Primary | ICD-10-CM

## 2023-06-08 DIAGNOSIS — R80.3 BENCE JONES PROTEINURIA: ICD-10-CM

## 2023-06-08 LAB
ALBUMIN SERPL-MCNC: 4.7 G/DL (ref 3.5–5.2)
ALBUMIN/GLOB SERPL: 1.9 G/DL (ref 1.1–2.4)
ALP SERPL-CCNC: 70 U/L (ref 38–116)
ALT SERPL W P-5'-P-CCNC: 46 U/L (ref 0–41)
ANION GAP SERPL CALCULATED.3IONS-SCNC: 12.4 MMOL/L (ref 5–15)
AST SERPL-CCNC: 46 U/L (ref 0–40)
BASOPHILS # BLD AUTO: 0.04 10*3/MM3 (ref 0–0.2)
BASOPHILS NFR BLD AUTO: 0.2 % (ref 0–1.5)
BILIRUB SERPL-MCNC: 0.6 MG/DL (ref 0.2–1.2)
BUN SERPL-MCNC: 9 MG/DL (ref 6–20)
BUN/CREAT SERPL: 6.9 (ref 7.3–30)
CALCIUM SPEC-SCNC: 9.8 MG/DL (ref 8.5–10.2)
CHLORIDE SERPL-SCNC: 103 MMOL/L (ref 98–107)
CO2 SERPL-SCNC: 25.6 MMOL/L (ref 22–29)
CREAT SERPL-MCNC: 1.31 MG/DL (ref 0.7–1.3)
DEPRECATED RDW RBC AUTO: 50.5 FL (ref 37–54)
EGFRCR SERPLBLD CKD-EPI 2021: 58.9 ML/MIN/1.73
EOSINOPHIL # BLD AUTO: 0.05 10*3/MM3 (ref 0–0.4)
EOSINOPHIL NFR BLD AUTO: 0.2 % (ref 0.3–6.2)
ERYTHROCYTE [DISTWIDTH] IN BLOOD BY AUTOMATED COUNT: 12.9 % (ref 12.3–15.4)
GLOBULIN UR ELPH-MCNC: 2.5 GM/DL (ref 1.8–3.5)
GLUCOSE SERPL-MCNC: 115 MG/DL (ref 74–124)
HCT VFR BLD AUTO: 40.7 % (ref 37.5–51)
HGB BLD-MCNC: 13.5 G/DL (ref 13–17.7)
IMM GRANULOCYTES # BLD AUTO: 0.04 10*3/MM3 (ref 0–0.05)
IMM GRANULOCYTES NFR BLD AUTO: 0.2 % (ref 0–0.5)
LYMPHOCYTES # BLD AUTO: 20.23 10*3/MM3 (ref 0.7–3.1)
LYMPHOCYTES NFR BLD AUTO: 88.7 % (ref 19.6–45.3)
MCH RBC QN AUTO: 35.3 PG (ref 26.6–33)
MCHC RBC AUTO-ENTMCNC: 33.2 G/DL (ref 31.5–35.7)
MCV RBC AUTO: 106.5 FL (ref 79–97)
MONOCYTES # BLD AUTO: 0.51 10*3/MM3 (ref 0.1–0.9)
MONOCYTES NFR BLD AUTO: 2.2 % (ref 5–12)
NEUTROPHILS NFR BLD AUTO: 1.93 10*3/MM3 (ref 1.7–7)
NEUTROPHILS NFR BLD AUTO: 8.5 % (ref 42.7–76)
NRBC BLD AUTO-RTO: 0 /100 WBC (ref 0–0.2)
PLATELET # BLD AUTO: 91 10*3/MM3 (ref 140–450)
PMV BLD AUTO: 10.9 FL (ref 6–12)
POTASSIUM SERPL-SCNC: 4.5 MMOL/L (ref 3.5–4.7)
PROT SERPL-MCNC: 7.2 G/DL (ref 6.3–8)
RBC # BLD AUTO: 3.82 10*6/MM3 (ref 4.14–5.8)
SODIUM SERPL-SCNC: 141 MMOL/L (ref 134–145)
URATE SERPL-MCNC: 2.6 MG/DL (ref 2.8–7.4)
WBC NRBC COR # BLD: 22.8 10*3/MM3 (ref 3.4–10.8)

## 2023-06-08 PROCEDURE — 85025 COMPLETE CBC W/AUTO DIFF WBC: CPT

## 2023-06-08 PROCEDURE — 80053 COMPREHEN METABOLIC PANEL: CPT

## 2023-06-08 PROCEDURE — 84550 ASSAY OF BLOOD/URIC ACID: CPT

## 2023-06-08 PROCEDURE — 36415 COLL VENOUS BLD VENIPUNCTURE: CPT

## 2023-06-08 NOTE — PROGRESS NOTES
Subjective     REASON FOR FOLLOW UP:   1. CLL ASSOCIATED WITH BENCE DAWSON PROTEINURIA AND ABNORMAL CREATININE UNDERGOING THERAPY WITH BRUKINSA    HISTORY OF PRESENT ILLNESS:  On 06/08/2023 this 68-year-old male with chronic lymphoid leukemia who has had hyperleukocytosis and was found to have abnormal creatinine with Bence-Dawson protein production associated with his leukemia has been treated with Brukinsa now for 2 months. Originally he has further rise in the white count and now the white count continues declining in each one of the visits. The patient has not had any clinical or laboratory evidence of tumor lysis syndrome. Physically he feels terrific.  His appetite is excellent, his weight is stable. He has no nausea, vomiting, diarrhea, constipation or jaundice. Urination in volume is normal. He has no pain, fever or infection. No abnormal bleeding, no cardiac irregularity. ECOG performance status 0. He has not encountered any problems with the consecution of the Brukinsa and he continues taking the medicine on proper schedule. He continues receiving allopurinol that will be discontinued today. Please review.          Past Medical History:   Diagnosis Date    Abnormal liver function test     Alcohol abuse     CLL (chronic lymphocytic leukemia)     Coronary artery disease     stent    Heart disease     History of pneumonia     1/2019    Hyperlipidemia     Hypertension     Inguinal hernia     left side to have surgery 3/28/19    Myocardial infarction     Screen for colon cancer 09/2016    Negative Cologaurd    Screening PSA (prostate specific antigen) 02/2013    .5    Thrombocytopenia         Past Surgical History:   Procedure Laterality Date    CARDIAC CATHETERIZATION  2017    CATARACT EXTRACTION Bilateral     COLONOSCOPY N/A 06/05/2006    Normal, repeat in 10 years, Dr. Preethi Gonzalez    COLONOSCOPY N/A 7/15/2020    Procedure: COLONOSCOPY TO CECUM & T.I. WITH COLD SNARE POLYPECTOMIES, CLIP PLACEMENT X 2;   Surgeon: Yennifer Salazar MD;  Location: [facility] ENDOSCOPY;  Service: Gastroenterology;  Laterality: N/A;  PRE- POSITIVE COLOGUARD  POST- COLON POLYPS, DIVERTICULOSIS, HEMORRHOIDS    CORONARY ANGIOPLASTY WITH STENT PLACEMENT N/A 05/09/2017    Left heart catheterization, Selective native vessel coronary arteriography, Left ventriculography, Successful percutaneous intervention to the 100% occluded right coronary artery with a 3.5 x 38 mm Synergy drug-eluting stent (post-dilated w/ a 4.0 x 20 mm NC Emerge balloon), Selective right femoral angiography, Successful Perclose arteriotomy closure. Dr. James Pierson    INGUINAL HERNIA REPAIR Left     INGUINAL HERNIA REPAIR Right     x2    INGUINAL HERNIA REPAIR Left 3/28/2019    Procedure: LEFT INGUINAL HERNIA REPAIR LAPAROSCOPIC;  Surgeon: Preethi Gonzalez MD;  Location: [facility] OR [facility];  Service: General    LAPAROSCOPIC INGUINAL HERNIA REPAIR Right 10/03/2002    w/ extra peritoneal Goe-Benton mesh (transperitoneal repair), Dr. Preethi Gonzalez    REFRACTIVE SURGERY Bilateral     RETINAL DETACHMENT SURGERY Right 07/02/2013      ONCOLOGY HISTORY SHOLA ENNIS MD:ONCOLOGIC HISTORY:  On 12/22/10 he was reviewed.  He   had a totally normal white count with normal differential, predominance of lymphocytes.  Normal platelet count.  His chemistry profile was normal including LDH.  His beta-2 microglobulin was normal.  His quantitative immunoglobulins were normal.  His jane  p  heral blood flow cytometry documented a typical pattern by flow cytometry of CLL, CD5, CD19 negative, dim positive CD20, ZAP-70 negative and CD38 negative.  His CT scan of the chest, abdomen and pelvis disclosed no peripheral adenopathy or hepatosplenomeg  a  ly.  His level of immunoglobulins was normal.  With this in mind we thought that the patient had very early stage disease with excellent prognostic features and we advised him to remain on observation.  He will be rechecked every 3 months for the first  ricardo resendez and thereafter every 6 months depending on the clinical circumstances.  Appropriate booklet information was given to him and his wife to review and further learn.          Given the excellent characteristics of his disease on clinical grounds, I doubt at this time a bone marrow is needed.        On 1/8/13 his physical exam is unremarkable with no peripheral adenopathy or hepatosplenomegaly, no B symptoms, no infections, no autoimmunity.  On the other hand he had areas of the skin on the left cheek and right cheek bhavani  t look like actinic keratosis.  His white count was up to17,000 with a normal hemoglobin and borderline platelet count of 125,000.  We asked the patient to try to minimize stressors in life and we asked him to return back in four months.  We also asked priscila edwards to be seen by dermatology in order to treat his multiple actinic keratosis.          On 05/09/13 the patient was asymptomatic with regard to his chronic lymphoid leukemia, no fatigue, fevers, chills, repeated infections or peripheral lymphadenopathy.  His physi  francisco j exam was unremarkable with no palpable lymphadenopathy or hepatosplenomegaly.  His white count has now improved to 12,900, stable hemoglobin of 13.6 and stable platelet count of 121,000.  With this in mind we advised him to remain on observation, retu  rning for reevaluation in six months.         On 05/07/2014 the patient was asymptomatic in regard to his CLL with no autoimmunity, no infections and no progressive disease clinically.  On the other hand we have seen duplication of his white count in one year.  T  he patient and wife are aware that eventually he will require intervention for this if the white count continues changing the way it is.  At this time we do not justify the need of any other therapy or any other testing.        Current Outpatient Medications on File Prior to Visit   Medication Sig Dispense Refill    acetaminophen (TYLENOL) 500 MG tablet Take 2 tablets by  mouth Every 6 (Six) Hours As Needed for Mild Pain.      acyclovir (ZOVIRAX) 400 MG tablet TAKE 1 TABLET TWICE DAILY 180 tablet 1    allopurinol (ZYLOPRIM) 300 MG tablet TAKE 1/2 TABLET BY MOUTH DAILY 15 tablet 1    aspirin 81 MG EC tablet Take 1 tablet by mouth Every Other Day.      carvedilol (COREG) 6.25 MG tablet TAKE 1 TABLET TWICE DAILY  WITH MEALS 180 tablet 1    cetirizine (zyrTEC) 10 MG tablet Take 1 tablet by mouth Daily.      fluticasone (FLONASE) 50 MCG/ACT nasal spray 2 sprays into the nostril(s) as directed by provider Daily. (Patient taking differently: 2 sprays into the nostril(s) as directed by provider Daily. Rarely used) 1 bottle 0    Multiple Vitamins-Minerals (EYE VITAMINS & MINERALS PO) Take 1 tablet by mouth Daily.      ondansetron ODT (ZOFRAN-ODT) 8 MG disintegrating tablet Place 1 tablet on the tongue Every 8 (Eight) Hours As Needed for Nausea or Vomiting. 30 tablet 5    rosuvastatin (CRESTOR) 20 MG tablet Take 1 tablet by mouth Every Night.      sulfamethoxazole-trimethoprim (BACTRIM DS,SEPTRA DS) 800-160 MG per tablet Take 1 tablet by mouth 3 (Three) Times a Week. 12 tablet 7    Zanubrutinib (BRUKINSA) 80 MG chemo capsule Take 2 capsules by mouth 2 (Two) Times a Day. 120 capsule 3     No current facility-administered medications on file prior to visit.        ALLERGIES:    Allergies   Allergen Reactions    Codeine Nausea Only and Nausea And Vomiting        Social History     Socioeconomic History    Marital status:      Spouse name: Charlotte Espino   Tobacco Use    Smoking status: Never    Smokeless tobacco: Never   Vaping Use    Vaping Use: Never used   Substance and Sexual Activity    Alcohol use: Yes     Alcohol/week: 21.0 standard drinks     Types: 21 Shots of liquor per week    Drug use: No    Sexual activity: Defer        Family History   Problem Relation Age of Onset    Heart attack Mother     Heart disease Mother     Diabetes Mother     Aortic aneurysm Mother     Coronary artery  "disease Mother     Early death Mother     No Known Problems Father     Diabetes type II Other     Diabetes Brother     Hyperlipidemia Brother     Stroke Sister 65    Diabetes Sister     Hyperlipidemia Sister     Hypertension Sister     No Known Problems Brother     Stomach cancer Paternal Uncle     Malig Hyperthermia Neg Hx         Objective     Vitals:    06/08/23 0842   BP: 124/78   Pulse: 80   Resp: 18   Temp: 98.4 °F (36.9 °C)   TempSrc: Temporal   SpO2: 95%   Weight: 90.4 kg (199 lb 3.2 oz)   Height: 188 cm (74.02\")   PainSc: 0-No pain         6/8/2023     8:43 AM   Current Status   ECOG score 0     EXAM :         GENERAL:  Well-developed, Patient  in no acute distress.   SKIN:  Warm, dry ,NO purpura ,no rash.  HEENT:  Pupils were equal and reactive to light and accomodation, conjunctivae noninjected,  normal visual acuity.   NECK:  Supple with good range of motion; no thyromegaly , no JVD or bruits,.No carotid artery pain, no carotid abnormal pulsation   LYMPHATICS:  No cervical, NO supraclavicular, NO axillary, NO inguinal adenopathies.  CARDIAC   normal rate , regular rhythm, without murmur,NO rubs NO S3 NO S4   LUNGS: normal breath sounds bilateral, no wheezing, NO rhonchi, NO crackles ,NO rubs.  VASCULAR VENOUS: no cyanosis, NO collateral circulation, NO varicosities, NO edema, NO palpable cords, NO pain,NO erythema, NO pigmentation of the skin.  ABDOMEN:  Soft, NO pain,no hepatomegaly, no splenomegaly,no masses, no ascites, no collateral circulation,no distention.  EXTREMITIES  AND SPINE:  No clubbing, no cyanosis ,no deformities , no pain .No kyphosis,  no pain in spine, no pain in ribs , no pain in pelvic bone.  NEUROLOGICAL:  Patient was awake, alert, oriented to time, person and place.    RESULTS REVIEWED:  Results from last 7 days   Lab Units 06/08/23  0815   WBC 10*3/mm3 22.80*   NEUTROS ABS 10*3/mm3 1.93   HEMOGLOBIN g/dL 13.5   HEMATOCRIT % 40.7   PLATELETS 10*3/mm3 91*             "       Assessment & Plan      1. CLL  CLL dating back to initial diagnosis in 2005.  He has remained on a course of observation without any need for active treatment.    12/22/2010 peripheral blood flow cytometry documented a typical pattern by flow cytometry of CLL, CD5, CD19 negative, dim positive CD20, ZAP-70 negative and CD38 negative.  His CT scan of the chest, abdomen and pelvis disclosed no peripheral adenopathy or hepatosplenomegaly.  His level of immunoglobulins was normal.  With this in mind we thought that the patient had very early stage disease with excellent prognostic features and we advised him to remain on observation  1/24/2023, leukocytosis worsened, though the patient was recently treated with prednisone for eustachian tube dysfunction.  WBC sylvain to 110,000, though then improved to 88,000.  He had no peripheral adenopathy or hepatosplenomegaly.  1/24/2023, creatinine elevated at 1.4 with urinalysis showing 300 mg of protein.  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  Absolute lymphocyte count 70.87 which is improvement compared to 113.15 2 weeks ago.  4/18/2023 WBC 60,000.  5/16/2023 WBC 41.28, hgb 13.4, platelet count 104,000.  On 06/08/2023 the patient was advised to continue taking his Brukinsa, he has not encountered any side effects of the medicine including no cardiac irregularity, no clinical bleeding, no rashes in the skin, no nausea, vomiting or diarrhea. His ECOG performance status has improved. The patient's creatinine has  dropped and furthermore the white count has dramatically dropped. The hemoglobin has improved, the platelet count remains stable at 90,000. Given these facts and the lack of tumor lysis syndrome with a normal uric acid and normal LDH, the patient was advised to discontinue allopurinol at this time and continue with Brukinsa at the same dose. He will have laboratory assessment with CBC, CMP every couple of weeks, we will review him back in 6 weeks and I anticipate by then that very likely his white count will be normal. Eventually when this happens and his creatinine is improving he will have a new urinary collection of 24 hours to remeasure monoclonal protein studies, Bence-Dawson proteinuria.     I advised the patient that he will remain on Brukinsa maintenance for the years to come.     I find no need for radiological assessment of any nature on him at this time.       2. Prophylaxis  Prophylactic acyclovir 150 mg daily until white count is normal  Acyclovir 400 mg twice daily and Bactrim DS Monday, Wednesday, Friday  On 06/08/2023 acyclovir will remain ongoing for the time being. He has not developed any problems with the Bactrim and he continues taking 3 times.          PLAN:  Each one of the points have been described above. Brukinsa will continue at the same dose, discontinue allopurinol, continue acyclovir and Bactrim. Blood count, CMP every 2 weeks. No further need for uric acid measurement. Eventually quantification of Bence-Dawson protein in urine collection of 24 hours after he is reviewed in 6 weeks. No need for radiological assessment.       Patient is on a high risk medication requiring close monitoring for toxicity.    Russell Cagle MD  06/08/23

## 2023-06-08 NOTE — PROGRESS NOTES
Staff message rec from Dr Cagle-Pts Ronnie will remain the same.    Pt takes Brukinsa 80 mg caps-2 caps twice per day.      Russell Cagle MD sent to Marta Jansen; Jazz Saenz, [facility]  RONNIE THE SAME      Marta Jansen  Specialty Pharmacy Technician

## 2023-07-05 ENCOUNTER — TELEPHONE (OUTPATIENT)
Dept: ONCOLOGY | Facility: CLINIC | Age: 68
End: 2023-07-05

## 2023-07-05 NOTE — TELEPHONE ENCOUNTER
Caller: Francisco Espino    Relationship: Self    Best call back number: [phone]    What is the best time to reach you: ANYTIME    Who are you requesting to speak with (clinical staff, provider,  specific staff member): VIANNEY OR CLINICAL STAFF       What was the call regarding:     WAS ADVISED BY BELIEVE WAS VIANNEY IN [place] THAT THE ANTHEM WAS NOT PAYING FOR THE  Brukinsa MEDICATION ANYMORE AND WOULD NEED TO CALL [facility]    JUST CALLING TO VERIFY THIS IS WHO NEEDS TO CALL MOVING FORWARD TO GET MEDICATION COVERED.     Is it okay if the provider responds through MyChart: NO

## 2023-07-20 ENCOUNTER — LAB (OUTPATIENT)
Dept: LAB | Facility: HOSPITAL | Age: 68
End: 2023-07-20
Payer: COMMERCIAL

## 2023-07-20 DIAGNOSIS — Z79.899 HIGH RISK MEDICATION USE: ICD-10-CM

## 2023-07-20 DIAGNOSIS — C91.10 CHRONIC LYMPHOCYTIC LEUKEMIA: ICD-10-CM

## 2023-07-20 DIAGNOSIS — R80.3 BENCE JONES PROTEINURIA: ICD-10-CM

## 2023-07-20 DIAGNOSIS — C91.10 CLL (CHRONIC LYMPHOCYTIC LEUKEMIA): ICD-10-CM

## 2023-07-20 LAB
ALBUMIN SERPL-MCNC: 4.8 G/DL (ref 3.5–5.2)
ALBUMIN/GLOB SERPL: 1.8 G/DL (ref 1.1–2.4)
ALP SERPL-CCNC: 74 U/L (ref 38–116)
ALT SERPL W P-5'-P-CCNC: 43 U/L (ref 0–41)
ANION GAP SERPL CALCULATED.3IONS-SCNC: 11.6 MMOL/L (ref 5–15)
AST SERPL-CCNC: 40 U/L (ref 0–40)
BASOPHILS # BLD AUTO: 0.05 10*3/MM3 (ref 0–0.2)
BASOPHILS NFR BLD AUTO: 0.3 % (ref 0–1.5)
BILIRUB SERPL-MCNC: 1 MG/DL (ref 0.2–1.2)
BUN SERPL-MCNC: 11 MG/DL (ref 6–20)
BUN/CREAT SERPL: 8.5 (ref 7.3–30)
CALCIUM SPEC-SCNC: 10 MG/DL (ref 8.5–10.2)
CHLORIDE SERPL-SCNC: 103 MMOL/L (ref 98–107)
CO2 SERPL-SCNC: 26.4 MMOL/L (ref 22–29)
CREAT SERPL-MCNC: 1.29 MG/DL (ref 0.7–1.3)
DEPRECATED RDW RBC AUTO: 54 FL (ref 37–54)
EGFRCR SERPLBLD CKD-EPI 2021: 60 ML/MIN/1.73
EOSINOPHIL # BLD AUTO: 0.05 10*3/MM3 (ref 0–0.4)
EOSINOPHIL NFR BLD AUTO: 0.3 % (ref 0.3–6.2)
ERYTHROCYTE [DISTWIDTH] IN BLOOD BY AUTOMATED COUNT: 13.8 % (ref 12.3–15.4)
GLOBULIN UR ELPH-MCNC: 2.6 GM/DL (ref 1.8–3.5)
GLUCOSE SERPL-MCNC: 127 MG/DL (ref 74–124)
HCT VFR BLD AUTO: 40.2 % (ref 37.5–51)
HGB BLD-MCNC: 13.5 G/DL (ref 13–17.7)
IMM GRANULOCYTES # BLD AUTO: 0.03 10*3/MM3 (ref 0–0.05)
IMM GRANULOCYTES NFR BLD AUTO: 0.2 % (ref 0–0.5)
LYMPHOCYTES # BLD AUTO: 15.06 10*3/MM3 (ref 0.7–3.1)
LYMPHOCYTES NFR BLD AUTO: 78.7 % (ref 19.6–45.3)
MCH RBC QN AUTO: 35.9 PG (ref 26.6–33)
MCHC RBC AUTO-ENTMCNC: 33.6 G/DL (ref 31.5–35.7)
MCV RBC AUTO: 106.9 FL (ref 79–97)
MONOCYTES # BLD AUTO: 0.69 10*3/MM3 (ref 0.1–0.9)
MONOCYTES NFR BLD AUTO: 3.6 % (ref 5–12)
NEUTROPHILS NFR BLD AUTO: 16.9 % (ref 42.7–76)
NEUTROPHILS NFR BLD AUTO: 3.26 10*3/MM3 (ref 1.7–7)
NRBC BLD AUTO-RTO: 0 /100 WBC (ref 0–0.2)
PLATELET # BLD AUTO: 97 10*3/MM3 (ref 140–450)
PMV BLD AUTO: 10.8 FL (ref 6–12)
POTASSIUM SERPL-SCNC: 4.2 MMOL/L (ref 3.5–4.7)
PROT SERPL-MCNC: 7.4 G/DL (ref 6.3–8)
RBC # BLD AUTO: 3.76 10*6/MM3 (ref 4.14–5.8)
SODIUM SERPL-SCNC: 141 MMOL/L (ref 134–145)
WBC NRBC COR # BLD: 19.14 10*3/MM3 (ref 3.4–10.8)

## 2023-07-20 PROCEDURE — 36415 COLL VENOUS BLD VENIPUNCTURE: CPT

## 2023-07-20 PROCEDURE — 85025 COMPLETE CBC W/AUTO DIFF WBC: CPT

## 2023-07-20 PROCEDURE — 80053 COMPREHEN METABOLIC PANEL: CPT

## 2023-08-01 ENCOUNTER — SPECIALTY PHARMACY (OUTPATIENT)
Dept: PHARMACY | Facility: HOSPITAL | Age: 68
End: 2023-08-01
Payer: COMMERCIAL

## 2023-08-10 ENCOUNTER — SPECIALTY PHARMACY (OUTPATIENT)
Dept: PHARMACY | Facility: HOSPITAL | Age: 68
End: 2023-08-10
Payer: COMMERCIAL

## 2023-08-10 NOTE — PROGRESS NOTES
[facility]    No answer today.  direct line [phone].     Thanks,   Chastity Carbajal, PharmD, BCPS

## 2023-08-11 ENCOUNTER — SPECIALTY PHARMACY (OUTPATIENT)
Dept: PHARMACY | Facility: HOSPITAL | Age: 68
End: 2023-08-11
Payer: COMMERCIAL

## 2023-08-11 NOTE — PROGRESS NOTES
[facility]     No answer today.  direct line [phone].      Thanks,   Chastity Carbajal, PharmD, BCPS

## 2023-08-16 ENCOUNTER — SPECIALTY PHARMACY (OUTPATIENT)
Dept: PHARMACY | Facility: HOSPITAL | Age: 68
End: 2023-08-16
Payer: COMMERCIAL

## 2023-08-16 NOTE — PROGRESS NOTES
Specialty Pharmacy Patient Management Program  Oncology 6-Month Clinical Assessment       Francisco Espino is a 68 y.o. male with chronic lymphocytic leukemia called today to assess adherence and side effects.    Regimen: Brukinsa 160 mg po bid    Reason for Outreach: Routine medication check-in .       Problem list reviewed by Chastity Carbajal RPH on 8/16/2023 at 10:30 AM  Medicines reviewed by Chastity Carbajal RPH on 8/16/2023 at 10:30 AM  Allergies reviewed by Chastity Carbajal RPH on 8/16/2023 at 10:30 AM     Goals        Specialty Pharmacy General Goal      Reduction of Bence Dawson proteinuria            Medication Assessment:  Medication Assessment  Follow Up Clinical Assessment  Medication(s) assessed: Brukinsa  Therapeutic appropriateness: Appropriate  Medication tolerability: Tolerating with no to minimal ADRs  Medication plan: Continue therapy with normal follow-up  Quality of Life Improvement Scale: No change  Administration: Patient is taking every day at the same time  and twice daily.   Patient can self administer medications: yes  Medication Follow-Up Plan: Next clinical assessment  Lab Review: The labs listed below have been reviewed. No dose adjustments are needed for the oral specialty medication(s) based on the labs.    Lab Results   Component Value Date    GLUCOSE 127 (H) 07/20/2023    CALCIUM 10.0 07/20/2023     07/20/2023    K 4.2 07/20/2023    CO2 26.4 07/20/2023     07/20/2023    BUN 11 07/20/2023    CREATININE 1.29 07/20/2023    EGFRIFAFRI 70 07/09/2021    EGFRIFNONA 60 (L) 01/06/2022    BCR 8.5 07/20/2023    ANIONGAP 11.6 07/20/2023     Lab Results   Component Value Date    WBC 19.14 (H) 07/20/2023    RBC 3.76 (L) 07/20/2023    HGB 13.5 07/20/2023    HCT 40.2 07/20/2023    .9 (H) 07/20/2023    MCH 35.9 (H) 07/20/2023    MCHC 33.6 07/20/2023    RDW 13.8 07/20/2023    RDWSD 54.0 07/20/2023    MPV 10.8 07/20/2023    PLT 97 (L) 07/20/2023    NEUTRORELPCT 16.9 (L) 07/20/2023     LYMPHORELPCT 78.7 (H) 07/20/2023    MONORELPCT 3.6 (L) 07/20/2023    EOSRELPCT 0.3 07/20/2023    BASORELPCT 0.3 07/20/2023    AUTOIGPER 0.2 07/20/2023    NEUTROABS 3.26 07/20/2023    LYMPHSABS 15.06 (H) 07/20/2023    MONOSABS 0.69 07/20/2023    EOSABS 0.05 07/20/2023    BASOSABS 0.05 07/20/2023    AUTOIGNUM 0.03 07/20/2023    NRBC 0.0 07/20/2023     Drug-drug interactions  Completed medication reconciliation today to assess for drug interactions. Patient denies starting or stopping any medications.    Assessed medication list for interactions, no significant drug interactions noted.   Advised patient to call the clinic if any new medications are started so we can assess for drug-drug interactions.  Drug-food interactions discussed: eating grapefruit and drinking grapefruit juice and eating [place] oranges (commonly found in orange marmalade)  Vaccines are coordinated by the patient's oncologist and primary care provider.    Allergies  Known allergies and reactions were discussed with the patient. The patient's chart has been reviewed for allergy information and updated as necessary.   Allergies   Allergen Reactions    Codeine Nausea Only and Nausea And Vomiting        Hospitalizations and Urgent Care Visits Since Last Assessment:  Unplanned hospitalizations or inpatient admissions: no  ED Visits: no  Urgent Office Visits: no    Adherence Assessment:  Adherence Questions  Medication(s) assessed: Brukinsa  On average, how many doses/injections does the patient miss per month?: 0  What are the identified reasons for non-adherence or missed doses? : no problems identfied  What is the estimated medication adherence level?: %  Based on the patient/caregiver response and refill history, does this patient require an MTP to track adherence improvements?: no    Quality of Life Assessment:  Quality of Life Assessment  Quality of Life Improvement Scale: No change  -- Quality of Life: 8/10    Financial  Assessment:  Medication availability/affordability: Patient has had no issues obtaining medication from pharmacy.      All questions addressed and patient had no additional concerns. Patient has pharmacy contact information.    Name/Credentials: Chastity Carbajal PharmD, BCPS    8/16/2023  10:32 EDT

## 2023-09-01 ENCOUNTER — SPECIALTY PHARMACY (OUTPATIENT)
Dept: PHARMACY | Facility: HOSPITAL | Age: 68
End: 2023-09-01
Payer: COMMERCIAL

## 2023-09-01 DIAGNOSIS — I25.119 CORONARY ARTERY DISEASE INVOLVING NATIVE CORONARY ARTERY OF NATIVE HEART WITH ANGINA PECTORIS: ICD-10-CM

## 2023-09-01 DIAGNOSIS — I10 ESSENTIAL HYPERTENSION: ICD-10-CM

## 2023-09-01 NOTE — PROGRESS NOTES
Specialty Pharmacy Refill Coordination Note     Francisco is a 68 y.o. male contacted today regarding refills of Brukinsa specialty medication(s).    Reviewed and verified with patient:       Specialty medication(s) and dose(s) confirmed: yes  Brukinsa 80 mg caps-2 caps twice per day    Refill Questions      Flowsheet Row Most Recent Value   Changes to allergies? No   Changes to medications? No   New conditions since last clinic visit No   Unplanned office visit, urgent care, ED, or hospital admission in the last 4 weeks  No   How does patient/caregiver feel medication is working? Good   Financial problems or insurance changes  No   Since the previous refill, were any specialty medication doses or scheduled injections missed or delayed?  No   Does this patient require a clinical escalation to a pharmacist? No            Delivery Questions      Flowsheet Row Most Recent Value   Delivery method Other (Comment)  [Ship to home address-Ship 9/5 for delivery 9/6-$0 copay with insurance and [employer]-Address Confirmed]   Delivery address correct? Yes  [Ship to home address]   Delivery phone number [phone]   Preferred delivery time? AM   Number of medications in delivery 1   Medication being filled and delivered Brukinsa   Doses left of specialty medications 5-7 days   Is there any medication that is due not being filled? No   Supplies needed? No supplies needed   Cooler needed? No   Do any medications need mixed or dated? No   Copay form of payment Payment plan already set up   Additional comments $0 copay with Insurance and [employer] Program-copay card funds exhausted   Questions or concerns for the pharmacist? No   Explain any questions or concerns for the pharmacist N/A   Are any medications first time fills? No          Brukinsa delivery coordinated with pt for 9/6/2023 to his home address. $0 copay with insurance and [employer]. His last delivery was on 8/3/2023. No questions or concerns to report to MTM Team today.      Follow-up: 21 day(s)     Marta Jansen, Pharmacy Technician  Specialty Pharmacy Technician

## 2023-09-05 RX ORDER — CARVEDILOL 6.25 MG/1
TABLET ORAL
Qty: 180 TABLET | Refills: 1 | Status: SHIPPED | OUTPATIENT
Start: 2023-09-05

## 2023-09-05 RX ORDER — LISINOPRIL 5 MG/1
TABLET ORAL
Qty: 90 TABLET | Refills: 3 | Status: SHIPPED | OUTPATIENT
Start: 2023-09-05

## 2023-09-08 ENCOUNTER — SPECIALTY PHARMACY (OUTPATIENT)
Dept: ONCOLOGY | Facility: HOSPITAL | Age: 68
End: 2023-09-08
Payer: COMMERCIAL

## 2023-09-08 ENCOUNTER — LAB (OUTPATIENT)
Dept: LAB | Facility: HOSPITAL | Age: 68
End: 2023-09-08
Payer: COMMERCIAL

## 2023-09-08 ENCOUNTER — OFFICE VISIT (OUTPATIENT)
Dept: ONCOLOGY | Facility: CLINIC | Age: 68
End: 2023-09-08
Payer: COMMERCIAL

## 2023-09-08 VITALS
DIASTOLIC BLOOD PRESSURE: 79 MMHG | HEIGHT: 74 IN | TEMPERATURE: 98 F | SYSTOLIC BLOOD PRESSURE: 115 MMHG | WEIGHT: 195.9 LBS | BODY MASS INDEX: 25.14 KG/M2 | OXYGEN SATURATION: 94 % | HEART RATE: 82 BPM

## 2023-09-08 DIAGNOSIS — C91.10 CHRONIC LYMPHOCYTIC LEUKEMIA: ICD-10-CM

## 2023-09-08 DIAGNOSIS — R80.3 BENCE JONES PROTEINURIA: ICD-10-CM

## 2023-09-08 DIAGNOSIS — N28.9 KIDNEY DISEASE: ICD-10-CM

## 2023-09-08 DIAGNOSIS — Z79.899 HIGH RISK MEDICATION USE: ICD-10-CM

## 2023-09-08 DIAGNOSIS — C91.10 CHRONIC LYMPHOCYTIC LEUKEMIA: Primary | ICD-10-CM

## 2023-09-08 LAB
ALBUMIN SERPL-MCNC: 4.4 G/DL (ref 3.5–5.2)
ALBUMIN/GLOB SERPL: 2.1 G/DL
ALP SERPL-CCNC: 70 U/L (ref 39–117)
ALT SERPL W P-5'-P-CCNC: 49 U/L (ref 1–41)
ANION GAP SERPL CALCULATED.3IONS-SCNC: 13.1 MMOL/L (ref 5–15)
AST SERPL-CCNC: 51 U/L (ref 1–40)
BASOPHILS # BLD AUTO: 0.04 10*3/MM3 (ref 0–0.2)
BASOPHILS NFR BLD AUTO: 0.3 % (ref 0–1.5)
BILIRUB SERPL-MCNC: 1.1 MG/DL (ref 0–1.2)
BUN SERPL-MCNC: 8 MG/DL (ref 8–23)
BUN/CREAT SERPL: 6.4 (ref 7–25)
CALCIUM SPEC-SCNC: 9.3 MG/DL (ref 8.6–10.5)
CHLORIDE SERPL-SCNC: 101 MMOL/L (ref 98–107)
CO2 SERPL-SCNC: 23.9 MMOL/L (ref 22–29)
CREAT SERPL-MCNC: 1.25 MG/DL (ref 0.7–1.3)
DEPRECATED RDW RBC AUTO: 53.4 FL (ref 37–54)
EGFRCR SERPLBLD CKD-EPI 2021: 62.3 ML/MIN/1.73
EOSINOPHIL # BLD AUTO: 0.06 10*3/MM3 (ref 0–0.4)
EOSINOPHIL NFR BLD AUTO: 0.5 % (ref 0.3–6.2)
ERYTHROCYTE [DISTWIDTH] IN BLOOD BY AUTOMATED COUNT: 13.4 % (ref 12.3–15.4)
GLOBULIN UR ELPH-MCNC: 2.1 GM/DL
GLUCOSE SERPL-MCNC: 99 MG/DL (ref 65–99)
HCT VFR BLD AUTO: 41.5 % (ref 37.5–51)
HGB BLD-MCNC: 13.9 G/DL (ref 13–17.7)
IMM GRANULOCYTES # BLD AUTO: 0.01 10*3/MM3 (ref 0–0.05)
IMM GRANULOCYTES NFR BLD AUTO: 0.1 % (ref 0–0.5)
LYMPHOCYTES # BLD AUTO: 9.79 10*3/MM3 (ref 0.7–3.1)
LYMPHOCYTES NFR BLD AUTO: 78.6 % (ref 19.6–45.3)
MCH RBC QN AUTO: 35.8 PG (ref 26.6–33)
MCHC RBC AUTO-ENTMCNC: 33.5 G/DL (ref 31.5–35.7)
MCV RBC AUTO: 107 FL (ref 79–97)
MONOCYTES # BLD AUTO: 0.59 10*3/MM3 (ref 0.1–0.9)
MONOCYTES NFR BLD AUTO: 4.7 % (ref 5–12)
NEUTROPHILS NFR BLD AUTO: 1.96 10*3/MM3 (ref 1.7–7)
NEUTROPHILS NFR BLD AUTO: 15.8 % (ref 42.7–76)
NRBC BLD AUTO-RTO: 0 /100 WBC (ref 0–0.2)
PLATELET # BLD AUTO: 100 10*3/MM3 (ref 140–450)
PMV BLD AUTO: 11 FL (ref 6–12)
POTASSIUM SERPL-SCNC: 4.7 MMOL/L (ref 3.5–5.2)
PROT SERPL-MCNC: 6.5 G/DL (ref 6–8.5)
RBC # BLD AUTO: 3.88 10*6/MM3 (ref 4.14–5.8)
SODIUM SERPL-SCNC: 138 MMOL/L (ref 136–145)
WBC NRBC COR # BLD: 12.45 10*3/MM3 (ref 3.4–10.8)

## 2023-09-08 PROCEDURE — 85025 COMPLETE CBC W/AUTO DIFF WBC: CPT

## 2023-09-08 PROCEDURE — 80053 COMPREHEN METABOLIC PANEL: CPT

## 2023-09-08 PROCEDURE — 36415 COLL VENOUS BLD VENIPUNCTURE: CPT

## 2023-09-08 NOTE — PROGRESS NOTES
Subjective     REASON FOR FOLLOW UP:   1. CLL     HISTORY OF PRESENT ILLNESS:   The patient is a 68 y.o. male with above-mentioned issues here today for lab review and evaluation continuing on Brukinsa 160 mg twice daily.  He is tolerating this quite well.  He denies any issues with fevers, chills, night sweats.  He denies any palpable adenopathy.  He has a good appetite and denies issues with nausea, vomiting, diarrhea, or constipation.      Past Medical History:   Diagnosis Date    Abnormal liver function test     Alcohol abuse     CLL (chronic lymphocytic leukemia)     Coronary artery disease     stent    Heart disease     History of pneumonia     1/2019    Hyperlipidemia     Hypertension     Inguinal hernia     left side to have surgery 3/28/19    Myocardial infarction     Screen for colon cancer 09/2016    Negative Cologaurd    Screening PSA (prostate specific antigen) 02/2013    .5    Thrombocytopenia         Past Surgical History:   Procedure Laterality Date    CARDIAC CATHETERIZATION  2017    CATARACT EXTRACTION Bilateral     COLONOSCOPY N/A 06/05/2006    Normal, repeat in 10 years, Dr. Preethi Gonzalez    COLONOSCOPY N/A 7/15/2020    Procedure: COLONOSCOPY TO CECUM & T.I. WITH COLD SNARE POLYPECTOMIES, CLIP PLACEMENT X 2;  Surgeon: Yennifer Salazar MD;  Location: [facility] ENDOSCOPY;  Service: Gastroenterology;  Laterality: N/A;  PRE- POSITIVE COLOGUARD  POST- COLON POLYPS, DIVERTICULOSIS, HEMORRHOIDS    CORONARY ANGIOPLASTY WITH STENT PLACEMENT N/A 05/09/2017    Left heart catheterization, Selective native vessel coronary arteriography, Left ventriculography, Successful percutaneous intervention to the 100% occluded right coronary artery with a 3.5 x 38 mm Synergy drug-eluting stent (post-dilated w/ a 4.0 x 20 mm NC Emerge balloon), Selective right femoral angiography, Successful Perclose arteriotomy closure. Dr. James Pierson    INGUINAL HERNIA REPAIR Left     INGUINAL HERNIA REPAIR Right     x2     INGUINAL HERNIA REPAIR Left 3/28/2019    Procedure: LEFT INGUINAL HERNIA REPAIR LAPAROSCOPIC;  Surgeon: Preethi Gonzalez MD;  [place] [facility] [place]: [place]    LAPAROSCOPIC INGUINAL HERNIA REPAIR Right 10/03/2002    w/ extra peritoneal Goe-Benton mesh (transperitoneal repair), Dr. Preethi Gonzalez    REFRACTIVE SURGERY Bilateral     RETINAL DETACHMENT SURGERY Right 07/02/2013      ONCOLOGY HISTORY SHOLA ENNIS MD:ONCOLOGIC HISTORY:  On 12/22/10 he was reviewed.  He   had a totally normal white count with normal differential, predominance of lymphocytes.  Normal platelet count.  His chemistry profile was normal including LDH.  His beta-2 microglobulin was normal.  His quantitative immunoglobulins were normal.  His jane  p  heral blood flow cytometry documented a typical pattern by flow cytometry of CLL, CD5, CD19 negative, dim positive CD20, ZAP-70 negative and CD38 negative.  His CT scan of the chest, abdomen and pelvis disclosed no peripheral adenopathy or hepatosplenomeg  a  ly.  His level of immunoglobulins was normal.  With this in mind we thought that the patient had very early stage disease with excellent prognostic features and we advised him to remain on observation.  He will be rechecked every 3 months for the first ye  ar and thereafter every 6 months depending on the clinical circumstances.  Appropriate booklet information was given to him and his wife to review and further learn.          Given the excellent characteristics of his disease on clinical grounds, I doubt at this time a bone marrow is needed.        On 1/8/13 his physical exam is unremarkable with no peripheral adenopathy or hepatosplenomegaly, no B symptoms, no infections, no autoimmunity.  On the other hand he had areas of the skin on the left cheek and right cheek bhavani  t look like actinic keratosis.  His white count was up to17,000 with a normal hemoglobin and borderline platelet count of 125,000.  We asked the patient to try to  minimize stressors in life and we asked him to return back in four months.  We also asked priscila edwards to be seen by dermatology in order to treat his multiple actinic keratosis.          On 05/09/13 the patient was asymptomatic with regard to his chronic lymphoid leukemia, no fatigue, fevers, chills, repeated infections or peripheral lymphadenopathy.  His physi  francisco j exam was unremarkable with no palpable lymphadenopathy or hepatosplenomegaly.  His white count has now improved to 12,900, stable hemoglobin of 13.6 and stable platelet count of 121,000.  With this in mind we advised him to remain on observation, retu  rning for reevaluation in six months.         On 05/07/2014 the patient was asymptomatic in regard to his CLL with no autoimmunity, no infections and no progressive disease clinically.  On the other hand we have seen duplication of his white count in one year.  T  he patient and wife are aware that eventually he will require intervention for this if the white count continues changing the way it is.  At this time we do not justify the need of any other therapy or any other testing.        Current Outpatient Medications on File Prior to Visit   Medication Sig Dispense Refill    acetaminophen (TYLENOL) 500 MG tablet Take 2 tablets by mouth Every 6 (Six) Hours As Needed for Mild Pain.      acyclovir (ZOVIRAX) 400 MG tablet TAKE 1 TABLET TWICE DAILY 180 tablet 1    allopurinol (ZYLOPRIM) 300 MG tablet TAKE 1/2 TABLET BY MOUTH DAILY 15 tablet 1    aspirin 81 MG EC tablet Take 1 tablet by mouth Every Other Day.      carvedilol (COREG) 6.25 MG tablet TAKE 1 TABLET TWICE DAILY  WITH MEALS 180 tablet 1    cetirizine (zyrTEC) 10 MG tablet Take 1 tablet by mouth Daily.      fluticasone (FLONASE) 50 MCG/ACT nasal spray 2 sprays into the nostril(s) as directed by provider Daily. (Patient taking differently: 2 sprays into the nostril(s) as directed by provider Daily. Rarely used) 1 bottle 0    lisinopril (PRINIVIL,ZESTRIL) 5  "MG tablet TAKE 1 TABLET DAILY 90 tablet 3    Multiple Vitamins-Minerals (EYE VITAMINS & MINERALS PO) Take 1 tablet by mouth Daily.      ondansetron ODT (ZOFRAN-ODT) 8 MG disintegrating tablet Place 1 tablet on the tongue Every 8 (Eight) Hours As Needed for Nausea or Vomiting. 30 tablet 5    rosuvastatin (CRESTOR) 20 MG tablet Take 1 tablet by mouth Every Night.      sulfamethoxazole-trimethoprim (BACTRIM DS,SEPTRA DS) 800-160 MG per tablet Take 1 tablet by mouth 3 (Three) Times a Week. 12 tablet 7    Zanubrutinib (Brukinsa) 80 MG chemo capsule Take 2 capsules by mouth 2 (Two) Times a Day. 120 capsule 5     No current facility-administered medications on file prior to visit.        ALLERGIES:    Allergies   Allergen Reactions    Codeine Nausea Only and Nausea And Vomiting        Social History     Socioeconomic History    Marital status:      Spouse name: Charlotte Espino   Tobacco Use    Smoking status: Never    Smokeless tobacco: Never   Vaping Use    Vaping Use: Never used   Substance and Sexual Activity    Alcohol use: Yes     Alcohol/week: 21.0 standard drinks     Types: 21 Shots of liquor per week    Drug use: No    Sexual activity: Defer        Family History   Problem Relation Age of Onset    Heart attack Mother     Heart disease Mother     Diabetes Mother     Aortic aneurysm Mother     Coronary artery disease Mother     Early death Mother     No Known Problems Father     Diabetes type II Other     Diabetes Brother     Hyperlipidemia Brother     Stroke Sister 65    Diabetes Sister     Hyperlipidemia Sister     Hypertension Sister     No Known Problems Brother     Stomach cancer Paternal Uncle     Malig Hyperthermia Neg Hx         Objective     Vitals:    09/08/23 1003   BP: 115/79   Pulse: 82   Temp: 98 °F (36.7 °C)   TempSrc: Temporal   SpO2: 94%   Weight: 88.9 kg (195 lb 14.4 oz)   Height: 188 cm (74.02\")   PainSc: 0-No pain         9/8/2023    10:00 AM   Current Status   ECOG score 0       Physical Exam "   Constitutional: He is oriented to person, place, and time. He appears well-developed. No distress.   HENT:   Head: Normocephalic and atraumatic.   Eyes: Pupils are equal, round, and reactive to light.   Cardiovascular: Normal rate, regular rhythm and normal heart sounds.   No murmur heard.  Pulmonary/Chest: Effort normal and breath sounds normal. No respiratory distress. He has no wheezes. He has no rhonchi. He has no rales.   Abdominal: Soft. Normal appearance and bowel sounds are normal. He exhibits no distension.   Musculoskeletal: Normal range of motion.   Neurological: He is alert and oriented to person, place, and time.   Skin: Skin is warm and dry. No rash noted.   Vitals reviewed.     RESULTS REVIEWED:  Results from last 7 days   Lab Units 09/08/23  0953   WBC 10*3/mm3 12.45*   NEUTROS ABS 10*3/mm3 1.96   HEMOGLOBIN g/dL 13.9   HEMATOCRIT % 41.5   PLATELETS 10*3/mm3 100*                   Assessment & Plan      1. CLL  CLL dating back to initial diagnosis in 2005.  He has remained on a course of observation without any need for active treatment.    12/22/2010 peripheral blood flow cytometry documented a typical pattern by flow cytometry of CLL, CD5, CD19 negative, dim positive CD20, ZAP-70 negative and CD38 negative.  His CT scan of the chest, abdomen and pelvis disclosed no peripheral adenopathy or hepatosplenomegaly.  His level of immunoglobulins was normal.  With this in mind we thought that the patient had very early stage disease with excellent prognostic features and we advised him to remain on observation  1/24/2023, leukocytosis worsened, though the patient was recently treated with prednisone for eustachian tube dysfunction.  WBC sylvain to 110,000, though then improved to 88,000.  He had no peripheral adenopathy or hepatosplenomegaly.  1/24/2023, creatinine elevated at 1.4 with urinalysis showing 300 mg of protein.  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  Absolute lymphocyte count 70.87 which is improvement compared to 113.15 2 weeks ago.  4/18/2023 WBC 60,000.  5/16/2023 WBC 41.28, hgb 13.4, platelet count 104,000.  6/8/2023 patient advised to discontinue allopurinol and continue with Brukinsa at the same dose.  When WBC normalizes plans to do a new urinary collection of 24 hours to remeasure monoclonal protein studies and Bence-Dawson proteinuria.    7/20/2023 WBC 19.14, hemoglobin 13.5, platelet count 97,000.  9/8/2023 WBC 12.45, hemoglobin 13.9, platelet count 100,000.  Patient will be sent with a 24-hour urine jug for UPEP, ELISA, FLC.      2. Prophylaxis  Prophylactic acyclovir 150 mg daily until white count is normal  Acyclovir 400 mg twice daily and Bactrim DS Monday, Wednesday, Friday 6/8/2023 allopurinol was discontinued however patient continues on prophylactic acyclovir and Bactrim DS.    3.  Orthostasis:    PLAN:  Continue Brukinsa 160 mg twice daily.  Continue prophylactic acyclovir for 100 mg twice daily, Bactrim DS 3 times weekly.  24-hour urine collection for SPEP, ELISA, FLC.  Follow-up in 6 weeks with Dr. Cagle with repeat CBC, CMP.  Call/ return sooner should the patient develop any new concerns or problems.    Patient is on a high risk medication requiring close monitoring for toxicity.    Julianne Arvizu, WINNIE  09/08/23

## 2023-09-08 NOTE — PROGRESS NOTES
MTM Encounter-Re: Adherence and side effects (Brukinsa)    Today's encounter was conducted in person, face-to-face.     Medication:  Brukinsa 160 mg po twice daily  - Reason for outreach: Routine medication check-in .  - Administration: Patient is taking every day at the same time  and twice daily.  - Missed doses: Patient reports missing 0 doses in the last 30 days.  - Self-administration: Patient demonstrates ability to self-administer medication. No barriers to adherence identified.   - Diagnosis/Indication: CLL, bence swift proteinuria. Progress toward achieving therapeutic goals reviewed.   - Patient denies side effects.    - Medication availability/affordability: Patient has had no issues obtaining medication from pharmacy.   - Questions/concerns about medications: none       Completed medication reconciliation today to assess for drug interactions.   Reviewed allergies, medical history, labs, quality of life, and medication history with patient.   Patient denies starting or stopping any medications.  Assessed medication list for interactions, no significant drug interactions noted.   Advised pt to call the clinic if any new medications are started so we can assess for drug-drug interactions.     All questions addressed. Patient had no additional concerns for MTM office.     Chastity Carbajal RPH  9/8/2023  10:15 EDT

## 2023-09-14 ENCOUNTER — LAB (OUTPATIENT)
Dept: LAB | Facility: HOSPITAL | Age: 68
End: 2023-09-14
Payer: COMMERCIAL

## 2023-09-14 DIAGNOSIS — Z79.899 HIGH RISK MEDICATION USE: ICD-10-CM

## 2023-09-14 DIAGNOSIS — C91.10 CHRONIC LYMPHOCYTIC LEUKEMIA: ICD-10-CM

## 2023-09-14 DIAGNOSIS — R80.3 BENCE JONES PROTEINURIA: ICD-10-CM

## 2023-09-18 LAB
ALBUMIN 24H MFR UR ELPH: 28.4 %
ALPHA1 GLOB 24H MFR UR ELPH: 6.7 %
ALPHA2 GLOB 24H MFR UR ELPH: 19.2 %
B-GLOBULIN MFR UR ELPH: 26.2 %
GAMMA GLOB 24H MFR UR ELPH: 19.6 %
HIV 1 & 2 AB SER-IMP: ABNORMAL
INTERPRETATION UR IFE-IMP: ABNORMAL
M PROTEIN 24H MFR UR ELPH: ABNORMAL %
PROT 24H UR-MRATE: 186 MG/24 HR (ref 30–150)
PROT UR-MCNC: 9.3 MG/DL

## 2023-10-02 ENCOUNTER — SPECIALTY PHARMACY (OUTPATIENT)
Dept: PHARMACY | Facility: HOSPITAL | Age: 68
End: 2023-10-02
Payer: COMMERCIAL

## 2023-10-02 NOTE — PROGRESS NOTES
Specialty Pharmacy Refill Coordination Note     Franicsco is a 68 y.o. male contacted today regarding refills of Brukinsa specialty medication(s).    Reviewed and verified with patient:       Specialty medication(s) and dose(s) confirmed: yes  Brukinsa 80 mg caps-2 caps twice per day    Refill Questions      Flowsheet Row Most Recent Value   Changes to allergies? No   Changes to medications? No   New conditions since last clinic visit No   Unplanned office visit, urgent care, ED, or hospital admission in the last 4 weeks  No   How does patient/caregiver feel medication is working? Good   Financial problems or insurance changes  No   Since the previous refill, were any specialty medication doses or scheduled injections missed or delayed?  No   Does this patient require a clinical escalation to a pharmacist? No            Delivery Questions      Flowsheet Row Most Recent Value   Delivery method Other (Comment)  [Ship to home address-Ship 10/3 for brittany 10/4-$0 copay with insurance and [employer]-Address Confirmed]   Delivery address correct? Yes  [Ship to home address]   Delivery phone number [phone]   Preferred delivery time? AM   Number of medications in delivery 1   Medication being filled and delivered Brukinsa   Doses left of specialty medications 5-7 days   Is there any medication that is due not being filled? No   Supplies needed? No supplies needed   Cooler needed? No   Do any medications need mixed or dated? No   Copay form of payment Payment plan already set up   Additional comments $0 copay with Insurance and [employer] Program-copay card funds exhausted   Questions or concerns for the pharmacist? No   Explain any questions or concerns for the pharmacist N/A   Are any medications first time fills? No          Brukinsa delivery coordinated with pt for 10/4/2023 to his home address. $0 copay with insurance and [employer]. His last delivery was on 9/6/2023. No questions or concerns to report to MTM Team  today.     Follow-up: 21 day(s)     Marta Jansen, Pharmacy Technician  Specialty Pharmacy Technician

## 2023-10-20 ENCOUNTER — OFFICE VISIT (OUTPATIENT)
Dept: ONCOLOGY | Facility: CLINIC | Age: 68
End: 2023-10-20
Payer: COMMERCIAL

## 2023-10-20 ENCOUNTER — LAB (OUTPATIENT)
Dept: LAB | Facility: HOSPITAL | Age: 68
End: 2023-10-20
Payer: COMMERCIAL

## 2023-10-20 VITALS
TEMPERATURE: 97.8 F | OXYGEN SATURATION: 94 % | HEIGHT: 74 IN | HEART RATE: 79 BPM | SYSTOLIC BLOOD PRESSURE: 138 MMHG | WEIGHT: 193.3 LBS | DIASTOLIC BLOOD PRESSURE: 88 MMHG | BODY MASS INDEX: 24.81 KG/M2

## 2023-10-20 DIAGNOSIS — C91.10 CHRONIC LYMPHOCYTIC LEUKEMIA: ICD-10-CM

## 2023-10-20 DIAGNOSIS — C91.10 CHRONIC LYMPHOCYTIC LEUKEMIA: Primary | ICD-10-CM

## 2023-10-20 DIAGNOSIS — R80.3 BENCE JONES PROTEINURIA: ICD-10-CM

## 2023-10-20 LAB
ALBUMIN SERPL-MCNC: 4.4 G/DL (ref 3.5–5.2)
ALBUMIN/GLOB SERPL: 1.8 G/DL
ALP SERPL-CCNC: 75 U/L (ref 39–117)
ALT SERPL W P-5'-P-CCNC: 48 U/L (ref 1–41)
ANION GAP SERPL CALCULATED.3IONS-SCNC: 14.6 MMOL/L (ref 5–15)
AST SERPL-CCNC: 55 U/L (ref 1–40)
BASOPHILS # BLD AUTO: 0.03 10*3/MM3 (ref 0–0.2)
BASOPHILS NFR BLD AUTO: 0.3 % (ref 0–1.5)
BILIRUB SERPL-MCNC: 0.7 MG/DL (ref 0–1.2)
BUN SERPL-MCNC: 7 MG/DL (ref 8–23)
BUN/CREAT SERPL: 5.5 (ref 7–25)
CALCIUM SPEC-SCNC: 9.5 MG/DL (ref 8.6–10.5)
CHLORIDE SERPL-SCNC: 104 MMOL/L (ref 98–107)
CO2 SERPL-SCNC: 24.4 MMOL/L (ref 22–29)
CREAT SERPL-MCNC: 1.28 MG/DL (ref 0.7–1.3)
DEPRECATED RDW RBC AUTO: 53.9 FL (ref 37–54)
EGFRCR SERPLBLD CKD-EPI 2021: 60.6 ML/MIN/1.73
EOSINOPHIL # BLD AUTO: 0.05 10*3/MM3 (ref 0–0.4)
EOSINOPHIL NFR BLD AUTO: 0.4 % (ref 0.3–6.2)
ERYTHROCYTE [DISTWIDTH] IN BLOOD BY AUTOMATED COUNT: 13.5 % (ref 12.3–15.4)
GLOBULIN UR ELPH-MCNC: 2.5 GM/DL
GLUCOSE SERPL-MCNC: 100 MG/DL (ref 65–99)
HCT VFR BLD AUTO: 41.4 % (ref 37.5–51)
HGB BLD-MCNC: 14 G/DL (ref 13–17.7)
IMM GRANULOCYTES # BLD AUTO: 0.02 10*3/MM3 (ref 0–0.05)
IMM GRANULOCYTES NFR BLD AUTO: 0.2 % (ref 0–0.5)
LYMPHOCYTES # BLD AUTO: 8.25 10*3/MM3 (ref 0.7–3.1)
LYMPHOCYTES NFR BLD AUTO: 72.7 % (ref 19.6–45.3)
MCH RBC QN AUTO: 36.3 PG (ref 26.6–33)
MCHC RBC AUTO-ENTMCNC: 33.8 G/DL (ref 31.5–35.7)
MCV RBC AUTO: 107.3 FL (ref 79–97)
MONOCYTES # BLD AUTO: 0.6 10*3/MM3 (ref 0.1–0.9)
MONOCYTES NFR BLD AUTO: 5.3 % (ref 5–12)
NEUTROPHILS NFR BLD AUTO: 2.4 10*3/MM3 (ref 1.7–7)
NEUTROPHILS NFR BLD AUTO: 21.1 % (ref 42.7–76)
NRBC BLD AUTO-RTO: 0 /100 WBC (ref 0–0.2)
PLATELET # BLD AUTO: 95 10*3/MM3 (ref 140–450)
PMV BLD AUTO: 10.8 FL (ref 6–12)
POTASSIUM SERPL-SCNC: 4.6 MMOL/L (ref 3.5–5.2)
PROT SERPL-MCNC: 6.9 G/DL (ref 6–8.5)
RBC # BLD AUTO: 3.86 10*6/MM3 (ref 4.14–5.8)
SODIUM SERPL-SCNC: 143 MMOL/L (ref 136–145)
WBC NRBC COR # BLD: 11.35 10*3/MM3 (ref 3.4–10.8)

## 2023-10-20 PROCEDURE — 36415 COLL VENOUS BLD VENIPUNCTURE: CPT

## 2023-10-20 PROCEDURE — 80053 COMPREHEN METABOLIC PANEL: CPT

## 2023-10-20 PROCEDURE — 85025 COMPLETE CBC W/AUTO DIFF WBC: CPT

## 2023-10-20 NOTE — PROGRESS NOTES
Subjective     REASON FOR FOLLOW UP:   1. 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  This was associated with his leukemia.  Since the initiation of his blood cancer therapy the patient's white count has started to come down and now is getting to the point of almost normalization.  Most importantly, his Bence-Dawson proteinuria that was raising his creatinine has disappeared and his creatinine has come down from 1.67 to 1.25.  The patient is feeling terrific.  He has no side effects of the Brukinsa including no cardiac irregularity and no abnormal bleeding. His appetite has remained excellent, his weight is stable. No diarrhea.  Normal bowel function and normal urination.  No fluid accumulation.  No fevers, no chills, no sweats, no pruritus, no adenopathy, no autoimmunity, and no infections.  ECOG performance status is 0.           Past Medical History:   Diagnosis Date    Abnormal liver function test     Alcohol abuse     CLL (chronic lymphocytic leukemia)     Coronary artery disease     stent    Heart disease     History of pneumonia     1/2019    Hyperlipidemia     Hypertension     Inguinal hernia     left side to have surgery 3/28/19    Myocardial infarction     Screen for colon cancer 09/2016    Negative Cologaurd    Screening PSA (prostate specific antigen) 02/2013    .5    Thrombocytopenia         Past Surgical History:   Procedure Laterality Date    CARDIAC CATHETERIZATION  2017    CATARACT EXTRACTION Bilateral     COLONOSCOPY N/A 06/05/2006    Normal, repeat in 10 years, Dr. Preethi Gonzalez    COLONOSCOPY N/A 7/15/2020    Procedure: COLONOSCOPY TO CECUM & T.I. WITH COLD SNARE  POLYPECTOMIES, CLIP PLACEMENT X 2;  Surgeon: Yennifer Salazar MD;  Location: [facility] ENDOSCOPY;  Service: Gastroenterology;  Laterality: N/A;  PRE- POSITIVE COLOGUARD  POST- COLON POLYPS, DIVERTICULOSIS, HEMORRHOIDS    CORONARY ANGIOPLASTY WITH STENT PLACEMENT N/A 05/09/2017    Left heart catheterization, Selective native vessel coronary arteriography, Left ventriculography, Successful percutaneous intervention to the 100% occluded right coronary artery with a 3.5 x 38 mm Synergy drug-eluting stent (post-dilated w/ a 4.0 x 20 mm NC Emerge balloon), Selective right femoral angiography, Successful Perclose arteriotomy closure. Dr. James Pierson    INGUINAL HERNIA REPAIR Left     INGUINAL HERNIA REPAIR Right     x2    INGUINAL HERNIA REPAIR Left 3/28/2019    Procedure: LEFT INGUINAL HERNIA REPAIR LAPAROSCOPIC;  Surgeon: Preethi Gonzalez MD;  Location: [facility] OR [facility];  Service: General    LAPAROSCOPIC INGUINAL HERNIA REPAIR Right 10/03/2002    w/ extra peritoneal Goe-Benton mesh (transperitoneal repair), Dr. Preethi Gonzalez    REFRACTIVE SURGERY Bilateral     RETINAL DETACHMENT SURGERY Right 07/02/2013      ONCOLOGY HISTORY SHOLA ENNIS MD:ONCOLOGIC HISTORY:  On 12/22/10 he was reviewed.  He   had a totally normal white count with normal differential, predominance of lymphocytes.  Normal platelet count.  His chemistry profile was normal including LDH.  His beta-2 microglobulin was normal.  His quantitative immunoglobulins were normal.  His jane  p  heral blood flow cytometry documented a typical pattern by flow cytometry of CLL, CD5, CD19 negative, dim positive CD20, ZAP-70 negative and CD38 negative.  His CT scan of the chest, abdomen and pelvis disclosed no peripheral adenopathy or hepatosplenomeg  a  ly.  His level of immunoglobulins was normal.  With this in mind we thought that the patient had very early stage disease with excellent prognostic features and we advised him to remain on observation.  He will be  rechecked every 3 months for the first ye  ar and thereafter every 6 months depending on the clinical circumstances.  Appropriate booklet information was given to him and his wife to review and further learn.          Given the excellent characteristics of his disease on clinical grounds, I doubt at this time a bone marrow is needed.        On 1/8/13 his physical exam is unremarkable with no peripheral adenopathy or hepatosplenomegaly, no B symptoms, no infections, no autoimmunity.  On the other hand he had areas of the skin on the left cheek and right cheek bhavani  t look like actinic keratosis.  His white count was up to17,000 with a normal hemoglobin and borderline platelet count of 125,000.  We asked the patient to try to minimize stressors in life and we asked him to return back in four months.  We also asked priscila edwards to be seen by dermatology in order to treat his multiple actinic keratosis.          On 05/09/13 the patient was asymptomatic with regard to his chronic lymphoid leukemia, no fatigue, fevers, chills, repeated infections or peripheral lymphadenopathy.  His physi  francisco j exam was unremarkable with no palpable lymphadenopathy or hepatosplenomegaly.  His white count has now improved to 12,900, stable hemoglobin of 13.6 and stable platelet count of 121,000.  With this in mind we advised him to remain on observation, retu  rning for reevaluation in six months.         On 05/07/2014 the patient was asymptomatic in regard to his CLL with no autoimmunity, no infections and no progressive disease clinically.  On the other hand we have seen duplication of his white count in one year.  T  he patient and wife are aware that eventually he will require intervention for this if the white count continues changing the way it is.  At this time we do not justify the need of any other therapy or any other testing.        Current Outpatient Medications on File Prior to Visit   Medication Sig Dispense Refill    acetaminophen  (TYLENOL) 500 MG tablet Take 2 tablets by mouth Every 6 (Six) Hours As Needed for Mild Pain.      acyclovir (ZOVIRAX) 400 MG tablet TAKE 1 TABLET TWICE DAILY 180 tablet 1    allopurinol (ZYLOPRIM) 300 MG tablet TAKE 1/2 TABLET BY MOUTH DAILY 15 tablet 1    aspirin 81 MG EC tablet Take 1 tablet by mouth Every Other Day.      carvedilol (COREG) 6.25 MG tablet TAKE 1 TABLET TWICE DAILY  WITH MEALS 180 tablet 1    cetirizine (zyrTEC) 10 MG tablet Take 1 tablet by mouth Daily.      fluticasone (FLONASE) 50 MCG/ACT nasal spray 2 sprays into the nostril(s) as directed by provider Daily. (Patient taking differently: 2 sprays into the nostril(s) as directed by provider Daily. Rarely used) 1 bottle 0    lisinopril (PRINIVIL,ZESTRIL) 5 MG tablet TAKE 1 TABLET DAILY 90 tablet 3    Multiple Vitamins-Minerals (EYE VITAMINS & MINERALS PO) Take 1 tablet by mouth Daily.      ondansetron ODT (ZOFRAN-ODT) 8 MG disintegrating tablet Place 1 tablet on the tongue Every 8 (Eight) Hours As Needed for Nausea or Vomiting. 30 tablet 5    rosuvastatin (CRESTOR) 20 MG tablet Take 1 tablet by mouth Every Night.      sulfamethoxazole-trimethoprim (BACTRIM DS,SEPTRA DS) 800-160 MG per tablet Take 1 tablet by mouth 3 (Three) Times a Week. 12 tablet 7    Zanubrutinib (Brukinsa) 80 MG chemo capsule Take 2 capsules by mouth 2 (Two) Times a Day. 120 capsule 5     No current facility-administered medications on file prior to visit.        ALLERGIES:    Allergies   Allergen Reactions    Codeine Nausea Only and Nausea And Vomiting        Social History     Socioeconomic History    Marital status:      Spouse name: Charlotte Espino   Tobacco Use    Smoking status: Never    Smokeless tobacco: Never   Vaping Use    Vaping Use: Never used   Substance and Sexual Activity    Alcohol use: Yes     Alcohol/week: 21.0 standard drinks of alcohol     Types: 21 Shots of liquor per week    Drug use: No    Sexual activity: Defer        Family History   Problem  "Relation Age of Onset    Heart attack Mother     Heart disease Mother     Diabetes Mother     Aortic aneurysm Mother     Coronary artery disease Mother     Early death Mother     No Known Problems Father     Diabetes type II Other     Diabetes Brother     Hyperlipidemia Brother     Stroke Sister 65    Diabetes Sister     Hyperlipidemia Sister     Hypertension Sister     No Known Problems Brother     Stomach cancer Paternal Uncle     Malig Hyperthermia Neg Hx         Objective     Vitals:    10/20/23 0853   BP: 138/88   Pulse: 79   Temp: 97.8 °F (36.6 °C)   TempSrc: Temporal   SpO2: 94%   Weight: 87.7 kg (193 lb 4.8 oz)   Height: 188 cm (74.02\")   PainSc: 0-No pain         10/20/2023     8:52 AM   Current Status   ECOG score 0     EXAM         GENERAL:  Well-developed, Patient  in no acute distress.   SKIN:  Warm, dry ,NO purpura ,no rash.  HEENT:  Pupils were equal and reactive to light and accomodation, conjunctivae noninjected,  normal visual acuity.   NECK:  Supple with good range of motion; no thyromegaly , no JVD or bruits,.No carotid artery pain, no carotid abnormal pulsation   LYMPHATICS:  No cervical, NO supraclavicular, NO axillary, NO inguinal adenopathies.  CARDIAC   normal rate , regular rhythm, without murmur,NO rubs NO S3 NO S4   LUNGS: normal breath sounds bilateral, no wheezing, NO rhonchi, NO crackles ,NO rubs.  VASCULAR VENOUS: no cyanosis, NO collateral circulation, NO varicosities, NO edema, NO palpable cords, NO pain,NO erythema, NO pigmentation of the skin.  ABDOMEN:  Soft, NO pain,no hepatomegaly, no splenomegaly,no masses, no ascites, no collateral circulation,no distention.  EXTREMITIES  AND SPINE:  No clubbing, no cyanosis ,no deformities , no pain .No kyphosis,  no pain in spine, no pain in ribs , no pain in pelvic bone.  NEUROLOGICAL:  Patient was awake, alert, oriented to time, person and place.    RESULTS REVIEWED:  Results from last 7 days   Lab Units 10/20/23  0848   WBC 10*3/mm3 " 11.35*   NEUTROS ABS 10*3/mm3 2.40   HEMOGLOBIN g/dL 14.0   HEMATOCRIT % 41.4   PLATELETS 10*3/mm3 95*         ELISA+Protein Electro, 24-Hr Urine - Urine, Clean Catch (09/14/2023 12:24)           Assessment & Plan      1. CLL  CLL dating back to initial diagnosis in 2005.  He has remained on a course of observation without any need for active treatment.    12/22/2010 peripheral blood flow cytometry documented a typical pattern by flow cytometry of CLL, CD5, CD19 negative, dim positive CD20, ZAP-70 negative and CD38 negative.  His CT scan of the chest, abdomen and pelvis disclosed no peripheral adenopathy or hepatosplenomegaly.  His level of immunoglobulins was normal.  With this in mind we thought that the patient had very early stage disease with excellent prognostic features and we advised him to remain on observation  1/24/2023, leukocytosis worsened, though the patient was recently treated with prednisone for eustachian tube dysfunction.  WBC sylvain to 110,000, though then improved to 88,000.  He had no peripheral adenopathy or hepatosplenomegaly.  1/24/2023, creatinine elevated at 1.4 with urinalysis showing 300 mg of protein.  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  Absolute lymphocyte count 70.87 which is improvement compared to 113.15 2 weeks ago.  4/18/2023 WBC 60,000.  5/16/2023 WBC 41.28, hgb 13.4, platelet count 104,000.  6/8/2023 patient advised to discontinue allopurinol and continue with Brukinsa at the same dose.  When WBC normalizes plans  to do a new urinary collection of 24 hours to remeasure monoclonal protein studies and Bence-Dawson proteinuria.    7/20/2023 WBC 19.14, hemoglobin 13.5, platelet count 97,000.  9/8/2023 WBC 12.45, hemoglobin 13.9, platelet count 100,000.  Patient will be sent with a 24-hour urine jug for UPEP, ELISA, FLC.  On 10/20/2023, the patient has had dramatic improvement in his leukocytosis to the point of almost normalization.  He still has some peripheral lymphocytosis but I expect that it will continue to improve as time goes by.  Most importantly, the triggering factor for the treatment of his disease with Brukinsa was the rise in the creatinine to 1.67, found to have almost 500 mg of proteinuria and Bence-Dawson proteinuria.  Urine protein electrophoresis disclosed normalization of protein secretion in the urine and resolution of the Bence-Dawson proteinuria.  This proves the point that leukemia was the cause of the problem, now that the treatment has subsided.  Kidney function has recovered and almost normalized.  A creatinine of 1.2 for a 68-year-old male is almost normal.    Given this fact, I advised the patient to remain on Brukinsa at the same dose.  Given the fact that he has not had any cardiac irregularity, abnormal bleeding, or any other new problem, I advised him to remain on the medicine and return to see us back every couple of months with repeated CBC, CMP, and urinalysis, to continue monitoring for proteinuria.      He will have serum protein and urine electrophoresis at that time.  No need for radiological assessment at this time.             2. Prophylaxis  Prophylactic acyclovir 150 mg daily until white count is normal  Acyclovir 400 mg twice daily and Bactrim DS Monday, Wednesday, Friday 6/8/2023 allopurinol was discontinued however patient continues on prophylactic acyclovir and Bactrim DS.  On 10/20/2023, his prophylactic medications will remain the same.         PLAN:  The patient will remain on  Brukinsa 160 mg twice a day.      He will have repeated laboratory testing, CBC, CMP, urinalysis, and serum protein immunoelectrophoresis every 2 months.  I find no need for radiological assessment on him at this time.  I advised him to proceed with his flu shot if not already done. I advised him to proceed with his booster shot for COVID.      Discussed with the patient and his wife in the room.         Patient is on a high risk medication requiring close monitoring for toxicity.    Russell Cagle MD  10/20/23

## 2023-10-23 ENCOUNTER — SPECIALTY PHARMACY (OUTPATIENT)
Dept: PHARMACY | Facility: HOSPITAL | Age: 68
End: 2023-10-23
Payer: COMMERCIAL

## 2023-10-27 ENCOUNTER — SPECIALTY PHARMACY (OUTPATIENT)
Dept: PHARMACY | Facility: HOSPITAL | Age: 68
End: 2023-10-27
Payer: COMMERCIAL

## 2023-10-27 NOTE — PROGRESS NOTES
Specialty Pharmacy Refill Coordination Note     Francisco is a 68 y.o. male contacted today regarding refills of Brukinsa specialty medication(s).    Reviewed and verified with patient:       Specialty medication(s) and dose(s) confirmed: yes  Brukinsa 80 mg caps-2 caps twice per day    Refill Questions      Flowsheet Row Most Recent Value   Changes to allergies? No   Changes to medications? No   New conditions since last clinic visit No   Unplanned office visit, urgent care, ED, or hospital admission in the last 4 weeks  No   How does patient/caregiver feel medication is working? Good   Financial problems or insurance changes  No   Since the previous refill, were any specialty medication doses or scheduled injections missed or delayed?  No   Does this patient require a clinical escalation to a pharmacist? No            Delivery Questions      Flowsheet Row Most Recent Value   Delivery method Other (Comment)  [Ship to home address-Ship 10/30 for brittany 10/31-$0 copay [facility] insurance and [employer]-Address Confirmed]   Delivery address correct? Yes  [Ship to home address]   Delivery phone number [phone]   Preferred delivery time? AM   Number of medications in delivery 1   Medication(s) being filled and delivered Zanubrutinib (Antineoplastic Enzyme Inhibitors)   Doses left of specialty medications 6-7 days   Is there any medication that is due not being filled? No   Supplies needed? No supplies needed   Cooler needed? No   Do any medications need mixed or dated? No   Copay form of payment Payment plan already set up   Additional comments $0 copay with Insurance and [employer] Program   Questions or concerns for the pharmacist? No   Explain any questions or concerns for the pharmacist N/A   Are any medications first time fills? No          Brukinsa delivery coordinated with pt for 10/31/2023 to his home address. $0 copay with insurance and [employer] Program. His last delivery was on 10/4/2023. No questions or concerns  to report to MTM Team today.     Follow-up: 21 day(s)     Marta Jansen, Pharmacy Technician  Specialty Pharmacy Technician

## 2023-11-20 ENCOUNTER — SPECIALTY PHARMACY (OUTPATIENT)
Dept: PHARMACY | Facility: HOSPITAL | Age: 68
End: 2023-11-20
Payer: COMMERCIAL

## 2023-11-20 NOTE — PROGRESS NOTES
Specialty Pharmacy Refill Coordination Note     Francisco is a 68 y.o. male contacted today regarding refills of Brukinsa specialty medication(s).    Reviewed and verified with patient:       Specialty medication(s) and dose(s) confirmed: yes  Brukinsa 80 mg caps-2 caps twice per day    Refill Questions      Flowsheet Row Most Recent Value   Changes to allergies? No   Changes to medications? No   New conditions since last clinic visit No   Unplanned office visit, urgent care, ED, or hospital admission in the last 4 weeks  No   How does patient/caregiver feel medication is working? Good   Financial problems or insurance changes  No   Since the previous refill, were any specialty medication doses or scheduled injections missed or delayed?  No   Does this patient require a clinical escalation to a pharmacist? No            Delivery Questions      Flowsheet Row Most Recent Value   Delivery method Other (Comment)  [Ship to home address-Ship 11/27 for delivery 11/28-$0 copay with insurance and [employer]-Address Confirmed-Signature Required]   Delivery address correct? Yes  [Ship to home address]   Delivery phone number [phone]   Preferred delivery time? AM   Number of medications in delivery 1   Medication(s) being filled and delivered Zanubrutinib (Antineoplastic Enzyme Inhibitors)   Doses left of specialty medications 10-12 days   Is there any medication that is due not being filled? No   Supplies needed? No supplies needed   Cooler needed? No   Do any medications need mixed or dated? No   Copay form of payment Payment plan already set up   Additional comments $0 copay with Insurance and [employer] Program   Questions or concerns for the pharmacist? No   Explain any questions or concerns for the pharmacist N/A   Are any medications first time fills? No          Brukinsa delivery coordinated with pt for 11/28/2023 to his home address. $0 copay with Insurance and [employer] Program. His last delivery was on 10/31/2023. No  questions or concerns to report to MTM Team today.     Follow-up: 21 day(s)     Marta Jansen, Pharmacy Technician  Specialty Pharmacy Technician

## 2023-12-14 ENCOUNTER — LAB (OUTPATIENT)
Dept: LAB | Facility: HOSPITAL | Age: 68
End: 2023-12-14
Payer: COMMERCIAL

## 2023-12-14 ENCOUNTER — OFFICE VISIT (OUTPATIENT)
Dept: ONCOLOGY | Facility: CLINIC | Age: 68
End: 2023-12-14
Payer: COMMERCIAL

## 2023-12-14 VITALS
HEIGHT: 74 IN | HEART RATE: 91 BPM | BODY MASS INDEX: 25.12 KG/M2 | OXYGEN SATURATION: 96 % | DIASTOLIC BLOOD PRESSURE: 90 MMHG | WEIGHT: 195.7 LBS | SYSTOLIC BLOOD PRESSURE: 143 MMHG | TEMPERATURE: 98.4 F

## 2023-12-14 DIAGNOSIS — R80.3 BENCE JONES PROTEINURIA: ICD-10-CM

## 2023-12-14 DIAGNOSIS — C91.10 CHRONIC LYMPHOCYTIC LEUKEMIA: Primary | ICD-10-CM

## 2023-12-14 DIAGNOSIS — C91.10 CHRONIC LYMPHOCYTIC LEUKEMIA: ICD-10-CM

## 2023-12-14 DIAGNOSIS — Z79.899 HIGH RISK MEDICATION USE: ICD-10-CM

## 2023-12-14 DIAGNOSIS — N28.9 KIDNEY DISEASE: ICD-10-CM

## 2023-12-14 LAB
ALBUMIN SERPL-MCNC: 4.8 G/DL (ref 3.5–5.2)
ALBUMIN/GLOB SERPL: 1.9 G/DL
ALP SERPL-CCNC: 82 U/L (ref 39–117)
ALT SERPL W P-5'-P-CCNC: 49 U/L (ref 1–41)
ANION GAP SERPL CALCULATED.3IONS-SCNC: 10.1 MMOL/L (ref 5–15)
AST SERPL-CCNC: 55 U/L (ref 1–40)
BASOPHILS # BLD AUTO: 0.05 10*3/MM3 (ref 0–0.2)
BASOPHILS NFR BLD AUTO: 0.4 % (ref 0–1.5)
BILIRUB SERPL-MCNC: 1.2 MG/DL (ref 0–1.2)
BILIRUB UR QL STRIP: ABNORMAL
BUN SERPL-MCNC: 13 MG/DL (ref 8–23)
BUN/CREAT SERPL: 9.7 (ref 7–25)
CALCIUM SPEC-SCNC: 9.7 MG/DL (ref 8.6–10.5)
CHLORIDE SERPL-SCNC: 103 MMOL/L (ref 98–107)
CLARITY UR: CLEAR
CO2 SERPL-SCNC: 29.9 MMOL/L (ref 22–29)
COLOR UR: YELLOW
CREAT SERPL-MCNC: 1.34 MG/DL (ref 0.76–1.27)
DEPRECATED RDW RBC AUTO: 52.9 FL (ref 37–54)
EGFRCR SERPLBLD CKD-EPI 2021: 57.3 ML/MIN/1.73
EOSINOPHIL # BLD AUTO: 0.05 10*3/MM3 (ref 0–0.4)
EOSINOPHIL NFR BLD AUTO: 0.4 % (ref 0.3–6.2)
ERYTHROCYTE [DISTWIDTH] IN BLOOD BY AUTOMATED COUNT: 13.2 % (ref 12.3–15.4)
GLOBULIN UR ELPH-MCNC: 2.5 GM/DL
GLUCOSE SERPL-MCNC: 129 MG/DL (ref 65–99)
GLUCOSE UR STRIP-MCNC: NEGATIVE MG/DL
HCT VFR BLD AUTO: 43.9 % (ref 37.5–51)
HGB BLD-MCNC: 14.8 G/DL (ref 13–17.7)
HGB UR QL STRIP.AUTO: NEGATIVE
IMM GRANULOCYTES # BLD AUTO: 0.03 10*3/MM3 (ref 0–0.05)
IMM GRANULOCYTES NFR BLD AUTO: 0.2 % (ref 0–0.5)
KETONES UR QL STRIP: ABNORMAL
LEUKOCYTE ESTERASE UR QL STRIP.AUTO: NEGATIVE
LYMPHOCYTES # BLD AUTO: 8.94 10*3/MM3 (ref 0.7–3.1)
LYMPHOCYTES NFR BLD AUTO: 66.2 % (ref 19.6–45.3)
MCH RBC QN AUTO: 36 PG (ref 26.6–33)
MCHC RBC AUTO-ENTMCNC: 33.7 G/DL (ref 31.5–35.7)
MCV RBC AUTO: 106.8 FL (ref 79–97)
MONOCYTES # BLD AUTO: 0.79 10*3/MM3 (ref 0.1–0.9)
MONOCYTES NFR BLD AUTO: 5.8 % (ref 5–12)
NEUTROPHILS NFR BLD AUTO: 27 % (ref 42.7–76)
NEUTROPHILS NFR BLD AUTO: 3.65 10*3/MM3 (ref 1.7–7)
NITRITE UR QL STRIP: NEGATIVE
NRBC BLD AUTO-RTO: 0 /100 WBC (ref 0–0.2)
PH UR STRIP.AUTO: 7.5 [PH] (ref 4.5–8)
PLATELET # BLD AUTO: 117 10*3/MM3 (ref 140–450)
PMV BLD AUTO: 11.5 FL (ref 6–12)
POTASSIUM SERPL-SCNC: 4.3 MMOL/L (ref 3.5–5.2)
PROT SERPL-MCNC: 7.3 G/DL (ref 6–8.5)
PROT UR QL STRIP: ABNORMAL
RBC # BLD AUTO: 4.11 10*6/MM3 (ref 4.14–5.8)
SODIUM SERPL-SCNC: 143 MMOL/L (ref 136–145)
SP GR UR STRIP: 1.02 (ref 1–1.03)
UROBILINOGEN UR QL STRIP: ABNORMAL
WBC NRBC COR # BLD AUTO: 13.51 10*3/MM3 (ref 3.4–10.8)

## 2023-12-14 PROCEDURE — 80053 COMPREHEN METABOLIC PANEL: CPT

## 2023-12-14 PROCEDURE — 85025 COMPLETE CBC W/AUTO DIFF WBC: CPT

## 2023-12-14 PROCEDURE — 81003 URINALYSIS AUTO W/O SCOPE: CPT

## 2023-12-14 PROCEDURE — 36415 COLL VENOUS BLD VENIPUNCTURE: CPT

## 2023-12-14 NOTE — PROGRESS NOTES
Subjective     REASON FOR FOLLOW UP:   1. CLL undergoing BRUKINSA THERAPY, ASSOCIATED BENCE DAWSON PROTEINURIA    HISTORY OF PRESENT ILLNESS:  On 12/14/2023, this 68-year-old male returns to the office for follow-up for his utilization of Calquence in the background of chronic lymphoid leukemia with Bence-Dawson proteinuria and abnormal creatinine. We have documented resolution of the Bence-Dawson proteinuria and dramatic drop in his white count. He has had steady improvement in the hemoglobin and platelet count. He is not encountering any side effects of the Calquence including no cardiac irregularity, diarrhea or clinical bleeding. His appetite has remained excellent. His weight is stable. His bowel function is normal. He has nasal congestion. He uses Claritin D for this. His blood pressure is elevated today. He has proper bowel function. Urine volume is appropriate. He has no fevers, chills or adenopathy. No rashes. He feels well and he is able to do his job with no limitations.               Past Medical History:   Diagnosis Date    Abnormal liver function test     Alcohol abuse     CLL (chronic lymphocytic leukemia)     Coronary artery disease     stent    Heart disease     History of pneumonia     1/2019    Hyperlipidemia     Hypertension     Inguinal hernia     left side to have surgery 3/28/19    Myocardial infarction     Screen for colon cancer 09/2016    Negative Cologaurd    Screening PSA (prostate specific antigen) 02/2013    .5    Thrombocytopenia         Past Surgical History:   Procedure Laterality Date    CARDIAC CATHETERIZATION  2017    CATARACT EXTRACTION Bilateral     COLONOSCOPY N/A 06/05/2006    Normal, repeat in 10 years, Dr. Preethi Gonzalez    COLONOSCOPY N/A 7/15/2020    Procedure: COLONOSCOPY TO CECUM & T.I. WITH COLD SNARE POLYPECTOMIES, CLIP PLACEMENT X 2;  Surgeon: Yennifer Salazar MD;  Location: [facility] ENDOSCOPY;  Service: Gastroenterology;  Laterality: N/A;  PRE- POSITIVE  COLOGUARD  POST- COLON POLYPS, DIVERTICULOSIS, HEMORRHOIDS    CORONARY ANGIOPLASTY WITH STENT PLACEMENT N/A 05/09/2017    Left heart catheterization, Selective native vessel coronary arteriography, Left ventriculography, Successful percutaneous intervention to the 100% occluded right coronary artery with a 3.5 x 38 mm Synergy drug-eluting stent (post-dilated w/ a 4.0 x 20 mm NC Emerge balloon), Selective right femoral angiography, Successful Perclose arteriotomy closure. Dr. James Pierson    INGUINAL HERNIA REPAIR Left     INGUINAL HERNIA REPAIR Right     x2    INGUINAL HERNIA REPAIR Left 3/28/2019    Procedure: LEFT INGUINAL HERNIA REPAIR LAPAROSCOPIC;  Surgeon: Preethi Gonzalez MD;  Location: [facility] OR [facility];  Service: General    LAPAROSCOPIC INGUINAL HERNIA REPAIR Right 10/03/2002    w/ extra peritoneal Goe-Benton mesh (transperitoneal repair), Dr. Preethi Gonzalez    REFRACTIVE SURGERY Bilateral     RETINAL DETACHMENT SURGERY Right 07/02/2013      ONCOLOGY HISTORY SHOLA ENNIS MD:ONCOLOGIC HISTORY:  On 12/22/10 he was reviewed.  He   had a totally normal white count with normal differential, predominance of lymphocytes.  Normal platelet count.  His chemistry profile was normal including LDH.  His beta-2 microglobulin was normal.  His quantitative immunoglobulins were normal.  His jane  p  heral blood flow cytometry documented a typical pattern by flow cytometry of CLL, CD5, CD19 negative, dim positive CD20, ZAP-70 negative and CD38 negative.  His CT scan of the chest, abdomen and pelvis disclosed no peripheral adenopathy or hepatosplenomeg  a  ly.  His level of immunoglobulins was normal.  With this in mind we thought that the patient had very early stage disease with excellent prognostic features and we advised him to remain on observation.  He will be rechecked every 3 months for the first ye  ar and thereafter every 6 months depending on the clinical circumstances.  Appropriate booklet information was given  to him and his wife to review and further learn.          Given the excellent characteristics of his disease on clinical grounds, I doubt at this time a bone marrow is needed.        On 1/8/13 his physical exam is unremarkable with no peripheral adenopathy or hepatosplenomegaly, no B symptoms, no infections, no autoimmunity.  On the other hand he had areas of the skin on the left cheek and right cheek bhavani  t look like actinic keratosis.  His white count was up to17,000 with a normal hemoglobin and borderline platelet count of 125,000.  We asked the patient to try to minimize stressors in life and we asked him to return back in four months.  We also asked priscila edwards to be seen by dermatology in order to treat his multiple actinic keratosis.          On 05/09/13 the patient was asymptomatic with regard to his chronic lymphoid leukemia, no fatigue, fevers, chills, repeated infections or peripheral lymphadenopathy.  His physi  francisco j exam was unremarkable with no palpable lymphadenopathy or hepatosplenomegaly.  His white count has now improved to 12,900, stable hemoglobin of 13.6 and stable platelet count of 121,000.  With this in mind we advised him to remain on observation, retu  rning for reevaluation in six months.         On 05/07/2014 the patient was asymptomatic in regard to his CLL with no autoimmunity, no infections and no progressive disease clinically.  On the other hand we have seen duplication of his white count in one year.  T  he patient and wife are aware that eventually he will require intervention for this if the white count continues changing the way it is.  At this time we do not justify the need of any other therapy or any other testing.        Current Outpatient Medications on File Prior to Visit   Medication Sig Dispense Refill    acetaminophen (TYLENOL) 500 MG tablet Take 2 tablets by mouth Every 6 (Six) Hours As Needed for Mild Pain.      acyclovir (ZOVIRAX) 400 MG tablet TAKE 1 TABLET TWICE DAILY 180  tablet 1    allopurinol (ZYLOPRIM) 300 MG tablet TAKE 1/2 TABLET BY MOUTH DAILY 15 tablet 1    aspirin 81 MG EC tablet Take 1 tablet by mouth Every Other Day.      carvedilol (COREG) 6.25 MG tablet TAKE 1 TABLET TWICE DAILY  WITH MEALS 180 tablet 1    cetirizine (zyrTEC) 10 MG tablet Take 1 tablet by mouth Daily.      fluticasone (FLONASE) 50 MCG/ACT nasal spray 2 sprays into the nostril(s) as directed by provider Daily. (Patient taking differently: 2 sprays into the nostril(s) as directed by provider Daily. Rarely used) 1 bottle 0    lisinopril (PRINIVIL,ZESTRIL) 5 MG tablet TAKE 1 TABLET DAILY 90 tablet 3    Multiple Vitamins-Minerals (EYE VITAMINS & MINERALS PO) Take 1 tablet by mouth Daily.      ondansetron ODT (ZOFRAN-ODT) 8 MG disintegrating tablet Place 1 tablet on the tongue Every 8 (Eight) Hours As Needed for Nausea or Vomiting. 30 tablet 5    rosuvastatin (CRESTOR) 20 MG tablet Take 1 tablet by mouth Every Night.      sulfamethoxazole-trimethoprim (BACTRIM DS,SEPTRA DS) 800-160 MG per tablet Take 1 tablet by mouth 3 (Three) Times a Week. 12 tablet 7    Zanubrutinib (Brukinsa) 80 MG chemo capsule Take 2 capsules by mouth 2 (Two) Times a Day. 120 capsule 5     No current facility-administered medications on file prior to visit.        ALLERGIES:    Allergies   Allergen Reactions    Codeine Nausea Only and Nausea And Vomiting        Social History     Socioeconomic History    Marital status:      Spouse name: Charlotte Espino   Tobacco Use    Smoking status: Never    Smokeless tobacco: Never   Vaping Use    Vaping Use: Never used   Substance and Sexual Activity    Alcohol use: Yes     Alcohol/week: 21.0 standard drinks of alcohol     Types: 21 Shots of liquor per week    Drug use: No    Sexual activity: Defer        Family History   Problem Relation Age of Onset    Heart attack Mother     Heart disease Mother     Diabetes Mother     Aortic aneurysm Mother     Coronary artery disease Mother     Early death  "Mother     No Known Problems Father     Diabetes type II Other     Diabetes Brother     Hyperlipidemia Brother     Stroke Sister 65    Diabetes Sister     Hyperlipidemia Sister     Hypertension Sister     No Known Problems Brother     Stomach cancer Paternal Uncle     Malig Hyperthermia Neg Hx         Objective     Vitals:    12/14/23 1601   BP: 143/90   Pulse: 91   Temp: 98.4 °F (36.9 °C)   TempSrc: Temporal   SpO2: 96%   Weight: 88.8 kg (195 lb 11.2 oz)   Height: 188 cm (74.02\")   PainSc: 0-No pain           12/14/2023     3:45 PM   Current Status   ECOG score 0     EXAM               GENERAL:  Well-developed, Patient  in no acute distress.   SKIN:  Warm, dry ,NO purpura ,no rash.Multiple areas of actinic keratosis in his face.      HEENT:  Pupils were equal and reactive to light and accomodation, conjunctivae noninjected,  normal visual acuity.   NECK:  Supple with good range of motion; no thyromegaly , no JVD or bruits,.No carotid artery pain, no carotid abnormal pulsation   LYMPHATICS:  No cervical, NO supraclavicular, NO axillary, NO inguinal adenopathies.  CARDIAC   normal rate , regular rhythm, without murmur,NO rubs NO S3 NO S4   LUNGS: normal breath sounds bilateral, no wheezing, NO rhonchi, NO crackles ,NO rubs.  VASCULAR VENOUS: no cyanosis, NO collateral circulation, NO varicosities, NO edema, NO palpable cords, NO pain,NO erythema, NO pigmentation of the skin.  ABDOMEN:  Soft, NO pain,no hepatomegaly, no splenomegaly,no masses, no ascites, no collateral circulation,no distention.  EXTREMITIES  AND SPINE:  No clubbing, no cyanosis ,no deformities , no pain .No kyphosis,  no pain in spine, no pain in ribs , no pain in pelvic bone.  NEUROLOGICAL:  Patient was awake, alert, oriented to time, person and place.      RESULTS REVIEWED:  Results from last 7 days   Lab Units 12/14/23  1529   WBC 10*3/mm3 13.51*   NEUTROS ABS 10*3/mm3 3.65   HEMOGLOBIN g/dL 14.8   HEMATOCRIT % 43.9   PLATELETS 10*3/mm3 117* "       Results from last 7 days   Lab Units 12/14/23  1529   SODIUM mmol/L 143   POTASSIUM mmol/L 4.3   CHLORIDE mmol/L 103   CO2 mmol/L 29.9*   BUN mg/dL 13   CREATININE mg/dL 1.34*   CALCIUM mg/dL 9.7   ALBUMIN g/dL 4.8   BILIRUBIN mg/dL 1.2   ALK PHOS U/L 82   ALT (SGPT) U/L 49*   AST (SGOT) U/L 55*   GLUCOSE mg/dL 129*     Urinalysis without microscopic (no culture) - Urine, Clean Catch (12/14/2023 15:29)     Assessment & Plan      1. CLL  CLL dating back to initial diagnosis in 2005.  He has remained on a course of observation without any need for active treatment.    12/22/2010 peripheral blood flow cytometry documented a typical pattern by flow cytometry of CLL, CD5, CD19 negative, dim positive CD20, ZAP-70 negative and CD38 negative.  His CT scan of the chest, abdomen and pelvis disclosed no peripheral adenopathy or hepatosplenomegaly.  His level of immunoglobulins was normal.  With this in mind we thought that the patient had very early stage disease with excellent prognostic features and we advised him to remain on observation  1/24/2023, leukocytosis worsened, though the patient was recently treated with prednisone for eustachian tube dysfunction.  WBC sylavin to 110,000, though then improved to 88,000.  He had no peripheral adenopathy or hepatosplenomegaly.  1/24/2023, creatinine elevated at 1.4 with urinalysis showing 300 mg of protein.  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  Absolute lymphocyte count 70.87 which is improvement compared to  113.15 2 weeks ago.  4/18/2023 WBC 60,000.  5/16/2023 WBC 41.28, hgb 13.4, platelet count 104,000.  6/8/2023 patient advised to discontinue allopurinol and continue with Brukinsa at the same dose.  When WBC normalizes plans to do a new urinary collection of 24 hours to remeasure monoclonal protein studies and Bence-Dawson proteinuria.    7/20/2023 WBC 19.14, hemoglobin 13.5, platelet count 97,000.  9/8/2023 WBC 12.45, hemoglobin 13.9, platelet count 100,000.  Patient will be sent with a 24-hour urine jug for UPEP, ELISA, FLC.  On 10/20/2023, the patient has had dramatic improvement in his leukocytosis to the point of almost normalization.  He still has some peripheral lymphocytosis but I expect that it will continue to improve as time goes by.  Most importantly, the triggering factor for the treatment of his disease with Brukinsa was the rise in the creatinine to 1.67, found to have almost 500 mg of proteinuria and Bence-Dawson proteinuria.  Urine protein electrophoresis disclosed normalization of protein secretion in the urine and resolution of the Bence-Dawson proteinuria.  This proves the point that leukemia was the cause of the problem, now that the treatment has subsided.  Kidney function has recovered and almost normalized.  A creatinine of 1.2 for a 68-year-old male is almost normal.    Given this fact, I advised the patient to remain on Brukinsa at the same dose.  Given the fact that he has not had any cardiac irregularity, abnormal bleeding, or any other new problem, I advised him to remain on the medicine and return to see us back every couple of months with repeated CBC, CMP, and urinalysis, to continue monitoring for proteinuria.      He will have serum protein and urine electrophoresis at that time.  No need for radiological assessment at this time.         On 12/14/2023, the patient is doing fine from the point of view of his CLL with no fevers, no chills, no sweats, no pruritis, no adenopathy, no  infections, no autoimmunity. His white count remains stable at 13,000. The hemoglobin keeps improving, normalized now and the platelet count finally is improving. His absolute lymphocyte count remains elevated similar to what it was previously. On the other hand he has had a dramatic improvement of these numbers over the last several months if we account for the difference. In absence of any side effects of the medicine, I advised him to take the Calquence at the same dose and return to see us back in 6 weeks with repeat CBC and CMP. A urinalysis will be repeated then. Today, his urinalysis shows minimal proteinuria with minimal ketonuria. He is not fasting. Encouraged him to have proper nutrition all the time and proper hydration. His specific gravity is 1.020 telling me that he is probably a little bit on the dry side.        2. Prophylaxis  Prophylactic acyclovir 150 mg daily until white count is normal  Acyclovir 400 mg twice daily and Bactrim DS Monday, Wednesday, Friday 6/8/2023 allopurinol was discontinued however patient continues on prophylactic acyclovir and Bactrim DS.  On 10/20/2023, his prophylactic medications will remain the same.   On 12/14/2023, prophylactic medication remains ongoing. He has not had any infection.          3.On 12/14/2023, he is taking Claritin D for discharge in the nose and congestion. I asked him to take just Claritin and use Flonase instead of Claritin D. The D part of the medication could be raising his blood pressure and he admits that this has happened before. I pointed out to him decongestants are not for somebody like him anymore. This is not good for people above 65.     We will review him back in 6 weeks with repeat CBC, CMP and urinalysis. We expect now that the hemoglobin is normal, the platelet count should be improving, and hopefully his total lymphocyte count will continue decreasing.        Russell Cagle MD  12/14/23

## 2023-12-15 ENCOUNTER — DOCUMENTATION (OUTPATIENT)
Dept: PHARMACY | Facility: HOSPITAL | Age: 68
End: 2023-12-15
Payer: COMMERCIAL

## 2023-12-15 ENCOUNTER — SPECIALTY PHARMACY (OUTPATIENT)
Dept: PHARMACY | Facility: HOSPITAL | Age: 68
End: 2023-12-15
Payer: COMMERCIAL

## 2023-12-15 LAB
ALBUMIN SERPL ELPH-MCNC: 4.4 G/DL (ref 2.9–4.4)
ALBUMIN/GLOB SERPL: 1.7 {RATIO} (ref 0.7–1.7)
ALPHA1 GLOB SERPL ELPH-MCNC: 0.2 G/DL (ref 0–0.4)
ALPHA2 GLOB SERPL ELPH-MCNC: 0.6 G/DL (ref 0.4–1)
B-GLOBULIN SERPL ELPH-MCNC: 1 G/DL (ref 0.7–1.3)
GAMMA GLOB SERPL ELPH-MCNC: 0.9 G/DL (ref 0.4–1.8)
GLOBULIN SER-MCNC: 2.7 G/DL (ref 2.2–3.9)
IGA SERPL-MCNC: 132 MG/DL (ref 61–437)
IGG SERPL-MCNC: 935 MG/DL (ref 603–1613)
IGM SERPL-MCNC: 58 MG/DL (ref 20–172)
INTERPRETATION SERPL IEP-IMP: ABNORMAL
KAPPA LC FREE SER-MCNC: 22.9 MG/L (ref 3.3–19.4)
KAPPA LC FREE/LAMBDA FREE SER: 1.18 {RATIO} (ref 0.26–1.65)
LABORATORY COMMENT REPORT: ABNORMAL
LAMBDA LC FREE SERPL-MCNC: 19.4 MG/L (ref 5.7–26.3)
M PROTEIN SERPL ELPH-MCNC: ABNORMAL G/DL
PROT SERPL-MCNC: 7.1 G/DL (ref 6–8.5)

## 2023-12-15 NOTE — PROGRESS NOTES
Staff message rec from Dr Cagle-Pts treatment will remain the same.    Dr Cagle' note states Calquence but pt is on Brukinsa 80 mg tabs-2 tabs twice per day. Office note from 12/14 is not signed yet.    Russell Cagle MD sent to Marta Jansen, Pharmacy Technician  CALQUENCE the same    Marta Jansen, Pharmacy Technician  Specialty Pharmacy Technician

## 2023-12-18 DIAGNOSIS — C91.10 CHRONIC LYMPHOCYTIC LEUKEMIA: ICD-10-CM

## 2023-12-19 RX ORDER — SULFAMETHOXAZOLE AND TRIMETHOPRIM 800; 160 MG/1; MG/1
TABLET ORAL
Qty: 12 TABLET | Refills: 7 | Status: SHIPPED | OUTPATIENT
Start: 2023-12-19

## 2023-12-21 DIAGNOSIS — C91.10 CHRONIC LYMPHOCYTIC LEUKEMIA: ICD-10-CM

## 2023-12-22 ENCOUNTER — SPECIALTY PHARMACY (OUTPATIENT)
Dept: PHARMACY | Facility: HOSPITAL | Age: 68
End: 2023-12-22
Payer: COMMERCIAL

## 2023-12-22 DIAGNOSIS — C91.10 CHRONIC LYMPHOCYTIC LEUKEMIA: ICD-10-CM

## 2023-12-22 RX ORDER — ACYCLOVIR 400 MG/1
400 TABLET ORAL 2 TIMES DAILY
Qty: 180 TABLET | Refills: 1 | Status: SHIPPED | OUTPATIENT
Start: 2023-12-22

## 2023-12-22 RX ORDER — ZANUBRUTINIB 80 MG/1
160 CAPSULE, GELATIN COATED ORAL 2 TIMES DAILY
Qty: 120 CAPSULE | Refills: 5 | Status: SHIPPED | OUTPATIENT
Start: 2023-12-22

## 2023-12-22 NOTE — PROGRESS NOTES
Drug: Brukinsa (zanubrutinib)  Strength: 80 mg  Directions: Take 2 capsules by mouth twice a day  Quantity: 120  Refills: 5  Pharmacy prescription sent to:  [facility]  Specialty Pharmacy  Completed independent double check on medication order/RX.  Jazz Saenz Rph, BCOP

## 2023-12-22 NOTE — TELEPHONE ENCOUNTER
Refill requested from pharmacy. Per last chart note, patient to continue Brukinsa 160 mg twice daily. Will route to MD for cosignature.    Noel Billy, PharmD, [facility]  Clinical Oncology Pharmacist

## 2023-12-22 NOTE — PROGRESS NOTES
Re: Refills of Oral Specialty Medication - Brukinsa (zanubrutinib)    Drug-Drug Interactions: The current medication list was reviewed and there are no relevant drug-drug interactions with the specialty medication.  Medication Allergies: The patient has no relevant allergies as it relates to their oral specialty medication  Review of Labs/Dose Adjustments: NO DOSE CHANGE - I reviewed the most recent note and labs and the patient will continue without any dose changes.  I sent refills as described below.    Drug: Brukinsa (zanubrutinib)  Strength: 80 mg  Directions: Take 2 capsules by mouth twice a day  Quantity: 120  Refills: 5  Pharmacy prescription sent to:  [facility]  Specialty Pharmacy    Name/Credentials: Noel Billy, Elizabeth, BCOP  Clinical Oncology Pharmacist    12/22/2023  13:41 EST

## 2023-12-27 ENCOUNTER — SPECIALTY PHARMACY (OUTPATIENT)
Dept: PHARMACY | Facility: HOSPITAL | Age: 68
End: 2023-12-27
Payer: COMMERCIAL

## 2023-12-27 NOTE — PROGRESS NOTES
Specialty Pharmacy Refill Coordination Note     Francisco is a 68 y.o. male contacted today regarding refills of Brukinsa specialty medication(s).    Reviewed and verified with patient:       Specialty medication(s) and dose(s) confirmed: yes  Brukinsa 80 mg caps-2 caps twice per day    Refill Questions      Flowsheet Row Most Recent Value   Changes to allergies? No   Changes to medications? No   New conditions or infections since last clinic visit No   Unplanned office visit, urgent care, ED, or hospital admission in the last 4 weeks  No   How does patient/caregiver feel medication is working? Good   Financial problems or insurance changes  No   Since the previous refill, were any specialty medication doses or scheduled injections missed or delayed?  No   Does this patient require a clinical escalation to a pharmacist? No            Delivery Questions      Flowsheet Row Most Recent Value   Delivery method Beeline  [Ship to home address-Ship 1/2 for delivery 1/3-$0 copay with insurance and copay card-Address Confirmed-Signature Required]   Delivery address verified with patient/caregiver? Yes  [Ship to home address]   Delivery address Home   Number of medications in delivery 1   Medication(s) being filled and delivered Zanubrutinib (Antineoplastic Enzyme Inhibitors)   Doses left of specialty medications 7-10 days   Copay verified? Yes   Copay amount $0 copay with insurance and copay card   Copay form of payment No copayment ($0)          Brukinsa delivery coordinated with pt for 1/3/2024 to his home address. $0 copay with insurance and copay card. His last delivery was on 11/28/2023. No questions or concerns to report to MTM Team today.     Follow-up: 21 day(s)     Marta Jansen, Pharmacy Technician  Specialty Pharmacy Technician

## 2024-01-18 ENCOUNTER — TELEPHONE (OUTPATIENT)
Dept: ONCOLOGY | Facility: CLINIC | Age: 69
End: 2024-01-18

## 2024-01-26 ENCOUNTER — SPECIALTY PHARMACY (OUTPATIENT)
Dept: PHARMACY | Facility: HOSPITAL | Age: 69
End: 2024-01-26
Payer: COMMERCIAL

## 2024-01-26 NOTE — PROGRESS NOTES
Specialty Pharmacy Refill Coordination Note     Francisco is a 68 y.o. male contacted today regarding refills of Brukinsa specialty medication(s).    Reviewed and verified with patient:       Specialty medication(s) and dose(s) confirmed: yes  Brukinsa 80 mg caps-2 caps twice per day    Refill Questions      Flowsheet Row Most Recent Value   Changes to allergies? No   Changes to medications? No   New conditions or infections since last clinic visit No   Unplanned office visit, urgent care, ED, or hospital admission in the last 4 weeks  No   How does patient/caregiver feel medication is working? Good   Financial problems or insurance changes  No   Since the previous refill, were any specialty medication doses or scheduled injections missed or delayed?  No   Does this patient require a clinical escalation to a pharmacist? No            Delivery Questions      Flowsheet Row Most Recent Value   Delivery method Beeline  [Ship to home address-Ship 1/29 for delivery 1/30-$0 copay with copay card-Address Confirmed]   Delivery address verified with patient/caregiver? Yes  [Ship to home address]   Delivery address Home   Number of medications in delivery 1   Medication(s) being filled and delivered Zanubrutinib (Antineoplastic Enzyme Inhibitors)   Doses left of specialty medications 7-10 days   Copay verified? Yes   Copay amount $0 copay with insurance and copay card   Copay form of payment No copayment ($0)          Brukinsa delivery coordinated with pt for 1/30/2024 to his home address. $0 copay with insurance and copay card. His last delivery was on 1/3/2024. No questions or concerns to report to MTM Team today.     Follow-up: 21 day(s)     Marta Jansen, Pharmacy Technician  Specialty Pharmacy Technician

## 2024-02-06 ENCOUNTER — SPECIALTY PHARMACY (OUTPATIENT)
Dept: ONCOLOGY | Facility: HOSPITAL | Age: 69
End: 2024-02-06
Payer: COMMERCIAL

## 2024-02-06 ENCOUNTER — OFFICE VISIT (OUTPATIENT)
Dept: ONCOLOGY | Facility: CLINIC | Age: 69
End: 2024-02-06
Payer: COMMERCIAL

## 2024-02-06 ENCOUNTER — LAB (OUTPATIENT)
Dept: LAB | Facility: HOSPITAL | Age: 69
End: 2024-02-06
Payer: COMMERCIAL

## 2024-02-06 VITALS
HEIGHT: 74 IN | RESPIRATION RATE: 18 BRPM | TEMPERATURE: 97.7 F | OXYGEN SATURATION: 97 % | HEART RATE: 72 BPM | WEIGHT: 193 LBS | SYSTOLIC BLOOD PRESSURE: 127 MMHG | DIASTOLIC BLOOD PRESSURE: 81 MMHG | BODY MASS INDEX: 24.77 KG/M2

## 2024-02-06 DIAGNOSIS — N28.9 KIDNEY DISEASE: ICD-10-CM

## 2024-02-06 DIAGNOSIS — Z79.899 HIGH RISK MEDICATION USE: ICD-10-CM

## 2024-02-06 DIAGNOSIS — C91.10 CHRONIC LYMPHOCYTIC LEUKEMIA: ICD-10-CM

## 2024-02-06 DIAGNOSIS — R80.3 BENCE JONES PROTEINURIA: ICD-10-CM

## 2024-02-06 DIAGNOSIS — C91.10 CHRONIC LYMPHOCYTIC LEUKEMIA: Primary | ICD-10-CM

## 2024-02-06 LAB
ALBUMIN SERPL-MCNC: 4.5 G/DL (ref 3.5–5.2)
ALBUMIN/GLOB SERPL: 1.8 G/DL
ALP SERPL-CCNC: 70 U/L (ref 39–117)
ALT SERPL W P-5'-P-CCNC: 22 U/L (ref 1–41)
ANION GAP SERPL CALCULATED.3IONS-SCNC: 12.5 MMOL/L (ref 5–15)
AST SERPL-CCNC: 35 U/L (ref 1–40)
BASOPHILS # BLD AUTO: 0.03 10*3/MM3 (ref 0–0.2)
BASOPHILS NFR BLD AUTO: 0.3 % (ref 0–1.5)
BILIRUB SERPL-MCNC: 1 MG/DL (ref 0–1.2)
BILIRUB UR QL STRIP: NEGATIVE
BUN SERPL-MCNC: 11 MG/DL (ref 8–23)
BUN/CREAT SERPL: 8.9 (ref 7–25)
CALCIUM SPEC-SCNC: 9.5 MG/DL (ref 8.6–10.5)
CHLORIDE SERPL-SCNC: 106 MMOL/L (ref 98–107)
CLARITY UR: CLEAR
CO2 SERPL-SCNC: 26.5 MMOL/L (ref 22–29)
COLOR UR: YELLOW
CREAT SERPL-MCNC: 1.23 MG/DL (ref 0.76–1.27)
DEPRECATED RDW RBC AUTO: 51.6 FL (ref 37–54)
EGFRCR SERPLBLD CKD-EPI 2021: 63.6 ML/MIN/1.73
EOSINOPHIL # BLD AUTO: 0.05 10*3/MM3 (ref 0–0.4)
EOSINOPHIL NFR BLD AUTO: 0.5 % (ref 0.3–6.2)
ERYTHROCYTE [DISTWIDTH] IN BLOOD BY AUTOMATED COUNT: 13.1 % (ref 12.3–15.4)
GLOBULIN UR ELPH-MCNC: 2.5 GM/DL
GLUCOSE SERPL-MCNC: 99 MG/DL (ref 65–99)
GLUCOSE UR STRIP-MCNC: NEGATIVE MG/DL
HCT VFR BLD AUTO: 42.1 % (ref 37.5–51)
HGB BLD-MCNC: 14.4 G/DL (ref 13–17.7)
HGB UR QL STRIP.AUTO: ABNORMAL
IMM GRANULOCYTES # BLD AUTO: 0.02 10*3/MM3 (ref 0–0.05)
IMM GRANULOCYTES NFR BLD AUTO: 0.2 % (ref 0–0.5)
KETONES UR QL STRIP: ABNORMAL
LEUKOCYTE ESTERASE UR QL STRIP.AUTO: NEGATIVE
LYMPHOCYTES # BLD AUTO: 5.79 10*3/MM3 (ref 0.7–3.1)
LYMPHOCYTES NFR BLD AUTO: 60 % (ref 19.6–45.3)
MCH RBC QN AUTO: 36.8 PG (ref 26.6–33)
MCHC RBC AUTO-ENTMCNC: 34.2 G/DL (ref 31.5–35.7)
MCV RBC AUTO: 107.7 FL (ref 79–97)
MONOCYTES # BLD AUTO: 0.55 10*3/MM3 (ref 0.1–0.9)
MONOCYTES NFR BLD AUTO: 5.7 % (ref 5–12)
NEUTROPHILS NFR BLD AUTO: 3.21 10*3/MM3 (ref 1.7–7)
NEUTROPHILS NFR BLD AUTO: 33.3 % (ref 42.7–76)
NITRITE UR QL STRIP: NEGATIVE
NRBC BLD AUTO-RTO: 0 /100 WBC (ref 0–0.2)
PH UR STRIP.AUTO: 5.5 [PH] (ref 4.5–8)
PLATELET # BLD AUTO: 105 10*3/MM3 (ref 140–450)
PMV BLD AUTO: 10.8 FL (ref 6–12)
POTASSIUM SERPL-SCNC: 4.6 MMOL/L (ref 3.5–5.2)
PROT SERPL-MCNC: 7 G/DL (ref 6–8.5)
PROT UR QL STRIP: ABNORMAL
RBC # BLD AUTO: 3.91 10*6/MM3 (ref 4.14–5.8)
SODIUM SERPL-SCNC: 145 MMOL/L (ref 136–145)
SP GR UR STRIP: >=1.03 (ref 1–1.03)
UROBILINOGEN UR QL STRIP: ABNORMAL
WBC NRBC COR # BLD AUTO: 9.65 10*3/MM3 (ref 3.4–10.8)

## 2024-02-06 PROCEDURE — 80053 COMPREHEN METABOLIC PANEL: CPT

## 2024-02-06 PROCEDURE — 36415 COLL VENOUS BLD VENIPUNCTURE: CPT

## 2024-02-06 PROCEDURE — 85025 COMPLETE CBC W/AUTO DIFF WBC: CPT

## 2024-02-06 PROCEDURE — 81003 URINALYSIS AUTO W/O SCOPE: CPT

## 2024-02-06 NOTE — PROGRESS NOTES
Specialty Pharmacy Patient Management Program  Oncology 6-Month Clinical Assessment       Francisco Espino is a 68 y.o. male with CLL seen today to assess adherence and side effects.    Regimen: Brukinsa 160 mg po bid    Reason for Outreach: Routine medication check-in .       Problem list reviewed by Chastity Carbajal RPH on 2/6/2024 at 10:10 AM  Medicines reviewed by Chastity Carbajal RPH on 2/6/2024 at 10:10 AM  Allergies reviewed by Chastity Carbajal RPH on 2/6/2024 at 10:10 AM     Goals Addressed Today    None       Medication Assessment:  Medication Assessment  Follow Up Clinical Assessment  Linked Medication(s) Assessed: Zanubrutinib (Antineoplastic Enzyme Inhibitors)  Therapeutic appropriateness: Appropriate  Medication tolerability: Tolerating with no to minimal ADRs  Medication plan: Continue therapy with normal follow-up  Quality of Life Improvement Scale: 5-No change  Administration: Patient is taking every day at the same time  and twice daily.   Patient can self administer medications: yes  Medication Follow-Up Plan: Next clinical assessment  Lab Review: The labs listed below have been reviewed. No dose adjustments are needed for the oral specialty medication(s) based on the labs.    Lab Results   Component Value Date    GLUCOSE 129 (H) 12/14/2023    CALCIUM 9.7 12/14/2023     12/14/2023    K 4.3 12/14/2023    CO2 29.9 (H) 12/14/2023     12/14/2023    BUN 13 12/14/2023    CREATININE 1.34 (H) 12/14/2023    EGFRIFAFRI 70 07/09/2021    EGFRIFNONA 60 (L) 01/06/2022    BCR 9.7 12/14/2023    ANIONGAP 10.1 12/14/2023     Lab Results   Component Value Date    WBC 9.65 02/06/2024    RBC 3.91 (L) 02/06/2024    HGB 14.4 02/06/2024    HCT 42.1 02/06/2024    .7 (H) 02/06/2024    MCH 36.8 (H) 02/06/2024    MCHC 34.2 02/06/2024    RDW 13.1 02/06/2024    RDWSD 51.6 02/06/2024    MPV 10.8 02/06/2024     (L) 02/06/2024    NEUTRORELPCT 33.3 (L) 02/06/2024    LYMPHORELPCT 60.0 (H) 02/06/2024     MONORELPCT 5.7 02/06/2024    EOSRELPCT 0.5 02/06/2024    BASORELPCT 0.3 02/06/2024    AUTOIGPER 0.2 02/06/2024    NEUTROABS 3.21 02/06/2024    LYMPHSABS 5.79 (H) 02/06/2024    MONOSABS 0.55 02/06/2024    EOSABS 0.05 02/06/2024    BASOSABS 0.03 02/06/2024    AUTOIGNUM 0.02 02/06/2024    NRBC 0.0 02/06/2024     Drug-drug interactions  Completed medication reconciliation today to assess for drug interactions. Patient denies starting or stopping any medications.    Assessed medication list for interactions, no significant drug interactions noted.   Advised patient to call the clinic if any new medications are started so we can assess for drug-drug interactions.  Drug-food interactions discussed: eating grapefruit and drinking grapefruit juice and eating [place] oranges (commonly found in orange marmalade)  Vaccines are coordinated by the patient's oncologist and primary care provider.    Allergies  Known allergies and reactions were discussed with the patient. The patient's chart has been reviewed for allergy information and updated as necessary.   Allergies   Allergen Reactions    Codeine Nausea Only and Nausea And Vomiting        Hospitalizations and Urgent Care Visits Since Last Assessment:  Unplanned hospitalizations or inpatient admissions: no  ED Visits: no  Urgent Office Visits: no    Adherence Assessment:  Adherence Questions  Linked Medication(s) Assessed: Zanubrutinib (Antineoplastic Enzyme Inhibitors)  On average, how many doses/injections does the patient miss per month?: 0  What are the identified reasons for non-adherence or missed doses? : no problems identified  What is the estimated medication adherence level?: %  Based on the patient/caregiver response and refill history, does this patient require an MTP to track adherence improvements?: no    Quality of Life Assessment:  Quality of Life Assessment  Quality of Life Improvement Scale: 5-No change  -- Quality of Life: 8/10    Financial  Assessment:  Medication availability/affordability: Patient has had no issues obtaining medication from pharmacy.    Attestation     I attest the patient was actively involved in and has agreed to the above plan of care.  If the prescribed therapy is at any point deemed not appropriate based on the current or future assessments, a consultation will be initiated with the patient's specialty care provider to determine the best course of action. The revised plan of therapy will be documented along with any required assessments and/or additional patient education provided.       All questions addressed and patient had no additional concerns. Patient has pharmacy contact information.    Name/Credentials: Chastity Carbajal PharmD, BCPS    2/6/2024  10:11 EST

## 2024-02-06 NOTE — PROGRESS NOTES
Subjective     REASON FOR FOLLOW UP:   1. 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 He is undergoing therapy with Brukinsa at this time. The medicine is not triggering any abnormal bleeding or irregular heartbeat and he feels terrific. He has not had any fever, chills, night sweats, pruritus, adenopathy or fatigue. His appetite has remained excellent. His bowel function and urination are normal. No other new health issues. He has not had any recent infections or any other problem.               Past Medical History:   Diagnosis Date    Abnormal liver function test     Alcohol abuse     CLL (chronic lymphocytic leukemia)     Coronary artery disease     stent    Heart disease     History of pneumonia     1/2019    Hyperlipidemia     Hypertension     Inguinal hernia     left side to have surgery 3/28/19    Myocardial infarction     Screen for colon cancer 09/2016    Negative Cologaurd    Screening PSA (prostate specific antigen) 02/2013    .5    Thrombocytopenia         Past Surgical History:   Procedure Laterality Date    CARDIAC CATHETERIZATION  2017    CATARACT EXTRACTION Bilateral     COLONOSCOPY N/A 06/05/2006    Normal, repeat in 10 years, Dr. Preethi Gonzalez    COLONOSCOPY N/A 7/15/2020    Procedure: COLONOSCOPY TO CECUM & T.I. WITH COLD SNARE POLYPECTOMIES, CLIP PLACEMENT X 2;  Surgeon: Yennifer Salazar MD;  Location: [facility] ENDOSCOPY;  Service: Gastroenterology;  Laterality: N/A;  PRE- POSITIVE COLOGUARD  POST- COLON POLYPS, DIVERTICULOSIS, HEMORRHOIDS    CORONARY ANGIOPLASTY WITH STENT PLACEMENT N/A 05/09/2017    Left heart catheterization, Selective native vessel coronary arteriography, Left ventriculography, Successful percutaneous intervention to the 100%  occluded right coronary artery with a 3.5 x 38 mm Synergy drug-eluting stent (post-dilated w/ a 4.0 x 20 mm NC Emerge balloon), Selective right femoral angiography, Successful Perclose arteriotomy closure. Dr. James Pierson    INGUINAL HERNIA REPAIR Left     INGUINAL HERNIA REPAIR Right     x2    INGUINAL HERNIA REPAIR Left 3/28/2019    Procedure: LEFT INGUINAL HERNIA REPAIR LAPAROSCOPIC;  Surgeon: Preethi Gonzlaez MD;  Location: [facility] OR [facility];  Service: General    LAPAROSCOPIC INGUINAL HERNIA REPAIR Right 10/03/2002    w/ extra peritoneal Goe-Benton mesh (transperitoneal repair), Dr. Preethi Gonzalez    REFRACTIVE SURGERY Bilateral     RETINAL DETACHMENT SURGERY Right 07/02/2013      ONCOLOGY HISTORY SHOLA ENNIS MD:ONCOLOGIC HISTORY:  On 12/22/10 he was reviewed.  He   had a totally normal white count with normal differential, predominance of lymphocytes.  Normal platelet count.  His chemistry profile was normal including LDH.  His beta-2 microglobulin was normal.  His quantitative immunoglobulins were normal.  His jane  p  heral blood flow cytometry documented a typical pattern by flow cytometry of CLL, CD5, CD19 negative, dim positive CD20, ZAP-70 negative and CD38 negative.  His CT scan of the chest, abdomen and pelvis disclosed no peripheral adenopathy or hepatosplenomeg  a  ly.  His level of immunoglobulins was normal.  With this in mind we thought that the patient had very early stage disease with excellent prognostic features and we advised him to remain on observation.  He will be rechecked every 3 months for the first ye  ar and thereafter every 6 months depending on the clinical circumstances.  Appropriate booklet information was given to him and his wife to review and further learn.          Given the excellent characteristics of his disease on clinical grounds, I doubt at this time a bone marrow is needed.        On 1/8/13 his physical exam is unremarkable with no peripheral adenopathy or  hepatosplenomegaly, no B symptoms, no infections, no autoimmunity.  On the other hand he had areas of the skin on the left cheek and right cheek bhavani  t look like actinic keratosis.  His white count was up to17,000 with a normal hemoglobin and borderline platelet count of 125,000.  We asked the patient to try to minimize stressors in life and we asked him to return back in four months.  We also asked priscila edwards to be seen by dermatology in order to treat his multiple actinic keratosis.          On 05/09/13 the patient was asymptomatic with regard to his chronic lymphoid leukemia, no fatigue, fevers, chills, repeated infections or peripheral lymphadenopathy.  His physi  francisco j exam was unremarkable with no palpable lymphadenopathy or hepatosplenomegaly.  His white count has now improved to 12,900, stable hemoglobin of 13.6 and stable platelet count of 121,000.  With this in mind we advised him to remain on observation, retu  rning for reevaluation in six months.         On 05/07/2014 the patient was asymptomatic in regard to his CLL with no autoimmunity, no infections and no progressive disease clinically.  On the other hand we have seen duplication of his white count in one year.  T  he patient and wife are aware that eventually he will require intervention for this if the white count continues changing the way it is.  At this time we do not justify the need of any other therapy or any other testing.        Current Outpatient Medications on File Prior to Visit   Medication Sig Dispense Refill    acetaminophen (TYLENOL) 500 MG tablet Take 2 tablets by mouth Every 6 (Six) Hours As Needed for Mild Pain.      acyclovir (ZOVIRAX) 400 MG tablet TAKE 1 TABLET BY MOUTH TWICE A  tablet 1    allopurinol (ZYLOPRIM) 300 MG tablet TAKE 1/2 TABLET BY MOUTH DAILY 15 tablet 1    aspirin 81 MG EC tablet Take 1 tablet by mouth Every Other Day.      carvedilol (COREG) 6.25 MG tablet TAKE 1 TABLET TWICE DAILY  WITH MEALS 180 tablet 1     cetirizine (zyrTEC) 10 MG tablet Take 1 tablet by mouth Daily.      fluticasone (FLONASE) 50 MCG/ACT nasal spray 2 sprays into the nostril(s) as directed by provider Daily. (Patient taking differently: 2 sprays into the nostril(s) as directed by provider Daily. Rarely used) 1 bottle 0    lisinopril (PRINIVIL,ZESTRIL) 5 MG tablet TAKE 1 TABLET DAILY 90 tablet 3    Multiple Vitamins-Minerals (EYE VITAMINS & MINERALS PO) Take 1 tablet by mouth Daily.      ondansetron ODT (ZOFRAN-ODT) 8 MG disintegrating tablet Place 1 tablet on the tongue Every 8 (Eight) Hours As Needed for Nausea or Vomiting. 30 tablet 5    rosuvastatin (CRESTOR) 20 MG tablet Take 1 tablet by mouth Every Night.      sulfamethoxazole-trimethoprim (BACTRIM DS,SEPTRA DS) 800-160 MG per tablet TAKE 1 TABLET BY MOUTH THREE TIMES A WEEK 12 tablet 7    Zanubrutinib (Brukinsa) 80 MG chemo capsule Take 2 capsules by mouth 2 (Two) Times a Day. 120 capsule 5     No current facility-administered medications on file prior to visit.        ALLERGIES:    Allergies   Allergen Reactions    Codeine Nausea Only and Nausea And Vomiting        Social History     Socioeconomic History    Marital status:      Spouse name: Charlotte Espino   Tobacco Use    Smoking status: Never    Smokeless tobacco: Never   Vaping Use    Vaping Use: Never used   Substance and Sexual Activity    Alcohol use: Yes     Alcohol/week: 21.0 standard drinks of alcohol     Types: 21 Shots of liquor per week    Drug use: No    Sexual activity: Defer        Family History   Problem Relation Age of Onset    Heart attack Mother     Heart disease Mother     Diabetes Mother     Aortic aneurysm Mother     Coronary artery disease Mother     Early death Mother     No Known Problems Father     Diabetes type II Other     Diabetes Brother     Hyperlipidemia Brother     Stroke Sister 65    Diabetes Sister     Hyperlipidemia Sister     Hypertension Sister     No Known Problems Brother     Stomach cancer  "Paternal Uncle     Malig Hyperthermia Neg Hx         Objective     Vitals:    02/06/24 0946   BP: 127/81   Pulse: 72   Resp: 18   Temp: 97.7 °F (36.5 °C)   TempSrc: Temporal   SpO2: 97%   Weight: 87.5 kg (193 lb)   Height: 188 cm (74.02\")   PainSc: 0-No pain           2/6/2024     9:50 AM   Current Status   ECOG score 0     EXAM               GENERAL:  Well-developed, Patient  in no acute distress.   SKIN:  Warm, dry ,NO purpura ,no rash.  HEENT:  Pupils were equal and reactive to light and accomodation, conjunctivae noninjected,  normal visual acuity.   NECK:  Supple with good range of motion; no thyromegaly , no JVD or bruits,.No carotid artery pain, no carotid abnormal pulsation   LYMPHATICS:  No cervical, NO supraclavicular, NO axillary, NO inguinal adenopathies.  CARDIAC   normal rate , regular rhythm, without murmur,NO rubs NO S3 NO S4   LUNGS: normal breath sounds bilateral, no wheezing, NO rhonchi, NO crackles ,NO rubs.  VASCULAR VENOUS: no cyanosis, NO collateral circulation, NO varicosities, NO edema, NO palpable cords, NO pain,NO erythema, NO pigmentation of the skin.  ABDOMEN:  Soft, NO pain,no hepatomegaly, no splenomegaly,no masses, no ascites, no collateral circulation,no distention.  EXTREMITIES  AND SPINE:  No clubbing, no cyanosis ,no deformities , no pain .No kyphosis,  no pain in spine, no pain in ribs , no pain in pelvic bone.  NEUROLOGICAL:  Patient was awake, alert, oriented to time, person and place.      RESULTS REVIEWED:  Results from last 7 days   Lab Units 02/06/24  0939   WBC 10*3/mm3 9.65   NEUTROS ABS 10*3/mm3 3.21   HEMOGLOBIN g/dL 14.4   HEMATOCRIT % 42.1   PLATELETS 10*3/mm3 105*               Assessment & Plan      1. CLL  CLL dating back to initial diagnosis in 2005.  He has remained on a course of observation without any need for active treatment.    12/22/2010 peripheral blood flow cytometry documented a typical pattern by flow cytometry of CLL, CD5, CD19 negative, dim positive " CD20, ZAP-70 negative and CD38 negative.  His CT scan of the chest, abdomen and pelvis disclosed no peripheral adenopathy or hepatosplenomegaly.  His level of immunoglobulins was normal.  With this in mind we thought that the patient had very early stage disease with excellent prognostic features and we advised him to remain on observation  1/24/2023, leukocytosis worsened, though the patient was recently treated with prednisone for eustachian tube dysfunction.  WBC sylvain to 110,000, though then improved to 88,000.  He had no peripheral adenopathy or hepatosplenomegaly.  1/24/2023, creatinine elevated at 1.4 with urinalysis showing 300 mg of protein.  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  Absolute lymphocyte count 70.87 which is improvement compared to 113.15 2 weeks ago.  4/18/2023 WBC 60,000.  5/16/2023 WBC 41.28, hgb 13.4, platelet count 104,000.  6/8/2023 patient advised to discontinue allopurinol and continue with Brukinsa at the same dose.  When WBC normalizes plans to do a new urinary collection of 24 hours to remeasure monoclonal protein studies and Bence-Dawson proteinuria.    7/20/2023 WBC 19.14, hemoglobin 13.5, platelet count 97,000.  9/8/2023 WBC 12.45, hemoglobin 13.9, platelet count 100,000.  Patient will be sent with a 24-hour urine jug for UPEP, ELISA, FLC.  On 10/20/2023, the patient has had dramatic improvement in his leukocytosis to the point of almost normalization.  He still has some peripheral lymphocytosis but I expect that it  will continue to improve as time goes by.  Most importantly, the triggering factor for the treatment of his disease with Brukinsa was the rise in the creatinine to 1.67, found to have almost 500 mg of proteinuria and Bence-Dawson proteinuria.  Urine protein electrophoresis disclosed normalization of protein secretion in the urine and resolution of the Bence-Dawson proteinuria.  This proves the point that leukemia was the cause of the problem, now that the treatment has subsided.  Kidney function has recovered and almost normalized.  A creatinine of 1.2 for a 68-year-old male is almost normal.    Given this fact, I advised the patient to remain on Brukinsa at the same dose.  Given the fact that he has not had any cardiac irregularity, abnormal bleeding, or any other new problem, I advised him to remain on the medicine and return to see us back every couple of months with repeated CBC, CMP, and urinalysis, to continue monitoring for proteinuria.      He will have serum protein and urine electrophoresis at that time.  No need for radiological assessment at this time.         On 12/14/2023, the patient is doing fine from the point of view of his CLL with no fevers, no chills, no sweats, no pruritis, no adenopathy, no infections, no autoimmunity. His white count remains stable at 13,000. The hemoglobin keeps improving, normalized now and the platelet count finally is improving. His absolute lymphocyte count remains elevated similar to what it was previously. On the other hand he has had a dramatic improvement of these numbers over the last several months if we account for the difference. In absence of any side effects of the medicine, I advised him to take the brukinsa at the same dose and return to see us back in 6 weeks with repeat CBC and CMP. A urinalysis will be repeated then. Today, his urinalysis shows minimal proteinuria with minimal ketonuria. He is not fasting. Encouraged him to have proper nutrition all the time  and proper hydration. His specific gravity is 1.020 telling me that he is probably a little bit on the dry side.    On 02/06/2024 the patient was further reviewed. He has not encountered any side effects of Brukinsa, no cardiac irregularity, no abnormal bleeding, no nausea, no vomiting, no diarrhea, no rashes. He has not had any fever, chills, night sweats, pruritus, adenopathy, autoimmunity or infections associated with his CLL. Furthermore his total lymphocyte count now is in the 5000 category, the hemoglobin is normal, the platelet count 109,000. He also again has no peripheral adenopathy or hepatosplenomegaly on his clinical examination. He has an ECOG performance status of 0. Given these findings we advised the patient to continue his Brukinsa at the same dose and return to see us back in a couple of months. I think he is doing so well that I think he does not need to come so often anymore. In 2 months he will have repeat CBC, CMP and a urinalysis. We know that his Bence-Dawson proteinuria has disappeared in a urine collection that was done a time ago.        2. Prophylaxis  Prophylactic acyclovir 500 mg daily until white count is normal  Acyclovir 500 mg twice daily and Bactrim DS Monday, Wednesday, Friday 6/8/2023 allopurinol was discontinued however patient continues on prophylactic acyclovir and Bactrim DS.  On 10/20/2023, his prophylactic medications will remain the same.   On 12/14/2023, prophylactic medication remains ongoing. He has not had any infection.    On 02/06/2024 prophylactic medication will remain the same. He has not developed any fever blisters, shingles or any other infection of any nature.          The patient is taking a high risk medication that requires frequent assessment for toxicity.            Russell Cagle MD  02/06/24

## 2024-02-07 DIAGNOSIS — C91.10 CHRONIC LYMPHOCYTIC LEUKEMIA: ICD-10-CM

## 2024-02-07 RX ORDER — ACYCLOVIR 400 MG/1
400 TABLET ORAL 2 TIMES DAILY
Qty: 180 TABLET | Refills: 1 | Status: SHIPPED | OUTPATIENT
Start: 2024-02-07

## 2024-02-07 NOTE — TELEPHONE ENCOUNTER
Caller: Evangelina Espino    Relationship: Emergency Contact    Best call back number: [phone]    Requested Prescriptions:   Requested Prescriptions     Pending Prescriptions Disp Refills    acyclovir (ZOVIRAX) 400 MG tablet 180 tablet 1     Sig: Take 1 tablet by mouth 2 (Two) Times a Day.        Pharmacy where request should be sent: [id number] - [place], [place] - [facility] [facility] [phone] [facility] [phone]      Last office visit with prescribing clinician: 2/6/2024   Last telemedicine visit with prescribing clinician: Visit date not found   Next office visit with prescribing clinician: 4/9/2024     Additional details provided by patient: HE HAS 2 WEEKS LEFT.    Does the patient have less than a 3 day supply:  [] Yes  [x] No    Would you like a call back once the refill request has been completed: [] Yes [x] No    If the office needs to give you a call back, can they leave a voicemail: [] Yes [x] No    Milana Randall Rep   02/07/24 11:20 EST

## 2024-02-20 NOTE — TELEPHONE ENCOUNTER
"HUB READ TO PATIENT:    Ok for HUB to read     Tried to call pt. No answer and was unable to leave results on VM according to Verbal Release Form. Told pt to call back for results.     If patient calls back please inform of results.  \". Renal function a little better on repeat labs but still needs u/s and f/u with nephrology\"  " Ipledge Number (Optional): [id number] Detail Level: Zone Anticipated Starting Dosage (Optional): 40mg Daily

## 2024-02-26 ENCOUNTER — TELEPHONE (OUTPATIENT)
Dept: ONCOLOGY | Facility: CLINIC | Age: 69
End: 2024-02-26
Payer: COMMERCIAL

## 2024-02-26 NOTE — TELEPHONE ENCOUNTER
Called the wife to let her know that the patient would take his medications as prescribed today and she v/u.

## 2024-02-26 NOTE — TELEPHONE ENCOUNTER
Caller: Evangelina Espino    Relationship: Emergency Contact    Best call back number: [phone]    What is the best time to reach you: ASAP THIS MORNING    Who are you requesting to speak with (clinical staff, provider,  specific staff member): CLINICAL    What was the call regarding: PLEASE ADVISE PATIENT IF HE NEEDS TO TAKE BOTH HIS ACYCLOVIR AND SULFAMETHOXAZOLE TODAY AS NORMAL, HE IS HAVING TRIPLE BYPASS SURGERY TOMORROW AND NEEDS TO KNOW IF HE SHOULD TAKE THESE TODAY AS SCHEDULED.  HE TAKES ONE OF THEM TWICE A DAY SO NEEDS TO KNOW SOON ON THIS.

## 2024-02-27 ENCOUNTER — TELEPHONE (OUTPATIENT)
Dept: ONCOLOGY | Facility: CLINIC | Age: 69
End: 2024-02-27
Payer: COMMERCIAL

## 2024-02-27 ENCOUNTER — SPECIALTY PHARMACY (OUTPATIENT)
Dept: PHARMACY | Facility: HOSPITAL | Age: 69
End: 2024-02-27
Payer: COMMERCIAL

## 2024-02-27 ENCOUNTER — PREP FOR SURGERY (OUTPATIENT)
Dept: OTHER | Facility: HOSPITAL | Age: 69
End: 2024-02-27
Payer: COMMERCIAL

## 2024-02-27 ENCOUNTER — OFFICE VISIT (OUTPATIENT)
Dept: CARDIAC SURGERY | Facility: CLINIC | Age: 69
End: 2024-02-27
Payer: COMMERCIAL

## 2024-02-27 VITALS
TEMPERATURE: 97.7 F | RESPIRATION RATE: 18 BRPM | HEART RATE: 76 BPM | BODY MASS INDEX: 24.77 KG/M2 | DIASTOLIC BLOOD PRESSURE: 86 MMHG | HEIGHT: 74 IN | SYSTOLIC BLOOD PRESSURE: 131 MMHG | OXYGEN SATURATION: 97 % | WEIGHT: 193 LBS

## 2024-02-27 DIAGNOSIS — I21.11 ST ELEVATION (STEMI) MYOCARDIAL INFARCTION INVOLVING RIGHT CORONARY ARTERY: ICD-10-CM

## 2024-02-27 DIAGNOSIS — I25.118 CORONARY ARTERY DISEASE OF NATIVE HEART WITH STABLE ANGINA PECTORIS, UNSPECIFIED VESSEL OR LESION TYPE: Primary | ICD-10-CM

## 2024-02-27 DIAGNOSIS — I25.119 CORONARY ARTERY DISEASE INVOLVING NATIVE CORONARY ARTERY OF NATIVE HEART WITH ANGINA PECTORIS: Primary | ICD-10-CM

## 2024-02-27 DIAGNOSIS — Z95.820 S/P ANGIOPLASTY WITH STENT: ICD-10-CM

## 2024-02-27 PROCEDURE — 99203 OFFICE O/P NEW LOW 30 MIN: CPT | Performed by: THORACIC SURGERY (CARDIOTHORACIC VASCULAR SURGERY)

## 2024-02-27 RX ORDER — METOPROLOL SUCCINATE 25 MG/1
25 TABLET, EXTENDED RELEASE ORAL DAILY
Status: ON HOLD | COMMUNITY
Start: 2024-02-23

## 2024-02-27 RX ORDER — NITROGLYCERIN 0.4 MG/1
1 TABLET SUBLINGUAL
Status: ON HOLD | COMMUNITY
Start: 2024-02-23

## 2024-02-27 RX ORDER — CLOPIDOGREL BISULFATE 75 MG/1
75 TABLET ORAL DAILY
Status: ON HOLD | COMMUNITY

## 2024-02-27 NOTE — PROGRESS NOTES
Staff message rec from Dr Cagle-Pt is to immediately stop his Brukinsa due to having surgery. Pt will resume 2 weeks after surgery.    Russell Cagle MD sent to Deana Samuels RN; Marta Jansen, Pharmacy Technician; Jr Jean Marie Mott MD  Call him now stop his leukemia med now if he has surgery in days, he can resume in 2 weeks after the surgery, this is done to minimize potential for bleeding     Pt is due for a refill call-this was moved out 2 weeks.    Marta Jansen, Pharmacy Technician  Specialty Pharmacy Technician

## 2024-02-27 NOTE — H&P (VIEW-ONLY)
2/27/2024      Subjective:      Eveline Pelaez MD    Chief Complaint: Chest pain.  Recent non-STEMI.    History of Present Illness:       Dear Eveline Lehman MD and Colleagues,  It was nice to see Francisco Espino in consultation at your request. He is a 68 y.o. male with a history of coronary artery disease status post stent to the right coronary artery LAD and circumflex who has developed unstable angina and non-STEMI with balloon opening of the RCA stent on February 22. He denies chest pain now and no heart failure. The ECHO that I reviewed personally shows normal ventricular function and normal valve function.. The Cardiac Cath that I reviewed personally shows a stent in the RCA with residual 80% lesion.  There is ostial left main disease about 60% and 60% LAD.  I agree operation is advisable in this situation.  He had been on Brilinta in the past and just started Plavix on the 22nd.  Will need to work around this for his surgery..    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    Varicose veins    Alcohol abuse    Coronary artery disease involving native coronary artery of native heart with angina pectoris    Dyslipidemia    Myocardial infarction    S/P angioplasty with stent    ST elevation (STEMI) myocardial infarction involving right coronary artery    Elevated liver function tests    Pneumonia    Positive colorectal cancer screening using Cologuard test    Kidney disease    High risk medication use    Bence Dawson proteinuria       Past Medical History:   Diagnosis Date    Abnormal liver function test     Alcohol abuse     CLL (chronic lymphocytic leukemia)     Coronary artery disease     stent    Heart disease     History of pneumonia     1/2019    Hyperlipidemia     Hypertension     Inguinal hernia     left side to have surgery 3/28/19     Myocardial infarction     Screen for colon cancer 09/2016    Negative Cologaurd    Screening PSA (prostate specific antigen) 02/2013    .5    Thrombocytopenia        Past Surgical History:   Procedure Laterality Date    CARDIAC CATHETERIZATION  2017    CATARACT EXTRACTION Bilateral     COLONOSCOPY N/A 06/05/2006    Normal, repeat in 10 years, Dr. Preethi Gonzalez    COLONOSCOPY N/A 7/15/2020    Procedure: COLONOSCOPY TO CECUM & T.I. WITH COLD SNARE POLYPECTOMIES, CLIP PLACEMENT X 2;  Surgeon: Yennifer Salazar MD;  Location: [facility] ENDOSCOPY;  Service: Gastroenterology;  Laterality: N/A;  PRE- POSITIVE COLOGUARD  POST- COLON POLYPS, DIVERTICULOSIS, HEMORRHOIDS    CORONARY ANGIOPLASTY WITH STENT PLACEMENT N/A 05/09/2017    Left heart catheterization, Selective native vessel coronary arteriography, Left ventriculography, Successful percutaneous intervention to the 100% occluded right coronary artery with a 3.5 x 38 mm Synergy drug-eluting stent (post-dilated w/ a 4.0 x 20 mm NC Emerge balloon), Selective right femoral angiography, Successful Perclose arteriotomy closure. Dr. James Pierson    INGUINAL HERNIA REPAIR Left     INGUINAL HERNIA REPAIR Right     x2    INGUINAL HERNIA REPAIR Left 3/28/2019    Procedure: LEFT INGUINAL HERNIA REPAIR LAPAROSCOPIC;  Surgeon: Preethi Gonzalez MD;  [place] [facility] [place] [facility] [place]  Service: General    LAPAROSCOPIC INGUINAL HERNIA REPAIR Right 10/03/2002    w/ extra peritoneal Goe-Benton mesh (transperitoneal repair), Dr. Preethi Gonzalez    REFRACTIVE SURGERY Bilateral     RETINAL DETACHMENT SURGERY Right 07/02/2013       Allergies   Allergen Reactions    Codeine Nausea Only and Nausea And Vomiting         Current Outpatient Medications:     acetaminophen (TYLENOL) 500 MG tablet, Take 2 tablets by mouth Every 6 (Six) Hours As Needed for Mild Pain., Disp: , Rfl:     acyclovir (ZOVIRAX) 400 MG tablet, Take 1 tablet by mouth 2 (Two) Times a Day., Disp: 180 tablet, Rfl: 1    aspirin 81 MG EC  tablet, Take 1 tablet by mouth Every Other Day., Disp: , Rfl:     cetirizine (zyrTEC) 10 MG tablet, Take 1 tablet by mouth Daily., Disp: , Rfl:     clopidogrel (PLAVIX) 75 MG tablet, Take 1 tablet by mouth Daily., Disp: , Rfl:     fluticasone (FLONASE) 50 MCG/ACT nasal spray, 2 sprays into the nostril(s) as directed by provider Daily. (Patient taking differently: 2 sprays into the nostril(s) as directed by provider Daily. Rarely used), Disp: 1 bottle, Rfl: 0    metoprolol succinate XL (TOPROL-XL) 25 MG 24 hr tablet, Take 1 tablet by mouth Daily., Disp: , Rfl:     nitroglycerin (NITROSTAT) 0.4 MG SL tablet, Place 1 tablet under the tongue Every 5 (Five) Minutes As Needed., Disp: , Rfl:     ondansetron ODT (ZOFRAN-ODT) 8 MG disintegrating tablet, Place 1 tablet on the tongue Every 8 (Eight) Hours As Needed for Nausea or Vomiting., Disp: 30 tablet, Rfl: 5    rosuvastatin (CRESTOR) 20 MG tablet, Take 1 tablet by mouth Every Night., Disp: , Rfl:     sulfamethoxazole-trimethoprim (BACTRIM DS,SEPTRA DS) 800-160 MG per tablet, TAKE 1 TABLET BY MOUTH THREE TIMES A WEEK, Disp: 12 tablet, Rfl: 7    Zanubrutinib (Brukinsa) 80 MG chemo capsule, Take 2 capsules by mouth 2 (Two) Times a Day., Disp: 120 capsule, Rfl: 5    Social History     Socioeconomic History    Marital status:      Spouse name: Charlotte Espino   Tobacco Use    Smoking status: Never    Smokeless tobacco: Never   Vaping Use    Vaping Use: Never used   Substance and Sexual Activity    Alcohol use: Yes     Alcohol/week: 21.0 standard drinks of alcohol     Types: 21 Shots of liquor per week    Drug use: No    Sexual activity: Defer       Family History   Problem Relation Age of Onset    Heart attack Mother     Heart disease Mother     Diabetes Mother     Aortic aneurysm Mother     Coronary artery disease Mother     Early death Mother     No Known Problems Father     Diabetes type II Other     Diabetes Brother     Hyperlipidemia Brother     Stroke Sister 65     Diabetes Sister     Hyperlipidemia Sister     Hypertension Sister     No Known Problems Brother     Stomach cancer Paternal Uncle     Malig Hyperthermia Neg Hx            Review of Systems:  Review of Systems   Constitutional:  Positive for activity change and fatigue.   HENT: Negative.     Eyes: Negative.    Respiratory: Negative.     Cardiovascular:  Positive for chest pain.   Gastrointestinal: Negative.    Endocrine: Negative.    Genitourinary: Negative.    Musculoskeletal: Negative.    Allergic/Immunologic: Negative.    Neurological: Negative.    Hematological: Negative.    Psychiatric/Behavioral: Negative.       Cardiovascular ROS: positive for - chest pain  Physical Exam:    Vital Signs:  Weight: 87.5 kg (193 lb)   Body mass index is 24.78 kg/m².  Temp: 97.7 °F (36.5 °C)   Heart Rate: 76   BP: 131/86     Constitutional:       Appearance: Healthy appearance. Not in distress.   Eyes:      Conjunctiva/sclera: Conjunctivae normal.      Pupils: Pupils are equal, round, and reactive to light.   HENT:      Nose: Nose normal.    Mouth/Throat:      Pharynx: Oropharynx is clear.   Pulmonary:      Effort: Pulmonary effort is normal.      Breath sounds: Normal breath sounds.   Chest:      Chest wall: Not tender to palpatation.   Cardiovascular:      PMI at left midclavicular line. Normal rate. Regular rhythm.      Murmurs: There is no murmur.      No gallop.  No click. No rub.      Comments: Bilateral varicosities in both calves.  Thigh veins seem okay.  Pulses:     Carotid: 4+ with bruit bilaterally.     Radial: 4+ bilaterally.     Femoral: 4+ bilaterally.     Dorsalis pedis: 4+ bilaterally.     Posterior tibial: 4+ bilaterally.  Edema:     Peripheral edema absent.   Abdominal:      General: Bowel sounds are normal.      Palpations: Abdomen is soft.   Musculoskeletal: Normal range of motion.      Cervical back: Normal range of motion and neck supple. Skin:     General: Skin is warm and dry.   Neurological:      Mental  Status: Alert and oriented to person, place and time.          Assessment:   He has coronary disease with left main stenosis.  He had a recent episode with occlusion of his RCA in the midst of the stent that was reopened but could not be restented.  His CLL is under control.  He sees hematology here at [facility].    He has left main disease and ostial LAD disease with residual disease in the RCA.          Recommendation/Plan:     He will stop his Plavix on Friday of this week and be admitted on Sunday for heparin.  I am worried that if we keep him off Plavix for too long without heparin that he could reclose the RCA.  I went over all the risks and options with the patient and his wife and they understand and wish to proceed.  All questions were answered.          Thank you for allowing me to participate in his care.    Regards,    Jean Marie Mott MD

## 2024-02-27 NOTE — PROGRESS NOTES
2/27/2024      Subjective:      Eveline Pelaez MD    Chief Complaint: Chest pain.  Recent non-STEMI.    History of Present Illness:       Dear Eveline Lehman MD and Colleagues,  It was nice to see Francisco Espino in consultation at your request. He is a 68 y.o. male with a history of coronary artery disease status post stent to the right coronary artery LAD and circumflex who has developed unstable angina and non-STEMI with balloon opening of the RCA stent on February 22. He denies chest pain now and no heart failure. The ECHO that I reviewed personally shows normal ventricular function and normal valve function.. The Cardiac Cath that I reviewed personally shows a stent in the RCA with residual 80% lesion.  There is ostial left main disease about 60% and 60% LAD.  I agree operation is advisable in this situation.  He had been on Brilinta in the past and just started Plavix on the 22nd.  Will need to work around this for his surgery..    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    Varicose veins    Alcohol abuse    Coronary artery disease involving native coronary artery of native heart with angina pectoris    Dyslipidemia    Myocardial infarction    S/P angioplasty with stent    ST elevation (STEMI) myocardial infarction involving right coronary artery    Elevated liver function tests    Pneumonia    Positive colorectal cancer screening using Cologuard test    Kidney disease    High risk medication use    Bence Dawson proteinuria       Past Medical History:   Diagnosis Date    Abnormal liver function test     Alcohol abuse     CLL (chronic lymphocytic leukemia)     Coronary artery disease     stent    Heart disease     History of pneumonia     1/2019    Hyperlipidemia     Hypertension     Inguinal hernia     left side to have surgery 3/28/19     Myocardial infarction     Screen for colon cancer 09/2016    Negative Cologaurd    Screening PSA (prostate specific antigen) 02/2013    .5    Thrombocytopenia        Past Surgical History:   Procedure Laterality Date    CARDIAC CATHETERIZATION  2017    CATARACT EXTRACTION Bilateral     COLONOSCOPY N/A 06/05/2006    Normal, repeat in 10 years, Dr. Preethi Gonzalez    COLONOSCOPY N/A 7/15/2020    Procedure: COLONOSCOPY TO CECUM & T.I. WITH COLD SNARE POLYPECTOMIES, CLIP PLACEMENT X 2;  Surgeon: Yennifer Salazar MD;  Location: [facility] ENDOSCOPY;  Service: Gastroenterology;  Laterality: N/A;  PRE- POSITIVE COLOGUARD  POST- COLON POLYPS, DIVERTICULOSIS, HEMORRHOIDS    CORONARY ANGIOPLASTY WITH STENT PLACEMENT N/A 05/09/2017    Left heart catheterization, Selective native vessel coronary arteriography, Left ventriculography, Successful percutaneous intervention to the 100% occluded right coronary artery with a 3.5 x 38 mm Synergy drug-eluting stent (post-dilated w/ a 4.0 x 20 mm NC Emerge balloon), Selective right femoral angiography, Successful Perclose arteriotomy closure. Dr. James Pierson    INGUINAL HERNIA REPAIR Left     INGUINAL HERNIA REPAIR Right     x2    INGUINAL HERNIA REPAIR Left 3/28/2019    Procedure: LEFT INGUINAL HERNIA REPAIR LAPAROSCOPIC;  Surgeon: Preethi Gonzalez MD;  Location: [facility] [place] [facility] [place];  Service: General    LAPAROSCOPIC INGUINAL HERNIA REPAIR Right 10/03/2002    w/ extra peritoneal Goe-Benton mesh (transperitoneal repair), Dr. Preethi Gonzalez    REFRACTIVE SURGERY Bilateral     RETINAL DETACHMENT SURGERY Right 07/02/2013       Allergies   Allergen Reactions    Codeine Nausea Only and Nausea And Vomiting         Current Outpatient Medications:     acetaminophen (TYLENOL) 500 MG tablet, Take 2 tablets by mouth Every 6 (Six) Hours As Needed for Mild Pain., Disp: , Rfl:     acyclovir (ZOVIRAX) 400 MG tablet, Take 1 tablet by mouth 2 (Two) Times a Day., Disp: 180 tablet, Rfl: 1    aspirin 81 MG EC  tablet, Take 1 tablet by mouth Every Other Day., Disp: , Rfl:     cetirizine (zyrTEC) 10 MG tablet, Take 1 tablet by mouth Daily., Disp: , Rfl:     clopidogrel (PLAVIX) 75 MG tablet, Take 1 tablet by mouth Daily., Disp: , Rfl:     fluticasone (FLONASE) 50 MCG/ACT nasal spray, 2 sprays into the nostril(s) as directed by provider Daily. (Patient taking differently: 2 sprays into the nostril(s) as directed by provider Daily. Rarely used), Disp: 1 bottle, Rfl: 0    metoprolol succinate XL (TOPROL-XL) 25 MG 24 hr tablet, Take 1 tablet by mouth Daily., Disp: , Rfl:     nitroglycerin (NITROSTAT) 0.4 MG SL tablet, Place 1 tablet under the tongue Every 5 (Five) Minutes As Needed., Disp: , Rfl:     ondansetron ODT (ZOFRAN-ODT) 8 MG disintegrating tablet, Place 1 tablet on the tongue Every 8 (Eight) Hours As Needed for Nausea or Vomiting., Disp: 30 tablet, Rfl: 5    rosuvastatin (CRESTOR) 20 MG tablet, Take 1 tablet by mouth Every Night., Disp: , Rfl:     sulfamethoxazole-trimethoprim (BACTRIM DS,SEPTRA DS) 800-160 MG per tablet, TAKE 1 TABLET BY MOUTH THREE TIMES A WEEK, Disp: 12 tablet, Rfl: 7    Zanubrutinib (Brukinsa) 80 MG chemo capsule, Take 2 capsules by mouth 2 (Two) Times a Day., Disp: 120 capsule, Rfl: 5    Social History     Socioeconomic History    Marital status:      Spouse name: Charlotte Espino   Tobacco Use    Smoking status: Never    Smokeless tobacco: Never   Vaping Use    Vaping Use: Never used   Substance and Sexual Activity    Alcohol use: Yes     Alcohol/week: 21.0 standard drinks of alcohol     Types: 21 Shots of liquor per week    Drug use: No    Sexual activity: Defer       Family History   Problem Relation Age of Onset    Heart attack Mother     Heart disease Mother     Diabetes Mother     Aortic aneurysm Mother     Coronary artery disease Mother     Early death Mother     No Known Problems Father     Diabetes type II Other     Diabetes Brother     Hyperlipidemia Brother     Stroke Sister 65     Diabetes Sister     Hyperlipidemia Sister     Hypertension Sister     No Known Problems Brother     Stomach cancer Paternal Uncle     Malig Hyperthermia Neg Hx            Review of Systems:  Review of Systems   Constitutional:  Positive for activity change and fatigue.   HENT: Negative.     Eyes: Negative.    Respiratory: Negative.     Cardiovascular:  Positive for chest pain.   Gastrointestinal: Negative.    Endocrine: Negative.    Genitourinary: Negative.    Musculoskeletal: Negative.    Allergic/Immunologic: Negative.    Neurological: Negative.    Hematological: Negative.    Psychiatric/Behavioral: Negative.       Cardiovascular ROS: positive for - chest pain  Physical Exam:    Vital Signs:  Weight: 87.5 kg (193 lb)   Body mass index is 24.78 kg/m².  Temp: 97.7 °F (36.5 °C)   Heart Rate: 76   BP: 131/86     Constitutional:       Appearance: Healthy appearance. Not in distress.   Eyes:      Conjunctiva/sclera: Conjunctivae normal.      Pupils: Pupils are equal, round, and reactive to light.   HENT:      Nose: Nose normal.    Mouth/Throat:      Pharynx: Oropharynx is clear.   Pulmonary:      Effort: Pulmonary effort is normal.      Breath sounds: Normal breath sounds.   Chest:      Chest wall: Not tender to palpatation.   Cardiovascular:      PMI at left midclavicular line. Normal rate. Regular rhythm.      Murmurs: There is no murmur.      No gallop.  No click. No rub.      Comments: Bilateral varicosities in both calves.  Thigh veins seem okay.  Pulses:     Carotid: 4+ with bruit bilaterally.     Radial: 4+ bilaterally.     Femoral: 4+ bilaterally.     Dorsalis pedis: 4+ bilaterally.     Posterior tibial: 4+ bilaterally.  Edema:     Peripheral edema absent.   Abdominal:      General: Bowel sounds are normal.      Palpations: Abdomen is soft.   Musculoskeletal: Normal range of motion.      Cervical back: Normal range of motion and neck supple. Skin:     General: Skin is warm and dry.   Neurological:      Mental  Status: Alert and oriented to person, place and time.          Assessment:   He has coronary disease with left main stenosis.  He had a recent episode with occlusion of his RCA in the midst of the stent that was reopened but could not be restented.  His CLL is under control.  He sees hematology here at [facility].    He has left main disease and ostial LAD disease with residual disease in the RCA.          Recommendation/Plan:     He will stop his Plavix on Friday of this week and be admitted on Sunday for heparin.  I am worried that if we keep him off Plavix for too long without heparin that he could reclose the RCA.  I went over all the risks and options with the patient and his wife and they understand and wish to proceed.  All questions were answered.          Thank you for allowing me to participate in his care.    Regards,    Jean Marie Mott MD

## 2024-02-27 NOTE — LETTER
February 27, 2024       No Recipients    Patient: Francisco Espino   YOB: 1955   Date of Visit: 2/27/2024     Dear Eveline Pelaez MD:       Thank you for referring Francisco Espino to me for evaluation. Below are the relevant portions of my assessment and plan of care.    If you have questions, please do not hesitate to call me. I look forward to following Francisco along with you.         Sincerely,        Jean Marie Mott MD        CC:   No Recipients    Jr Jean Marie Mott MD  02/27/24 1331  Sign when Signing Visit  2/27/2024      Subjective:      Eveline Pelaez MD    Chief Complaint: Chest pain.  Recent non-STEMI.    History of Present Illness:       Dear Dr. Pelaez, Eveline Desai MD and Colleagues,  It was nice to see Francisco Espino in consultation at your request. He is a 68 y.o. male with a history of coronary artery disease status post stent to the right coronary artery LAD and circumflex who has developed unstable angina and non-STEMI with balloon opening of the RCA stent on February 22. He denies chest pain now and no heart failure. The ECHO that I reviewed personally shows normal ventricular function and normal valve function.. The Cardiac Cath that I reviewed personally shows a stent in the RCA with residual 80% lesion.  There is ostial left main disease about 60% and 60% LAD.  I agree operation is advisable in this situation.  He had been on Brilinta in the past and just started Plavix on the 22nd.  Will need to work around this for his surgery..    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   • Varicose veins   • Alcohol abuse   • Coronary artery disease involving native coronary artery of native heart with angina pectoris   • Dyslipidemia   • Myocardial infarction   • S/P angioplasty with stent   • ST elevation (STEMI) myocardial  Intervent Radiology infarction involving right coronary artery   • Elevated liver function tests   • Pneumonia   • Positive colorectal cancer screening using Cologuard test   • Kidney disease   • High risk medication use   • Bence Dawson proteinuria       Past Medical History:   Diagnosis Date   • Abnormal liver function test    • Alcohol abuse    • CLL (chronic lymphocytic leukemia)    • Coronary artery disease     stent   • Heart disease    • History of pneumonia     1/2019   • Hyperlipidemia    • Hypertension    • Inguinal hernia     left side to have surgery 3/28/19   • Myocardial infarction    • Screen for colon cancer 09/2016    Negative Cologaurd   • Screening PSA (prostate specific antigen) 02/2013    .5   • Thrombocytopenia        Past Surgical History:   Procedure Laterality Date   • CARDIAC CATHETERIZATION  2017   • CATARACT EXTRACTION Bilateral    • COLONOSCOPY N/A 06/05/2006    Normal, repeat in 10 years, Dr. Preethi Gonzalez   • COLONOSCOPY N/A 7/15/2020    Procedure: COLONOSCOPY TO CECUM & T.I. WITH COLD SNARE POLYPECTOMIES, CLIP PLACEMENT X 2;  Surgeon: Yennifer Salazar MD;  Location: [facility] ENDOSCOPY;  Service: Gastroenterology;  Laterality: N/A;  PRE- POSITIVE COLOGUARD  POST- COLON POLYPS, DIVERTICULOSIS, HEMORRHOIDS   • CORONARY ANGIOPLASTY WITH STENT PLACEMENT N/A 05/09/2017    Left heart catheterization, Selective native vessel coronary arteriography, Left ventriculography, Successful percutaneous intervention to the 100% occluded right coronary artery with a 3.5 x 38 mm Synergy drug-eluting stent (post-dilated w/ a 4.0 x 20 mm NC Emerge balloon), Selective right femoral angiography, Successful Perclose arteriotomy closure. Dr. James Pierson   • INGUINAL HERNIA REPAIR Left    • INGUINAL HERNIA REPAIR Right     x2   • INGUINAL HERNIA REPAIR Left 3/28/2019    Procedure: LEFT INGUINAL HERNIA REPAIR LAPAROSCOPIC;  Surgeon: Preethi Gonzalez MD;  Location: [facility] OR [facility];  Service: General   • LAPAROSCOPIC INGUINAL  HERNIA REPAIR Right 10/03/2002    w/ extra peritoneal Goe-Benton mesh (transperitoneal repair), Dr. Preethi Gonzalez   • REFRACTIVE SURGERY Bilateral    • RETINAL DETACHMENT SURGERY Right 07/02/2013       Allergies   Allergen Reactions   • Codeine Nausea Only and Nausea And Vomiting         Current Outpatient Medications:   •  acetaminophen (TYLENOL) 500 MG tablet, Take 2 tablets by mouth Every 6 (Six) Hours As Needed for Mild Pain., Disp: , Rfl:   •  acyclovir (ZOVIRAX) 400 MG tablet, Take 1 tablet by mouth 2 (Two) Times a Day., Disp: 180 tablet, Rfl: 1  •  aspirin 81 MG EC tablet, Take 1 tablet by mouth Every Other Day., Disp: , Rfl:   •  cetirizine (zyrTEC) 10 MG tablet, Take 1 tablet by mouth Daily., Disp: , Rfl:   •  clopidogrel (PLAVIX) 75 MG tablet, Take 1 tablet by mouth Daily., Disp: , Rfl:   •  fluticasone (FLONASE) 50 MCG/ACT nasal spray, 2 sprays into the nostril(s) as directed by provider Daily. (Patient taking differently: 2 sprays into the nostril(s) as directed by provider Daily. Rarely used), Disp: 1 bottle, Rfl: 0  •  metoprolol succinate XL (TOPROL-XL) 25 MG 24 hr tablet, Take 1 tablet by mouth Daily., Disp: , Rfl:   •  nitroglycerin (NITROSTAT) 0.4 MG SL tablet, Place 1 tablet under the tongue Every 5 (Five) Minutes As Needed., Disp: , Rfl:   •  ondansetron ODT (ZOFRAN-ODT) 8 MG disintegrating tablet, Place 1 tablet on the tongue Every 8 (Eight) Hours As Needed for Nausea or Vomiting., Disp: 30 tablet, Rfl: 5  •  rosuvastatin (CRESTOR) 20 MG tablet, Take 1 tablet by mouth Every Night., Disp: , Rfl:   •  sulfamethoxazole-trimethoprim (BACTRIM DS,SEPTRA DS) 800-160 MG per tablet, TAKE 1 TABLET BY MOUTH THREE TIMES A WEEK, Disp: 12 tablet, Rfl: 7  •  Zanubrutinib (Brukinsa) 80 MG chemo capsule, Take 2 capsules by mouth 2 (Two) Times a Day., Disp: 120 capsule, Rfl: 5    Social History     Socioeconomic History   • Marital status:      Spouse name: Charlotte Espino   Tobacco Use   • Smoking status: Never    • Smokeless tobacco: Never   Vaping Use   • Vaping Use: Never used   Substance and Sexual Activity   • Alcohol use: Yes     Alcohol/week: 21.0 standard drinks of alcohol     Types: 21 Shots of liquor per week   • Drug use: No   • Sexual activity: Defer       Family History   Problem Relation Age of Onset   • Heart attack Mother    • Heart disease Mother    • Diabetes Mother    • Aortic aneurysm Mother    • Coronary artery disease Mother    • Early death Mother    • No Known Problems Father    • Diabetes type II Other    • Diabetes Brother    • Hyperlipidemia Brother    • Stroke Sister 65   • Diabetes Sister    • Hyperlipidemia Sister    • Hypertension Sister    • No Known Problems Brother    • Stomach cancer Paternal Uncle    • Malig Hyperthermia Neg Hx            Review of Systems:  Review of Systems   Constitutional:  Positive for activity change and fatigue.   HENT: Negative.     Eyes: Negative.    Respiratory: Negative.     Cardiovascular:  Positive for chest pain.   Gastrointestinal: Negative.    Endocrine: Negative.    Genitourinary: Negative.    Musculoskeletal: Negative.    Allergic/Immunologic: Negative.    Neurological: Negative.    Hematological: Negative.    Psychiatric/Behavioral: Negative.       Cardiovascular ROS: positive for - chest pain  Physical Exam:    Vital Signs:  Weight: 87.5 kg (193 lb)   Body mass index is 24.78 kg/m².  Temp: 97.7 °F (36.5 °C)   Heart Rate: 76   BP: 131/86     Constitutional:       Appearance: Healthy appearance. Not in distress.   Eyes:      Conjunctiva/sclera: Conjunctivae normal.      Pupils: Pupils are equal, round, and reactive to light.   HENT:      Nose: Nose normal.    Mouth/Throat:      Pharynx: Oropharynx is clear.   Pulmonary:      Effort: Pulmonary effort is normal.      Breath sounds: Normal breath sounds.   Chest:      Chest wall: Not tender to palpatation.   Cardiovascular:      PMI at left midclavicular line. Normal rate. Regular rhythm.      Murmurs:  There is no murmur.      No gallop.  No click. No rub.      Comments: Bilateral varicosities in both calves.  Thigh veins seem okay.  Pulses:     Carotid: 4+ with bruit bilaterally.     Radial: 4+ bilaterally.     Femoral: 4+ bilaterally.     Dorsalis pedis: 4+ bilaterally.     Posterior tibial: 4+ bilaterally.  Edema:     Peripheral edema absent.   Abdominal:      General: Bowel sounds are normal.      Palpations: Abdomen is soft.   Musculoskeletal: Normal range of motion.      Cervical back: Normal range of motion and neck supple. Skin:     General: Skin is warm and dry.   Neurological:      Mental Status: Alert and oriented to person, place and time.          Assessment:   He has coronary disease with left main stenosis.  He had a recent episode with occlusion of his RCA in the midst of the stent that was reopened but could not be restented.  His CLL is under control.  He sees hematology here at [facility].    He has left main disease and ostial LAD disease with residual disease in the RCA.          Recommendation/Plan:     He will stop his Plavix on Friday of this week and be admitted on Sunday for heparin.  I am worried that if we keep him off Plavix for too long without heparin that he could reclose the RCA.  I went over all the risks and options with the patient and his wife and they understand and wish to proceed.  All questions were answered.          Thank you for allowing me to participate in his care.    Regards,    Jean Marie Mott MD

## 2024-02-27 NOTE — TELEPHONE ENCOUNTER
Called patient and relayed message that he should hold his zanubrutinib until 2 weeks after surgery. Pt v/u. Deana Samuels RN

## 2024-02-27 NOTE — TELEPHONE ENCOUNTER
----- Message from Russell Cagle MD sent at 2/27/2024  2:12 PM EST -----  Call him now stop his leukemia med now if he has surgery in days, he can resume in 2 weeks after the surgery, this is done to minimize potential for bleeding

## 2024-02-28 ENCOUNTER — TELEPHONE (OUTPATIENT)
Dept: CARDIAC SURGERY | Facility: CLINIC | Age: 69
End: 2024-02-28
Payer: COMMERCIAL

## 2024-02-28 ENCOUNTER — PATIENT ROUNDING (BHMG ONLY) (OUTPATIENT)
Dept: CARDIAC SURGERY | Facility: CLINIC | Age: 69
End: 2024-02-28
Payer: COMMERCIAL

## 2024-02-28 NOTE — TELEPHONE ENCOUNTER
Spoke to Deborah to request bed for [facility] 3-3-2024. Jb will be coming from home. Auth for admission [id number]

## 2024-02-28 NOTE — TELEPHONE ENCOUNTER
Spoke to patient regarding admission to hospital on Sunday, 3-3-2024. Explained that bed board will call then if/and when bed is available. He then is to come directly to hospital for admission.

## 2024-03-03 ENCOUNTER — APPOINTMENT (OUTPATIENT)
Dept: GENERAL RADIOLOGY | Facility: HOSPITAL | Age: 69
DRG: 236 | End: 2024-03-03
Payer: COMMERCIAL

## 2024-03-03 ENCOUNTER — HOSPITAL ENCOUNTER (INPATIENT)
Facility: HOSPITAL | Age: 69
LOS: 7 days | Discharge: HOME HEALTH | DRG: 236 | End: 2024-03-10
Attending: THORACIC SURGERY (CARDIOTHORACIC VASCULAR SURGERY) | Admitting: THORACIC SURGERY (CARDIOTHORACIC VASCULAR SURGERY)
Payer: COMMERCIAL

## 2024-03-03 DIAGNOSIS — Z98.890 POSTOPERATIVE HYPOXIA: ICD-10-CM

## 2024-03-03 DIAGNOSIS — R09.02 POSTOPERATIVE HYPOXIA: ICD-10-CM

## 2024-03-03 DIAGNOSIS — J01.90 ACUTE RHINOSINUSITIS: ICD-10-CM

## 2024-03-03 DIAGNOSIS — Z95.1 S/P CABG (CORONARY ARTERY BYPASS GRAFT): Primary | ICD-10-CM

## 2024-03-03 PROBLEM — I25.10 CAD (CORONARY ARTERY DISEASE), NATIVE CORONARY ARTERY: Status: ACTIVE | Noted: 2024-03-03

## 2024-03-03 LAB
ALBUMIN SERPL-MCNC: 4.3 G/DL (ref 3.5–5.2)
ALBUMIN/GLOB SERPL: 2.3 G/DL
ALP SERPL-CCNC: 76 U/L (ref 39–117)
ALT SERPL W P-5'-P-CCNC: 16 U/L (ref 1–41)
ANION GAP SERPL CALCULATED.3IONS-SCNC: 10 MMOL/L (ref 5–15)
APTT PPP: 25.6 SECONDS (ref 22.7–35.4)
ARTERIAL PATENCY WRIST A: POSITIVE
AST SERPL-CCNC: 44 U/L (ref 1–40)
ATMOSPHERIC PRESS: 748.6 MMHG
BASE EXCESS BLDA CALC-SCNC: 1 MMOL/L (ref 0–2)
BASOPHILS # BLD AUTO: 0.03 10*3/MM3 (ref 0–0.2)
BASOPHILS NFR BLD AUTO: 0.6 % (ref 0–1.5)
BDY SITE: ABNORMAL
BILIRUB SERPL-MCNC: 0.7 MG/DL (ref 0–1.2)
BILIRUB UR QL STRIP: NEGATIVE
BUN SERPL-MCNC: 13 MG/DL (ref 8–23)
BUN/CREAT SERPL: 10.6 (ref 7–25)
CALCIUM SPEC-SCNC: 9.3 MG/DL (ref 8.6–10.5)
CHLORIDE SERPL-SCNC: 102 MMOL/L (ref 98–107)
CHOLEST SERPL-MCNC: 162 MG/DL (ref 0–200)
CLARITY UR: CLEAR
CLOSE TME COLL+ADP + EPINEP PNL BLD: 67 % (ref 86–100)
CO2 BLDA-SCNC: 25.9 MMOL/L (ref 23–27)
CO2 SERPL-SCNC: 25 MMOL/L (ref 22–29)
COLOR UR: YELLOW
CREAT SERPL-MCNC: 1.23 MG/DL (ref 0.76–1.27)
DEPRECATED RDW RBC AUTO: 47.4 FL (ref 37–54)
EGFRCR SERPLBLD CKD-EPI 2021: 63.6 ML/MIN/1.73
EOSINOPHIL # BLD AUTO: 0.02 10*3/MM3 (ref 0–0.4)
EOSINOPHIL NFR BLD AUTO: 0.4 % (ref 0.3–6.2)
ERYTHROCYTE [DISTWIDTH] IN BLOOD BY AUTOMATED COUNT: 12.7 % (ref 12.3–15.4)
GLOBULIN UR ELPH-MCNC: 1.9 GM/DL
GLUCOSE BLDC GLUCOMTR-MCNC: 137 MG/DL (ref 70–130)
GLUCOSE SERPL-MCNC: 137 MG/DL (ref 65–99)
GLUCOSE UR STRIP-MCNC: NEGATIVE MG/DL
HBA1C MFR BLD: 5.7 % (ref 4.8–5.6)
HCO3 BLDA-SCNC: 24.8 MMOL/L (ref 22–28)
HCT VFR BLD AUTO: 36.2 % (ref 37.5–51)
HDLC SERPL-MCNC: 63 MG/DL (ref 40–60)
HEMODILUTION: NO
HGB BLD-MCNC: 12.5 G/DL (ref 13–17.7)
HGB UR QL STRIP.AUTO: NEGATIVE
INR PPP: 0.96 (ref 0.9–1.1)
KETONES UR QL STRIP: NEGATIVE
LDLC SERPL CALC-MCNC: 58 MG/DL (ref 0–100)
LDLC/HDLC SERPL: 0.74 {RATIO}
LEUKOCYTE ESTERASE UR QL STRIP.AUTO: NEGATIVE
LYMPHOCYTES # BLD AUTO: 1.42 10*3/MM3 (ref 0.7–3.1)
LYMPHOCYTES NFR BLD AUTO: 30.1 % (ref 19.6–45.3)
MAGNESIUM SERPL-MCNC: 1.7 MG/DL (ref 1.6–2.4)
MCH RBC QN AUTO: 35.2 PG (ref 26.6–33)
MCHC RBC AUTO-ENTMCNC: 34.5 G/DL (ref 31.5–35.7)
MCV RBC AUTO: 102 FL (ref 79–97)
MODALITY: ABNORMAL
MONOCYTES # BLD AUTO: 0.63 10*3/MM3 (ref 0.1–0.9)
MONOCYTES NFR BLD AUTO: 13.4 % (ref 5–12)
NEUTROPHILS NFR BLD AUTO: 2.6 10*3/MM3 (ref 1.7–7)
NEUTROPHILS NFR BLD AUTO: 55.3 % (ref 42.7–76)
NITRITE UR QL STRIP: NEGATIVE
NT-PROBNP SERPL-MCNC: 228 PG/ML (ref 0–900)
PCO2 BLDA: 36 MM HG (ref 35–45)
PH BLDA: 7.45 PH UNITS (ref 7.35–7.45)
PH UR STRIP.AUTO: 6 [PH] (ref 5–8)
PLATELET # BLD AUTO: 94 10*3/MM3 (ref 140–450)
PMV BLD AUTO: 11.1 FL (ref 6–12)
PO2 BLDA: 63.9 MM HG (ref 80–100)
POTASSIUM SERPL-SCNC: 3.9 MMOL/L (ref 3.5–5.2)
PROT SERPL-MCNC: 6.2 G/DL (ref 6–8.5)
PROT UR QL STRIP: NEGATIVE
PROTHROMBIN TIME: 12.9 SECONDS (ref 11.7–14.2)
RBC # BLD AUTO: 3.55 10*6/MM3 (ref 4.14–5.8)
SAO2 % BLDCOA: 93.1 % (ref 92–98.5)
SARS-COV-2 RNA RESP QL NAA+PROBE: NOT DETECTED
SET MECH RESP RATE: 18
SODIUM SERPL-SCNC: 137 MMOL/L (ref 136–145)
SP GR UR STRIP: 1.01 (ref 1–1.03)
TRIGL SERPL-MCNC: 261 MG/DL (ref 0–150)
UROBILINOGEN UR QL STRIP: NORMAL
VLDLC SERPL-MCNC: 41 MG/DL (ref 5–40)
WBC NRBC COR # BLD AUTO: 4.71 10*3/MM3 (ref 3.4–10.8)

## 2024-03-03 PROCEDURE — 80053 COMPREHEN METABOLIC PANEL: CPT | Performed by: NURSE PRACTITIONER

## 2024-03-03 PROCEDURE — 85610 PROTHROMBIN TIME: CPT | Performed by: NURSE PRACTITIONER

## 2024-03-03 PROCEDURE — 82803 BLOOD GASES ANY COMBINATION: CPT | Performed by: NURSE PRACTITIONER

## 2024-03-03 PROCEDURE — 83880 ASSAY OF NATRIURETIC PEPTIDE: CPT | Performed by: NURSE PRACTITIONER

## 2024-03-03 PROCEDURE — 83735 ASSAY OF MAGNESIUM: CPT | Performed by: NURSE PRACTITIONER

## 2024-03-03 PROCEDURE — 83036 HEMOGLOBIN GLYCOSYLATED A1C: CPT | Performed by: NURSE PRACTITIONER

## 2024-03-03 PROCEDURE — 25010000002 HEPARIN (PORCINE) 25000-0.45 UT/250ML-% SOLUTION: Performed by: NURSE PRACTITIONER

## 2024-03-03 PROCEDURE — 36600 WITHDRAWAL OF ARTERIAL BLOOD: CPT | Performed by: NURSE PRACTITIONER

## 2024-03-03 PROCEDURE — 85025 COMPLETE CBC W/AUTO DIFF WBC: CPT | Performed by: NURSE PRACTITIONER

## 2024-03-03 PROCEDURE — 85730 THROMBOPLASTIN TIME PARTIAL: CPT | Performed by: NURSE PRACTITIONER

## 2024-03-03 PROCEDURE — 71046 X-RAY EXAM CHEST 2 VIEWS: CPT

## 2024-03-03 PROCEDURE — 82948 REAGENT STRIP/BLOOD GLUCOSE: CPT

## 2024-03-03 PROCEDURE — 85576 BLOOD PLATELET AGGREGATION: CPT | Performed by: NURSE PRACTITIONER

## 2024-03-03 PROCEDURE — 80061 LIPID PANEL: CPT | Performed by: NURSE PRACTITIONER

## 2024-03-03 PROCEDURE — 81003 URINALYSIS AUTO W/O SCOPE: CPT | Performed by: NURSE PRACTITIONER

## 2024-03-03 PROCEDURE — 87635 SARS-COV-2 COVID-19 AMP PRB: CPT | Performed by: THORACIC SURGERY (CARDIOTHORACIC VASCULAR SURGERY)

## 2024-03-03 RX ORDER — NITROGLYCERIN 0.4 MG/1
0.4 TABLET SUBLINGUAL
Status: DISCONTINUED | OUTPATIENT
Start: 2024-03-03 | End: 2024-03-03

## 2024-03-03 RX ORDER — AMOXICILLIN 250 MG
2 CAPSULE ORAL 2 TIMES DAILY PRN
Status: DISCONTINUED | OUTPATIENT
Start: 2024-03-03 | End: 2024-03-05

## 2024-03-03 RX ORDER — ONDANSETRON 4 MG/1
8 TABLET, ORALLY DISINTEGRATING ORAL EVERY 8 HOURS PRN
Status: DISCONTINUED | OUTPATIENT
Start: 2024-03-03 | End: 2024-03-05

## 2024-03-03 RX ORDER — ASPIRIN 81 MG/1
81 TABLET ORAL EVERY OTHER DAY
Status: DISCONTINUED | OUTPATIENT
Start: 2024-03-03 | End: 2024-03-05

## 2024-03-03 RX ORDER — SODIUM CHLORIDE 0.9 % (FLUSH) 0.9 %
10 SYRINGE (ML) INJECTION EVERY 12 HOURS SCHEDULED
Status: DISCONTINUED | OUTPATIENT
Start: 2024-03-03 | End: 2024-03-05

## 2024-03-03 RX ORDER — CHLORHEXIDINE GLUCONATE 500 MG/1
1 CLOTH TOPICAL EVERY 12 HOURS
Status: DISCONTINUED | OUTPATIENT
Start: 2024-03-04 | End: 2024-03-05

## 2024-03-03 RX ORDER — CETIRIZINE HYDROCHLORIDE 10 MG/1
10 TABLET ORAL DAILY
Status: DISCONTINUED | OUTPATIENT
Start: 2024-03-03 | End: 2024-03-05

## 2024-03-03 RX ORDER — BISACODYL 5 MG/1
5 TABLET, DELAYED RELEASE ORAL DAILY PRN
Status: DISCONTINUED | OUTPATIENT
Start: 2024-03-03 | End: 2024-03-05

## 2024-03-03 RX ORDER — CEFAZOLIN SODIUM 2 G/100ML
2000 INJECTION, SOLUTION INTRAVENOUS
Status: COMPLETED | OUTPATIENT
Start: 2024-03-05 | End: 2024-03-05

## 2024-03-03 RX ORDER — ALPRAZOLAM 0.5 MG/1
0.5 TABLET ORAL EVERY 8 HOURS PRN
Status: DISCONTINUED | OUTPATIENT
Start: 2024-03-03 | End: 2024-03-05

## 2024-03-03 RX ORDER — TEMAZEPAM 15 MG/1
15 CAPSULE ORAL NIGHTLY PRN
Status: DISCONTINUED | OUTPATIENT
Start: 2024-03-03 | End: 2024-03-04

## 2024-03-03 RX ORDER — BISACODYL 10 MG
10 SUPPOSITORY, RECTAL RECTAL DAILY PRN
Status: DISCONTINUED | OUTPATIENT
Start: 2024-03-03 | End: 2024-03-05

## 2024-03-03 RX ORDER — ACETAMINOPHEN 325 MG/1
650 TABLET ORAL EVERY 4 HOURS PRN
Status: DISCONTINUED | OUTPATIENT
Start: 2024-03-03 | End: 2024-03-03

## 2024-03-03 RX ORDER — NITROGLYCERIN 0.4 MG/1
0.4 TABLET SUBLINGUAL
Status: DISCONTINUED | OUTPATIENT
Start: 2024-03-03 | End: 2024-03-05

## 2024-03-03 RX ORDER — SODIUM CHLORIDE 9 MG/ML
40 INJECTION, SOLUTION INTRAVENOUS AS NEEDED
Status: DISCONTINUED | OUTPATIENT
Start: 2024-03-03 | End: 2024-03-05

## 2024-03-03 RX ORDER — ACETAMINOPHEN 500 MG
1000 TABLET ORAL EVERY 6 HOURS PRN
Status: DISCONTINUED | OUTPATIENT
Start: 2024-03-03 | End: 2024-03-05

## 2024-03-03 RX ORDER — HYDROCODONE BITARTRATE AND ACETAMINOPHEN 5; 325 MG/1; MG/1
1 TABLET ORAL EVERY 4 HOURS PRN
Status: DISCONTINUED | OUTPATIENT
Start: 2024-03-03 | End: 2024-03-05

## 2024-03-03 RX ORDER — POLYETHYLENE GLYCOL 3350 17 G/17G
17 POWDER, FOR SOLUTION ORAL DAILY PRN
Status: DISCONTINUED | OUTPATIENT
Start: 2024-03-03 | End: 2024-03-05

## 2024-03-03 RX ORDER — CHLORHEXIDINE GLUCONATE ORAL RINSE 1.2 MG/ML
15 SOLUTION DENTAL EVERY 12 HOURS SCHEDULED
Status: COMPLETED | OUTPATIENT
Start: 2024-03-03 | End: 2024-03-04

## 2024-03-03 RX ORDER — METOPROLOL SUCCINATE 25 MG/1
25 TABLET, EXTENDED RELEASE ORAL DAILY
Status: DISCONTINUED | OUTPATIENT
Start: 2024-03-03 | End: 2024-03-05

## 2024-03-03 RX ORDER — ROSUVASTATIN CALCIUM 20 MG/1
20 TABLET, COATED ORAL NIGHTLY
Status: DISCONTINUED | OUTPATIENT
Start: 2024-03-03 | End: 2024-03-05

## 2024-03-03 RX ORDER — SODIUM CHLORIDE 0.9 % (FLUSH) 0.9 %
10 SYRINGE (ML) INJECTION AS NEEDED
Status: DISCONTINUED | OUTPATIENT
Start: 2024-03-03 | End: 2024-03-05

## 2024-03-03 RX ORDER — FLUTICASONE PROPIONATE 50 MCG
2 SPRAY, SUSPENSION (ML) NASAL DAILY
Status: DISCONTINUED | OUTPATIENT
Start: 2024-03-03 | End: 2024-03-05

## 2024-03-03 RX ORDER — HEPARIN SODIUM 10000 [USP'U]/100ML
11.4 INJECTION, SOLUTION INTRAVENOUS
Status: DISCONTINUED | OUTPATIENT
Start: 2024-03-03 | End: 2024-03-05

## 2024-03-03 RX ADMIN — Medication 10 ML: at 20:36

## 2024-03-03 RX ADMIN — 0.12% CHLORHEXIDINE GLUCONATE 15 ML: 1.2 RINSE ORAL at 20:31

## 2024-03-03 RX ADMIN — HEPARIN SODIUM 11.4 UNITS/KG/HR: 10000 INJECTION, SOLUTION INTRAVENOUS at 20:31

## 2024-03-03 RX ADMIN — ROSUVASTATIN CALCIUM 20 MG: 20 TABLET, FILM COATED ORAL at 20:31

## 2024-03-03 NOTE — PLAN OF CARE
Goal Outcome Evaluation:  Plan of Care Reviewed With: patient, spouse        Progress: no change  Outcome Evaluation: VSS, AOx4 upon admission to unit from [facility]. CABG scheduled for 3/4/2024. Pre-op labs/tests currently in process. Hep gtt running at 17 units'kg per orders. No pain complaints or new issues at this time. WCTM.

## 2024-03-04 ENCOUNTER — APPOINTMENT (OUTPATIENT)
Dept: CARDIOLOGY | Facility: HOSPITAL | Age: 69
DRG: 236 | End: 2024-03-04
Payer: COMMERCIAL

## 2024-03-04 ENCOUNTER — ANESTHESIA EVENT (OUTPATIENT)
Dept: PERIOP | Facility: HOSPITAL | Age: 69
End: 2024-03-04
Payer: COMMERCIAL

## 2024-03-04 LAB
ABO GROUP BLD: NORMAL
ANION GAP SERPL CALCULATED.3IONS-SCNC: 11.4 MMOL/L (ref 5–15)
APTT PPP: 35.8 SECONDS (ref 22.7–35.4)
APTT PPP: 47.7 SECONDS (ref 22.7–35.4)
APTT PPP: 61 SECONDS (ref 22.7–35.4)
BASOPHILS # BLD AUTO: 0.02 10*3/MM3 (ref 0–0.2)
BASOPHILS NFR BLD AUTO: 0.4 % (ref 0–1.5)
BH CV XLRA MEAS - DIST GSV CALF DIST LEFT: 0.22 CM
BH CV XLRA MEAS - DIST GSV CALF DIST RIGHT: 0.23 CM
BH CV XLRA MEAS - DIST GSV THIGH DIST LEFT: 0.34 CM
BH CV XLRA MEAS - DIST GSV THIGH DIST RIGHT: 0.31 CM
BH CV XLRA MEAS - DIST LSV CALF DIST LEFT: 0.14 CM
BH CV XLRA MEAS - DIST LSV CALF DIST RIGHT: 0.13 CM
BH CV XLRA MEAS - GSV ANKLE DIST LEFT: 0.23 CM
BH CV XLRA MEAS - GSV ANKLE DIST RIGHT: 0.28 CM
BH CV XLRA MEAS - GSV KNEE DIST LEFT: 0.24 CM
BH CV XLRA MEAS - GSV KNEE DIST RIGHT: 0.33 CM
BH CV XLRA MEAS - GSV ORIGIN DIST LEFT: 0.41 CM
BH CV XLRA MEAS - GSV ORIGIN DIST RIGHT: 0.5 CM
BH CV XLRA MEAS - MID GSV CALF LEFT: 0.19 CM
BH CV XLRA MEAS - MID GSV CALF RIGHT: 0.21 CM
BH CV XLRA MEAS - MID GSV THIGH  LEFT: 0.32 CM
BH CV XLRA MEAS - MID GSV THIGH  RIGHT: 0.31 CM
BH CV XLRA MEAS - MID LSV CALF DIST LEFT: 0.16 CM
BH CV XLRA MEAS - MID LSV CALF DIST RIGHT: 0.14 CM
BH CV XLRA MEAS - PROX GSV CALF DIST LEFT: 0.19 CM
BH CV XLRA MEAS - PROX GSV CALF DIST RIGHT: 0.27 CM
BH CV XLRA MEAS - PROX GSV THIGH  LEFT: 0.29 CM
BH CV XLRA MEAS - PROX GSV THIGH  RIGHT: 0.34 CM
BH CV XLRA MEAS - PROX LSV CALF DIST LEFT: 0.16 CM
BH CV XLRA MEAS - PROX LSV CALF DIST RIGHT: 0.28 CM
BH CV XLRA MEAS LEFT DIST CCA EDV: -21.1 CM/SEC
BH CV XLRA MEAS LEFT DIST CCA PSV: -82.6 CM/SEC
BH CV XLRA MEAS LEFT DIST ICA EDV: -22.4 CM/SEC
BH CV XLRA MEAS LEFT DIST ICA PSV: -60.6 CM/SEC
BH CV XLRA MEAS LEFT ICA/CCA RATIO: 0.85
BH CV XLRA MEAS LEFT MID ICA EDV: -27 CM/SEC
BH CV XLRA MEAS LEFT MID ICA PSV: -69.8 CM/SEC
BH CV XLRA MEAS LEFT PROX CCA EDV: -21.1 CM/SEC
BH CV XLRA MEAS LEFT PROX CCA PSV: -85.1 CM/SEC
BH CV XLRA MEAS LEFT PROX ECA EDV: -12.3 CM/SEC
BH CV XLRA MEAS LEFT PROX ECA PSV: -46.9 CM/SEC
BH CV XLRA MEAS LEFT PROX ICA EDV: 21.1 CM/SEC
BH CV XLRA MEAS LEFT PROX ICA PSV: 65 CM/SEC
BH CV XLRA MEAS LEFT PROX SCLA PSV: 90.5 CM/SEC
BH CV XLRA MEAS LEFT VERTEBRAL A EDV: -9.4 CM/SEC
BH CV XLRA MEAS LEFT VERTEBRAL A PSV: -31.7 CM/SEC
BH CV XLRA MEAS RIGHT DIST CCA EDV: 13 CM/SEC
BH CV XLRA MEAS RIGHT DIST CCA PSV: 63.1 CM/SEC
BH CV XLRA MEAS RIGHT DIST ICA EDV: -29 CM/SEC
BH CV XLRA MEAS RIGHT DIST ICA PSV: -77.3 CM/SEC
BH CV XLRA MEAS RIGHT ICA/CCA RATIO: -1.23
BH CV XLRA MEAS RIGHT MID ICA EDV: -25 CM/SEC
BH CV XLRA MEAS RIGHT MID ICA PSV: -71.1 CM/SEC
BH CV XLRA MEAS RIGHT PROX CCA EDV: 16.6 CM/SEC
BH CV XLRA MEAS RIGHT PROX CCA PSV: 75.2 CM/SEC
BH CV XLRA MEAS RIGHT PROX ECA EDV: -11 CM/SEC
BH CV XLRA MEAS RIGHT PROX ECA PSV: -71.6 CM/SEC
BH CV XLRA MEAS RIGHT PROX ICA EDV: -29.4 CM/SEC
BH CV XLRA MEAS RIGHT PROX ICA PSV: -76.8 CM/SEC
BH CV XLRA MEAS RIGHT PROX SCLA PSV: 60.2 CM/SEC
BH CV XLRA MEAS RIGHT VERTEBRAL A EDV: -14.9 CM/SEC
BH CV XLRA MEAS RIGHT VERTEBRAL A PSV: -55.9 CM/SEC
BLD GP AB SCN SERPL QL: NEGATIVE
BUN SERPL-MCNC: 8 MG/DL (ref 8–23)
BUN/CREAT SERPL: 7.3 (ref 7–25)
CALCIUM SPEC-SCNC: 8.9 MG/DL (ref 8.6–10.5)
CHLORIDE SERPL-SCNC: 104 MMOL/L (ref 98–107)
CLOSE TME COLL+ADP + EPINEP PNL BLD: 87 % (ref 86–100)
CO2 SERPL-SCNC: 24.6 MMOL/L (ref 22–29)
CREAT SERPL-MCNC: 1.09 MG/DL (ref 0.76–1.27)
DEPRECATED RDW RBC AUTO: 49.5 FL (ref 37–54)
EGFRCR SERPLBLD CKD-EPI 2021: 73.5 ML/MIN/1.73
EOSINOPHIL # BLD AUTO: 0.05 10*3/MM3 (ref 0–0.4)
EOSINOPHIL NFR BLD AUTO: 1.1 % (ref 0.3–6.2)
ERYTHROCYTE [DISTWIDTH] IN BLOOD BY AUTOMATED COUNT: 12.9 % (ref 12.3–15.4)
GLUCOSE BLDC GLUCOMTR-MCNC: 107 MG/DL (ref 70–130)
GLUCOSE BLDC GLUCOMTR-MCNC: 126 MG/DL (ref 70–130)
GLUCOSE SERPL-MCNC: 113 MG/DL (ref 65–99)
HCT VFR BLD AUTO: 35.7 % (ref 37.5–51)
HGB BLD-MCNC: 12.2 G/DL (ref 13–17.7)
LYMPHOCYTES # BLD AUTO: 1.52 10*3/MM3 (ref 0.7–3.1)
LYMPHOCYTES NFR BLD AUTO: 32.5 % (ref 19.6–45.3)
MCH RBC QN AUTO: 35.9 PG (ref 26.6–33)
MCHC RBC AUTO-ENTMCNC: 34.2 G/DL (ref 31.5–35.7)
MCV RBC AUTO: 105 FL (ref 79–97)
MONOCYTES # BLD AUTO: 0.7 10*3/MM3 (ref 0.1–0.9)
MONOCYTES NFR BLD AUTO: 15 % (ref 5–12)
NEUTROPHILS NFR BLD AUTO: 2.36 10*3/MM3 (ref 1.7–7)
NEUTROPHILS NFR BLD AUTO: 50.6 % (ref 42.7–76)
PLATELET # BLD AUTO: 98 10*3/MM3 (ref 140–450)
PLATELET # BLD AUTO: 99 10*3/MM3 (ref 140–450)
PLATELETS.RETICULATED NFR BLD AUTO: 12.7 % (ref 0.9–6.5)
PMV BLD AUTO: 11 FL (ref 6–12)
POTASSIUM SERPL-SCNC: 3.9 MMOL/L (ref 3.5–5.2)
RBC # BLD AUTO: 3.4 10*6/MM3 (ref 4.14–5.8)
RH BLD: POSITIVE
SODIUM SERPL-SCNC: 140 MMOL/L (ref 136–145)
T&S EXPIRATION DATE: NORMAL
WBC NRBC COR # BLD AUTO: 4.67 10*3/MM3 (ref 3.4–10.8)

## 2024-03-04 PROCEDURE — 85055 RETICULATED PLATELET ASSAY: CPT | Performed by: INTERNAL MEDICINE

## 2024-03-04 PROCEDURE — 86850 RBC ANTIBODY SCREEN: CPT | Performed by: NURSE PRACTITIONER

## 2024-03-04 PROCEDURE — 99223 1ST HOSP IP/OBS HIGH 75: CPT | Performed by: INTERNAL MEDICINE

## 2024-03-04 PROCEDURE — 99232 SBSQ HOSP IP/OBS MODERATE 35: CPT | Performed by: NURSE PRACTITIONER

## 2024-03-04 PROCEDURE — 86901 BLOOD TYPING SEROLOGIC RH(D): CPT | Performed by: NURSE PRACTITIONER

## 2024-03-04 PROCEDURE — 93970 EXTREMITY STUDY: CPT

## 2024-03-04 PROCEDURE — 85730 THROMBOPLASTIN TIME PARTIAL: CPT | Performed by: THORACIC SURGERY (CARDIOTHORACIC VASCULAR SURGERY)

## 2024-03-04 PROCEDURE — 86923 COMPATIBILITY TEST ELECTRIC: CPT

## 2024-03-04 PROCEDURE — 82948 REAGENT STRIP/BLOOD GLUCOSE: CPT

## 2024-03-04 PROCEDURE — 86900 BLOOD TYPING SEROLOGIC ABO: CPT | Performed by: NURSE PRACTITIONER

## 2024-03-04 PROCEDURE — 93880 EXTRACRANIAL BILAT STUDY: CPT

## 2024-03-04 PROCEDURE — 80048 BASIC METABOLIC PNL TOTAL CA: CPT | Performed by: NURSE PRACTITIONER

## 2024-03-04 PROCEDURE — 85025 COMPLETE CBC W/AUTO DIFF WBC: CPT | Performed by: NURSE PRACTITIONER

## 2024-03-04 PROCEDURE — 25010000002 HEPARIN (PORCINE) 25000-0.45 UT/250ML-% SOLUTION: Performed by: NURSE PRACTITIONER

## 2024-03-04 PROCEDURE — 93010 ELECTROCARDIOGRAM REPORT: CPT | Performed by: INTERNAL MEDICINE

## 2024-03-04 PROCEDURE — 93005 ELECTROCARDIOGRAM TRACING: CPT | Performed by: THORACIC SURGERY (CARDIOTHORACIC VASCULAR SURGERY)

## 2024-03-04 PROCEDURE — 85576 BLOOD PLATELET AGGREGATION: CPT | Performed by: NURSE PRACTITIONER

## 2024-03-04 RX ORDER — FOLIC ACID 1 MG/1
1 TABLET ORAL DAILY
Status: DISCONTINUED | OUTPATIENT
Start: 2024-03-04 | End: 2024-03-05

## 2024-03-04 RX ORDER — LORAZEPAM 2 MG/ML
1 INJECTION INTRAMUSCULAR EVERY 4 HOURS PRN
Status: DISCONTINUED | OUTPATIENT
Start: 2024-03-04 | End: 2024-03-05

## 2024-03-04 RX ORDER — MULTIPLE VITAMINS W/ MINERALS TAB 9MG-400MCG
1 TAB ORAL DAILY
Status: DISCONTINUED | OUTPATIENT
Start: 2024-03-04 | End: 2024-03-05

## 2024-03-04 RX ORDER — CHLORHEXIDINE GLUCONATE ORAL RINSE 1.2 MG/ML
15 SOLUTION DENTAL EVERY 12 HOURS SCHEDULED
Status: COMPLETED | OUTPATIENT
Start: 2024-03-04 | End: 2024-03-05

## 2024-03-04 RX ADMIN — ROSUVASTATIN CALCIUM 20 MG: 20 TABLET, FILM COATED ORAL at 20:34

## 2024-03-04 RX ADMIN — Medication 10 ML: at 10:44

## 2024-03-04 RX ADMIN — ALPRAZOLAM 0.5 MG: 0.5 TABLET ORAL at 21:39

## 2024-03-04 RX ADMIN — METOPROLOL SUCCINATE 25 MG: 25 TABLET, EXTENDED RELEASE ORAL at 10:21

## 2024-03-04 RX ADMIN — 0.12% CHLORHEXIDINE GLUCONATE 15 ML: 1.2 RINSE ORAL at 20:34

## 2024-03-04 RX ADMIN — HEPARIN SODIUM 17.4 UNITS/KG/HR: 10000 INJECTION, SOLUTION INTRAVENOUS at 14:38

## 2024-03-04 RX ADMIN — 0.12% CHLORHEXIDINE GLUCONATE 15 ML: 1.2 RINSE ORAL at 10:23

## 2024-03-04 RX ADMIN — MUPIROCIN 1 APPLICATION: 20 OINTMENT TOPICAL at 20:34

## 2024-03-04 RX ADMIN — Medication 100 MG: at 10:21

## 2024-03-04 RX ADMIN — Medication 1 TABLET: at 10:24

## 2024-03-04 RX ADMIN — FOLIC ACID 1 MG: 1 TABLET ORAL at 10:19

## 2024-03-04 NOTE — PROGRESS NOTES
" LOS: 1 day   Patient Care Team:  Eveline Pelaez MD as PCP - General (Internal Medicine & Pediatrics)  James Moy MD as Consulting Physician (Interventional Cardiology)  Russell Cagle MD as Consulting Physician (Hematology and Oncology)  Eveline Pelaez MD as Referring Physician (Internal Medicine & Pediatrics)    Chief Complaint: CAD    Subjective:  Symptoms:  No shortness of breath, cough or chest pain.    Diet:  Adequate intake.  No nausea or vomiting.    Activity level: Normal.    Pain:  He reports no pain.      Vital Signs  Temp:  [97.8 °F (36.6 °C)-98.7 °F (37.1 °C)] 97.8 °F (36.6 °C)  Heart Rate:  [83-96] 83  Resp:  [16-18] 18  BP: (107-141)/(67-86) 107/67  Body mass index is 25.08 kg/m².    Intake/Output Summary (Last 24 hours) at 3/4/2024 0730  Last data filed at 3/3/2024 2045  Gross per 24 hour   Intake --   Output 250 ml   Net -250 ml     No intake/output data recorded.          03/03/24  1705 03/03/24  1751   Weight: 88.6 kg (195 lb 4.8 oz) 88.6 kg (195 lb 4.8 oz)     Objective:  General Appearance:  Comfortable and in no acute distress.    Vital signs: (most recent): Blood pressure 128/77, pulse 102, temperature 97.9 °F (36.6 °C), temperature source Oral, resp. rate 18, height 188 cm (74\"), weight 88.6 kg (195 lb 4.8 oz), SpO2 96%.  Vital signs are normal.  No fever.    Output: Producing urine.    Lungs:  Normal effort and normal respiratory rate.    Heart: Normal rate.  Regular rhythm.    Abdomen: Abdomen is soft.  Bowel sounds are normal.     Extremities: There is no dependent edema.    Pulses: Distal pulses are intact.    Neurological: Patient is alert and oriented to person, place and time.    Skin:  Warm and dry.            Results Review:        WBC WBC   Date Value Ref Range Status   03/04/2024 4.67 3.40 - 10.80 10*3/mm3 Final   03/03/2024 4.71 3.40 - 10.80 10*3/mm3 Final      HGB Hemoglobin   Date Value Ref Range Status   03/04/2024 12.2 (L) 13.0 - 17.7 g/dL " Final   03/03/2024 12.5 (L) 13.0 - 17.7 g/dL Final      HCT Hematocrit   Date Value Ref Range Status   03/04/2024 35.7 (L) 37.5 - 51.0 % Final   03/03/2024 36.2 (L) 37.5 - 51.0 % Final      Platelets Platelets   Date Value Ref Range Status   03/04/2024 99 (L) 140 - 450 10*3/mm3 Final   03/03/2024 94 (L) 140 - 450 10*3/mm3 Final        PT/INR:    Protime   Date Value Ref Range Status   03/03/2024 12.9 11.7 - 14.2 Seconds Final   /  INR   Date Value Ref Range Status   03/03/2024 0.96 0.90 - 1.10 Final       Sodium Sodium   Date Value Ref Range Status   03/03/2024 137 136 - 145 mmol/L Final      Potassium Potassium   Date Value Ref Range Status   03/03/2024 3.9 3.5 - 5.2 mmol/L Final      Chloride Chloride   Date Value Ref Range Status   03/03/2024 102 98 - 107 mmol/L Final      Bicarbonate CO2   Date Value Ref Range Status   03/03/2024 25.0 22.0 - 29.0 mmol/L Final      BUN BUN   Date Value Ref Range Status   03/03/2024 13 8 - 23 mg/dL Final      Creatinine Creatinine   Date Value Ref Range Status   03/03/2024 1.23 0.76 - 1.27 mg/dL Final      Calcium Calcium   Date Value Ref Range Status   03/03/2024 9.3 8.6 - 10.5 mg/dL Final      Magnesium Magnesium   Date Value Ref Range Status   03/03/2024 1.7 1.6 - 2.4 mg/dL Final          aspirin, 81 mg, Oral, Every Other Day  [START ON 3/5/2024] ceFAZolin, 2,000 mg, Intravenous, On Call to OR  cetirizine, 10 mg, Oral, Daily  chlorhexidine, 15 mL, Mouth/Throat, Q12H  Chlorhexidine Gluconate Cloth, 1 Application, Topical, Q12H  fluticasone, 2 spray, Nasal, Daily  metoprolol succinate XL, 25 mg, Oral, Daily  [START ON 3/5/2024] metoprolol tartrate, 12.5 mg, Oral, On Call to OR  mupirocin, 1 Application, Each Nare, Q12H  rosuvastatin, 20 mg, Oral, Nightly  sodium chloride, 10 mL, Intravenous, Q12H  sodium chloride, 10 mL, Intravenous, Q12H      heparin, 11.4 Units/kg/hr, Last Rate: 14.4 Units/kg/hr (03/04/24 0227)      Assessment & Plan  - severe multi-vessel CAD with hx of PCI in  2017; recent NSTEMI with balloon opening of stent to RCA; LD Plavix 3/1  - hypertension  - hyperlipidemia--statin therapy  - CLL--- on Brukinsa; oncology consulted  - daily ETOH use--watch for s/sx of withdrawal. CIWA protocol.     No issues overnight  Preoperative work up underway. Vein mapping and carotid duplex pending  ADP 67 yesterday, will repeat today  Plan for CABG tomorrow with Dr. Mott. Hold heparin gtt on call to OR  Questions answered with verbalize understanding    Domi Franco, WINNIE  03/04/24  07:30 EST

## 2024-03-04 NOTE — PLAN OF CARE
Goal Outcome Evaluation:      Pt taught how to use IS. Consent signed for tomorrow sx. Heparin gtt adjusted per protocol.

## 2024-03-04 NOTE — CASE MANAGEMENT/SOCIAL WORK
Discharge Planning Assessment  [facility]     Patient Name: Francisco Espino  MRN: [id number]  Today's Date: 3/4/2024    Admit Date: 3/3/2024    Plan: Home w/ [facility]    Discharge Needs Assessment       Row Name 03/04/24 1550       Living Environment    People in Home spouse    Name(s) of People in Home Tegan Espino    Current Living Arrangements home    Potentially Unsafe Housing Conditions none    In the past 12 months has the electric, gas, oil, or water company threatened to shut off services in your home? No    Primary Care Provided by self    Provides Primary Care For pet(s)  1 DOG    Family Caregiver if Needed spouse    Family Caregiver Names TEGAN    Quality of Family Relationships helpful;involved;supportive    Able to Return to Prior Arrangements yes       Resource/Environmental Concerns    Resource/Environmental Concerns none       Transportation Needs    In the past 12 months, has lack of transportation kept you from medical appointments or from getting medications? no    In the past 12 months, has lack of transportation kept you from meetings, work, or from getting things needed for daily living? No       Food Insecurity    Within the past 12 months, you worried that your food would run out before you got the money to buy more. Never true    Within the past 12 months, the food you bought just didn't last and you didn't have money to get more. Never true       Transition Planning    Patient/Family Anticipates Transition to home with family    Patient/Family Anticipated Services at Transition home health care    Transportation Anticipated family or friend will provide       Discharge Needs Assessment    Readmission Within the Last 30 Days no previous admission in last 30 days    Equipment Currently Used at Home scales;bp cuff    Concerns to be Addressed discharge planning    Anticipated Changes Related to Illness none    Equipment Needed After Discharge other (see comments)  TBD    Discharge  Facility/Level of Care Needs home with home health    Provided Post Acute Provider Quality & Resource List? Yes    Post Acute Provider Quality and Resource List Home Health    Delivered To Support Person    Support Person TEGAN / WIFE    Method of Delivery In person    Patient's Choice of Community Agency(s) [facility]                   Discharge Plan       Row Name 03/04/24 1555       Plan    Plan Home w/ [facility]    Plan Comments CCP spoke with Pt spouse and daughter Mana, at bedside.  Pt off unit for testing.  CCP role explained and discharge planning discussed.  Face sheet verified.  Spouse reports Pt is IADL's, works and drives.  Pt lives in a single-story home with a basement and three entrance stair steps.  Pt PCP is, Eveline Pelaez.  Pt pharmacy confirmed as, [facility].  Pt has not used past home health or been to a sub-acute rehab.  Pt has the following DME- BP cuff and scale.  Pt plans to return home at discharge.  Pt scheduled for heart surgery tomorrow, 3/5/2024.  Home health choices given to spouse and she chose, [facility] .  Referral sent to [facility] and pending.  CCP will continue to follow…….Lyssa VALLECILLO /CIELO.                  Continued Care and Services - Admitted Since 3/3/2024       Home Medical Care       Service Provider Request Status Selected Services Address Phone Fax Patient Preferred     [facility] Pending - Request Sent N/A [id number] [address] [phone] --       Internal Comment last updated by Lyssa Jarvis, RN 3/4/2024 0657    [facility] scheduled for Tuesday, 3/5/24                             Selected Continued Care - Episodes Includes continued care and service providers with selected services from the active episodes listed below      Oncology Episode start date: 2/13/2023   There are no active outsourced providers for this episode.                 Expected Discharge Date and Time       Expected Discharge Date Expected Discharge  Time    Mar 11, 2024            Demographic Summary       Row Name 03/04/24 1549       General Information    Admission Type inpatient    Arrived From home    Required Notices Provided Important Message from Medicare    Referral Source admission list;physician    Reason for Consult discharge planning    Preferred Language English       Contact Information    Permission Granted to Share Info With family/designee                   Functional Status       Row Name 03/04/24 1549       Functional Status    Usual Activity Tolerance good    Current Activity Tolerance good       Assessment of Health Literacy    Health Literacy Good       Functional Status, IADL    Medications independent    Meal Preparation independent    Housekeeping independent    Laundry independent    Shopping independent       Mental Status    General Appearance WDL WDL       Mental Status Summary    Recent Changes in Mental Status/Cognitive Functioning no changes       Employment/    Employment Status employed full-time                   Psychosocial    No documentation.                  Abuse/Neglect    No documentation.                  Legal    No documentation.                  Substance Abuse    No documentation.                  Patient Forms    No documentation.                     Lyssa Jarvis RN

## 2024-03-04 NOTE — CONSULTS
[facility] CBC GROUP INITIAL INPATIENT CONSULTATION NOTE    REASON FOR CONSULTATION:    CLL    HISTORY OF PRESENT ILLNESS:  Francisco Espnio is a 68 y.o. male who we are asked to see today in consultation for the above issue.    Patient with past medical history significant for hypertension, hyperlipidemia, coronary artery disease, alcohol abuse.    Patient has history of CLL, is followed in the outpatient setting by Dr. Cagle in our practice.  He was originally diagnosed in 2005 via peripheral blood flow cytometry.  He was managed initially with a course of observation.  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  Gradual escalation in WBC, gradual development of thrombocytopenia.  Patient initiated treatment in February 2023 with Zanubrutinib.  Patient with gradual response to treatment, no significant side effects.  Labs prior to current hospitalization 2/6/2024 with WBC 9.65, differential 33 segs, 60 lymphs, hemoglobin 14.4, platelet count 105,000.  He had continued on prophylactic acyclovir and Bactrim with treatment.    The patient had prior history of severe multivessel coronary disease, underwent PCI in 2017.  He experienced non-ST elevation MI with angioplasty to RCA stent on 2/22/2024 and was placed on Plavix after previously receiving Brilinta.  Subsequently discontinued Plavix on 3/1/2024 in preparation for CABG.  Patient admitted today with preparation for surgery with CABG tomorrow.  Currently on heparin drip. He was notified to discontinue Zanubrutinib on 2/27/2024 preoperatively with plans to resume 2 weeks postoperatively (last dose taken on 2/27/2024).  Patient also discontinued acyclovir and Bactrim prophylaxis at that time.    Labs today with WBC 4.67, differential 50 segs, 32 lymphs, 15 monocytes, ANC 2.36 with hemoglobin 12.2, hematocrit 35.7, , platelet count 99,000.    Patient with no current complaints, preparing for surgery in  AM.    Past Medical History:   Diagnosis Date    Abnormal liver function test     Alcohol abuse     CLL (chronic lymphocytic leukemia)     Coronary artery disease     stent    Heart disease     History of pneumonia     1/2019    Hyperlipidemia     Hypertension     Inguinal hernia     left side to have surgery 3/28/19    Myocardial infarction     Screen for colon cancer 09/2016    Negative Cologaurd    Screening PSA (prostate specific antigen) 02/2013    .5    Thrombocytopenia        Past Surgical History:   Procedure Laterality Date    CARDIAC CATHETERIZATION  2017    CATARACT EXTRACTION Bilateral     COLONOSCOPY N/A 06/05/2006    Normal, repeat in 10 years, Dr. Preethi Gonzalez    COLONOSCOPY N/A 7/15/2020    Procedure: COLONOSCOPY TO CECUM & T.I. WITH COLD SNARE POLYPECTOMIES, CLIP PLACEMENT X 2;  Surgeon: Yennifer Salazar MD;  Location: [facility] ENDOSCOPY;  Service: Gastroenterology;  Laterality: N/A;  PRE- POSITIVE COLOGUARD  POST- COLON POLYPS, DIVERTICULOSIS, HEMORRHOIDS    CORONARY ANGIOPLASTY WITH STENT PLACEMENT N/A 05/09/2017    Left heart catheterization, Selective native vessel coronary arteriography, Left ventriculography, Successful percutaneous intervention to the 100% occluded right coronary artery with a 3.5 x 38 mm Synergy drug-eluting stent (post-dilated w/ a 4.0 x 20 mm NC Emerge balloon), Selective right femoral angiography, Successful Perclose arteriotomy closure. Dr. James Peirson    INGUINAL HERNIA REPAIR Left     INGUINAL HERNIA REPAIR Right     x2    INGUINAL HERNIA REPAIR Left 3/28/2019    Procedure: LEFT INGUINAL HERNIA REPAIR LAPAROSCOPIC;  Surgeon: Preethi Gonzalez MD;  Location: [facility] OR [facility];  Service: General    LAPAROSCOPIC INGUINAL HERNIA REPAIR Right 10/03/2002    w/ extra peritoneal Goe-Benton mesh (transperitoneal repair), Dr. Preethi Gonzalez    REFRACTIVE SURGERY Bilateral     RETINAL DETACHMENT SURGERY Right 07/02/2013       SOCIAL HISTORY:   reports that he has never smoked.  He has never used smokeless tobacco. He reports current alcohol use of about 21.0 standard drinks of alcohol per week. He reports that he does not use drugs.    FAMILY HISTORY:  family history includes Aortic aneurysm in his mother; Coronary artery disease in his mother; Diabetes in his brother, mother, and sister; Diabetes type II in an other family member; Early death in his mother; Heart attack in his mother; Heart disease in his mother; Hyperlipidemia in his brother and sister; Hypertension in his sister; No Known Problems in his brother and father; Stomach cancer in his paternal uncle; Stroke (age of onset: 65) in his sister.    ALLERGIES:  Allergies   Allergen Reactions    Codeine Nausea Only and Nausea And Vomiting       MEDICATIONS:  As listed in the electronic medical record.    Review of Systems  A comprehensive review of systems was obtained with pertinent positive findings as noted in the interval history above.  All other systems negative.    Vitals:    03/03/24 1919 03/04/24 0015 03/04/24 0428 03/04/24 0745   BP: 126/86 112/69 107/67 128/77   BP Location: Right arm Right arm Right arm Right arm   Patient Position: Lying Lying Lying Lying   Pulse: 88 89 83 102   Resp: 16 18 18 18   Temp: 97.8 °F (36.6 °C) 98.6 °F (37 °C) 97.8 °F (36.6 °C) 97.9 °F (36.6 °C)   TempSrc: Oral Oral Oral Oral   SpO2: 99% 95% 97% 96%   Weight:       Height:           Physical Exam  Constitutional:       Appearance: He is well-developed.   Eyes:      Conjunctiva/sclera: Conjunctivae normal.   Neck:      Thyroid: No thyromegaly.   Cardiovascular:      Rate and Rhythm: Normal rate and regular rhythm.      Heart sounds: No murmur heard.     No friction rub. No gallop.   Pulmonary:      Effort: No respiratory distress.      Breath sounds: Normal breath sounds.   Abdominal:      General: Bowel sounds are normal. There is no distension.      Palpations: Abdomen is soft.      Tenderness: There is no abdominal tenderness.    Lymphadenopathy:      Head:      Right side of head: No submandibular adenopathy.      Cervical: No cervical adenopathy.      Upper Body:      Right upper body: No supraclavicular adenopathy.      Left upper body: No supraclavicular adenopathy.   Skin:     General: Skin is warm and dry.      Findings: No rash.   Neurological:      Mental Status: He is alert and oriented to person, place, and time.      Cranial Nerves: No cranial nerve deficit.      Motor: No abnormal muscle tone.      Deep Tendon Reflexes: Reflexes normal.   Psychiatric:         Behavior: Behavior normal.         DIAGNOSTIC DATA:    Results from last 7 days   Lab Units 03/04/24  0129 03/03/24  1745   WBC 10*3/mm3 4.67 4.71   HEMOGLOBIN g/dL 12.2* 12.5*   HEMATOCRIT % 35.7* 36.2*   PLATELETS 10*3/mm3 99* 94*      Results from last 7 days   Lab Units 03/03/24  1745   SODIUM mmol/L 137   POTASSIUM mmol/L 3.9   CHLORIDE mmol/L 102   CO2 mmol/L 25.0   BUN mg/dL 13   CREATININE mg/dL 1.23   CALCIUM mg/dL 9.3   BILIRUBIN mg/dL 0.7   ALK PHOS U/L 76   ALT (SGPT) U/L 16   AST (SGOT) U/L 44*   GLUCOSE mg/dL 137*          Assessment & Plan   ASSESSMENT:  This is a 68 y.o. male with:    *Coronary artery disease status post non-ST elevation MI with plans for CABG  The patient had prior history of severe multivessel coronary disease, underwent PCI in 2017.    He experienced non-ST elevation MI with angioplasty to RCA stent on 2/22/2024 and was placed on Plavix after previously receiving Brilinta.    Subsequently discontinued Plavix on 3/1/2024 in preparation for CABG.    Patient admitted today with preparation for surgery with CABG tomorrow.    Currently on heparin drip.    *CLL  Patient has history of CLL, is followed in the outpatient setting by Dr. Cagle in our practice.    He was originally diagnosed in 2005 via peripheral blood flow cytometry.    He was managed initially with a course of observation.    Patient was found to have associated paraprotein  with IgG kappa 0.3 g detected 2/10/2023.    Gradual escalation in WBC, gradual development of thrombocytopenia.    Patient initiated treatment in February 2023 with Zanubrutinib.    Patient with gradual response to treatment, no significant side effects.    Labs prior to current hospitalization 2/6/2024 with WBC 9.65, differential 33 segs, 60 lymphs, hemoglobin 14.4, platelet count 105,000.    He was receiving prophylactic acyclovir and Bactrim with treatment  He was notified to discontinue Zanubrutinib on 2/27/2024 due to upcoming CABG with plans to resume 2 weeks postoperatively (last dose Zanubrutinib taken on 2/27/2024).  Patient also discontinued acyclovir and Bactrim prophylaxis at that time.  We will monitor the patient's counts perioperatively in light of his CLL.  Zanubrutinib discontinued as of 2/27/2024 due to potential bleeding risks with upcoming surgery.  Plan to hold zanubrutinib and consider resuming 2 weeks postop.    *Thrombocytopenia  Patient with longstanding thrombocytopenia with platelet count in the 90-low 100,000 range  Unclear whether thrombocytopenia is related to marrow involvement from CLL, ITP related to CLL, or splenomegaly related to CLL  We will check additional labs with IPF, B12, folate  Monitor platelet count postoperatively, expect some degree of consumption with upcoming cardiopulmonary bypass.    *History of alcohol abuse  Monitor for withdrawal    *Viral prophylaxis  Acyclovir on hold    *PCP prophylaxis  Bactrim on hold      PLAN:   Patient remains off of zanubrutinib (discontinued 2/27/2024 preoperatively) for CLL due to potential bleeding risks with upcoming surgery.  Consider resuming treatment 2 weeks postoperatively  Prophylaxis with acyclovir and Bactrim on hold  Additional labs with IPF, B12, folate  Expect exacerbation of chronic thrombocytopenia with consumption from upcoming cardiopulmonary bypass  We will monitor counts perioperatively  Daily  CBC/differential    Discussed with patient and family at bedside    Christopher Nugent MD

## 2024-03-04 NOTE — DISCHARGE PLACEMENT REQUEST
"Francisco Espino (68 y.o. Male)       Date of Birth   1955    Social Security Number       Address   [address] [address]    Home Phone   [phone]    MRN   [id number]       [employer]   None    Marital Status                               Admission Date   3/3/24    Admission Type   Urgent    Admitting Provider   Jr Jean Marie Mott MD    Attending Provider   Jr Jean Marie Mott MD    Department, Room/Bed   [facility] CARDIOVASC UNIT, 2215/1       Discharge Date       Discharge Disposition       Discharge Destination                                 Attending Provider: Jr Jean Marie Mott MD    Allergies: Codeine    Isolation: None   Infection: None   Code Status: CPR    Ht: 188 cm (74\")   Wt: 88.6 kg (195 lb 4.8 oz)    Admission Cmt: None   Principal Problem: CAD (coronary artery disease), native coronary artery [I25.10]                   Active Insurance as of 3/3/2024       Primary Coverage       Payor Plan Insurance Group Employer/Plan Group    ANTHEM BLUE CROSS ANTHEM BLUE CROSS BLUE SHIELD PPO [id number]       Payor Plan Address Payor Plan Phone Number Payor Plan Fax Number Effective Dates    [address] [phone]  1/1/2020 - None Entered    [facility] [id number]         Subscriber Name Subscriber Birth Date Member ID       FRANCISCO ESPINO 1955 [id number]               Secondary Coverage       Payor Plan Insurance Group Employer/Plan Group    MEDICARE MEDICARE A ONLY        Payor Plan Address Payor Plan Phone Number Payor Plan Fax Number Effective Dates    [address] [phone]  2/1/2020 - None Entered    [facility] [id number]         Subscriber Name Subscriber Birth Date Member ID       FRANCISCO ESPINO 1955 [id number]                     Emergency Contacts        (Rel.) Home Phone Work Phone Mobile Phone    ShiraEvangelina nava (Spouse) [phone] -- [phone]                "

## 2024-03-05 ENCOUNTER — ANESTHESIA (OUTPATIENT)
Dept: PERIOP | Facility: HOSPITAL | Age: 69
End: 2024-03-05
Payer: COMMERCIAL

## 2024-03-05 ENCOUNTER — APPOINTMENT (OUTPATIENT)
Dept: GENERAL RADIOLOGY | Facility: HOSPITAL | Age: 69
DRG: 236 | End: 2024-03-05
Payer: COMMERCIAL

## 2024-03-05 LAB
ACT BLD: 131 SECONDS (ref 82–152)
ACT BLD: 174 SECONDS (ref 82–152)
ACT BLD: 374 SECONDS (ref 82–152)
ACT BLD: 396 SECONDS (ref 82–152)
ACT BLD: 423 SECONDS (ref 82–152)
ACT BLD: 471 SECONDS (ref 82–152)
ALBUMIN SERPL-MCNC: 4 G/DL (ref 3.5–5.2)
ALBUMIN SERPL-MCNC: 4.1 G/DL (ref 3.5–5.2)
ALBUMIN SERPL-MCNC: 4.7 G/DL (ref 3.5–5.2)
ANION GAP SERPL CALCULATED.3IONS-SCNC: 10.9 MMOL/L (ref 5–15)
ANION GAP SERPL CALCULATED.3IONS-SCNC: 14 MMOL/L (ref 5–15)
ANION GAP SERPL CALCULATED.3IONS-SCNC: 14.5 MMOL/L (ref 5–15)
ANION GAP SERPL CALCULATED.3IONS-SCNC: 8 MMOL/L (ref 5–15)
APTT PPP: 29.1 SECONDS (ref 22.7–35.4)
APTT PPP: 63.4 SECONDS (ref 22.7–35.4)
ARTERIAL PATENCY WRIST A: ABNORMAL
ARTERIAL PATENCY WRIST A: ABNORMAL
ATMOSPHERIC PRESS: 747 MMHG
ATMOSPHERIC PRESS: 747.4 MMHG
BASE EXCESS BLDA CALC-SCNC: -1.9 MMOL/L (ref 0–2)
BASE EXCESS BLDA CALC-SCNC: -2 MMOL/L (ref -5–5)
BASE EXCESS BLDA CALC-SCNC: -4 MMOL/L (ref -5–5)
BASE EXCESS BLDA CALC-SCNC: -4.6 MMOL/L (ref 0–2)
BASE EXCESS BLDA CALC-SCNC: 0 MMOL/L (ref -5–5)
BASE EXCESS BLDA CALC-SCNC: 3 MMOL/L (ref -5–5)
BASOPHILS # BLD AUTO: 0.02 10*3/MM3 (ref 0–0.2)
BASOPHILS # BLD AUTO: 0.02 10*3/MM3 (ref 0–0.2)
BASOPHILS NFR BLD AUTO: 0.3 % (ref 0–1.5)
BASOPHILS NFR BLD AUTO: 0.5 % (ref 0–1.5)
BDY SITE: ABNORMAL
BDY SITE: ABNORMAL
BUN SERPL-MCNC: 6 MG/DL (ref 8–23)
BUN SERPL-MCNC: 7 MG/DL (ref 8–23)
BUN SERPL-MCNC: 7 MG/DL (ref 8–23)
BUN SERPL-MCNC: 8 MG/DL (ref 8–23)
BUN/CREAT SERPL: 5 (ref 7–25)
BUN/CREAT SERPL: 5.6 (ref 7–25)
BUN/CREAT SERPL: 5.7 (ref 7–25)
BUN/CREAT SERPL: 7 (ref 7–25)
CA-I BLD-MCNC: 5 MG/DL (ref 4.6–5.4)
CA-I BLDA-SCNC: ABNORMAL MMOL/L
CA-I SERPL ISE-MCNC: 1.24 MMOL/L (ref 1.15–1.35)
CALCIUM SPEC-SCNC: 8.6 MG/DL (ref 8.6–10.5)
CALCIUM SPEC-SCNC: 8.6 MG/DL (ref 8.6–10.5)
CALCIUM SPEC-SCNC: 8.9 MG/DL (ref 8.6–10.5)
CALCIUM SPEC-SCNC: 8.9 MG/DL (ref 8.6–10.5)
CHLORIDE SERPL-SCNC: 104 MMOL/L (ref 98–107)
CHLORIDE SERPL-SCNC: 106 MMOL/L (ref 98–107)
CHLORIDE SERPL-SCNC: 106 MMOL/L (ref 98–107)
CHLORIDE SERPL-SCNC: 108 MMOL/L (ref 98–107)
CLOSE TME COLL+ADP + EPINEP PNL BLD: 82 % (ref 86–100)
CO2 BLDA-SCNC: 22.7 MMOL/L (ref 23–27)
CO2 BLDA-SCNC: 23 MMOL/L (ref 24–29)
CO2 BLDA-SCNC: 23.5 MMOL/L (ref 23–27)
CO2 BLDA-SCNC: 24 MMOL/L (ref 24–29)
CO2 BLDA-SCNC: 24 MMOL/L (ref 24–29)
CO2 BLDA-SCNC: 25 MMOL/L (ref 24–29)
CO2 BLDA-SCNC: 26 MMOL/L (ref 24–29)
CO2 BLDA-SCNC: 29 MMOL/L (ref 24–29)
CO2 SERPL-SCNC: 19.5 MMOL/L (ref 22–29)
CO2 SERPL-SCNC: 20 MMOL/L (ref 22–29)
CO2 SERPL-SCNC: 20.1 MMOL/L (ref 22–29)
CO2 SERPL-SCNC: 24 MMOL/L (ref 22–29)
CREAT SERPL-MCNC: 1.15 MG/DL (ref 0.76–1.27)
CREAT SERPL-MCNC: 1.19 MG/DL (ref 0.76–1.27)
CREAT SERPL-MCNC: 1.22 MG/DL (ref 0.76–1.27)
CREAT SERPL-MCNC: 1.24 MG/DL (ref 0.76–1.27)
DEPRECATED RDW RBC AUTO: 46.9 FL (ref 37–54)
DEPRECATED RDW RBC AUTO: 47.1 FL (ref 37–54)
DEPRECATED RDW RBC AUTO: 49.5 FL (ref 37–54)
DEVICE COMMENT: ABNORMAL
DEVICE COMMENT: ABNORMAL
EGFRCR SERPLBLD CKD-EPI 2021: 62.9 ML/MIN/1.73
EGFRCR SERPLBLD CKD-EPI 2021: 64.2 ML/MIN/1.73
EGFRCR SERPLBLD CKD-EPI 2021: 66.1 ML/MIN/1.73
EGFRCR SERPLBLD CKD-EPI 2021: 68.9 ML/MIN/1.73
EOSINOPHIL # BLD AUTO: 0.03 10*3/MM3 (ref 0–0.4)
EOSINOPHIL # BLD AUTO: 0.06 10*3/MM3 (ref 0–0.4)
EOSINOPHIL NFR BLD AUTO: 0.4 % (ref 0.3–6.2)
EOSINOPHIL NFR BLD AUTO: 1.4 % (ref 0.3–6.2)
ERYTHROCYTE [DISTWIDTH] IN BLOOD BY AUTOMATED COUNT: 12.7 % (ref 12.3–15.4)
ERYTHROCYTE [DISTWIDTH] IN BLOOD BY AUTOMATED COUNT: 12.7 % (ref 12.3–15.4)
ERYTHROCYTE [DISTWIDTH] IN BLOOD BY AUTOMATED COUNT: 13.1 % (ref 12.3–15.4)
FIBRINOGEN PPP-MCNC: 307 MG/DL (ref 219–464)
FOLATE SERPL-MCNC: 14.3 NG/ML (ref 4.78–24.2)
GLUCOSE BLDC GLUCOMTR-MCNC: 119 MG/DL (ref 70–130)
GLUCOSE BLDC GLUCOMTR-MCNC: 120 MG/DL (ref 70–130)
GLUCOSE BLDC GLUCOMTR-MCNC: 122 MG/DL (ref 70–130)
GLUCOSE BLDC GLUCOMTR-MCNC: 124 MG/DL (ref 70–130)
GLUCOSE BLDC GLUCOMTR-MCNC: 127 MG/DL (ref 70–130)
GLUCOSE BLDC GLUCOMTR-MCNC: 133 MG/DL (ref 70–130)
GLUCOSE BLDC GLUCOMTR-MCNC: 134 MG/DL (ref 70–130)
GLUCOSE BLDC GLUCOMTR-MCNC: 140 MG/DL (ref 70–130)
GLUCOSE BLDC GLUCOMTR-MCNC: 141 MG/DL (ref 70–130)
GLUCOSE BLDC GLUCOMTR-MCNC: 142 MG/DL (ref 70–130)
GLUCOSE BLDC GLUCOMTR-MCNC: 157 MG/DL (ref 70–130)
GLUCOSE BLDC GLUCOMTR-MCNC: 160 MG/DL (ref 70–130)
GLUCOSE BLDC GLUCOMTR-MCNC: 161 MG/DL (ref 70–130)
GLUCOSE SERPL-MCNC: 114 MG/DL (ref 65–99)
GLUCOSE SERPL-MCNC: 118 MG/DL (ref 65–99)
GLUCOSE SERPL-MCNC: 126 MG/DL (ref 65–99)
GLUCOSE SERPL-MCNC: 132 MG/DL (ref 65–99)
HCO3 BLDA-SCNC: 21.4 MMOL/L (ref 22–28)
HCO3 BLDA-SCNC: 21.7 MMOL/L (ref 22–26)
HCO3 BLDA-SCNC: 22.4 MMOL/L (ref 22–28)
HCO3 BLDA-SCNC: 23 MMOL/L (ref 22–26)
HCO3 BLDA-SCNC: 23 MMOL/L (ref 22–26)
HCO3 BLDA-SCNC: 23.5 MMOL/L (ref 22–26)
HCO3 BLDA-SCNC: 24.9 MMOL/L (ref 22–26)
HCO3 BLDA-SCNC: 27.8 MMOL/L (ref 22–26)
HCT VFR BLD AUTO: 26.6 % (ref 37.5–51)
HCT VFR BLD AUTO: 29.8 % (ref 37.5–51)
HCT VFR BLD AUTO: 35 % (ref 37.5–51)
HCT VFR BLDA CALC: 26 % (ref 38–51)
HCT VFR BLDA CALC: 27 % (ref 38–51)
HCT VFR BLDA CALC: 29 % (ref 38–51)
HCT VFR BLDA CALC: 30 % (ref 38–51)
HCT VFR BLDA CALC: 33 % (ref 38–51)
HCT VFR BLDA CALC: 37 % (ref 38–51)
HEMODILUTION: NO
HEMODILUTION: NO
HGB BLD-MCNC: 10.3 G/DL (ref 13–17.7)
HGB BLD-MCNC: 12 G/DL (ref 13–17.7)
HGB BLD-MCNC: 9.1 G/DL (ref 13–17.7)
HGB BLDA-MCNC: 10.2 G/DL (ref 12–17)
HGB BLDA-MCNC: 11.2 G/DL (ref 12–17)
HGB BLDA-MCNC: 12.6 G/DL (ref 12–17)
HGB BLDA-MCNC: 8.8 G/DL (ref 12–17)
HGB BLDA-MCNC: 9.2 G/DL (ref 12–17)
HGB BLDA-MCNC: 9.9 G/DL (ref 12–17)
INHALED O2 CONCENTRATION: 100 %
INHALED O2 CONCENTRATION: 40 %
INR PPP: 1.27 (ref 0.9–1.1)
LYMPHOCYTES # BLD AUTO: 0.93 10*3/MM3 (ref 0.7–3.1)
LYMPHOCYTES # BLD AUTO: 1.24 10*3/MM3 (ref 0.7–3.1)
LYMPHOCYTES NFR BLD AUTO: 13.6 % (ref 19.6–45.3)
LYMPHOCYTES NFR BLD AUTO: 29.9 % (ref 19.6–45.3)
MAGNESIUM SERPL-MCNC: 2 MG/DL (ref 1.6–2.4)
MAGNESIUM SERPL-MCNC: 2.6 MG/DL (ref 1.6–2.4)
MAGNESIUM SERPL-MCNC: 2.9 MG/DL (ref 1.6–2.4)
MCH RBC QN AUTO: 34.6 PG (ref 26.6–33)
MCH RBC QN AUTO: 35 PG (ref 26.6–33)
MCH RBC QN AUTO: 36 PG (ref 26.6–33)
MCHC RBC AUTO-ENTMCNC: 34.2 G/DL (ref 31.5–35.7)
MCHC RBC AUTO-ENTMCNC: 34.3 G/DL (ref 31.5–35.7)
MCHC RBC AUTO-ENTMCNC: 34.6 G/DL (ref 31.5–35.7)
MCV RBC AUTO: 100.9 FL (ref 79–97)
MCV RBC AUTO: 102.3 FL (ref 79–97)
MCV RBC AUTO: 104.2 FL (ref 79–97)
MODALITY: ABNORMAL
MODALITY: ABNORMAL
MONOCYTES # BLD AUTO: 0.44 10*3/MM3 (ref 0.1–0.9)
MONOCYTES # BLD AUTO: 0.5 10*3/MM3 (ref 0.1–0.9)
MONOCYTES NFR BLD AUTO: 12 % (ref 5–12)
MONOCYTES NFR BLD AUTO: 6.4 % (ref 5–12)
NEUTROPHILS NFR BLD AUTO: 2.31 10*3/MM3 (ref 1.7–7)
NEUTROPHILS NFR BLD AUTO: 5.38 10*3/MM3 (ref 1.7–7)
NEUTROPHILS NFR BLD AUTO: 55.7 % (ref 42.7–76)
NEUTROPHILS NFR BLD AUTO: 78.6 % (ref 42.7–76)
O2 A-A PPRESDIFF RESPIRATORY: 0.2 MMHG
O2 A-A PPRESDIFF RESPIRATORY: 0.4 MMHG
PCO2 BLDA: 35.7 MM HG (ref 35–45)
PCO2 BLDA: 38.7 MM HG (ref 35–45)
PCO2 BLDA: 39.5 MM HG (ref 35–45)
PCO2 BLDA: 40.2 MM HG (ref 35–45)
PCO2 BLDA: 40.8 MM HG (ref 35–45)
PCO2 BLDA: 41 MM HG (ref 35–45)
PCO2 BLDA: 42.2 MM HG (ref 35–45)
PCO2 BLDA: 44.4 MM HG (ref 35–45)
PEEP RESPIRATORY: 8 CM[H2O]
PEEP RESPIRATORY: 8 CM[H2O]
PH BLDA: 7.31 PH UNITS (ref 7.35–7.45)
PH BLDA: 7.36 PH UNITS (ref 7.35–7.6)
PH BLDA: 7.37 PH UNITS (ref 7.35–7.6)
PH BLDA: 7.37 PH UNITS (ref 7.35–7.6)
PH BLDA: 7.39 PH UNITS (ref 7.35–7.6)
PH BLDA: 7.41 PH UNITS (ref 7.35–7.45)
PH BLDA: 7.43 PH UNITS (ref 7.35–7.6)
PH BLDA: 7.43 PH UNITS (ref 7.35–7.6)
PHOSPHATE SERPL-MCNC: 1.4 MG/DL (ref 2.5–4.5)
PHOSPHATE SERPL-MCNC: 2.7 MG/DL (ref 2.5–4.5)
PHOSPHATE SERPL-MCNC: 4 MG/DL (ref 2.5–4.5)
PLATELET # BLD AUTO: 60 10*3/MM3 (ref 140–450)
PLATELET # BLD AUTO: 86 10*3/MM3 (ref 140–450)
PLATELET # BLD AUTO: 92 10*3/MM3 (ref 140–450)
PMV BLD AUTO: 11.1 FL (ref 6–12)
PMV BLD AUTO: 11.1 FL (ref 6–12)
PMV BLD AUTO: 11.2 FL (ref 6–12)
PO2 BLDA: 106.3 MM HG (ref 80–100)
PO2 BLDA: 121.4 MM HG (ref 80–100)
PO2 BLDA: 315 MMHG (ref 80–105)
PO2 BLDA: 403 MMHG (ref 80–105)
PO2 BLDA: 405 MMHG (ref 80–105)
PO2 BLDA: 468 MMHG (ref 80–105)
PO2 BLDA: 48 MMHG (ref 80–105)
PO2 BLDA: 84 MMHG (ref 80–105)
POTASSIUM BLDA-SCNC: 3.4 MMOL/L (ref 3.5–4.9)
POTASSIUM BLDA-SCNC: 4.6 MMOL/L (ref 3.5–4.9)
POTASSIUM BLDA-SCNC: 4.7 MMOL/L (ref 3.5–4.9)
POTASSIUM BLDA-SCNC: 5.1 MMOL/L (ref 3.5–4.9)
POTASSIUM BLDA-SCNC: 5.1 MMOL/L (ref 3.5–4.9)
POTASSIUM BLDA-SCNC: 5.4 MMOL/L (ref 3.5–4.9)
POTASSIUM SERPL-SCNC: 3.7 MMOL/L (ref 3.5–5.2)
POTASSIUM SERPL-SCNC: 3.8 MMOL/L (ref 3.5–5.2)
POTASSIUM SERPL-SCNC: 4.1 MMOL/L (ref 3.5–5.2)
POTASSIUM SERPL-SCNC: 4.4 MMOL/L (ref 3.5–5.2)
PROTHROMBIN TIME: 16.1 SECONDS (ref 11.7–14.2)
PSV: 8 CMH2O
QT INTERVAL: 346 MS
QT INTERVAL: 411 MS
QTC INTERVAL: 395 MS
QTC INTERVAL: 486 MS
RBC # BLD AUTO: 2.6 10*6/MM3 (ref 4.14–5.8)
RBC # BLD AUTO: 2.86 10*6/MM3 (ref 4.14–5.8)
RBC # BLD AUTO: 3.47 10*6/MM3 (ref 4.14–5.8)
SAO2 % BLDA: 100 % (ref 95–98)
SAO2 % BLDA: 84 % (ref 95–98)
SAO2 % BLDA: 96 % (ref 95–98)
SAO2 % BLDCOA: 97.6 % (ref 92–98.5)
SAO2 % BLDCOA: 98.8 % (ref 92–98.5)
SET MECH RESP RATE: 14
SODIUM SERPL-SCNC: 137 MMOL/L (ref 136–145)
SODIUM SERPL-SCNC: 138 MMOL/L (ref 136–145)
SODIUM SERPL-SCNC: 138 MMOL/L (ref 136–145)
SODIUM SERPL-SCNC: 142 MMOL/L (ref 136–145)
TOTAL RATE: 11 BREATHS/MINUTE
TOTAL RATE: 14 BREATHS/MINUTE
VENTILATOR MODE: ABNORMAL
VENTILATOR MODE: AC
VIT B12 BLD-MCNC: 324 PG/ML (ref 211–946)
VT ON VENT VENT: 700 ML
WBC NRBC COR # BLD AUTO: 4.15 10*3/MM3 (ref 3.4–10.8)
WBC NRBC COR # BLD AUTO: 6.85 10*3/MM3 (ref 3.4–10.8)
WBC NRBC COR # BLD AUTO: 8.94 10*3/MM3 (ref 3.4–10.8)

## 2024-03-05 PROCEDURE — 25010000002 PAPAVERINE PER 60 MG: Performed by: THORACIC SURGERY (CARDIOTHORACIC VASCULAR SURGERY)

## 2024-03-05 PROCEDURE — C1751 CATH, INF, PER/CENT/MIDLINE: HCPCS | Performed by: ANESTHESIOLOGY

## 2024-03-05 PROCEDURE — 94799 UNLISTED PULMONARY SVC/PX: CPT

## 2024-03-05 PROCEDURE — 25010000002 MAGNESIUM SULFATE IN D5W 1G/100ML (PREMIX) 1-5 GM/100ML-% SOLUTION: Performed by: NURSE PRACTITIONER

## 2024-03-05 PROCEDURE — 33521 CABG ARTERY-VEIN FOUR: CPT | Performed by: THORACIC SURGERY (CARDIOTHORACIC VASCULAR SURGERY)

## 2024-03-05 PROCEDURE — 85025 COMPLETE CBC W/AUTO DIFF WBC: CPT | Performed by: NURSE PRACTITIONER

## 2024-03-05 PROCEDURE — C1713 ANCHOR/SCREW BN/BN,TIS/BN: HCPCS | Performed by: THORACIC SURGERY (CARDIOTHORACIC VASCULAR SURGERY)

## 2024-03-05 PROCEDURE — 25010000002 MIDAZOLAM PER 1 MG: Performed by: ANESTHESIOLOGY

## 2024-03-05 PROCEDURE — 25010000002 ONDANSETRON PER 1 MG: Performed by: ANESTHESIOLOGY

## 2024-03-05 PROCEDURE — 94760 N-INVAS EAR/PLS OXIMETRY 1: CPT

## 2024-03-05 PROCEDURE — 25010000002 METOCLOPRAMIDE PER 10 MG: Performed by: NURSE PRACTITIONER

## 2024-03-05 PROCEDURE — 06BP4ZZ EXCISION OF RIGHT SAPHENOUS VEIN, PERCUTANEOUS ENDOSCOPIC APPROACH: ICD-10-PCS | Performed by: THORACIC SURGERY (CARDIOTHORACIC VASCULAR SURGERY)

## 2024-03-05 PROCEDURE — 33521 CABG ARTERY-VEIN FOUR: CPT | Performed by: SPECIALIST/TECHNOLOGIST, OTHER

## 2024-03-05 PROCEDURE — 82330 ASSAY OF CALCIUM: CPT | Performed by: NURSE PRACTITIONER

## 2024-03-05 PROCEDURE — 25010000002 NICARDIPINE 2.5 MG/ML SOLUTION: Performed by: ANESTHESIOLOGY

## 2024-03-05 PROCEDURE — 25010000002 NICARDIPINE 2.5 MG/ML SOLUTION 10 ML VIAL: Performed by: ANESTHESIOLOGY

## 2024-03-05 PROCEDURE — 85347 COAGULATION TIME ACTIVATED: CPT

## 2024-03-05 PROCEDURE — 71045 X-RAY EXAM CHEST 1 VIEW: CPT

## 2024-03-05 PROCEDURE — 25010000002 PROPOFOL 10 MG/ML EMULSION: Performed by: ANESTHESIOLOGY

## 2024-03-05 PROCEDURE — 25010000002 ACETAMINOPHEN 10 MG/ML SOLUTION: Performed by: NURSE PRACTITIONER

## 2024-03-05 PROCEDURE — 33534 CABG ARTERIAL TWO: CPT | Performed by: SPECIALIST/TECHNOLOGIST, OTHER

## 2024-03-05 PROCEDURE — 85730 THROMBOPLASTIN TIME PARTIAL: CPT | Performed by: NURSE PRACTITIONER

## 2024-03-05 PROCEDURE — 25010000002 MAGNESIUM SULFATE PER 500 MG OF MAGNESIUM: Performed by: ANESTHESIOLOGY

## 2024-03-05 PROCEDURE — 02100Z9 BYPASS CORONARY ARTERY, ONE ARTERY FROM LEFT INTERNAL MAMMARY, OPEN APPROACH: ICD-10-PCS | Performed by: THORACIC SURGERY (CARDIOTHORACIC VASCULAR SURGERY)

## 2024-03-05 PROCEDURE — 25010000002 ALBUMIN HUMAN 5% PER 50 ML: Performed by: NURSE PRACTITIONER

## 2024-03-05 PROCEDURE — 85018 HEMOGLOBIN: CPT

## 2024-03-05 PROCEDURE — 82607 VITAMIN B-12: CPT | Performed by: INTERNAL MEDICINE

## 2024-03-05 PROCEDURE — A4648 IMPLANTABLE TISSUE MARKER: HCPCS | Performed by: THORACIC SURGERY (CARDIOTHORACIC VASCULAR SURGERY)

## 2024-03-05 PROCEDURE — 85014 HEMATOCRIT: CPT

## 2024-03-05 PROCEDURE — P9041 ALBUMIN (HUMAN),5%, 50ML: HCPCS | Performed by: NURSE PRACTITIONER

## 2024-03-05 PROCEDURE — 82803 BLOOD GASES ANY COMBINATION: CPT

## 2024-03-05 PROCEDURE — 85027 COMPLETE CBC AUTOMATED: CPT | Performed by: NURSE PRACTITIONER

## 2024-03-05 PROCEDURE — 93005 ELECTROCARDIOGRAM TRACING: CPT | Performed by: NURSE PRACTITIONER

## 2024-03-05 PROCEDURE — 94002 VENT MGMT INPAT INIT DAY: CPT

## 2024-03-05 PROCEDURE — 82746 ASSAY OF FOLIC ACID SERUM: CPT | Performed by: INTERNAL MEDICINE

## 2024-03-05 PROCEDURE — 25010000002 MORPHINE PER 10 MG: Performed by: NURSE PRACTITIONER

## 2024-03-05 PROCEDURE — 82948 REAGENT STRIP/BLOOD GLUCOSE: CPT

## 2024-03-05 PROCEDURE — 63710000001 INSULIN REGULAR HUMAN PER 5 UNITS: Performed by: ANESTHESIOLOGY

## 2024-03-05 PROCEDURE — 33534 CABG ARTERIAL TWO: CPT | Performed by: THORACIC SURGERY (CARDIOTHORACIC VASCULAR SURGERY)

## 2024-03-05 PROCEDURE — 25010000002 HEPARIN (PORCINE) PER 1000 UNITS: Performed by: THORACIC SURGERY (CARDIOTHORACIC VASCULAR SURGERY)

## 2024-03-05 PROCEDURE — 5A1221Z PERFORMANCE OF CARDIAC OUTPUT, CONTINUOUS: ICD-10-PCS | Performed by: THORACIC SURGERY (CARDIOTHORACIC VASCULAR SURGERY)

## 2024-03-05 PROCEDURE — 25010000002 FENTANYL CITRATE (PF) 250 MCG/5ML SOLUTION: Performed by: ANESTHESIOLOGY

## 2024-03-05 PROCEDURE — 25010000002 CEFAZOLIN IN DEXTROSE 2000 MG/ 100 ML SOLUTION: Performed by: NURSE PRACTITIONER

## 2024-03-05 PROCEDURE — 85576 BLOOD PLATELET AGGREGATION: CPT | Performed by: NURSE PRACTITIONER

## 2024-03-05 PROCEDURE — 25010000002 ONDANSETRON PER 1 MG: Performed by: NURSE PRACTITIONER

## 2024-03-05 PROCEDURE — 25010000002 NITROGLYCERIN 200 MCG/ML SOLUTION: Performed by: ANESTHESIOLOGY

## 2024-03-05 PROCEDURE — 25010000002 SUGAMMADEX 200 MG/2ML SOLUTION: Performed by: ANESTHESIOLOGY

## 2024-03-05 PROCEDURE — 25010000002 PHENYLEPHRINE 10 MG/ML SOLUTION: Performed by: ANESTHESIOLOGY

## 2024-03-05 PROCEDURE — 80048 BASIC METABOLIC PNL TOTAL CA: CPT | Performed by: NURSE PRACTITIONER

## 2024-03-05 PROCEDURE — 82947 ASSAY GLUCOSE BLOOD QUANT: CPT

## 2024-03-05 PROCEDURE — 99232 SBSQ HOSP IP/OBS MODERATE 35: CPT | Performed by: INTERNAL MEDICINE

## 2024-03-05 PROCEDURE — 33508 ENDOSCOPIC VEIN HARVEST: CPT | Performed by: THORACIC SURGERY (CARDIOTHORACIC VASCULAR SURGERY)

## 2024-03-05 PROCEDURE — 33508 ENDOSCOPIC VEIN HARVEST: CPT | Performed by: SPECIALIST/TECHNOLOGIST, OTHER

## 2024-03-05 PROCEDURE — 93010 ELECTROCARDIOGRAM REPORT: CPT | Performed by: INTERNAL MEDICINE

## 2024-03-05 PROCEDURE — 83735 ASSAY OF MAGNESIUM: CPT | Performed by: NURSE PRACTITIONER

## 2024-03-05 PROCEDURE — 94761 N-INVAS EAR/PLS OXIMETRY MLT: CPT

## 2024-03-05 PROCEDURE — 25010000002 HEPARIN (PORCINE) PER 1000 UNITS: Performed by: ANESTHESIOLOGY

## 2024-03-05 PROCEDURE — B245ZZ4 ULTRASONOGRAPHY OF LEFT HEART, TRANSESOPHAGEAL: ICD-10-PCS | Performed by: THORACIC SURGERY (CARDIOTHORACIC VASCULAR SURGERY)

## 2024-03-05 PROCEDURE — 85384 FIBRINOGEN ACTIVITY: CPT | Performed by: NURSE PRACTITIONER

## 2024-03-05 PROCEDURE — 25010000002 PROTAMINE SULFATE PER 10 MG: Performed by: ANESTHESIOLOGY

## 2024-03-05 PROCEDURE — 25810000003 SODIUM CHLORIDE 0.9 % SOLUTION 250 ML FLEX CONT: Performed by: ANESTHESIOLOGY

## 2024-03-05 PROCEDURE — 021309W BYPASS CORONARY ARTERY, FOUR OR MORE ARTERIES FROM AORTA WITH AUTOLOGOUS VENOUS TISSUE, OPEN APPROACH: ICD-10-PCS | Performed by: THORACIC SURGERY (CARDIOTHORACIC VASCULAR SURGERY)

## 2024-03-05 PROCEDURE — 80069 RENAL FUNCTION PANEL: CPT | Performed by: NURSE PRACTITIONER

## 2024-03-05 PROCEDURE — 85610 PROTHROMBIN TIME: CPT | Performed by: NURSE PRACTITIONER

## 2024-03-05 DEVICE — HEMOST ABS SURGIFOAM SZ100 8X12 10MM: Type: IMPLANTABLE DEVICE | Site: STERNUM | Status: FUNCTIONAL

## 2024-03-05 DEVICE — SS SUTURE, 4 PER SLEEVE
Type: IMPLANTABLE DEVICE | Site: STERNUM | Status: FUNCTIONAL
Brand: MYO/WIRE II

## 2024-03-05 DEVICE — SS SUTURE, 3 PER SLEEVE
Type: IMPLANTABLE DEVICE | Site: STERNUM | Status: FUNCTIONAL
Brand: MYO/WIRE II

## 2024-03-05 RX ORDER — LIDOCAINE HYDROCHLORIDE 40 MG/ML
SOLUTION TOPICAL AS NEEDED
Status: DISCONTINUED | OUTPATIENT
Start: 2024-03-05 | End: 2024-03-05 | Stop reason: SURG

## 2024-03-05 RX ORDER — ALPRAZOLAM 0.25 MG/1
0.25 TABLET ORAL EVERY 8 HOURS PRN
Status: DISCONTINUED | OUTPATIENT
Start: 2024-03-05 | End: 2024-03-10 | Stop reason: HOSPADM

## 2024-03-05 RX ORDER — HEPARIN SODIUM 5000 [USP'U]/ML
INJECTION, SOLUTION INTRAVENOUS; SUBCUTANEOUS AS NEEDED
Status: DISCONTINUED | OUTPATIENT
Start: 2024-03-05 | End: 2024-03-05 | Stop reason: HOSPADM

## 2024-03-05 RX ORDER — CEFAZOLIN SODIUM 2 G/100ML
2 INJECTION, SOLUTION INTRAVENOUS EVERY 8 HOURS
Qty: 500 ML | Refills: 0 | Status: COMPLETED | OUTPATIENT
Start: 2024-03-05 | End: 2024-03-07

## 2024-03-05 RX ORDER — NITROGLYCERIN 20 MG/100ML
5-200 INJECTION INTRAVENOUS
Status: DISCONTINUED | OUTPATIENT
Start: 2024-03-05 | End: 2024-03-06

## 2024-03-05 RX ORDER — MIDAZOLAM HYDROCHLORIDE 1 MG/ML
2 INJECTION INTRAMUSCULAR; INTRAVENOUS
Status: DISCONTINUED | OUTPATIENT
Start: 2024-03-05 | End: 2024-03-06

## 2024-03-05 RX ORDER — ENOXAPARIN SODIUM 100 MG/ML
40 INJECTION SUBCUTANEOUS DAILY
Status: DISCONTINUED | OUTPATIENT
Start: 2024-03-06 | End: 2024-03-10 | Stop reason: HOSPADM

## 2024-03-05 RX ORDER — HEPARIN SODIUM 1000 [USP'U]/ML
INJECTION, SOLUTION INTRAVENOUS; SUBCUTANEOUS AS NEEDED
Status: DISCONTINUED | OUTPATIENT
Start: 2024-03-05 | End: 2024-03-05 | Stop reason: SURG

## 2024-03-05 RX ORDER — CHOLECALCIFEROL (VITAMIN D3) 125 MCG
1000 CAPSULE ORAL DAILY
Status: DISCONTINUED | OUTPATIENT
Start: 2024-03-06 | End: 2024-03-10 | Stop reason: HOSPADM

## 2024-03-05 RX ORDER — MORPHINE SULFATE 2 MG/ML
1 INJECTION, SOLUTION INTRAMUSCULAR; INTRAVENOUS EVERY 4 HOURS PRN
Status: DISCONTINUED | OUTPATIENT
Start: 2024-03-05 | End: 2024-03-07

## 2024-03-05 RX ORDER — MEPERIDINE HYDROCHLORIDE 25 MG/ML
25 INJECTION INTRAMUSCULAR; INTRAVENOUS; SUBCUTANEOUS EVERY 4 HOURS PRN
Status: ACTIVE | OUTPATIENT
Start: 2024-03-05 | End: 2024-03-05

## 2024-03-05 RX ORDER — BISACODYL 10 MG
10 SUPPOSITORY, RECTAL RECTAL DAILY PRN
Status: DISCONTINUED | OUTPATIENT
Start: 2024-03-06 | End: 2024-03-10 | Stop reason: HOSPADM

## 2024-03-05 RX ORDER — NICARDIPINE HYDROCHLORIDE 2.5 MG/ML
INJECTION INTRAVENOUS AS NEEDED
Status: DISCONTINUED | OUTPATIENT
Start: 2024-03-05 | End: 2024-03-05 | Stop reason: SURG

## 2024-03-05 RX ORDER — LIDOCAINE HYDROCHLORIDE 20 MG/ML
INJECTION, SOLUTION INFILTRATION; PERINEURAL AS NEEDED
Status: DISCONTINUED | OUTPATIENT
Start: 2024-03-05 | End: 2024-03-05 | Stop reason: SURG

## 2024-03-05 RX ORDER — PROTAMINE SULFATE 10 MG/ML
INJECTION, SOLUTION INTRAVENOUS AS NEEDED
Status: DISCONTINUED | OUTPATIENT
Start: 2024-03-05 | End: 2024-03-05 | Stop reason: SURG

## 2024-03-05 RX ORDER — EPHEDRINE SULFATE 50 MG/ML
INJECTION, SOLUTION INTRAVENOUS AS NEEDED
Status: DISCONTINUED | OUTPATIENT
Start: 2024-03-05 | End: 2024-03-05 | Stop reason: SURG

## 2024-03-05 RX ORDER — ACETAMINOPHEN 160 MG/5ML
650 SOLUTION ORAL EVERY 4 HOURS
Status: ACTIVE | OUTPATIENT
Start: 2024-03-05 | End: 2024-03-06

## 2024-03-05 RX ORDER — CYCLOBENZAPRINE HCL 10 MG
10 TABLET ORAL EVERY 8 HOURS PRN
Status: DISCONTINUED | OUTPATIENT
Start: 2024-03-06 | End: 2024-03-07

## 2024-03-05 RX ORDER — VECURONIUM BROMIDE 1 MG/ML
INJECTION, POWDER, LYOPHILIZED, FOR SOLUTION INTRAVENOUS AS NEEDED
Status: DISCONTINUED | OUTPATIENT
Start: 2024-03-05 | End: 2024-03-05 | Stop reason: SURG

## 2024-03-05 RX ORDER — DEXMEDETOMIDINE HYDROCHLORIDE 4 UG/ML
INJECTION, SOLUTION INTRAVENOUS CONTINUOUS PRN
Status: DISCONTINUED | OUTPATIENT
Start: 2024-03-05 | End: 2024-03-05 | Stop reason: SURG

## 2024-03-05 RX ORDER — HYDROCODONE BITARTRATE AND ACETAMINOPHEN 5; 325 MG/1; MG/1
2 TABLET ORAL EVERY 4 HOURS PRN
Status: DISCONTINUED | OUTPATIENT
Start: 2024-03-05 | End: 2024-03-10 | Stop reason: HOSPADM

## 2024-03-05 RX ORDER — PHENYLEPHRINE HCL IN 0.9% NACL 0.5 MG/5ML
.2-2 SYRINGE (ML) INTRAVENOUS CONTINUOUS PRN
Status: DISCONTINUED | OUTPATIENT
Start: 2024-03-05 | End: 2024-03-06

## 2024-03-05 RX ORDER — ACETAMINOPHEN 325 MG/1
650 TABLET ORAL EVERY 4 HOURS
Status: DISPENSED | OUTPATIENT
Start: 2024-03-05 | End: 2024-03-06

## 2024-03-05 RX ORDER — MORPHINE SULFATE 2 MG/ML
4 INJECTION, SOLUTION INTRAMUSCULAR; INTRAVENOUS
Status: DISCONTINUED | OUTPATIENT
Start: 2024-03-05 | End: 2024-03-06

## 2024-03-05 RX ORDER — OXYCODONE HYDROCHLORIDE 5 MG/1
10 TABLET ORAL EVERY 4 HOURS PRN
Status: DISCONTINUED | OUTPATIENT
Start: 2024-03-05 | End: 2024-03-07

## 2024-03-05 RX ORDER — BISACODYL 5 MG/1
10 TABLET, DELAYED RELEASE ORAL DAILY PRN
Status: DISCONTINUED | OUTPATIENT
Start: 2024-03-05 | End: 2024-03-10 | Stop reason: HOSPADM

## 2024-03-05 RX ORDER — DOPAMINE HYDROCHLORIDE 160 MG/100ML
2-20 INJECTION, SOLUTION INTRAVENOUS CONTINUOUS PRN
Status: DISCONTINUED | OUTPATIENT
Start: 2024-03-05 | End: 2024-03-06

## 2024-03-05 RX ORDER — MAGNESIUM SULFATE 1 G/100ML
1 INJECTION INTRAVENOUS EVERY 8 HOURS
Status: DISPENSED | OUTPATIENT
Start: 2024-03-05 | End: 2024-03-06

## 2024-03-05 RX ORDER — PANTOPRAZOLE SODIUM 40 MG/1
40 TABLET, DELAYED RELEASE ORAL EVERY MORNING
Status: DISCONTINUED | OUTPATIENT
Start: 2024-03-06 | End: 2024-03-10 | Stop reason: HOSPADM

## 2024-03-05 RX ORDER — ACETAMINOPHEN 650 MG/1
650 SUPPOSITORY RECTAL EVERY 4 HOURS
Status: ACTIVE | OUTPATIENT
Start: 2024-03-05 | End: 2024-03-06

## 2024-03-05 RX ORDER — MAGNESIUM SULFATE HEPTAHYDRATE 500 MG/ML
INJECTION, SOLUTION INTRAMUSCULAR; INTRAVENOUS AS NEEDED
Status: DISCONTINUED | OUTPATIENT
Start: 2024-03-05 | End: 2024-03-05 | Stop reason: SURG

## 2024-03-05 RX ORDER — NICOTINE POLACRILEX 4 MG
15 LOZENGE BUCCAL
Status: DISCONTINUED | OUTPATIENT
Start: 2024-03-05 | End: 2024-03-06

## 2024-03-05 RX ORDER — ALBUMIN, HUMAN INJ 5% 5 %
1500 SOLUTION INTRAVENOUS AS NEEDED
Status: DISPENSED | OUTPATIENT
Start: 2024-03-05 | End: 2024-03-06

## 2024-03-05 RX ORDER — POLYETHYLENE GLYCOL 3350 17 G/17G
17 POWDER, FOR SOLUTION ORAL DAILY PRN
Status: DISCONTINUED | OUTPATIENT
Start: 2024-03-05 | End: 2024-03-10 | Stop reason: HOSPADM

## 2024-03-05 RX ORDER — DEXMEDETOMIDINE HYDROCHLORIDE 4 UG/ML
.2-1.5 INJECTION, SOLUTION INTRAVENOUS
Status: DISCONTINUED | OUTPATIENT
Start: 2024-03-05 | End: 2024-03-06

## 2024-03-05 RX ORDER — NITROGLYCERIN 20 MG/100ML
INJECTION INTRAVENOUS CONTINUOUS PRN
Status: DISCONTINUED | OUTPATIENT
Start: 2024-03-05 | End: 2024-03-05 | Stop reason: SURG

## 2024-03-05 RX ORDER — AMOXICILLIN 250 MG
2 CAPSULE ORAL NIGHTLY
Status: DISCONTINUED | OUTPATIENT
Start: 2024-03-06 | End: 2024-03-10 | Stop reason: HOSPADM

## 2024-03-05 RX ORDER — PAPAVERINE HYDROCHLORIDE 30 MG/ML
INJECTION INTRAMUSCULAR; INTRAVENOUS AS NEEDED
Status: DISCONTINUED | OUTPATIENT
Start: 2024-03-05 | End: 2024-03-05 | Stop reason: HOSPADM

## 2024-03-05 RX ORDER — IBUPROFEN 600 MG/1
1 TABLET ORAL
Status: DISCONTINUED | OUTPATIENT
Start: 2024-03-05 | End: 2024-03-06

## 2024-03-05 RX ORDER — SODIUM CHLORIDE 9 MG/ML
30 INJECTION, SOLUTION INTRAVENOUS CONTINUOUS
Status: DISCONTINUED | OUTPATIENT
Start: 2024-03-05 | End: 2024-03-10 | Stop reason: HOSPADM

## 2024-03-05 RX ORDER — NOREPINEPHRINE BITARTRATE 0.03 MG/ML
.02-.2 INJECTION, SOLUTION INTRAVENOUS CONTINUOUS PRN
Status: DISCONTINUED | OUTPATIENT
Start: 2024-03-05 | End: 2024-03-06

## 2024-03-05 RX ORDER — FENTANYL CITRATE 50 UG/ML
INJECTION, SOLUTION INTRAMUSCULAR; INTRAVENOUS AS NEEDED
Status: DISCONTINUED | OUTPATIENT
Start: 2024-03-05 | End: 2024-03-05 | Stop reason: SURG

## 2024-03-05 RX ORDER — PROPOFOL 10 MG/ML
VIAL (ML) INTRAVENOUS AS NEEDED
Status: DISCONTINUED | OUTPATIENT
Start: 2024-03-05 | End: 2024-03-05 | Stop reason: SURG

## 2024-03-05 RX ORDER — NITROGLYCERIN 0.4 MG/1
0.4 TABLET SUBLINGUAL
Status: DISCONTINUED | OUTPATIENT
Start: 2024-03-05 | End: 2024-03-10 | Stop reason: HOSPADM

## 2024-03-05 RX ORDER — ATORVASTATIN CALCIUM 20 MG/1
40 TABLET, FILM COATED ORAL NIGHTLY
Status: DISCONTINUED | OUTPATIENT
Start: 2024-03-05 | End: 2024-03-10 | Stop reason: HOSPADM

## 2024-03-05 RX ORDER — SODIUM CHLORIDE 9 MG/ML
INJECTION, SOLUTION INTRAVENOUS CONTINUOUS PRN
Status: DISCONTINUED | OUTPATIENT
Start: 2024-03-05 | End: 2024-03-05 | Stop reason: SURG

## 2024-03-05 RX ORDER — PANTOPRAZOLE SODIUM 40 MG/10ML
40 INJECTION, POWDER, LYOPHILIZED, FOR SOLUTION INTRAVENOUS ONCE
Status: COMPLETED | OUTPATIENT
Start: 2024-03-05 | End: 2024-03-05

## 2024-03-05 RX ORDER — ONDANSETRON 2 MG/ML
INJECTION INTRAMUSCULAR; INTRAVENOUS AS NEEDED
Status: DISCONTINUED | OUTPATIENT
Start: 2024-03-05 | End: 2024-03-05 | Stop reason: SURG

## 2024-03-05 RX ORDER — AMINOCAPROIC ACID 250 MG/ML
INJECTION, SOLUTION INTRAVENOUS AS NEEDED
Status: DISCONTINUED | OUTPATIENT
Start: 2024-03-05 | End: 2024-03-05 | Stop reason: SURG

## 2024-03-05 RX ORDER — ACETAMINOPHEN 650 MG/1
650 SUPPOSITORY RECTAL EVERY 4 HOURS PRN
Status: DISCONTINUED | OUTPATIENT
Start: 2024-03-06 | End: 2024-03-10 | Stop reason: HOSPADM

## 2024-03-05 RX ORDER — PHENYLEPHRINE HYDROCHLORIDE 10 MG/ML
INJECTION INTRAVENOUS AS NEEDED
Status: DISCONTINUED | OUTPATIENT
Start: 2024-03-05 | End: 2024-03-05 | Stop reason: SURG

## 2024-03-05 RX ORDER — ACETAMINOPHEN 160 MG/5ML
650 SOLUTION ORAL EVERY 4 HOURS PRN
Status: DISCONTINUED | OUTPATIENT
Start: 2024-03-06 | End: 2024-03-10 | Stop reason: HOSPADM

## 2024-03-05 RX ORDER — CHLORHEXIDINE GLUCONATE ORAL RINSE 1.2 MG/ML
15 SOLUTION DENTAL EVERY 12 HOURS
Status: DISCONTINUED | OUTPATIENT
Start: 2024-03-05 | End: 2024-03-08

## 2024-03-05 RX ORDER — ACETAMINOPHEN 325 MG/1
650 TABLET ORAL EVERY 4 HOURS PRN
Status: DISCONTINUED | OUTPATIENT
Start: 2024-03-06 | End: 2024-03-10 | Stop reason: HOSPADM

## 2024-03-05 RX ORDER — METOCLOPRAMIDE HYDROCHLORIDE 5 MG/ML
10 INJECTION INTRAMUSCULAR; INTRAVENOUS EVERY 6 HOURS
Status: DISPENSED | OUTPATIENT
Start: 2024-03-05 | End: 2024-03-06

## 2024-03-05 RX ORDER — DEXTROSE MONOHYDRATE 25 G/50ML
10-50 INJECTION, SOLUTION INTRAVENOUS
Status: DISCONTINUED | OUTPATIENT
Start: 2024-03-05 | End: 2024-03-06

## 2024-03-05 RX ORDER — ONDANSETRON 2 MG/ML
4 INJECTION INTRAMUSCULAR; INTRAVENOUS EVERY 6 HOURS PRN
Status: DISCONTINUED | OUTPATIENT
Start: 2024-03-05 | End: 2024-03-10 | Stop reason: HOSPADM

## 2024-03-05 RX ORDER — ASPIRIN 81 MG/1
81 TABLET ORAL DAILY
Status: DISCONTINUED | OUTPATIENT
Start: 2024-03-06 | End: 2024-03-10 | Stop reason: HOSPADM

## 2024-03-05 RX ORDER — ACETAMINOPHEN 10 MG/ML
1000 INJECTION, SOLUTION INTRAVENOUS EVERY 8 HOURS
Status: COMPLETED | OUTPATIENT
Start: 2024-03-05 | End: 2024-03-06

## 2024-03-05 RX ORDER — NALOXONE HCL 0.4 MG/ML
0.4 VIAL (ML) INJECTION
Status: DISCONTINUED | OUTPATIENT
Start: 2024-03-05 | End: 2024-03-07

## 2024-03-05 RX ORDER — MIDAZOLAM HYDROCHLORIDE 5 MG/ML
INJECTION INTRAMUSCULAR; INTRAVENOUS AS NEEDED
Status: DISCONTINUED | OUTPATIENT
Start: 2024-03-05 | End: 2024-03-05 | Stop reason: SURG

## 2024-03-05 RX ORDER — MILRINONE LACTATE 0.2 MG/ML
.25-.375 INJECTION, SOLUTION INTRAVENOUS CONTINUOUS PRN
Status: DISCONTINUED | OUTPATIENT
Start: 2024-03-05 | End: 2024-03-06

## 2024-03-05 RX ADMIN — FENTANYL CITRATE 150 MCG: 50 INJECTION, SOLUTION INTRAMUSCULAR; INTRAVENOUS at 07:52

## 2024-03-05 RX ADMIN — FENTANYL CITRATE 100 MCG: 50 INJECTION, SOLUTION INTRAMUSCULAR; INTRAVENOUS at 09:08

## 2024-03-05 RX ADMIN — PHENYLEPHRINE HYDROCHLORIDE 100 MCG: 10 INJECTION INTRAVENOUS at 07:41

## 2024-03-05 RX ADMIN — AMINOCAPROIC ACID 10 G: 250 INJECTION, SOLUTION INTRAVENOUS at 07:38

## 2024-03-05 RX ADMIN — EPHEDRINE SULFATE 10 MG: 50 INJECTION INTRAVENOUS at 07:40

## 2024-03-05 RX ADMIN — FENTANYL CITRATE 100 MCG: 50 INJECTION, SOLUTION INTRAMUSCULAR; INTRAVENOUS at 10:19

## 2024-03-05 RX ADMIN — NICARDIPINE HYDROCHLORIDE 0.2 MG: 25 INJECTION, SOLUTION INTRAVENOUS at 07:56

## 2024-03-05 RX ADMIN — SODIUM CHLORIDE: 9 INJECTION, SOLUTION INTRAVENOUS at 06:43

## 2024-03-05 RX ADMIN — PROTAMINE SULFATE 300 MG: 10 INJECTION, SOLUTION INTRAVENOUS at 10:16

## 2024-03-05 RX ADMIN — ONDANSETRON 4 MG: 2 INJECTION INTRAMUSCULAR; INTRAVENOUS at 10:14

## 2024-03-05 RX ADMIN — NICARDIPINE HYDROCHLORIDE 0.4 MG: 25 INJECTION, SOLUTION INTRAVENOUS at 10:48

## 2024-03-05 RX ADMIN — HEPARIN SODIUM 27000 UNITS: 1000 INJECTION, SOLUTION INTRAVENOUS; SUBCUTANEOUS at 08:35

## 2024-03-05 RX ADMIN — PROPOFOL 120 MG: 10 INJECTION, EMULSION INTRAVENOUS at 07:03

## 2024-03-05 RX ADMIN — MORPHINE SULFATE 1 MG: 2 INJECTION, SOLUTION INTRAMUSCULAR; INTRAVENOUS at 16:46

## 2024-03-05 RX ADMIN — ALBUMIN (HUMAN) 1500 ML: 12.5 INJECTION, SOLUTION INTRAVENOUS at 19:01

## 2024-03-05 RX ADMIN — SODIUM CHLORIDE 1.6 UNITS/HR: 9 INJECTION, SOLUTION INTRAVENOUS at 09:10

## 2024-03-05 RX ADMIN — MUPIROCIN 1 APPLICATION: 20 OINTMENT TOPICAL at 06:25

## 2024-03-05 RX ADMIN — FENTANYL CITRATE 150 MCG: 50 INJECTION, SOLUTION INTRAMUSCULAR; INTRAVENOUS at 10:21

## 2024-03-05 RX ADMIN — PANTOPRAZOLE SODIUM 40 MG: 40 INJECTION, POWDER, FOR SOLUTION INTRAVENOUS at 13:21

## 2024-03-05 RX ADMIN — 0.12% CHLORHEXIDINE GLUCONATE 15 ML: 1.2 RINSE ORAL at 20:02

## 2024-03-05 RX ADMIN — FENTANYL CITRATE 150 MCG: 50 INJECTION, SOLUTION INTRAMUSCULAR; INTRAVENOUS at 08:45

## 2024-03-05 RX ADMIN — DEXMEDETOMIDINE HYDROCHLORIDE IN SODIUM CHLORIDE 0.5 MCG/KG/HR: 4 INJECTION INTRAVENOUS at 07:25

## 2024-03-05 RX ADMIN — EPHEDRINE SULFATE 10 MG: 50 INJECTION INTRAVENOUS at 07:20

## 2024-03-05 RX ADMIN — NICARDIPINE HYDROCHLORIDE 5 MG/HR: 25 INJECTION, SOLUTION INTRAVENOUS at 07:56

## 2024-03-05 RX ADMIN — FENTANYL CITRATE 50 MCG: 50 INJECTION, SOLUTION INTRAMUSCULAR; INTRAVENOUS at 06:50

## 2024-03-05 RX ADMIN — ATORVASTATIN CALCIUM 40 MG: 20 TABLET, FILM COATED ORAL at 20:29

## 2024-03-05 RX ADMIN — MIDAZOLAM 3 MG: 5 INJECTION INTRAMUSCULAR; INTRAVENOUS at 07:03

## 2024-03-05 RX ADMIN — NICARDIPINE HYDROCHLORIDE 0.2 MG: 25 INJECTION, SOLUTION INTRAVENOUS at 10:21

## 2024-03-05 RX ADMIN — FENTANYL CITRATE 50 MCG: 50 INJECTION, SOLUTION INTRAMUSCULAR; INTRAVENOUS at 07:03

## 2024-03-05 RX ADMIN — MAGNESIUM SULFATE IN DEXTROSE 1 G: 10 INJECTION, SOLUTION INTRAVENOUS at 20:03

## 2024-03-05 RX ADMIN — DEXMEDETOMIDINE HYDROCHLORIDE IN SODIUM CHLORIDE 0.3 MCG/KG/HR: 4 INJECTION INTRAVENOUS at 18:12

## 2024-03-05 RX ADMIN — LIDOCAINE HYDROCHLORIDE 60 MG: 20 INJECTION, SOLUTION INFILTRATION; PERINEURAL at 07:03

## 2024-03-05 RX ADMIN — METOPROLOL TARTRATE 12.5 MG: 25 TABLET, FILM COATED ORAL at 06:25

## 2024-03-05 RX ADMIN — VECURONIUM BROMIDE 5 MG: 10 INJECTION, POWDER, LYOPHILIZED, FOR SOLUTION INTRAVENOUS at 10:00

## 2024-03-05 RX ADMIN — VECURONIUM BROMIDE 2 MG: 10 INJECTION, POWDER, LYOPHILIZED, FOR SOLUTION INTRAVENOUS at 07:52

## 2024-03-05 RX ADMIN — LIDOCAINE HYDROCHLORIDE 1 EACH: 40 SOLUTION TOPICAL at 07:07

## 2024-03-05 RX ADMIN — OXYCODONE HYDROCHLORIDE 10 MG: 5 TABLET ORAL at 18:44

## 2024-03-05 RX ADMIN — CEFAZOLIN SODIUM 2 G: 2 INJECTION, SOLUTION INTRAVENOUS at 07:19

## 2024-03-05 RX ADMIN — ACETAMINOPHEN 1000 MG: 10 INJECTION, SOLUTION INTRAVENOUS at 20:02

## 2024-03-05 RX ADMIN — ALBUMIN (HUMAN) 250 ML: 12.5 INJECTION, SOLUTION INTRAVENOUS at 14:46

## 2024-03-05 RX ADMIN — ALBUMIN (HUMAN) 500 ML: 12.5 INJECTION, SOLUTION INTRAVENOUS at 14:09

## 2024-03-05 RX ADMIN — VECURONIUM BROMIDE 5 MG: 10 INJECTION, POWDER, LYOPHILIZED, FOR SOLUTION INTRAVENOUS at 08:38

## 2024-03-05 RX ADMIN — MORPHINE SULFATE 2 MG: 2 INJECTION, SOLUTION INTRAMUSCULAR; INTRAVENOUS at 15:10

## 2024-03-05 RX ADMIN — CEFAZOLIN SODIUM 2 G: 2 INJECTION, SOLUTION INTRAVENOUS at 10:14

## 2024-03-05 RX ADMIN — NITROGLYCERIN 10 MCG/MIN: 20 INJECTION INTRAVENOUS at 07:56

## 2024-03-05 RX ADMIN — PROPOFOL 50 MCG/KG/MIN: 10 INJECTION, EMULSION INTRAVENOUS at 08:36

## 2024-03-05 RX ADMIN — VECURONIUM BROMIDE 8 MG: 10 INJECTION, POWDER, LYOPHILIZED, FOR SOLUTION INTRAVENOUS at 07:03

## 2024-03-05 RX ADMIN — MAGNESIUM SULFATE HEPTAHYDRATE 2 G: 500 INJECTION, SOLUTION INTRAMUSCULAR; INTRAVENOUS at 10:00

## 2024-03-05 RX ADMIN — ONDANSETRON 4 MG: 2 INJECTION INTRAMUSCULAR; INTRAVENOUS at 16:56

## 2024-03-05 RX ADMIN — CEFAZOLIN SODIUM 2 G: 2 INJECTION, SOLUTION INTRAVENOUS at 17:21

## 2024-03-05 RX ADMIN — SUGAMMADEX 200 MG: 100 INJECTION, SOLUTION INTRAVENOUS at 11:28

## 2024-03-05 RX ADMIN — EPHEDRINE SULFATE 10 MG: 50 INJECTION INTRAVENOUS at 07:06

## 2024-03-05 RX ADMIN — ALBUMIN (HUMAN) 250 ML: 12.5 INJECTION, SOLUTION INTRAVENOUS at 12:16

## 2024-03-05 RX ADMIN — SODIUM BICARBONATE 50 MEQ: 84 INJECTION, SOLUTION INTRAVENOUS at 17:21

## 2024-03-05 RX ADMIN — MUPIROCIN 1 APPLICATION: 20 OINTMENT TOPICAL at 20:02

## 2024-03-05 RX ADMIN — 0.12% CHLORHEXIDINE GLUCONATE 15 ML: 1.2 RINSE ORAL at 06:26

## 2024-03-05 RX ADMIN — ALBUMIN (HUMAN) 250 ML: 12.5 INJECTION, SOLUTION INTRAVENOUS at 15:24

## 2024-03-05 RX ADMIN — AMINOCAPROIC ACID 10 G: 250 INJECTION, SOLUTION INTRAVENOUS at 10:25

## 2024-03-05 RX ADMIN — OXYCODONE HYDROCHLORIDE 10 MG: 5 TABLET ORAL at 22:31

## 2024-03-05 RX ADMIN — ACETAMINOPHEN 1000 MG: 10 INJECTION, SOLUTION INTRAVENOUS at 13:48

## 2024-03-05 RX ADMIN — PHENYLEPHRINE HYDROCHLORIDE 100 MCG: 10 INJECTION INTRAVENOUS at 07:20

## 2024-03-05 RX ADMIN — NICARDIPINE HYDROCHLORIDE 0.2 MG: 25 INJECTION, SOLUTION INTRAVENOUS at 10:24

## 2024-03-05 RX ADMIN — MIDAZOLAM 2 MG: 5 INJECTION INTRAMUSCULAR; INTRAVENOUS at 06:50

## 2024-03-05 RX ADMIN — METOCLOPRAMIDE 10 MG: 5 INJECTION, SOLUTION INTRAMUSCULAR; INTRAVENOUS at 17:22

## 2024-03-05 RX ADMIN — EPHEDRINE SULFATE 10 MG: 50 INJECTION INTRAVENOUS at 07:41

## 2024-03-05 RX ADMIN — PHENYLEPHRINE HYDROCHLORIDE 100 MCG: 10 INJECTION INTRAVENOUS at 07:13

## 2024-03-05 RX ADMIN — PHENYLEPHRINE HYDROCHLORIDE 100 MCG: 10 INJECTION INTRAVENOUS at 07:06

## 2024-03-05 RX ADMIN — EPHEDRINE SULFATE 10 MG: 50 INJECTION INTRAVENOUS at 08:36

## 2024-03-05 NOTE — ANESTHESIA PROCEDURE NOTES
Airway  Urgency: elective    Date/Time: 3/5/2024 7:07 AM  Airway not difficult    General Information and Staff    Patient location during procedure: OR  Anesthesiologist: Jose Rios MD    Indications and Patient Condition  Indications for airway management: airway protection    Preoxygenated: yes  MILS maintained throughout  Mask difficulty assessment: 1 - vent by mask    Final Airway Details  Final airway type: endotracheal airway      Successful airway: ETT  Cuffed: yes   Successful intubation technique: direct laryngoscopy  Facilitating devices/methods: intubating stylet  Endotracheal tube insertion site: oral  Blade: CMAC  Blade size: D  ETT size (mm): 8.0  Cormack-Lehane Classification: grade I - full view of glottis  Placement verified by: chest auscultation and capnometry   Measured from: lips  ETT/EBT  to lips (cm): 21  Number of attempts at approach: 1  Assessment: lips, teeth, and gum same as pre-op and atraumatic intubation

## 2024-03-05 NOTE — OP NOTE
[facility] CARDIAC SURGERY OP NOTE    Preop Diagnosis: Severe coronary artery disease.  Recent STEMI with balloon rescue angioplasty but with residual stenosis.  Chronic lymphocytic leukemia.  Left main stenosis.  Varicose veins of both calves.    Postop Diagnosis: Same    Indications: This patient had closed off his right coronary artery and a stent and had rescue balloon with residual stenosis.  He was on Plavix and we transitioned him off Plavix with heparin because of the risk of rethrombosis in this area.  This worked out well.  Operation was advisable to prolong life and relieve symptoms.The  calculated STS Risk score was discussed with the patient and family. All risks and alternatives were discussed with the patient and family.  Counseling was done regarding abuse of tobacco, alcohol and drugs as needed.  A discussion about advanced directive was done with the patient. They understand and wish to proceed.    Procedure: CABG x 6.  Sequential skeletonized LIMA to diagonal branch and then LAD.  Vein graft to PDA.  Vein graft to right coronary artery.  Vein graft to OM1.  Vein graft to OM 2.  Temporary cardiopulmonary bypass.  Antegrade and retrograde cold blood cardioplegia with warm reperfusion.  Neurologic monitoring.  Transesophageal echo.  Endoscopic vein harvest of the right greater saphenous vein. [place].  The heart and cardiac chambers were normal.  There was no residual    Surgeon: Jean Marie Mott MD    Assistant: Assistant: Aysha Vargas CSA was responsible for performing the following activities: Cardiac Surgery First assist, Endoscopic Vein [place] if needed for CABG,  surgical wound closure and their skilled assistance was necessary for the success of this case.     Anesthesia: GET    Findings : Body mass index is 24.7 kg/m².  Scarring or hypokinesis of the inferior wall.  There were 2 branches there.  The PDA was 1.5 mm.  The right coronary artery was 2 mm.   OM1 was 2.5 mm.  OM 2 was 2 mm.  The diagonal branch was 1.5 mm and the LAD was 2 mm.  The mammary was 2.5 mm with excellent blood flow.  The vein was 4.5 mm and excellent quality.    Operative Procedure: A primary median sternotomy was made while the right greater saphenous vein was harvested with the endoscope.  The internal mammary artery was dissected off the left chest wall with the cautery at low voltage.  Cardiopulmonary bypass was then established for 84 minutes drifting to 34 °C and appropriate flow rates.  The aorta was crossclamped for 69 minutes we gave a liter of antegrade cold blood cardioplegia then 2 L of retrograde cold blood cardioplegia and repeated doses every 10 to 15 minutes to good effect.  4 veins were anastomosed the ascending aorta with 6-0 Prolene and marked with washers.  The veins were sewn to the PDA, RCA, OM1, and OM 2 all with 7-0 Prolene.  The mammary was sewn side-to-side to the diagonal branch and then to the LAD with 7-0 Prolene.  A warm dose of retrograde cardioplegia was given and then with strong suction on the aortic needle vent the cross-clamp was released.  The patient was rewarmed and cardiopulmonary bypass was weaned and discontinued.  Decannulation was effected and usual devices were placed.  Hemostasis was obtained.  4 chest tubes were inserted.  We applied vancomycin enriched platelet rich plasma.  The sternum was closed with stainless steel wires.  The fascia, soft tissues and skin were closed usual.  The sponge, needle and instrument counts noted to be correct pending the open-heart recovery in good condition.  All the [place] nicely and all the anastomoses were hemostatic.    Complications: None     Tubes: 4    Epicardial Wires: 3    Blood Loss: Minimal.  280 mL of Cell Saver returned.    Bypass Time: 84 min    Aortic cross-clamp time: 69 min    Specimen: None    Condition: Good      Patient Care Team:  Eveline Pelaez MD as PCP - General (Internal  Medicine & Pediatrics)  James Moy MD as Consulting Physician (Interventional Cardiology)  Russell Cagle MD as Consulting Physician (Hematology and Oncology)  Eveline Pelaez MD as Referring Physician (Internal Medicine & Pediatrics)        Jean Marie Mott MD  3/5/2024  11:10 EST

## 2024-03-05 NOTE — PLAN OF CARE
Goal Outcome Evaluation:              Outcome Evaluation: Pt arrived to the unit post-op at 1125 and was extubated at 1637. Pt has been on 4L NC following extubation and oxygenating well. Pain has been controlled with prn medication and position adjustment. Family visited pt at bedside and plan of care was discussed with them.

## 2024-03-05 NOTE — PROGRESS NOTES
"[facility] GROUP INPATIENT PROGRESS NOTE    Length of Stay:  2 days    CHIEF COMPLAINT:  CLL, thrombocytopenia    SUBJECTIVE:   Patient underwent CABG earlier today, no significant complications.  He did not experience any bleeding issues, did not require any transfusion support.  Patient currently extubated, awake, communicating    ROS:  Review of Systems   A comprehensive review of systems was obtained with pertinent positive findings as noted in the interval history above.  All other systems negative.      OBJECTIVE:  Vitals:    03/04/24 2034 03/04/24 2358 03/04/24 2359 03/05/24 0453   BP: 130/80 101/60  103/61   BP Location: Right arm Right arm  Right arm   Patient Position: Lying Lying  Lying   Pulse: 91 78  78   Resp: 18 18  18   Temp: 99.1 °F (37.3 °C) 98.3 °F (36.8 °C)  98.3 °F (36.8 °C)   TempSrc: Oral Oral  Oral   SpO2: 96% 96%  94%   Weight:   87.3 kg (192 lb 6.4 oz)    Height:   188 cm (74\")          PHYSICAL EXAMINATION:  General: Alert and orient x 3 no distress  Chest/Lungs: Clear to auscultation bilaterally anteriorly  Heart: Regular rate and rhythm  Abdomen/GI: Soft nontender distended bowel signs present  Extremities: Trace bilateral lower extremity edema    DIAGNOSTIC DATA:  Results Review:     I reviewed the patient's new clinical results.    Results from last 7 days   Lab Units 03/05/24 0425 03/04/24  0129 03/03/24  1745   WBC 10*3/mm3 4.15 4.67 4.71   HEMOGLOBIN g/dL 12.0* 12.2* 12.5*   HEMATOCRIT % 35.0* 35.7* 36.2*   PLATELETS 10*3/mm3 92* 98*  99* 94*      Results from last 7 days   Lab Units 03/05/24  0425 03/04/24  0838 03/03/24  1745   SODIUM mmol/L 138 140 137   POTASSIUM mmol/L 3.7 3.9 3.9   CHLORIDE mmol/L 106 104 102   CO2 mmol/L 24.0 24.6 25.0   BUN mg/dL 7* 8 13   CREATININE mg/dL 1.22 1.09 1.23   CALCIUM mg/dL 8.9 8.9 9.3   BILIRUBIN mg/dL  --   --  0.7   ALK PHOS U/L  --   --  76   ALT (SGPT) U/L  --   --  16   AST (SGOT) U/L  --   --  44*   GLUCOSE mg/dL 126* 113* 137* "      Lab Results   Component Value Date    NEUTROABS 2.31 03/05/2024     Results from last 7 days   Lab Units 03/04/24  2352 03/04/24  1609 03/04/24  0838 03/04/24  0129 03/03/24  1745   INR   --   --   --   --  0.96   APTT seconds 63.4* 61.0* 47.7*   < > 25.6    < > = values in this interval not displayed.     Results from last 7 days   Lab Units 03/03/24  1745   MAGNESIUM mg/dL 1.7             Assessment & Plan   ASSESSMENT/PLAN:  This is a 68 y.o. male with:     *Coronary artery disease status post non-ST elevation MI with plans for CABG  The patient had prior history of severe multivessel coronary disease, underwent PCI in 2017.    He experienced non-ST elevation MI with angioplasty to RCA stent on 2/22/2024 and was placed on Plavix after previously receiving Brilinta.    Subsequently discontinued Plavix on 3/1/2024 in preparation for CABG.    Patient admitted on heparin drip 3/4/2024  Patient underwent CABG 3/5/2024     *CLL  Patient has history of CLL, is followed in the outpatient setting by Dr. Cagle in our practice.    He was originally diagnosed in 2005 via peripheral blood flow cytometry.    He was managed initially with a course of observation.    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    Gradual escalation in WBC, gradual development of thrombocytopenia.    Patient initiated treatment in February 2023 with Zanubrutinib.    Patient with gradual response to treatment, no significant side effects.    Labs prior to current hospitalization 2/6/2024 with WBC 9.65, differential 33 segs, 60 lymphs, hemoglobin 14.4, platelet count 105,000.    He was receiving prophylactic acyclovir and Bactrim with treatment  He was notified to discontinue Zanubrutinib on 2/27/2024 due to upcoming CABG with plans to resume 2 weeks postoperatively (last dose Zanubrutinib taken on 2/27/2024).  Patient also discontinued acyclovir and Bactrim prophylaxis at that time.      *Thrombocytopenia  Patient with longstanding thrombocytopenia with platelet count in the 90-low 100,000 range  Unclear whether thrombocytopenia is related to marrow involvement from CLL, ITP related to CLL, or splenomegaly related to CLL  Labs on 3/5/2024 with B12 low normal at 324, folate 14.3  Additional labs on 3//24 with elevated IPF at 12.7%, suspicion for ITP associated with CLL  Monitor platelet count postoperatively, expect some degree of consumption with upcoming cardiopulmonary bypass.  Begin oral B12 1000 mcg daily due to low normal level  Most recent platelet count postoperatively today with slight decline to 86,000    *Postoperative anemia  Hemoglobin preoperatively was 12.0 on 3/5/2024  Most recent postoperative hemoglobin 9.1    *History of alcohol abuse  Monitor for withdrawal     *Viral prophylaxis  Acyclovir on hold     *PCP prophylaxis  Bactrim on hold        PLAN:   Patient remains off of zanubrutinib (discontinued 2/27/2024 preoperatively) for CLL due to potential bleeding risks with upcoming surgery.  Consider resuming treatment 2 weeks postoperatively  Prophylaxis with acyclovir and Bactrim on hold  Begin oral B12 1000 mcg daily  Expect exacerbation of chronic thrombocytopenia with consumption from upcoming cardiopulmonary bypass  Daily CBC/differential    Discussed with patient at bedside         Christopher Nugent MD

## 2024-03-05 NOTE — PLAN OF CARE
Goal Outcome Evaluation:  Plan of Care Reviewed With: patient        Progress: no change  Outcome Evaluation: A/O, no complaints of chest pain, CABG scheduled for today, heparin drip infusing, pre op orders complete, consent in chart, will finish bath this AM, VSS.

## 2024-03-05 NOTE — ANESTHESIA PROCEDURE NOTES
ADRIANE (probe placement only)    Procedure Performed: ADRIANE (probe placement only)       Start Time:  3/5/2024 7:28 AM       End Time:   3/5/2024 7:30 AM      General Procedure Information  ADRIANE Placed for monitoring purposes only -- This is not a diagnostic ADRIANE        Anesthesia Information      Echocardiogram Comments:       Mild MR, trace AI noted

## 2024-03-05 NOTE — ANESTHESIA PREPROCEDURE EVALUATION
Anesthesia Evaluation     Patient summary reviewed and Nursing notes reviewed   NPO Solid Status: > 8 hours  NPO Liquid Status: > 2 hours           Airway   Mallampati: III  TM distance: <3 FB  Neck ROM: full  Possible difficult intubation  Comment: Grade I view with [place] 2  Dental - normal exam     Pulmonary - normal exam    breath sounds clear to auscultation  Cardiovascular - normal exam    ECG reviewed  Rhythm: regular  Rate: normal    (+) hypertension less than 2 medications, past MI  1-7 days, CAD, cardiac stents Drug eluting stent more than 12 months ago , angina with exertion, hyperlipidemia    ROS comment: Severe multivessel CAD/hx STEMI and stent to RCA in 2017, recent NSTEMI on 3/1/24 with subsequent balloon opening of RCA stent    Neuro/Psych  GI/Hepatic/Renal/Endo    (+) liver disease history of elevated LFT, renal disease- CRI    Musculoskeletal     Abdominal  - normal exam   Substance History   (+) alcohol use      Comment: 21 drinks/week   OB/GYN          Other   blood dyscrasia anemia,   history of cancer      Other Comment: CLL/Hgb 12                Anesthesia Plan    ASA 4     general     (Art line/CVC/SGC/ADRIANE/postop vent/CMAC for intubation)  intravenous induction   Postoperative Plan: Expected vent after surgery  Anesthetic plan, risks, benefits, and alternatives have been provided, discussed and informed consent has been obtained with: patient.    CODE STATUS:    Level Of Support Discussed With: Patient  Code Status (Patient has no pulse and is not breathing): CPR (Attempt to Resuscitate)  Medical Interventions (Patient has pulse or is breathing): Full Support

## 2024-03-05 NOTE — ANESTHESIA PROCEDURE NOTES
Arterial Line      Patient reassessed immediately prior to procedure    Patient location during procedure: OR  Start time: 3/5/2024 6:54 AM  Stop Time:3/5/2024 6:57 AM       Line placed for hemodynamic monitoring and ABGs/Labs/ISTAT.  Performed By   Anesthesiologist: Jose Rios MD   Preanesthetic Checklist  Completed: patient identified, IV checked, site marked, risks and benefits discussed, surgical consent, monitors and equipment checked, pre-op evaluation and timeout performed  Arterial Line Prep    Sterile Tech: cap, gloves and mask  Prep: ChloraPrep  Patient monitoring: blood pressure monitoring, continuous pulse oximetry and EKG  Arterial Line Procedure   Laterality:left  Location:  radial artery  Catheter size: 20 G   Guidance: palpation technique  Number of attempts: 1  Successful placement: yes   Post Assessment   Dressing Type: biopatch applied, occlusive dressing applied, secured with tape and wrist guard applied.   Complications no  Circ/Move/Sens Assessment: normal and unchanged.   Patient Tolerance: patient tolerated the procedure well with no apparent complications

## 2024-03-05 NOTE — ANESTHESIA PROCEDURE NOTES
Central Line      Patient reassessed immediately prior to procedure    Patient location during procedure: OR  Start time: 3/5/2024 7:14 AM  Stop Time:3/5/2024 7:19 AM  Indications: vascular access and central pressure monitoring  Staff  Anesthesiologist: Jose Rios MD  Preanesthetic Checklist  Completed: patient identified, IV checked, site marked, risks and benefits discussed, surgical consent, monitors and equipment checked, pre-op evaluation and timeout performed  Central Line Prep  Sterile Tech:cap, gloves, gown, mask and sterile barriers  Prep: chloraprep  Patient monitoring: blood pressure monitoring, continuous pulse oximetry and EKG  Central Line Procedure  Laterality:right  Location:internal jugular  Catheter Type:Cordis and double lumen  Catheter Size:9 Fr  Guidance:landmark technique  Assessment  Post procedure:biopatch applied, line sutured and occlusive dressing applied  Assessement:blood return through all ports, free fluid flow and Carlos Alberto Test  Complications:no  Patient Tolerance:patient tolerated the procedure well with no apparent complications

## 2024-03-05 NOTE — ANESTHESIA PROCEDURE NOTES
Central Line      Patient reassessed immediately prior to procedure    Patient location during procedure: OR  Start time: 3/5/2024 7:19 AM  Stop Time:3/5/2024 7:24 AM  Indications: vascular access and central pressure monitoring  Staff  Anesthesiologist: Jose Rios MD  Preanesthetic Checklist  Completed: patient identified, IV checked, site marked, risks and benefits discussed, surgical consent, monitors and equipment checked, pre-op evaluation and timeout performed  Central Line Prep  Sterile Tech:cap, gloves, gown, mask and sterile barriers  Prep: chloraprep  Patient monitoring: blood pressure monitoring, continuous pulse oximetry and EKG  Central Line Procedure  Laterality:right  Location:internal jugular  Catheter Type:[place]vonne  Catheter Size:8.5 Fr  Guidance:landmark technique  Assessment  Post procedure:biopatch applied, line sutured and occlusive dressing applied  Assessement:blood return through all ports and free fluid flow  Complications:no  Patient Tolerance:patient tolerated the procedure well with no apparent complications

## 2024-03-05 NOTE — ANESTHESIA POSTPROCEDURE EVALUATION
Patient: Francisco Espino    Procedure Summary       Date: 03/05/24 Room / Location: [address] CARDIOVASCULAR OPERATING ROOM    Anesthesia Start: 0643 Anesthesia Stop: 1130    Procedure: STERNOTOMY, CORONARY ARTERY BYPASS GRAFTING TRANSESOPHAGEAL ECHOCARDIOGRAM X6, UTILIZING THE LEFT AUGUSTUS AND RIGHT SAPHENOUS VEIN.  ADRIANE, PRP WITH ANESTHESIA (Chest) Diagnosis:       Coronary artery disease of native heart with stable angina pectoris, unspecified vessel or lesion type      (Coronary artery disease of native heart with stable angina pectoris, unspecified vessel or lesion type [I25.118])    Surgeons: Jr Jean Marie Mott MD Provider: Jose Rios MD    Anesthesia Type: general ASA Status: 4            Anesthesia Type: general    Vitals  Vitals Value Taken Time   /68 03/05/24 1157   Temp 35.6 °C (96.08 °F) 03/05/24 1159   Pulse 86 03/05/24 1159   Resp 14 03/05/24 1155   SpO2 100 % 03/05/24 1159   Vitals shown include unfiled device data.        Post Anesthesia Care and Evaluation    Patient location during evaluation: PACU  Patient participation: complete - patient cannot participate  Level of consciousness: obtunded/minimal responses  Pain management: adequate    Airway patency: patent  Anesthetic complications: No anesthetic complications  PONV Status: NA  Cardiovascular status: acceptable and hemodynamically stable  Respiratory status: acceptable, ETT, intubated and ventilator  Hydration status: acceptable

## 2024-03-06 ENCOUNTER — APPOINTMENT (OUTPATIENT)
Dept: GENERAL RADIOLOGY | Facility: HOSPITAL | Age: 69
DRG: 236 | End: 2024-03-06
Payer: COMMERCIAL

## 2024-03-06 LAB
ANION GAP SERPL CALCULATED.3IONS-SCNC: 10 MMOL/L (ref 5–15)
BASOPHILS # BLD AUTO: 0.01 10*3/MM3 (ref 0–0.2)
BASOPHILS NFR BLD AUTO: 0.1 % (ref 0–1.5)
BUN SERPL-MCNC: 8 MG/DL (ref 8–23)
BUN/CREAT SERPL: 5.9 (ref 7–25)
CA-I BLD-MCNC: 4.8 MG/DL (ref 4.6–5.4)
CA-I SERPL ISE-MCNC: 1.19 MMOL/L (ref 1.15–1.35)
CALCIUM SPEC-SCNC: 8.4 MG/DL (ref 8.6–10.5)
CHLORIDE SERPL-SCNC: 109 MMOL/L (ref 98–107)
CO2 SERPL-SCNC: 22 MMOL/L (ref 22–29)
CREAT SERPL-MCNC: 1.36 MG/DL (ref 0.76–1.27)
DEPRECATED RDW RBC AUTO: 48.2 FL (ref 37–54)
EGFRCR SERPLBLD CKD-EPI 2021: 56.3 ML/MIN/1.73
EOSINOPHIL # BLD AUTO: 0.01 10*3/MM3 (ref 0–0.4)
EOSINOPHIL NFR BLD AUTO: 0.1 % (ref 0.3–6.2)
ERYTHROCYTE [DISTWIDTH] IN BLOOD BY AUTOMATED COUNT: 12.9 % (ref 12.3–15.4)
FERRITIN SERPL-MCNC: 114 NG/ML (ref 30–400)
GLUCOSE BLDC GLUCOMTR-MCNC: 110 MG/DL (ref 70–130)
GLUCOSE BLDC GLUCOMTR-MCNC: 112 MG/DL (ref 70–130)
GLUCOSE BLDC GLUCOMTR-MCNC: 124 MG/DL (ref 70–130)
GLUCOSE BLDC GLUCOMTR-MCNC: 125 MG/DL (ref 70–130)
GLUCOSE BLDC GLUCOMTR-MCNC: 125 MG/DL (ref 70–130)
GLUCOSE BLDC GLUCOMTR-MCNC: 130 MG/DL (ref 70–130)
GLUCOSE BLDC GLUCOMTR-MCNC: 146 MG/DL (ref 70–130)
GLUCOSE SERPL-MCNC: 110 MG/DL (ref 65–99)
HCT VFR BLD AUTO: 23.6 % (ref 37.5–51)
HGB BLD-MCNC: 8.1 G/DL (ref 13–17.7)
INR PPP: 1.26 (ref 0.9–1.1)
IRON 24H UR-MRATE: 22 MCG/DL (ref 59–158)
IRON SATN MFR SERPL: 10 % (ref 20–50)
LYMPHOCYTES # BLD AUTO: 1.01 10*3/MM3 (ref 0.7–3.1)
LYMPHOCYTES NFR BLD AUTO: 14.4 % (ref 19.6–45.3)
MCH RBC QN AUTO: 35.8 PG (ref 26.6–33)
MCHC RBC AUTO-ENTMCNC: 34.3 G/DL (ref 31.5–35.7)
MCV RBC AUTO: 104.4 FL (ref 79–97)
MONOCYTES # BLD AUTO: 0.83 10*3/MM3 (ref 0.1–0.9)
MONOCYTES NFR BLD AUTO: 11.8 % (ref 5–12)
NEUTROPHILS NFR BLD AUTO: 5.12 10*3/MM3 (ref 1.7–7)
NEUTROPHILS NFR BLD AUTO: 73.2 % (ref 42.7–76)
PLATELET # BLD AUTO: 73 10*3/MM3 (ref 140–450)
PMV BLD AUTO: 11.5 FL (ref 6–12)
POTASSIUM SERPL-SCNC: 3.8 MMOL/L (ref 3.5–5.2)
PROTHROMBIN TIME: 16 SECONDS (ref 11.7–14.2)
QT INTERVAL: 324 MS
QTC INTERVAL: 406 MS
RBC # BLD AUTO: 2.26 10*6/MM3 (ref 4.14–5.8)
SODIUM SERPL-SCNC: 141 MMOL/L (ref 136–145)
TIBC SERPL-MCNC: 212 MCG/DL (ref 298–536)
TRANSFERRIN SERPL-MCNC: 142 MG/DL (ref 200–360)
WBC NRBC COR # BLD AUTO: 7.01 10*3/MM3 (ref 3.4–10.8)

## 2024-03-06 PROCEDURE — 93010 ELECTROCARDIOGRAM REPORT: CPT | Performed by: INTERNAL MEDICINE

## 2024-03-06 PROCEDURE — 80048 BASIC METABOLIC PNL TOTAL CA: CPT | Performed by: THORACIC SURGERY (CARDIOTHORACIC VASCULAR SURGERY)

## 2024-03-06 PROCEDURE — 84466 ASSAY OF TRANSFERRIN: CPT | Performed by: NURSE PRACTITIONER

## 2024-03-06 PROCEDURE — 93005 ELECTROCARDIOGRAM TRACING: CPT | Performed by: NURSE PRACTITIONER

## 2024-03-06 PROCEDURE — 25010000002 POTASSIUM CHLORIDE PER 2 MEQ: Performed by: THORACIC SURGERY (CARDIOTHORACIC VASCULAR SURGERY)

## 2024-03-06 PROCEDURE — 82948 REAGENT STRIP/BLOOD GLUCOSE: CPT

## 2024-03-06 PROCEDURE — 82728 ASSAY OF FERRITIN: CPT | Performed by: INTERNAL MEDICINE

## 2024-03-06 PROCEDURE — 85610 PROTHROMBIN TIME: CPT | Performed by: NURSE PRACTITIONER

## 2024-03-06 PROCEDURE — 97530 THERAPEUTIC ACTIVITIES: CPT

## 2024-03-06 PROCEDURE — 25010000002 LORAZEPAM PER 2 MG: Performed by: NURSE PRACTITIONER

## 2024-03-06 PROCEDURE — 25010000002 METOCLOPRAMIDE PER 10 MG: Performed by: NURSE PRACTITIONER

## 2024-03-06 PROCEDURE — 71045 X-RAY EXAM CHEST 1 VIEW: CPT

## 2024-03-06 PROCEDURE — 25010000002 CALCIUM GLUCONATE 2-0.675 GM/100ML-% SOLUTION: Performed by: NURSE PRACTITIONER

## 2024-03-06 PROCEDURE — 25010000002 ACETAMINOPHEN 10 MG/ML SOLUTION: Performed by: NURSE PRACTITIONER

## 2024-03-06 PROCEDURE — 25010000002 CEFAZOLIN IN DEXTROSE 2-4 GM/100ML-% SOLUTION: Performed by: NURSE PRACTITIONER

## 2024-03-06 PROCEDURE — 82330 ASSAY OF CALCIUM: CPT | Performed by: NURSE PRACTITIONER

## 2024-03-06 PROCEDURE — 25010000002 MAGNESIUM SULFATE IN D5W 1G/100ML (PREMIX) 1-5 GM/100ML-% SOLUTION: Performed by: NURSE PRACTITIONER

## 2024-03-06 PROCEDURE — 97162 PT EVAL MOD COMPLEX 30 MIN: CPT

## 2024-03-06 PROCEDURE — 25010000002 MORPHINE PER 10 MG: Performed by: NURSE PRACTITIONER

## 2024-03-06 PROCEDURE — 99232 SBSQ HOSP IP/OBS MODERATE 35: CPT | Performed by: INTERNAL MEDICINE

## 2024-03-06 PROCEDURE — 83540 ASSAY OF IRON: CPT | Performed by: NURSE PRACTITIONER

## 2024-03-06 PROCEDURE — 85025 COMPLETE CBC W/AUTO DIFF WBC: CPT | Performed by: NURSE PRACTITIONER

## 2024-03-06 RX ORDER — FOLIC ACID 1 MG/1
1 TABLET ORAL DAILY
Status: DISCONTINUED | OUTPATIENT
Start: 2024-03-06 | End: 2024-03-10 | Stop reason: HOSPADM

## 2024-03-06 RX ORDER — GUAIFENESIN 600 MG/1
1200 TABLET, EXTENDED RELEASE ORAL EVERY 12 HOURS SCHEDULED
Status: DISCONTINUED | OUTPATIENT
Start: 2024-03-06 | End: 2024-03-10 | Stop reason: HOSPADM

## 2024-03-06 RX ORDER — DIPHENOXYLATE HYDROCHLORIDE AND ATROPINE SULFATE 2.5; .025 MG/1; MG/1
1 TABLET ORAL DAILY
Status: DISCONTINUED | OUTPATIENT
Start: 2024-03-06 | End: 2024-03-10 | Stop reason: HOSPADM

## 2024-03-06 RX ORDER — CHLORDIAZEPOXIDE HYDROCHLORIDE 5 MG/1
5 CAPSULE, GELATIN COATED ORAL 4 TIMES DAILY PRN
Status: DISCONTINUED | OUTPATIENT
Start: 2024-03-06 | End: 2024-03-10 | Stop reason: HOSPADM

## 2024-03-06 RX ORDER — NICOTINE POLACRILEX 4 MG
15 LOZENGE BUCCAL
Status: DISCONTINUED | OUTPATIENT
Start: 2024-03-06 | End: 2024-03-10 | Stop reason: HOSPADM

## 2024-03-06 RX ORDER — DEXTROSE MONOHYDRATE 25 G/50ML
25 INJECTION, SOLUTION INTRAVENOUS
Status: DISCONTINUED | OUTPATIENT
Start: 2024-03-06 | End: 2024-03-10 | Stop reason: HOSPADM

## 2024-03-06 RX ORDER — CALCIUM GLUCONATE 20 MG/ML
2000 INJECTION, SOLUTION INTRAVENOUS ONCE
Status: COMPLETED | OUTPATIENT
Start: 2024-03-06 | End: 2024-03-06

## 2024-03-06 RX ORDER — IBUPROFEN 600 MG/1
1 TABLET ORAL
Status: DISCONTINUED | OUTPATIENT
Start: 2024-03-06 | End: 2024-03-10 | Stop reason: HOSPADM

## 2024-03-06 RX ORDER — INSULIN LISPRO 100 [IU]/ML
2-9 INJECTION, SOLUTION INTRAVENOUS; SUBCUTANEOUS
Status: DISCONTINUED | OUTPATIENT
Start: 2024-03-06 | End: 2024-03-08

## 2024-03-06 RX ORDER — LORAZEPAM 2 MG/ML
1 INJECTION INTRAMUSCULAR EVERY 4 HOURS PRN
Status: DISCONTINUED | OUTPATIENT
Start: 2024-03-06 | End: 2024-03-07

## 2024-03-06 RX ORDER — MORPHINE SULFATE 2 MG/ML
2 INJECTION, SOLUTION INTRAMUSCULAR; INTRAVENOUS ONCE
Status: COMPLETED | OUTPATIENT
Start: 2024-03-06 | End: 2024-03-06

## 2024-03-06 RX ORDER — GABAPENTIN 100 MG/1
200 CAPSULE ORAL EVERY 8 HOURS SCHEDULED
Status: DISCONTINUED | OUTPATIENT
Start: 2024-03-06 | End: 2024-03-07

## 2024-03-06 RX ORDER — POTASSIUM CHLORIDE 29.8 MG/ML
20 INJECTION INTRAVENOUS ONCE
Status: COMPLETED | OUTPATIENT
Start: 2024-03-06 | End: 2024-03-06

## 2024-03-06 RX ADMIN — OXYCODONE HYDROCHLORIDE 10 MG: 5 TABLET ORAL at 09:36

## 2024-03-06 RX ADMIN — CALCIUM GLUCONATE 2000 MG: 20 INJECTION, SOLUTION INTRAVENOUS at 07:57

## 2024-03-06 RX ADMIN — CEFAZOLIN SODIUM 2 G: 2 INJECTION, SOLUTION INTRAVENOUS at 09:06

## 2024-03-06 RX ADMIN — CYCLOBENZAPRINE 10 MG: 10 TABLET, FILM COATED ORAL at 02:15

## 2024-03-06 RX ADMIN — MUPIROCIN: 20 OINTMENT TOPICAL at 08:19

## 2024-03-06 RX ADMIN — GABAPENTIN 200 MG: 100 CAPSULE ORAL at 22:13

## 2024-03-06 RX ADMIN — CYCLOBENZAPRINE 10 MG: 10 TABLET, FILM COATED ORAL at 09:36

## 2024-03-06 RX ADMIN — METOCLOPRAMIDE 10 MG: 5 INJECTION, SOLUTION INTRAMUSCULAR; INTRAVENOUS at 06:55

## 2024-03-06 RX ADMIN — MUPIROCIN 1 APPLICATION: 20 OINTMENT TOPICAL at 20:07

## 2024-03-06 RX ADMIN — CEFAZOLIN SODIUM 2 G: 2 INJECTION, SOLUTION INTRAVENOUS at 17:56

## 2024-03-06 RX ADMIN — MAGNESIUM SULFATE IN DEXTROSE 1 G: 10 INJECTION, SOLUTION INTRAVENOUS at 04:13

## 2024-03-06 RX ADMIN — ATORVASTATIN CALCIUM 40 MG: 20 TABLET, FILM COATED ORAL at 20:07

## 2024-03-06 RX ADMIN — OXYCODONE HYDROCHLORIDE 10 MG: 5 TABLET ORAL at 14:41

## 2024-03-06 RX ADMIN — POTASSIUM CHLORIDE 20 MEQ: 400 INJECTION, SOLUTION INTRAVENOUS at 06:56

## 2024-03-06 RX ADMIN — METOPROLOL TARTRATE 25 MG: 25 TABLET, FILM COATED ORAL at 20:07

## 2024-03-06 RX ADMIN — ACETAMINOPHEN 650 MG: 325 TABLET ORAL at 00:32

## 2024-03-06 RX ADMIN — OXYCODONE HYDROCHLORIDE 10 MG: 5 TABLET ORAL at 03:04

## 2024-03-06 RX ADMIN — Medication 1000 MCG: at 09:06

## 2024-03-06 RX ADMIN — METOPROLOL TARTRATE 25 MG: 25 TABLET, FILM COATED ORAL at 07:57

## 2024-03-06 RX ADMIN — CEFAZOLIN SODIUM 2 G: 2 INJECTION, SOLUTION INTRAVENOUS at 01:29

## 2024-03-06 RX ADMIN — FOLIC ACID 1 MG: 1 TABLET ORAL at 09:06

## 2024-03-06 RX ADMIN — Medication 100 MG: at 09:06

## 2024-03-06 RX ADMIN — PANTOPRAZOLE SODIUM 40 MG: 40 TABLET, DELAYED RELEASE ORAL at 06:54

## 2024-03-06 RX ADMIN — ACETAMINOPHEN 1000 MG: 10 INJECTION, SOLUTION INTRAVENOUS at 03:23

## 2024-03-06 RX ADMIN — CYCLOBENZAPRINE 10 MG: 10 TABLET, FILM COATED ORAL at 23:06

## 2024-03-06 RX ADMIN — MORPHINE SULFATE 2 MG: 2 INJECTION, SOLUTION INTRAMUSCULAR; INTRAVENOUS at 09:14

## 2024-03-06 RX ADMIN — GABAPENTIN 200 MG: 100 CAPSULE ORAL at 14:41

## 2024-03-06 RX ADMIN — HYDROCODONE BITARTRATE AND ACETAMINOPHEN 2 TABLET: 5; 325 TABLET ORAL at 22:13

## 2024-03-06 RX ADMIN — LORAZEPAM 1 MG: 2 INJECTION INTRAMUSCULAR; INTRAVENOUS at 20:07

## 2024-03-06 RX ADMIN — ASPIRIN 81 MG: 81 TABLET, COATED ORAL at 07:57

## 2024-03-06 RX ADMIN — GUAIFENESIN 1200 MG: 600 TABLET, EXTENDED RELEASE ORAL at 07:56

## 2024-03-06 RX ADMIN — Medication 1 TABLET: at 09:06

## 2024-03-06 RX ADMIN — OXYCODONE HYDROCHLORIDE 10 MG: 5 TABLET ORAL at 06:54

## 2024-03-06 RX ADMIN — GABAPENTIN 200 MG: 100 CAPSULE ORAL at 07:57

## 2024-03-06 RX ADMIN — 0.12% CHLORHEXIDINE GLUCONATE 15 ML: 1.2 RINSE ORAL at 07:57

## 2024-03-06 RX ADMIN — METOCLOPRAMIDE 10 MG: 5 INJECTION, SOLUTION INTRAMUSCULAR; INTRAVENOUS at 00:13

## 2024-03-06 RX ADMIN — MORPHINE SULFATE 1 MG: 2 INJECTION, SOLUTION INTRAMUSCULAR; INTRAVENOUS at 12:24

## 2024-03-06 RX ADMIN — DOCUSATE SODIUM 50MG AND SENNOSIDES 8.6MG 2 TABLET: 8.6; 5 TABLET, FILM COATED ORAL at 20:07

## 2024-03-06 RX ADMIN — MORPHINE SULFATE 1 MG: 2 INJECTION, SOLUTION INTRAMUSCULAR; INTRAVENOUS at 17:53

## 2024-03-06 RX ADMIN — MORPHINE SULFATE 1 MG: 2 INJECTION, SOLUTION INTRAMUSCULAR; INTRAVENOUS at 08:10

## 2024-03-06 RX ADMIN — 0.12% CHLORHEXIDINE GLUCONATE 15 ML: 1.2 RINSE ORAL at 20:08

## 2024-03-06 RX ADMIN — GUAIFENESIN 1200 MG: 600 TABLET, EXTENDED RELEASE ORAL at 20:07

## 2024-03-06 NOTE — PLAN OF CARE
Goal Outcome Evaluation:  Plan of Care Reviewed With: patient        Progress: improving  Outcome Evaluation: Pt agreeable to PT session this treatment session. Pt admitted to [facility] on 3/3/24 in preparation of CABG surgery. Pt received CABG x 6 on 3/5/24. Pt's PMH includes: severe multivessel coronary disease, underwent PCI in 2017. Pt also experienced non-ST elevation MI with angioplasty to RCA stent on 2/22/2024. Pt lives in a single-story home with basement and 3 CHRIS with spouse. Pt was independent with all I/ADLs prior to admission and did not use an AD at home. Pt required CGA/min A x 2 and TS to help manage line for STS transfer and during dynamic balance acitivites. Pt was able to ambulate 10 ft in room. Pt became mildly unsteady at times, and required min A to correct unsteadiness from PT. Pt fatigued quickly and c/o severe chest pain upon inhalation, nsg notified and aware. Pt was able to sit statically on edge of chair with SBA and performed 1 x 5 of cardiac protocol exercises. PT will continue to follow peripherally as pt progresses. Pt was left in recliner chair with call light in reach.      Anticipated Discharge Disposition (PT): home with home health, home with assist, home

## 2024-03-06 NOTE — PROGRESS NOTES
" LOS: 3 days   Patient Care Team:  Eveline Pelaez MD as PCP - General (Internal Medicine & Pediatrics)  James Moy MD as Consulting Physician (Interventional Cardiology)  Russell Cagle MD as Consulting Physician (Hematology and Oncology)  Eveline Pelaez MD as Referring Physician (Internal Medicine & Pediatrics)    Chief Complaint: post op     Subjective:  Symptoms:  No shortness of breath or chest pain.    Diet:  No nausea or vomiting.    Activity level: Impaired due to weakness.          Vital Signs  Temp:  [95.7 °F (35.4 °C)-100.2 °F (37.9 °C)] 99.5 °F (37.5 °C)  Heart Rate:  [] 98  Resp:  [14-18] 14  BP: ()/(44-78) 114/78  FiO2 (%):  [39 %-96 %] 40 %  Body mass index is 26.24 kg/m².    Intake/Output Summary (Last 24 hours) at 3/6/2024 0723  Last data filed at 3/6/2024 0400  Gross per 24 hour   Intake 3460.2 ml   Output 4705 ml   Net -1244.8 ml     No intake/output data recorded.    Chest tube drainage last 8 hours70/35        03/03/24  1751 03/04/24  2359 03/06/24  0600   Weight: 88.6 kg (195 lb 4.8 oz) 87.3 kg (192 lb 6.4 oz) 92.7 kg (204 lb 5.9 oz)         Objective:  Vital signs: (most recent): Blood pressure 114/78, pulse 98, temperature 99.5 °F (37.5 °C), resp. rate 14, height 188 cm (74\"), weight 92.7 kg (204 lb 5.9 oz), SpO2 99%.                Results Review:        WBC WBC   Date Value Ref Range Status   03/06/2024 7.01 3.40 - 10.80 10*3/mm3 Final   03/05/2024 8.94 3.40 - 10.80 10*3/mm3 Final   03/05/2024 6.85 3.40 - 10.80 10*3/mm3 Final   03/05/2024 4.15 3.40 - 10.80 10*3/mm3 Final   03/04/2024 4.67 3.40 - 10.80 10*3/mm3 Final   03/03/2024 4.71 3.40 - 10.80 10*3/mm3 Final      HGB Hemoglobin   Date Value Ref Range Status   03/06/2024 8.1 (L) 13.0 - 17.7 g/dL Final   03/05/2024 9.1 (L) 13.0 - 17.7 g/dL Final   03/05/2024 10.3 (L) 13.0 - 17.7 g/dL Final   03/05/2024 11.2 (L) 12.0 - 17.0 g/dL Final   03/05/2024 8.8 (L) 12.0 - 17.0 g/dL Final   03/05/2024 10.2 " (L) 12.0 - 17.0 g/dL Final   03/05/2024 9.9 (L) 12.0 - 17.0 g/dL Final   03/05/2024 9.2 (L) 12.0 - 17.0 g/dL Final   03/05/2024 12.6 12.0 - 17.0 g/dL Final   03/05/2024 12.0 (L) 13.0 - 17.7 g/dL Final   03/04/2024 12.2 (L) 13.0 - 17.7 g/dL Final   03/03/2024 12.5 (L) 13.0 - 17.7 g/dL Final      HCT Hematocrit   Date Value Ref Range Status   03/06/2024 23.6 (L) 37.5 - 51.0 % Final   03/05/2024 26.6 (L) 37.5 - 51.0 % Final   03/05/2024 29.8 (L) 37.5 - 51.0 % Final   03/05/2024 33 (L) 38 - 51 % Final   03/05/2024 26 (L) 38 - 51 % Final   03/05/2024 30 (L) 38 - 51 % Final   03/05/2024 29 (L) 38 - 51 % Final   03/05/2024 27 (L) 38 - 51 % Final   03/05/2024 37 (L) 38 - 51 % Final   03/05/2024 35.0 (L) 37.5 - 51.0 % Final   03/04/2024 35.7 (L) 37.5 - 51.0 % Final   03/03/2024 36.2 (L) 37.5 - 51.0 % Final      Platelets Platelets   Date Value Ref Range Status   03/06/2024 73 (L) 140 - 450 10*3/mm3 Final   03/05/2024 86 (L) 140 - 450 10*3/mm3 Final   03/05/2024 60 (L) 140 - 450 10*3/mm3 Final   03/05/2024 92 (L) 140 - 450 10*3/mm3 Final   03/04/2024 99 (L) 140 - 450 10*3/mm3 Final   03/04/2024 98 (L) 140 - 450 10*3/mm3 Final   03/03/2024 94 (L) 140 - 450 10*3/mm3 Final        PT/INR:    Protime   Date Value Ref Range Status   03/06/2024 16.0 (H) 11.7 - 14.2 Seconds Final   03/05/2024 16.1 (H) 11.7 - 14.2 Seconds Final   03/03/2024 12.9 11.7 - 14.2 Seconds Final   /  INR   Date Value Ref Range Status   03/06/2024 1.26 (H) 0.90 - 1.10 Final   03/05/2024 1.27 (H) 0.90 - 1.10 Final   03/03/2024 0.96 0.90 - 1.10 Final       Sodium Sodium   Date Value Ref Range Status   03/06/2024 141 136 - 145 mmol/L Final   03/05/2024 142 136 - 145 mmol/L Final   03/05/2024 137 136 - 145 mmol/L Final   03/05/2024 138 136 - 145 mmol/L Final   03/05/2024 138 136 - 145 mmol/L Final   03/04/2024 140 136 - 145 mmol/L Final   03/03/2024 137 136 - 145 mmol/L Final      Potassium Potassium   Date Value Ref Range Status   03/06/2024 3.8 3.5 - 5.2 mmol/L  Final   03/05/2024 4.1 3.5 - 5.2 mmol/L Final   03/05/2024 4.4 3.5 - 5.2 mmol/L Final     Comment:     Slight hemolysis detected by analyzer. Result may be falsely elevated.   03/05/2024 3.7 3.5 - 5.2 mmol/L Final   03/05/2024 3.8 3.5 - 5.2 mmol/L Final   03/04/2024 3.9 3.5 - 5.2 mmol/L Final   03/03/2024 3.9 3.5 - 5.2 mmol/L Final      Chloride Chloride   Date Value Ref Range Status   03/06/2024 109 (H) 98 - 107 mmol/L Final   03/05/2024 108 (H) 98 - 107 mmol/L Final   03/05/2024 106 98 - 107 mmol/L Final   03/05/2024 106 98 - 107 mmol/L Final   03/05/2024 104 98 - 107 mmol/L Final   03/04/2024 104 98 - 107 mmol/L Final   03/03/2024 102 98 - 107 mmol/L Final      Bicarbonate CO2   Date Value Ref Range Status   03/06/2024 22.0 22.0 - 29.0 mmol/L Final   03/05/2024 19.5 (L) 22.0 - 29.0 mmol/L Final   03/05/2024 20.1 (L) 22.0 - 29.0 mmol/L Final   03/05/2024 24.0 22.0 - 29.0 mmol/L Final   03/05/2024 20.0 (L) 22.0 - 29.0 mmol/L Final   03/04/2024 24.6 22.0 - 29.0 mmol/L Final   03/03/2024 25.0 22.0 - 29.0 mmol/L Final      BUN BUN   Date Value Ref Range Status   03/06/2024 8 8 - 23 mg/dL Final   03/05/2024 7 (L) 8 - 23 mg/dL Final   03/05/2024 6 (L) 8 - 23 mg/dL Final   03/05/2024 7 (L) 8 - 23 mg/dL Final   03/05/2024 8 8 - 23 mg/dL Final   03/04/2024 8 8 - 23 mg/dL Final   03/03/2024 13 8 - 23 mg/dL Final      Creatinine Creatinine   Date Value Ref Range Status   03/06/2024 1.36 (H) 0.76 - 1.27 mg/dL Final   03/05/2024 1.24 0.76 - 1.27 mg/dL Final   03/05/2024 1.19 0.76 - 1.27 mg/dL Final   03/05/2024 1.22 0.76 - 1.27 mg/dL Final   03/05/2024 1.15 0.76 - 1.27 mg/dL Final   03/04/2024 1.09 0.76 - 1.27 mg/dL Final   03/03/2024 1.23 0.76 - 1.27 mg/dL Final      Calcium Calcium   Date Value Ref Range Status   03/06/2024 8.4 (L) 8.6 - 10.5 mg/dL Final   03/05/2024 8.6 8.6 - 10.5 mg/dL Final   03/05/2024 8.6 8.6 - 10.5 mg/dL Final   03/05/2024 8.9 8.6 - 10.5 mg/dL Final   03/05/2024 8.9 8.6 - 10.5 mg/dL Final   03/04/2024  8.9 8.6 - 10.5 mg/dL Final   03/03/2024 9.3 8.6 - 10.5 mg/dL Final      Magnesium Magnesium   Date Value Ref Range Status   03/05/2024 2.6 (H) 1.6 - 2.4 mg/dL Final   03/05/2024 2.9 (H) 1.6 - 2.4 mg/dL Final   03/05/2024 2.0 1.6 - 2.4 mg/dL Final   03/03/2024 1.7 1.6 - 2.4 mg/dL Final          aspirin, 81 mg, Oral, Daily  atorvastatin, 40 mg, Oral, Nightly  ceFAZolin, 2 g, Intravenous, Q8H  chlorhexidine, 15 mL, Mouth/Throat, Q12H  enoxaparin, 40 mg, Subcutaneous, Daily  magnesium sulfate, 1 g, Intravenous, Q8H  metoclopramide, 10 mg, Intravenous, Q6H  metoprolol tartrate, 12.5 mg, Oral, Q12H  mupirocin, , Each Nare, BID  pantoprazole, 40 mg, Oral, QAM  potassium chloride, 20 mEq, Intravenous, Once  senna-docusate sodium, 2 tablet, Oral, Nightly  vitamin B-12, 1,000 mcg, Oral, Daily      clevidipine, 2-32 mg/hr  dexmedetomidine, 0.2-1.5 mcg/kg/hr, Last Rate: Stopped (03/05/24 2130)  DOPamine, 2-20 mcg/kg/min  EPINEPHrine, 0.02-0.1 mcg/kg/min  insulin, 0-100 Units/hr, Last Rate: 2.5 Units/hr (03/06/24 0545)  milrinone, 0.25-0.375 mcg/kg/min  niCARdipine, 5-15 mg/hr  nitroglycerin, 5-200 mcg/min  norepinephrine, 0.02-0.2 mcg/kg/min, Last Rate: Stopped (03/06/24 0615)  phenylephrine, 0.2-2 mcg/kg/min  propofol, 5-50 mcg/kg/min, Last Rate: Stopped (03/05/24 1351)  sodium chloride, 30 mL/hr, Last Rate: 30 mL/hr (03/05/24 1133)              CAD (coronary artery disease), native coronary artery    Coronary artery disease of native heart with stable angina pectoris      Assessment & Plan    - severe multi-vessel CAD with hx of PCI in 2017; recent NSTEMI with balloon opening of stent to RCA; LD Plavix 3/1- s/p CABGx6 [place]/RSVG POD#1 [place]  - hypertension  - hyperlipidemia--statin therapy  - CLL--- on Brukinsa; oncology consulted  - daily ETOH use--watch for s/sx of withdrawal. CIWA protocol  -TCP plt count 73, hold lovenox  -post op anemia- expected acute blood loss     Looks good this morning  Up in the chair  2L NC--  wean as able   Sinus rhythm rate in the 90s--will increase beta blocker  Replete calcium  Encourage pulmonary toilet-- continue IS, add flutter and mucinex  Pain is an issue-- will add some gabapentin  Hgb 8.1 this morning-- preoperatively it was 12-- will monitor closely, not symptomatic but will discuss with Dr. Mott if he wants any transfusions  Creatinine 1.36--monitor UOP closely  Will add some PRN librium and IV ativan for withdrawal-- add multivitamin, thiamine and folate  Mobilize  Transfer to stepdown       WINNIE Redd  03/06/24  07:23 EST

## 2024-03-06 NOTE — PROGRESS NOTES
[facility] CBC GROUP INPATIENT PROGRESS NOTE    Length of Stay:  3 days    CHIEF COMPLAINT:  CLL, thrombocytopenia    SUBJECTIVE:   Patient continues to do well postoperatively.  He has not experienced any bleeding issues.  No new complaints today.  Patient experiencing expected postoperative pain.    ROS:  Review of Systems   Comprehensive review of systems was obtained with pertinent positive findings as noted in the interval history above.  All other systems negative.      OBJECTIVE:  Vitals:    03/06/24 0700 03/06/24 0800 03/06/24 0900 03/06/24 1000   BP: 114/78 113/72 108/67 111/67   BP Location:   Right arm Right arm   Patient Position:   Lying Lying   Pulse: 98 98 102 97   Resp:  19 18 19   Temp:   98.9 °F (37.2 °C)    TempSrc:   Oral    SpO2: 99% 100% 99% 96%   Weight:       Height:             PHYSICAL EXAMINATION:  General: Alert and orient x 3 no distress  Chest/Lungs: Clear to auscultation bilaterally anteriorly  Heart: Regular rate and rhythm  Abdomen/GI: Soft nontender distended bowel signs present  Extremities: Trace bilateral lower extremity edema    Patient was examined today, unchanged from above    DIAGNOSTIC DATA:  Results Review:     I reviewed the patient's new clinical results.    Results from last 7 days   Lab Units 03/06/24  0308 03/05/24  1513 03/05/24  1125   WBC 10*3/mm3 7.01 8.94 6.85   HEMOGLOBIN g/dL 8.1* 9.1* 10.3*   HEMATOCRIT % 23.6* 26.6* 29.8*   PLATELETS 10*3/mm3 73* 86* 60*      Results from last 7 days   Lab Units 03/06/24  0308 03/05/24  1513 03/05/24  1125 03/04/24  0838 03/03/24  1745   SODIUM mmol/L 141 142 137   < > 137   POTASSIUM mmol/L 3.8 4.1 4.4   < > 3.9   CHLORIDE mmol/L 109* 108* 106   < > 102   CO2 mmol/L 22.0 19.5* 20.1*   < > 25.0   BUN mg/dL 8 7* 6*   < > 13   CREATININE mg/dL 1.36* 1.24 1.19   < > 1.23   CALCIUM mg/dL 8.4* 8.6 8.6   < > 9.3   BILIRUBIN mg/dL  --   --   --   --  0.7   ALK PHOS U/L  --   --   --   --  76   ALT (SGPT) U/L  --   --   --   --   16   AST (SGOT) U/L  --   --   --   --  44*   GLUCOSE mg/dL 110* 118* 132*   < > 137*    < > = values in this interval not displayed.      Lab Results   Component Value Date    NEUTROABS 5.12 03/06/2024     Results from last 7 days   Lab Units 03/06/24  0308 03/05/24  1125 03/04/24  2352 03/04/24  1609 03/04/24  0129 03/03/24  1745   INR  1.26* 1.27*  --   --   --  0.96   APTT seconds  --  29.1 63.4* 61.0*   < > 25.6    < > = values in this interval not displayed.     Results from last 7 days   Lab Units 03/05/24  1513   MAGNESIUM mg/dL 2.6*             Assessment & Plan   ASSESSMENT/PLAN:  This is a 68 y.o. male with:     *Coronary artery disease status post non-ST elevation MI with plans for CABG  The patient had prior history of severe multivessel coronary disease, underwent PCI in 2017.    He experienced non-ST elevation MI with angioplasty to RCA stent on 2/22/2024 and was placed on Plavix after previously receiving Brilinta.    Subsequently discontinued Plavix on 3/1/2024 in preparation for CABG.    Patient admitted on heparin drip 3/4/2024  Patient underwent CABG 3/5/2024     *CLL  Patient has history of CLL, is followed in the outpatient setting by Dr. Calge in our practice.    He was originally diagnosed in 2005 via peripheral blood flow cytometry.    He was managed initially with a course of observation.    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    Gradual escalation in WBC, gradual development of thrombocytopenia.    Patient initiated treatment in February 2023 with Zanubrutinib.    Patient with gradual response to treatment, no significant side effects.    Labs prior to current hospitalization 2/6/2024 with WBC 9.65, differential 33 segs, 60 lymphs, hemoglobin 14.4, platelet count 105,000.    He was receiving prophylactic acyclovir and Bactrim with treatment  He was notified to discontinue Zanubrutinib on 2/27/2024 due to upcoming CABG with plans to  resume 2 weeks postoperatively (last dose Zanubrutinib taken on 2/27/2024).  Patient also discontinued acyclovir and Bactrim prophylaxis at that time.     *Thrombocytopenia  Patient with longstanding thrombocytopenia with platelet count in the 90-low 100,000 range  Unclear whether thrombocytopenia is related to marrow involvement from CLL, ITP related to CLL, or splenomegaly related to CLL  Labs on 3/5/2024 with B12 low normal at 324, folate 14.3  Additional labs on 3//24 with elevated IPF at 12.7%, suspicion for ITP associated with CLL  Monitor platelet count postoperatively, expect some degree of consumption with upcoming cardiopulmonary bypass.  Begin oral B12 1000 mcg daily due to low normal level  The count today has declined slightly further down to 73,000.  This is expected following cardiopulmonary bypass and CABG.  No current bleeding issues, no need for transfusion.  Will continue to monitor expectantly.    *Postoperative anemia  Hemoglobin preoperatively was 12.0 on 3/5/2024  Hemoglobin has declined as expected postoperatively, currently down to 8.1.  Additional labs today with iron 22, ferritin 114, iron saturation 10%, TIBC 212.  Patient does appear to have potentially mild iron deficiency.  Would recommend treatment with oral iron sulfate daily however we will hold off until he has return of bowel function postoperatively to begin this    *History of alcohol abuse  Monitor for withdrawal     *Viral prophylaxis  Acyclovir on hold     *PCP prophylaxis  Bactrim on hold        PLAN:   Patient remains off of zanubrutinib (discontinued 2/27/2024 preoperatively) for CLL due to potential bleeding risks with upcoming surgery.  Consider resuming treatment 2 weeks postoperatively  Prophylaxis with acyclovir and Bactrim on hold  Continue oral B12 1000 mcg daily  Expected exacerbation of chronic thrombocytopenia with consumption from upcoming cardiopulmonary bypass  Plan to begin oral iron sulfate 325 mg daily once  patient's bowel function returns postoperatively  Defer decision for postoperative transfusion to CT surgery  Daily CBC/differential    Discussed with patient and wife at bedside.         Christopher Nugent MD

## 2024-03-06 NOTE — PLAN OF CARE
Goal Outcome Evaluation:On going  Pt S/P CABGx6. POD#1, CT, IJ, EPM and hebert in place. Pain 10/10 treated per MAR. On 2L of O2. Diet advanced to full liquid.

## 2024-03-06 NOTE — PLAN OF CARE
Goal Outcome Evaluation:      Pt a&o x4--sinus rhythm 90s --nasal cannula 4l/min --pt pain level managed with prn medication--pt on Levophed and Insulin gtt--tolerating H2O and ice chips  C.I 3.2 C.O. 7.0     Progress: improving

## 2024-03-06 NOTE — CONSULTS
Met with patient and his wife to discuss the benefits of cardiac rehab. Pt states that he attended Cardiac rehab over 10 years ago. Provided phase II information along with the contact information for cardiac rehab here at [facility]. Explained if receiving home health would not be able to attend cardiac rehab until finished with home health. Will call and schedule after discharge.

## 2024-03-06 NOTE — THERAPY EVALUATION
Patient Name: Francisco Espino  : 1955    MRN: [id number]                              Today's Date: 3/6/2024       Admit Date: 3/3/2024    Visit Dx:     ICD-10-CM ICD-9-CM   1. 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    Varicose veins    Alcohol abuse    Coronary artery disease involving native coronary artery of native heart with angina pectoris    Dyslipidemia    Myocardial infarction    S/P angioplasty with stent    ST elevation (STEMI) myocardial infarction involving right coronary artery    Elevated liver function tests    Pneumonia    Positive colorectal cancer screening using Cologuard test    Kidney disease    High risk medication use    Bence Dawson proteinuria    Coronary artery disease of native heart with stable angina pectoris    CAD (coronary artery disease), native coronary artery     Past Medical History:   Diagnosis Date    Abnormal liver function test     Alcohol abuse     CLL (chronic lymphocytic leukemia)     Coronary artery disease     stent    Heart disease     History of pneumonia     2019    Hyperlipidemia     Hypertension     Inguinal hernia     left side to have surgery 3/28/19    Myocardial infarction     Screen for colon cancer 2016    Negative Cologaurd    Screening PSA (prostate specific antigen) 2013    .5    Thrombocytopenia      Past Surgical History:   Procedure Laterality Date    CARDIAC CATHETERIZATION  2017    CATARACT EXTRACTION Bilateral     COLONOSCOPY N/A 2006    Normal, repeat in 10 years, Dr. Preethi Gonzalez    COLONOSCOPY N/A 7/15/2020    Procedure: COLONOSCOPY TO CECUM & T.I. WITH COLD SNARE POLYPECTOMIES, CLIP PLACEMENT X 2;  Surgeon: Yennifer Salazar MD;  Location: [facility] ENDOSCOPY;  Service: Gastroenterology;  Laterality: N/A;  PRE- POSITIVE  COLOGUARD  POST- COLON POLYPS, DIVERTICULOSIS, HEMORRHOIDS    CORONARY ANGIOPLASTY WITH STENT PLACEMENT N/A 05/09/2017    Left heart catheterization, Selective native vessel coronary arteriography, Left ventriculography, Successful percutaneous intervention to the 100% occluded right coronary artery with a 3.5 x 38 mm Synergy drug-eluting stent (post-dilated w/ a 4.0 x 20 mm NC Emerge balloon), Selective right femoral angiography, Successful Perclose arteriotomy closure. Dr. James Pireson    CORONARY ARTERY BYPASS GRAFT N/A 3/5/2024    Procedure: STERNOTOMY, CORONARY ARTERY BYPASS GRAFTING TRANSESOPHAGEAL ECHOCARDIOGRAM X6, UTILIZING THE LEFT AUGUSTUS AND RIGHT SAPHENOUS VEIN.  ADRIANE, PRP WITH ANESTHESIA;  Surgeon: Jr Jean Marie Mott MD;  Location: [facility];  Service: Cardiothoracic;  Laterality: N/A;    INGUINAL HERNIA REPAIR Left     INGUINAL HERNIA REPAIR Right     x2    INGUINAL HERNIA REPAIR Left 3/28/2019    Procedure: LEFT INGUINAL HERNIA REPAIR LAPAROSCOPIC;  Surgeon: Preethi Gonzalez MD;  [place] [facility] [place] [facility]  Service: General    LAPAROSCOPIC INGUINAL HERNIA REPAIR Right 10/03/2002    w/ extra peritoneal Goe-Benton mesh (transperitoneal repair), Dr. Preethi Gonzalez    REFRACTIVE SURGERY Bilateral     RETINAL DETACHMENT SURGERY Right 07/02/2013      General Information       Row Name 03/06/24 0943          Physical Therapy Time and Intention    Document Type evaluation  -MS (r) MH (t) MS (c)     Mode of Treatment individual therapy;physical therapy  -MS (r) MH (t) MS (c)       Row Name 03/06/24 0943          General Information    Patient Profile Reviewed yes  -MS (r) MH (t) MS (c)     Prior Level of Function independent:;ADL's;bed mobility;gait;driving  -MS (r) MH (t) MS (c)     Existing Precautions/Restrictions cardiac;fall;oxygen therapy device and L/min;sternal  external pacemaker, CTs, CABG x 6  -MS (r) MH (t) MS (c)     Barriers to Rehab medically complex  -MS (r) MH (t) MS (c)       Row Name  03/06/24 0943          Living Environment    People in Home spouse  -MS (r) MH (t) MS (c)       Row Name 03/06/24 0943          Home Main Entrance    Number of Stairs, Main Entrance two;three  -MS (r) MH (t) MS (c)       Row Name 03/06/24 0943          Stairs Within Home, Primary    Stairs Comment, Within Home, Primary Pt lives in a single story home with basement and 2-3 CHRIS.  -MS (r) MH (t) MS (c)       Row Name 03/06/24 0943          Cognition    Orientation Status (Cognition) oriented x 4;person;place;situation;time  -MS (r) MH (t) MS (c)       Row Name 03/06/24 0943          Safety Issues, Functional Mobility    Safety Issues Affecting Function (Mobility) safety precaution awareness;safety precautions follow-through/compliance;sequencing abilities;insight into deficits/self-awareness  -MS (r) MH (t) MS (c)     Impairments Affecting Function (Mobility) balance;endurance/activity tolerance;shortness of breath;postural/trunk control  -MS (r) MH (t) MS (c)     Comment, Safety Issues/Impairments (Mobility) Pt had non skid socked donned for safety.  -MS (r) MH (t) MS (c)               User Key  (r) = Recorded By, (t) = Taken By, (c) = Cosigned By      Initials Name Provider Type    Marcia Cosby, PT Physical Therapist     Yue Oneal, PT Student PT Student                   Mobility       Row Name 03/06/24 0946          Bed Mobility    Comment, (Bed Mobility) Bed mobility not tested this date.  -MS (r) MH (t) MS (c)       Row Name 03/06/24 0946          Bed-Chair Transfer    Bed-Chair Angelina (Transfers) not tested  -MS (r) MH (t) MS (c)       Row Name 03/06/24 0946          Sit-Stand Transfer    Sit-Stand Angelina (Transfers) contact guard;minimum assist (75% patient effort);1 person to manage equipment;2 person assist;nonverbal cues (demo/gesture);verbal cues  -MS (r) MH (t) MS (c)     Comment, (Sit-Stand Transfer) TS needed to help manage lines. Pt had no overt LOB, however was mildly  unsteady.  -MS (r) MH (t) MS (c)       Row Name 03/06/24 0946          Gait/Stairs (Locomotion)    Jennings Level (Gait) contact guard;minimum assist (75% patient effort);verbal cues;nonverbal cues (demo/gesture);2 person assist;1 person to manage equipment  -MS (r) MH (t) MS (c)     Patient was able to Ambulate yes  -MS (r) MH (t) MS (c)     Distance in Feet (Gait) 10 ft  -MS (r) MH (t) MS (c)     Deviations/Abnormal Patterns (Gait) base of support, narrow;gait speed decreased;kandice decreased  -MS (r) MH (t) MS (c)     Bilateral Gait Deviations forward flexed posture  -MS (r) MH (t) MS (c)     Jennings Level (Stairs) not tested  -MS (r) MH (t) MS (c)     Comment, (Gait/Stairs) Pt was mildly unsteady during ambulation and fatigued very quickly. Pt needed a short sitting rest break after ambulation. Vitals WNL.  -MS (r) MH (t) MS (c)               User Key  (r) = Recorded By, (t) = Taken By, (c) = Cosigned By      Initials Name Provider Type    Marcia Cosby, PT Physical Therapist     Yue Oneal, PT Student PT Student                   Obj/Interventions       Row Name 03/06/24 0948          Range of Motion Comprehensive    General Range of Motion no range of motion deficits identified  -MS (r) MH (t) MS (c)       Row Name 03/06/24 0948          Strength Comprehensive (MMT)    General Manual Muscle Testing (MMT) Assessment no strength deficits identified  -MS (r) MH (t) MS (c)       Row Name 03/06/24 0948          Motor Skills    Therapeutic Exercise --  cardiac protocol x 10, 1 STS  -MS (r) MH (t) MS (c)       Row Name 03/06/24 0948          Balance    Balance Assessment sitting static balance;sit to stand dynamic balance;standing static balance;standing dynamic balance  -MS (r) MH (t) MS (c)     Static Sitting Balance verbal cues;non-verbal cues (demo/gesture);standby assist  -MS (r) MH (t) MS (c)     Position, Sitting Balance unsupported;sitting in chair  -MS (r) MH (t) MS (c)     Sit to  Stand Dynamic Balance contact guard;minimal assist;non-verbal cues (demo/gesture);verbal cues;2-person assist;1 person to manage equipment  -MS (r) MH (t) MS (c)     Static Standing Balance verbal cues;non-verbal cues (demo/gesture);contact guard;2-person assist;1 person to manage equipment  -MS (r) MH (t) MS (c)     Dynamic Standing Balance verbal cues;non-verbal cues (demo/gesture);contact guard;2-person assist;1 person to manage equipment  -MS (r) MH (t) MS (c)     Balance Interventions sitting;standing;sit to stand;static;dynamic  -MS (r) MH (t) MS (c)     Comment, Balance Pt required min A at times due to mild unsteadiness noted. Pt needed mod VCs while navigating turns.  -MS (r) MH (t) MS (c)               User Key  (r) = Recorded By, (t) = Taken By, (c) = Cosigned By      Initials Name Provider Type    Marcia Cosby, PT Physical Therapist    Yue Davenport, PT Student PT Student                   Goals/Plan    No documentation.                  Clinical Impression       Row Name 03/06/24 0951          Pain    Pretreatment Pain Rating 9/10  -MS (r) MH (t) MS (c)     Posttreatment Pain Rating 10/10  -MS (r) MH (t) MS (c)     Pre/Posttreatment Pain Comment Pt c/o chest pain upon inhalation. Nsg notified and aware.  -MS (r) MH (t) MS (c)     Pain Intervention(s) Repositioned;Nursing Notified;Rest  -MS (r) MH (t) MS (c)       Row Name 03/06/24 0951          Plan of Care Review    Plan of Care Reviewed With patient  -MS (r) MH (t) MS (c)     Progress improving  -MS (r) MH (t) MS (c)     Outcome Evaluation Pt agreeable to PT session this treatment session. Pt admitted to [facility] on 3/3/24 in preparation of CABG surgery. Pt received CABG x 6 on 3/5/24. Pt's PMH includes: severe multivessel coronary disease, underwent PCI in 2017. Pt also experienced non-ST elevation MI with angioplasty to RCA stent on 2/22/2024. Pt lives in a single-story home with basement and 3 CHRIS with spouse. Pt was independent with  all I/ADLs prior to admission and did not use an AD at home. Pt required CGA/min A x 2 and TS to help manage line for STS transfer and during dynamic balance acitivites. Pt was able to ambulate 10 ft in room. Pt became mildly unsteady at times, and required min A to correct unsteadiness from PT. Pt fatigued quickly and c/o severe chest pain upon inhalation, nsg notified and aware. Pt was able to sit statically on edge of chair with SBA and performed 1 x 5 of cardiac protocol exercises. PT will continue to follow peripherally as pt progresses. Pt was left in recliner chair with call light in reach.  -MS (r) MH (t) MS (c)       Row Name 03/06/24 1153 03/06/24 0951       Therapy Assessment/Plan (PT)    Rehab Potential (PT) -- good, to achieve stated therapy goals  -MS (r) MH (t) MS (c)    Criteria for Skilled Interventions Met (PT) -- yes  -MS (r) MH (t) MS (c)    Therapy Frequency (PT) 6 times/wk  -MS daily  -MS (r) MH (t) MS (c)      Row Name 03/06/24 0951          Vital Signs    Pre Systolic BP Rehab 113  -MS (r) MH (t) MS (c)     Pre Treatment Diastolic BP 72  -MS (r) MH (t) MS (c)     Pretreatment Heart Rate (beats/min) 100  -MS (r) MH (t) MS (c)     Pre SpO2 (%) 97  -MS (r) MH (t) MS (c)     O2 Delivery Pre Treatment nasal cannula  3 L  -MS (r) MH (t) MS (c)     O2 Delivery Intra Treatment nasal cannula  3 L  -MS (r) MH (t) MS (c)     Post SpO2 (%) 96  -MS (r) MH (t) MS (c)     O2 Delivery Post Treatment nasal cannula  3 L  -MS (r) MH (t) MS (c)     Pre Patient Position Sitting  -MS (r) MH (t) MS (c)     Intra Patient Position Standing  -MS (r) MH (t) MS (c)     Post Patient Position Sitting  -MS (r) MH (t) MS (c)       Row Name 03/06/24 0951          Positioning and Restraints    Pre-Treatment Position sitting in chair/recliner  -MS (r) MH (t) MS (c)     Post Treatment Position chair  -MS (r) MH (t) MS (c)     In Chair reclined;notified nsg;sitting;encouraged to call for assist;exit alarm on;call light within  reach;with nsg  -MS (r) MH (t) MS (c)               User Key  (r) = Recorded By, (t) = Taken By, (c) = Cosigned By      Initials Name Provider Type    MS HugginsMarcia, PT Physical Therapist    Yue Davenport, PT Student PT Student                   Outcome Measures       Row Name 03/06/24 1006          How much help from another person do you currently need...    Turning from your back to your side while in flat bed without using bedrails? 4  -MS (r) MH (t) MS (c)     Moving from lying on back to sitting on the side of a flat bed without bedrails? 4  -MS (r) MH (t) MS (c)     Moving to and from a bed to a chair (including a wheelchair)? 3  -MS (r) MH (t) MS (c)     Standing up from a chair using your arms (e.g., wheelchair, bedside chair)? 3  -MS (r) MH (t) MS (c)     Climbing 3-5 steps with a railing? 2  -MS (r) MH (t) MS (c)     To walk in hospital room? 3  -MS (r) MH (t) MS (c)     AM-PAC 6 Clicks Score (PT) 19  -MS (r) MH (t)     Highest Level of Mobility Goal 6 --> Walk 10 steps or more  -MS (r) MH (t)       Row Name 03/06/24 1006          Functional Assessment    Outcome Measure Options AM-PAC 6 Clicks Basic Mobility (PT)  -MS (r) MH (t) MS (c)               User Key  (r) = Recorded By, (t) = Taken By, (c) = Cosigned By      Initials Name Provider Type    MS HugginsMarcia, PT Physical Therapist    Yue Davenport, PT Student PT Student                                 Physical Therapy Education       Title: PT OT SLP Therapies (Done)       Topic: Physical Therapy (Done)       Point: Mobility training (Done)       Learning Progress Summary             Patient Acceptance, E,TB, VU by  at 3/6/2024 1007                         Point: Home exercise program (Done)       Learning Progress Summary             Patient Acceptance, E,TB, VU by  at 3/6/2024 1007                         Point: Body mechanics (Done)       Learning Progress Summary             Patient Acceptance, E,TB, VU by  at  3/6/2024 1007                         Point: Precautions (Done)       Learning Progress Summary             Patient Acceptance, E,TB, VU by  at 3/6/2024 1007                                         User Key       Initials Effective Dates Name Provider Type Discipline     01/24/24 -  Yue Oneal, PT Student PT Student PT                  PT Recommendation and Plan     Plan of Care Reviewed With: patient  Progress: improving  Outcome Evaluation: Pt agreeable to PT session this treatment session. Pt admitted to [facility] on 3/3/24 in preparation of CABG surgery. Pt received CABG x 6 on 3/5/24. Pt's PMH includes: severe multivessel coronary disease, underwent PCI in 2017. Pt also experienced non-ST elevation MI with angioplasty to RCA stent on 2/22/2024. Pt lives in a single-story home with basement and 3 CHRIS with spouse. Pt was independent with all I/ADLs prior to admission and did not use an AD at home. Pt required CGA/min A x 2 and TS to help manage line for STS transfer and during dynamic balance acitivites. Pt was able to ambulate 10 ft in room. Pt became mildly unsteady at times, and required min A to correct unsteadiness from PT. Pt fatigued quickly and c/o severe chest pain upon inhalation, nsg notified and aware. Pt was able to sit statically on edge of chair with SBA and performed 1 x 5 of cardiac protocol exercises. PT will continue to follow peripherally as pt progresses. Pt was left in recliner chair with call light in reach.     Time Calculation:         PT Charges       Row Name 03/06/24 1007             Time Calculation    Start Time 0851  -MS (r) MH (t) MS (c)      Stop Time 0908  -MS (r) MH (t) MS (c)      Time Calculation (min) 17 min  -MS (r) MH (t)      PT Received On 03/06/24  -MS (r) MH (t) MS (c)      PT - Next Appointment 03/07/24  -MS (r) MH (t) MS (c)         Time Calculation- PT    Total Timed Code Minutes- PT 17 minute(s)  -MS (r) MH (t) MS (c)         Timed Charges    95056 - PT  Therapeutic Exercise Minutes 5  -MS (r) MH (t) MS (c)      28864 - PT Therapeutic Activity Minutes 12  -MS (r) MH (t) MS (c)         Total Minutes    Timed Charges Total Minutes 17  -MS (r) MH (t)       Total Minutes 17  -MS (r) MH (t)                User Key  (r) = Recorded By, (t) = Taken By, (c) = Cosigned By      Initials Name Provider Type    MS HugginsMarcia, PT Physical Therapist    Yue Davenport, PT Student PT Student                  Therapy Charges for Today       Code Description Service Date Service Provider Modifiers Qty    39162781617  PT EVAL MOD COMPLEXITY 3 3/6/2024 Yue Oneal, PT Student GP 1    [id number]  PT THERAPEUTIC ACT EA 15 MIN 3/6/2024 Yue Oneal, PT Student GP 1            PT G-Codes  Outcome Measure Options: AM-PAC 6 Clicks Basic Mobility (PT)  AM-PAC 6 Clicks Score (PT): 19  PT Discharge Summary  Anticipated Discharge Disposition (PT): home with home health, home with assist, home    Yeu Oneal PT Student  3/6/2024

## 2024-03-07 ENCOUNTER — APPOINTMENT (OUTPATIENT)
Dept: GENERAL RADIOLOGY | Facility: HOSPITAL | Age: 69
DRG: 236 | End: 2024-03-07
Payer: COMMERCIAL

## 2024-03-07 LAB
ANION GAP SERPL CALCULATED.3IONS-SCNC: 11.9 MMOL/L (ref 5–15)
BUN SERPL-MCNC: 14 MG/DL (ref 8–23)
BUN/CREAT SERPL: 8 (ref 7–25)
CALCIUM SPEC-SCNC: 8.4 MG/DL (ref 8.6–10.5)
CHLORIDE SERPL-SCNC: 101 MMOL/L (ref 98–107)
CO2 SERPL-SCNC: 21.1 MMOL/L (ref 22–29)
CREAT SERPL-MCNC: 1.74 MG/DL (ref 0.76–1.27)
DEPRECATED RDW RBC AUTO: 45.4 FL (ref 37–54)
EGFRCR SERPLBLD CKD-EPI 2021: 41.9 ML/MIN/1.73
ERYTHROCYTE [DISTWIDTH] IN BLOOD BY AUTOMATED COUNT: 12.2 % (ref 12.3–15.4)
GLUCOSE BLDC GLUCOMTR-MCNC: 126 MG/DL (ref 70–130)
GLUCOSE BLDC GLUCOMTR-MCNC: 140 MG/DL (ref 70–130)
GLUCOSE BLDC GLUCOMTR-MCNC: 154 MG/DL (ref 70–130)
GLUCOSE BLDC GLUCOMTR-MCNC: 167 MG/DL (ref 70–130)
GLUCOSE SERPL-MCNC: 140 MG/DL (ref 65–99)
HCT VFR BLD AUTO: 23.3 % (ref 37.5–51)
HGB BLD-MCNC: 7.9 G/DL (ref 13–17.7)
MCH RBC QN AUTO: 35 PG (ref 26.6–33)
MCHC RBC AUTO-ENTMCNC: 33.9 G/DL (ref 31.5–35.7)
MCV RBC AUTO: 103.1 FL (ref 79–97)
PLATELET # BLD AUTO: 77 10*3/MM3 (ref 140–450)
PMV BLD AUTO: 11.9 FL (ref 6–12)
POTASSIUM SERPL-SCNC: 4.1 MMOL/L (ref 3.5–5.2)
QT INTERVAL: 345 MS
QTC INTERVAL: 422 MS
RBC # BLD AUTO: 2.26 10*6/MM3 (ref 4.14–5.8)
SODIUM SERPL-SCNC: 134 MMOL/L (ref 136–145)
WBC NRBC COR # BLD AUTO: 9.31 10*3/MM3 (ref 3.4–10.8)

## 2024-03-07 PROCEDURE — 97530 THERAPEUTIC ACTIVITIES: CPT

## 2024-03-07 PROCEDURE — 93005 ELECTROCARDIOGRAM TRACING: CPT | Performed by: NURSE PRACTITIONER

## 2024-03-07 PROCEDURE — 36430 TRANSFUSION BLD/BLD COMPNT: CPT

## 2024-03-07 PROCEDURE — P9016 RBC LEUKOCYTES REDUCED: HCPCS

## 2024-03-07 PROCEDURE — 25010000002 FUROSEMIDE PER 20 MG: Performed by: INTERNAL MEDICINE

## 2024-03-07 PROCEDURE — 85027 COMPLETE CBC AUTOMATED: CPT | Performed by: NURSE PRACTITIONER

## 2024-03-07 PROCEDURE — 86900 BLOOD TYPING SEROLOGIC ABO: CPT

## 2024-03-07 PROCEDURE — 25010000002 CEFAZOLIN IN DEXTROSE 2-4 GM/100ML-% SOLUTION: Performed by: NURSE PRACTITIONER

## 2024-03-07 PROCEDURE — 82948 REAGENT STRIP/BLOOD GLUCOSE: CPT

## 2024-03-07 PROCEDURE — 80048 BASIC METABOLIC PNL TOTAL CA: CPT | Performed by: NURSE PRACTITIONER

## 2024-03-07 PROCEDURE — 99232 SBSQ HOSP IP/OBS MODERATE 35: CPT | Performed by: INTERNAL MEDICINE

## 2024-03-07 PROCEDURE — 93010 ELECTROCARDIOGRAM REPORT: CPT | Performed by: INTERNAL MEDICINE

## 2024-03-07 PROCEDURE — 25010000002 CALCIUM GLUCONATE 2-0.675 GM/100ML-% SOLUTION: Performed by: NURSE PRACTITIONER

## 2024-03-07 PROCEDURE — 71045 X-RAY EXAM CHEST 1 VIEW: CPT

## 2024-03-07 PROCEDURE — 25010000002 ENOXAPARIN PER 10 MG: Performed by: NURSE PRACTITIONER

## 2024-03-07 RX ORDER — LORAZEPAM 2 MG/ML
0.5 INJECTION INTRAMUSCULAR EVERY 4 HOURS PRN
Status: DISCONTINUED | OUTPATIENT
Start: 2024-03-07 | End: 2024-03-10 | Stop reason: HOSPADM

## 2024-03-07 RX ORDER — POLYETHYLENE GLYCOL 3350 17 G/17G
17 POWDER, FOR SOLUTION ORAL DAILY
Status: DISCONTINUED | OUTPATIENT
Start: 2024-03-08 | End: 2024-03-10 | Stop reason: HOSPADM

## 2024-03-07 RX ORDER — ATENOLOL 25 MG/1
12.5 TABLET ORAL
Status: DISCONTINUED | OUTPATIENT
Start: 2024-03-07 | End: 2024-03-09

## 2024-03-07 RX ORDER — DOCUSATE SODIUM 100 MG/1
100 CAPSULE, LIQUID FILLED ORAL 2 TIMES DAILY
Status: DISCONTINUED | OUTPATIENT
Start: 2024-03-07 | End: 2024-03-10 | Stop reason: HOSPADM

## 2024-03-07 RX ORDER — FUROSEMIDE 10 MG/ML
40 INJECTION INTRAMUSCULAR; INTRAVENOUS ONCE
Status: COMPLETED | OUTPATIENT
Start: 2024-03-07 | End: 2024-03-07

## 2024-03-07 RX ORDER — AMIODARONE HYDROCHLORIDE 200 MG/1
300 TABLET ORAL EVERY 12 HOURS SCHEDULED
Status: DISCONTINUED | OUTPATIENT
Start: 2024-03-07 | End: 2024-03-10

## 2024-03-07 RX ORDER — CYCLOBENZAPRINE HCL 10 MG
5 TABLET ORAL EVERY 8 HOURS PRN
Status: DISCONTINUED | OUTPATIENT
Start: 2024-03-07 | End: 2024-03-09

## 2024-03-07 RX ORDER — CALCIUM GLUCONATE 20 MG/ML
2000 INJECTION, SOLUTION INTRAVENOUS ONCE
Status: COMPLETED | OUTPATIENT
Start: 2024-03-07 | End: 2024-03-07

## 2024-03-07 RX ADMIN — MUPIROCIN: 20 OINTMENT TOPICAL at 20:36

## 2024-03-07 RX ADMIN — DOCUSATE SODIUM 50MG AND SENNOSIDES 8.6MG 2 TABLET: 8.6; 5 TABLET, FILM COATED ORAL at 20:36

## 2024-03-07 RX ADMIN — Medication 1 TABLET: at 09:44

## 2024-03-07 RX ADMIN — ATORVASTATIN CALCIUM 40 MG: 20 TABLET, FILM COATED ORAL at 20:36

## 2024-03-07 RX ADMIN — MUPIROCIN 1 APPLICATION: 20 OINTMENT TOPICAL at 09:46

## 2024-03-07 RX ADMIN — GUAIFENESIN 1200 MG: 600 TABLET, EXTENDED RELEASE ORAL at 20:36

## 2024-03-07 RX ADMIN — CALCIUM GLUCONATE 2000 MG: 20 INJECTION, SOLUTION INTRAVENOUS at 09:45

## 2024-03-07 RX ADMIN — CYCLOBENZAPRINE 5 MG: 10 TABLET, FILM COATED ORAL at 20:36

## 2024-03-07 RX ADMIN — GABAPENTIN 200 MG: 100 CAPSULE ORAL at 06:21

## 2024-03-07 RX ADMIN — 0.12% CHLORHEXIDINE GLUCONATE 15 ML: 1.2 RINSE ORAL at 20:36

## 2024-03-07 RX ADMIN — OXYCODONE HYDROCHLORIDE 10 MG: 5 TABLET ORAL at 06:24

## 2024-03-07 RX ADMIN — ASPIRIN 81 MG: 81 TABLET, COATED ORAL at 09:44

## 2024-03-07 RX ADMIN — Medication 1000 MCG: at 09:44

## 2024-03-07 RX ADMIN — ATENOLOL 12.5 MG: 25 TABLET ORAL at 13:56

## 2024-03-07 RX ADMIN — ALPRAZOLAM 0.25 MG: 0.25 TABLET ORAL at 20:41

## 2024-03-07 RX ADMIN — DOCUSATE SODIUM 100 MG: 100 CAPSULE, LIQUID FILLED ORAL at 09:58

## 2024-03-07 RX ADMIN — 0.12% CHLORHEXIDINE GLUCONATE 15 ML: 1.2 RINSE ORAL at 09:44

## 2024-03-07 RX ADMIN — GUAIFENESIN 1200 MG: 600 TABLET, EXTENDED RELEASE ORAL at 09:44

## 2024-03-07 RX ADMIN — AMIODARONE HYDROCHLORIDE 300 MG: 200 TABLET ORAL at 20:33

## 2024-03-07 RX ADMIN — HYDROCODONE BITARTRATE AND ACETAMINOPHEN 2 TABLET: 5; 325 TABLET ORAL at 22:26

## 2024-03-07 RX ADMIN — FOLIC ACID 1 MG: 1 TABLET ORAL at 09:44

## 2024-03-07 RX ADMIN — PANTOPRAZOLE SODIUM 40 MG: 40 TABLET, DELAYED RELEASE ORAL at 06:21

## 2024-03-07 RX ADMIN — CEFAZOLIN SODIUM 2 G: 2 INJECTION, SOLUTION INTRAVENOUS at 01:35

## 2024-03-07 RX ADMIN — AMIODARONE HYDROCHLORIDE 300 MG: 200 TABLET ORAL at 09:58

## 2024-03-07 RX ADMIN — Medication 100 MG: at 09:44

## 2024-03-07 RX ADMIN — DOCUSATE SODIUM 100 MG: 100 CAPSULE, LIQUID FILLED ORAL at 20:36

## 2024-03-07 RX ADMIN — FUROSEMIDE 40 MG: 10 INJECTION, SOLUTION INTRAMUSCULAR; INTRAVENOUS at 14:59

## 2024-03-07 RX ADMIN — HYDROCODONE BITARTRATE AND ACETAMINOPHEN 2 TABLET: 5; 325 TABLET ORAL at 10:02

## 2024-03-07 RX ADMIN — HYDROCODONE BITARTRATE AND ACETAMINOPHEN 2 TABLET: 5; 325 TABLET ORAL at 18:42

## 2024-03-07 NOTE — PLAN OF CARE
Goal Outcome Evaluation:  Plan of Care Reviewed With: patient           Outcome Evaluation: Upon entering room, pt. sitting up in chair, awake/alert, and agreeable to work with P.T. this date.  This AM, pt. able to ambulate 60 feet, Min. assist x 1 (+1 for assist with lines), with use of Rwx.  Pt. requires Min. assist x 2 for bed mobility and Min. assist x 1 for sit <-> stand transfers. Cardiac ther. ex. program x 10 reps completed for general strengthening.  Verbal/tactile cues given throughout ambulation for posture correction, Rwx guidance, and to increase his bilateral step length.  Will continue to progress functional mobility as tolerated.

## 2024-03-07 NOTE — PLAN OF CARE
Goal Outcome Evaluation:           Progress: no change  Outcome Evaluation: VSS, 1 unit PRBC infused, IV Lasix given post blood transfusion. IV Calcium Glucanate given.  Norco for Pain with good relief. Diet advanced. PO Amiodarone started. IS per patient. SATs on 3LNC unable to wean. Chest tubes, hebert catheter and RIJ remain. EPM continues.

## 2024-03-07 NOTE — CONSULTS
[facility] Consult Note      Patient Name: Francisco Espino  : 1955  MRN: [id number]  Primary Care Physician:  Eveline Pelaez MD  Referring Physician: Jr Jean Marie Mott MD  Date of admission: 3/3/2024    Subjective     Reason for Consult:  SPENSER on CKD2    HPI:   Francisco Espino is a 68 y.o. male previously followed by Dr. Julianne Shrestha of our group (patient was last seen 22) admitted 3/3/2024 for planned CABG, completed 3/5/24.  Had a heart attack just a few weeks ago.  PMHx significant for CLL, CAD, diabetes, HTN, AAA.  Baseline creatinine 1.2 - 1.4 mg/dL, with U Pr/Cr ratio 1.0 g/g.  SCr was 1.2 on admission, and today is 1.7 mg/dL.  PTA was on Bactrim TIW as prophylaxis, which is currently on hold.    Feels fine; OOB today while in chair, groggy, poor PO intake per family since surgery. Denies CP, SOA, N/V, no BM in several days.     On 3L per NC   UOP 2.6L yesterday with Harris catheter in place  CXR today showed improving vascular congestion and small left pneumothorax noted  Blood pressures have been soft; Cardiology stopping metoprolol and adding atenolol and amiodarone.  Blood transfusion ordered today for hemoglobin 7.9  NS for KVO  Blood sugars controlled  Tolerating diet; no flatus yet      Review of Systems:   14 point review of systems is otherwise negative except for mentioned above on HPI    Personal History     Past Medical History:   Diagnosis Date    Abnormal liver function test     Alcohol abuse     CLL (chronic lymphocytic leukemia)     Coronary artery disease     stent    Heart disease     History of pneumonia     2019    Hyperlipidemia     Hypertension     Inguinal hernia     left side to have surgery 3/28/19    Myocardial infarction     Screen for colon cancer 2016    Negative Cologaurd    Screening PSA (prostate specific antigen) 2013    .5    Thrombocytopenia        Past Surgical History:   Procedure Laterality Date    CARDIAC  CATHETERIZATION  2017    CATARACT EXTRACTION Bilateral     COLONOSCOPY N/A 06/05/2006    Normal, repeat in 10 years, Dr. Preethi Gonzalez    COLONOSCOPY N/A 7/15/2020    Procedure: COLONOSCOPY TO CECUM & T.I. WITH COLD SNARE POLYPECTOMIES, CLIP PLACEMENT X 2;  Surgeon: Yennifer Salazar MD;  Location: [facility] ENDOSCOPY;  Service: Gastroenterology;  Laterality: N/A;  PRE- POSITIVE COLOGUARD  POST- COLON POLYPS, DIVERTICULOSIS, HEMORRHOIDS    CORONARY ANGIOPLASTY WITH STENT PLACEMENT N/A 05/09/2017    Left heart catheterization, Selective native vessel coronary arteriography, Left ventriculography, Successful percutaneous intervention to the 100% occluded right coronary artery with a 3.5 x 38 mm Synergy drug-eluting stent (post-dilated w/ a 4.0 x 20 mm NC Emerge balloon), Selective right femoral angiography, Successful Perclose arteriotomy closure. Dr. James Pierson    CORONARY ARTERY BYPASS GRAFT N/A 3/5/2024    Procedure: STERNOTOMY, CORONARY ARTERY BYPASS GRAFTING TRANSESOPHAGEAL ECHOCARDIOGRAM X6, UTILIZING THE LEFT AUGUSTUS AND RIGHT SAPHENOUS VEIN.  ADRIANE, PRP WITH ANESTHESIA;  Surgeon: Jr Jean Marie Mott MD;  Location: [facility] CVOR;  Service: Cardiothoracic;  Laterality: N/A;    INGUINAL HERNIA REPAIR Left     INGUINAL HERNIA REPAIR Right     x2    INGUINAL HERNIA REPAIR Left 3/28/2019    Procedure: LEFT INGUINAL HERNIA REPAIR LAPAROSCOPIC;  Surgeon: Preethi Gonzalez MD;  Location: [facility] OR [facility];  Service: General    LAPAROSCOPIC INGUINAL HERNIA REPAIR Right 10/03/2002    w/ extra peritoneal Goe-Benton mesh (transperitoneal repair), Dr. Preethi Gonzalez    REFRACTIVE SURGERY Bilateral     RETINAL DETACHMENT SURGERY Right 07/02/2013       Family History: family history includes Aortic aneurysm in his mother; Coronary artery disease in his mother; Diabetes in his brother, mother, and sister; Diabetes type II in an other family member; Early death in his mother; Heart attack in his mother; Heart disease in his mother;  Hyperlipidemia in his brother and sister; Hypertension in his sister; No Known Problems in his brother and father; Stomach cancer in his paternal uncle; Stroke (age of onset: 65) in his sister.    Social History:  reports that he has never smoked. He has never used smokeless tobacco. He reports current alcohol use of about 21.0 standard drinks of alcohol per week. He reports that he does not use drugs.    Home Medications:  Prior to Admission medications    Medication Sig Start Date End Date Taking? Authorizing Provider   acetaminophen (TYLENOL) 500 MG tablet Take 2 tablets by mouth Every 6 (Six) Hours As Needed for Mild Pain.   Yes Monet Richardson MD   acyclovir (ZOVIRAX) 400 MG tablet Take 1 tablet by mouth 2 (Two) Times a Day. 2/7/24  Yes Russell Cagle MD   aspirin 81 MG EC tablet Take 1 tablet by mouth Every Other Day.   Yes Monet Richardson MD   cetirizine (zyrTEC) 10 MG tablet Take 1 tablet by mouth Daily As Needed for Allergies.   Yes Monet Richardson MD   clopidogrel (PLAVIX) 75 MG tablet Take 1 tablet by mouth Daily.   Yes Monet Richardson MD   fluticasone (FLONASE) 50 MCG/ACT nasal spray 2 sprays into the nostril(s) as directed by provider Daily.  Patient taking differently: 2 sprays into the nostril(s) as directed by provider Daily. Rarely used 8/16/19  Yes Jaskaran Quiros MD   metoprolol succinate XL (TOPROL-XL) 25 MG 24 hr tablet Take 1 tablet by mouth Daily. 2/23/24  Yes Monet Richardson MD   rosuvastatin (CRESTOR) 20 MG tablet Take 1 tablet by mouth Every Night. 9/22/21  Yes Monet Richardson MD   sulfamethoxazole-trimethoprim (BACTRIM DS,SEPTRA DS) 800-160 MG per tablet TAKE 1 TABLET BY MOUTH THREE TIMES A WEEK 12/19/23  Yes Sandra Hartley MD   Zanubrutinib (Brukinsa) 80 MG chemo capsule Take 2 capsules by mouth 2 (Two) Times a Day. 12/22/23  Yes Russell Cagle MD   nitroglycerin (NITROSTAT) 0.4 MG SL tablet Place 1 tablet under the tongue Every 5 (Five)  Minutes As Needed. 2/23/24   Provider, MD Monet   ondansetron ODT (ZOFRAN-ODT) 8 MG disintegrating tablet Place 1 tablet on the tongue Every 8 (Eight) Hours As Needed for Nausea or Vomiting. 7/5/23   Russell Cagle MD       Allergies:  Allergies   Allergen Reactions    Codeine Nausea Only and Nausea And Vomiting       Objective     Vitals:   Temp:  [97.8 °F (36.6 °C)-100.1 °F (37.8 °C)] 99.6 °F (37.6 °C)  Heart Rate:  [] 109  Resp:  [17-18] 17  BP: ()/(55-70) 116/70  Flow (L/min):  [2-3] 2    Intake/Output Summary (Last 24 hours) at 3/7/2024 1819  Last data filed at 3/7/2024 1643  Gross per 24 hour   Intake 940 ml   Output 3390 ml   Net -2450 ml     Wt Readings from Last 1 Encounters:   03/07/24 0651 92.9 kg (204 lb 14.4 oz)   03/06/24 0600 92.7 kg (204 lb 5.9 oz)   03/04/24 2359 87.3 kg (192 lb 6.4 oz)   03/03/24 1751 88.6 kg (195 lb 4.8 oz)   03/03/24 1705 88.6 kg (195 lb 4.8 oz)     Wt Readings from Last 3 Encounters:   03/07/24 92.9 kg (204 lb 14.4 oz)   02/27/24 87.5 kg (193 lb)   02/06/24 87.5 kg (193 lb)       Physical Exam:    General Appearance: ill appearing, sleepy, oriented, overweight  Skin: warm and dry  HEENT: CVC RIJ, oral mucosa dry  Neck: supple, no JVD  Lungs: crackles in flanks, shallow breaths but not labored on 3 L/min  Heart: RR, tachycardic, + rub  Abdomen: soft, nontender, + D, hypoactive BS  : Harris catheter draining clear yellow urine  Extremities: no edema, cyanosis or clubbing  Neuro: moves all limbs    Scheduled Meds:     amiodarone, 300 mg, Oral, Q12H  aspirin, 81 mg, Oral, Daily  atenolol, 12.5 mg, Oral, Q24H  atorvastatin, 40 mg, Oral, Nightly  chlorhexidine, 15 mL, Mouth/Throat, Q12H  docusate sodium, 100 mg, Oral, BID  enoxaparin, 40 mg, Subcutaneous, Daily  folic acid, 1 mg, Oral, Daily  guaiFENesin, 1,200 mg, Oral, Q12H  insulin lispro, 2-9 Units, Subcutaneous, 4x Daily AC & at Bedtime  multivitamin, 1 tablet, Oral, Daily  mupirocin, , Each Nare,  BID  pantoprazole, 40 mg, Oral, QAM  [START ON 3/8/2024] polyethylene glycol, 17 g, Oral, Daily  senna-docusate sodium, 2 tablet, Oral, Nightly  thiamine, 100 mg, Oral, Daily  vitamin B-12, 1,000 mcg, Oral, Daily      IV Meds:   sodium chloride, 30 mL/hr, Last Rate: 30 mL/hr (03/05/24 1133)        Results Reviewed:   I have personally reviewed the results from the time of this admission to 3/7/2024 18:19 EST     Lab Results   Component Value Date    GLUCOSE 140 (H) 03/07/2024    CALCIUM 8.4 (L) 03/07/2024     (L) 03/07/2024    K 4.1 03/07/2024    CO2 21.1 (L) 03/07/2024     03/07/2024    BUN 14 03/07/2024    CREATININE 1.74 (H) 03/07/2024    EGFRIFAFRI 70 07/09/2021    EGFRIFNONA 60 (L) 01/06/2022    BCR 8.0 03/07/2024    ANIONGAP 11.9 03/07/2024      Lab Results   Component Value Date    MG 2.6 (H) 03/05/2024    PHOS 2.7 03/05/2024    ALBUMIN 4.7 03/05/2024           Assessment / Plan     ASSESSMENT:  SPENSER on CKD2, nonoliguric, prerenal due to hypotension, tachycardia, ABLA, and large fluid shifts in the setting of CABG.  Subnephrotic proteinuria, with 1 g/g by previous ratio.  Suspect hypovolemic; normal potassium and anion gap.    Hypotension  CAD s/p CABG 3/5, with preceding MI in February '24  Anemia, with expected blood loss postoperatively; 1U PRBC ordered.  Iron deficiency by iron studies  CLL with anemia and thrombocytopenia  Regular alcohol use    PLAN:  Give furosemide 40 mg IV after PRBCs given today  Continue to hold Bactrim  Discussed with family at bedside    Thank you for involving us in the care of Francisco Espino.  Please feel free to call with any questions.    Leroy Diane MD  03/07/24  18:19 EST    [facility]  [phone]      Much of this encounter note is an electronic transcription/translation of spoken language to printed text. The electronic translation of spoken language may permit erroneous, or at times, nonsensical words or phrases to be  inadvertently transcribed; Although I have reviewed the note for such errors, some may still exist.

## 2024-03-07 NOTE — PROGRESS NOTES
[facility] INPATIENT PROGRESS NOTE    Length of Stay:  4 days    CHIEF COMPLAINT:  CLL, thrombocytopenia    SUBJECTIVE:   Patient reports mild postoperative pain in the sternal region today.  He is currently receiving 1 unit PRBC transfusion per CT surgery.  No bleeding issues.    ROS:  Review of Systems   A comprehensive review of systems was obtained with pertinent positive findings as noted in the interval history above.  All other systems negative.      OBJECTIVE:  Vitals:    03/07/24 0137 03/07/24 0400 03/07/24 0651 03/07/24 0741   BP: 96/55 101/61  91/64   BP Location:  Left arm  Left arm   Patient Position:  Lying  Lying   Pulse: 94 92  100   Resp:  18  18   Temp:  98.6 °F (37 °C)  98.8 °F (37.1 °C)   TempSrc:  Oral  Oral   SpO2:  91%  98%   Weight:   92.9 kg (204 lb 14.4 oz)    Height:             PHYSICAL EXAMINATION:  General: Alert and orient x 3 no distress  Chest/Lungs: Clear to auscultation bilaterally anteriorly.  Sternal incision dressed, no bleeding  Heart: Regular rate and rhythm  Abdomen/GI: Soft nontender distended bowel signs present  Extremities: Trace bilateral lower extremity edema        DIAGNOSTIC DATA:  Results Review:     I reviewed the patient's new clinical results.    Results from last 7 days   Lab Units 03/07/24  0403 03/06/24  0308 03/05/24  1513   WBC 10*3/mm3 9.31 7.01 8.94   HEMOGLOBIN g/dL 7.9* 8.1* 9.1*   HEMATOCRIT % 23.3* 23.6* 26.6*   PLATELETS 10*3/mm3 77* 73* 86*      Results from last 7 days   Lab Units 03/07/24  0403 03/06/24  0308 03/05/24  1513 03/04/24  0838 03/03/24  1745   SODIUM mmol/L 134* 141 142   < > 137   POTASSIUM mmol/L 4.1 3.8 4.1   < > 3.9   CHLORIDE mmol/L 101 109* 108*   < > 102   CO2 mmol/L 21.1* 22.0 19.5*   < > 25.0   BUN mg/dL 14 8 7*   < > 13   CREATININE mg/dL 1.74* 1.36* 1.24   < > 1.23   CALCIUM mg/dL 8.4* 8.4* 8.6   < > 9.3   BILIRUBIN mg/dL  --   --   --   --  0.7   ALK PHOS U/L  --   --   --   --  76   ALT (SGPT) U/L  --   --   --    --  16   AST (SGOT) U/L  --   --   --   --  44*   GLUCOSE mg/dL 140* 110* 118*   < > 137*    < > = values in this interval not displayed.      Lab Results   Component Value Date    NEUTROABS 5.12 03/06/2024     Results from last 7 days   Lab Units 03/06/24  0308 03/05/24  1125 03/04/24  2352 03/04/24  1609 03/04/24  0129 03/03/24  1745   INR  1.26* 1.27*  --   --   --  0.96   APTT seconds  --  29.1 63.4* 61.0*   < > 25.6    < > = values in this interval not displayed.     Results from last 7 days   Lab Units 03/05/24  1513   MAGNESIUM mg/dL 2.6*             Assessment & Plan   ASSESSMENT/PLAN:  This is a 68 y.o. male with:     *Coronary artery disease status post non-ST elevation MI with plans for CABG  The patient had prior history of severe multivessel coronary disease, underwent PCI in 2017.    He experienced non-ST elevation MI with angioplasty to RCA stent on 2/22/2024 and was placed on Plavix after previously receiving Brilinta.    Subsequently discontinued Plavix on 3/1/2024 in preparation for CABG.    Patient admitted on heparin drip 3/4/2024  Patient underwent CABG 3/5/2024     *CLL  Patient has history of CLL, is followed in the outpatient setting by Dr. Cagle in our practice.    He was originally diagnosed in 2005 via peripheral blood flow cytometry.    He was managed initially with a course of observation.    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    Gradual escalation in WBC, gradual development of thrombocytopenia.    Patient initiated treatment in February 2023 with Zanubrutinib.    Patient with gradual response to treatment, no significant side effects.    Labs prior to current hospitalization 2/6/2024 with WBC 9.65, differential 33 segs, 60 lymphs, hemoglobin 14.4, platelet count 105,000.    He was receiving prophylactic acyclovir and Bactrim with treatment  He was notified to discontinue Zanubrutinib on 2/27/2024 due to upcoming CABG with plans  to resume 2 weeks postoperatively (last dose Zanubrutinib taken on 2/27/2024).  Patient also discontinued acyclovir and Bactrim prophylaxis at that time.     *Thrombocytopenia  Patient with longstanding thrombocytopenia with platelet count in the 90-low 100,000 range  Unclear whether thrombocytopenia is related to marrow involvement from CLL, ITP related to CLL, or splenomegaly related to CLL  Labs on 3/5/2024 with B12 low normal at 324, folate 14.3  Additional labs on 3//24 with elevated IPF at 12.7%, suspicion for ITP associated with CLL  Monitor platelet count postoperatively, expect some degree of consumption with upcoming cardiopulmonary bypass.  Begin oral B12 1000 mcg daily due to low normal level  The count today has declined slightly further down to 73,000.  This is expected following cardiopulmonary bypass and CABG.  No current bleeding issues, no need for transfusion.  Will continue to monitor expectantly.    *Postoperative anemia  Hemoglobin preoperatively was 12.0 on 3/5/2024  Hemoglobin has declined as expected postoperatively.    Additional labs on 3/6/2024 with iron 22, ferritin 114, iron saturation 10%, TIBC 212.    Patient does appear to have mild iron deficiency.  Would recommend treatment with oral iron sulfate daily however we will hold off until he has return of bowel function postoperatively to begin this  Hemoglobin today has declined slightly further to 7.9, patient receiving 1 unit PRBC per CT surgery.    *History of alcohol abuse  Monitor for withdrawal     *Viral prophylaxis  Acyclovir on hold     *PCP prophylaxis  Bactrim on hold        PLAN:   Patient remains off of zanubrutinib (discontinued 2/27/2024 preoperatively) for CLL due to potential bleeding risks with upcoming surgery.  Consider resuming treatment 2 weeks postoperatively  Prophylaxis with acyclovir and Bactrim on hold  Continue oral B12 1000 mcg daily  Expected exacerbation of chronic thrombocytopenia with consumption from  upcoming cardiopulmonary bypass  Plan to begin oral iron sulfate 325 mg daily once patient's bowel function returns postoperatively  Patient receiving 1 unit PRBC transfusion today per CT surgery  Daily CBC/differential    Discussed with patient and wife at bedside         Christopher Nugent MD

## 2024-03-07 NOTE — THERAPY TREATMENT NOTE
Patient Name: Francisco Espino  : 1955    MRN: [id number]                              Today's Date: 3/7/2024       Admit Date: 3/3/2024    Visit Dx:     ICD-10-CM ICD-9-CM   1. 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    Varicose veins    Alcohol abuse    Coronary artery disease involving native coronary artery of native heart with angina pectoris    Dyslipidemia    Myocardial infarction    S/P angioplasty with stent    ST elevation (STEMI) myocardial infarction involving right coronary artery    Elevated liver function tests    Pneumonia    Positive colorectal cancer screening using Cologuard test    Kidney disease    High risk medication use    Bence Dawson proteinuria    Coronary artery disease of native heart with stable angina pectoris    CAD (coronary artery disease), native coronary artery     Past Medical History:   Diagnosis Date    Abnormal liver function test     Alcohol abuse     CLL (chronic lymphocytic leukemia)     Coronary artery disease     stent    Heart disease     History of pneumonia     2019    Hyperlipidemia     Hypertension     Inguinal hernia     left side to have surgery 3/28/19    Myocardial infarction     Screen for colon cancer 2016    Negative Cologaurd    Screening PSA (prostate specific antigen) 2013    .5    Thrombocytopenia      Past Surgical History:   Procedure Laterality Date    CARDIAC CATHETERIZATION  2017    CATARACT EXTRACTION Bilateral     COLONOSCOPY N/A 2006    Normal, repeat in 10 years, Dr. Preethi Gonzalez    COLONOSCOPY N/A 7/15/2020    Procedure: COLONOSCOPY TO CECUM & T.I. WITH COLD SNARE POLYPECTOMIES, CLIP PLACEMENT X 2;  Surgeon: Yennifer Salazar MD;  Location: [facility] ENDOSCOPY;  Service: Gastroenterology;  Laterality: N/A;  PRE- POSITIVE  COLOGUARD  POST- COLON POLYPS, DIVERTICULOSIS, HEMORRHOIDS    CORONARY ANGIOPLASTY WITH STENT PLACEMENT N/A 05/09/2017    Left heart catheterization, Selective native vessel coronary arteriography, Left ventriculography, Successful percutaneous intervention to the 100% occluded right coronary artery with a 3.5 x 38 mm Synergy drug-eluting stent (post-dilated w/ a 4.0 x 20 mm NC Emerge balloon), Selective right femoral angiography, Successful Perclose arteriotomy closure. Dr. James Pierson    CORONARY ARTERY BYPASS GRAFT N/A 3/5/2024    Procedure: STERNOTOMY, CORONARY ARTERY BYPASS GRAFTING TRANSESOPHAGEAL ECHOCARDIOGRAM X6, UTILIZING THE LEFT AUGUSTUS AND RIGHT SAPHENOUS VEIN.  ADRIANE, PRP WITH ANESTHESIA;  Surgeon: Jr Jean Marie Mott MD;  [facility];  Service: Cardiothoracic;  Laterality: N/A;    INGUINAL HERNIA REPAIR Left     INGUINAL HERNIA REPAIR Right     x2    INGUINAL HERNIA REPAIR Left 3/28/2019    Procedure: LEFT INGUINAL HERNIA REPAIR LAPAROSCOPIC;  Surgeon: Preethi Gonzalez MD;  [place] [facility] [place] [facility] [place]  Service: General    LAPAROSCOPIC INGUINAL HERNIA REPAIR Right 10/03/2002    w/ extra peritoneal Goe-Benton mesh (transperitoneal repair), Dr. Preethi Gonzalez    REFRACTIVE SURGERY Bilateral     RETINAL DETACHMENT SURGERY Right 07/02/2013      General Information       Row Name 03/07/24 1137          Physical Therapy Time and Intention    Document Type therapy note (daily note)  -MS     Mode of Treatment physical therapy;individual therapy  -MS       Row Name 03/07/24 1137          General Information    Patient Profile Reviewed yes  -MS     Existing Precautions/Restrictions sternal;cardiac;oxygen therapy device and L/min   Chest tube x 2; Exit alarm  -MS     Barriers to Rehab none identified  -MS       Row Name 03/07/24 1137          Cognition    Orientation Status (Cognition) oriented x 3  -MS               User Key  (r) = Recorded By, (t) = Taken By, (c) = Cosigned By      Initials Name  Provider Type    MS OrtizBello, PT Physical Therapist                   Mobility       Row Name 03/07/24 1137          Bed Mobility    Bed Mobility sit-supine;supine-sit  -MS     Sit-Supine Ward (Bed Mobility) minimum assist (75% patient effort);2 person assist  -MS     Comment, (Bed Mobility) via logroll  -MS       Row Name 03/07/24 1137          Sit-Stand Transfer    Sit-Stand Ward (Transfers) minimum assist (75% patient effort)  -MS     Assistive Device (Sit-Stand Transfers) walker, front-wheeled  -MS       Row Name 03/07/24 1137          Gait/Stairs (Locomotion)    Ward Level (Gait) minimum assist (75% patient effort);1 person to manage equipment  -MS     Assistive Device (Gait) walker, front-wheeled  -MS     Distance in Feet (Gait) 60  -MS     Deviations/Abnormal Patterns (Gait) kandice decreased;stride length decreased  -MS     Bilateral Gait Deviations forward flexed posture  -MS     Comment, (Gait/Stairs) Verbal/tactile cues given for posture correction, Rwx guidance, and to increase his bilateral step length.  -MS               User Key  (r) = Recorded By, (t) = Taken By, (c) = Cosigned By      Initials Name Provider Type    MS MunizOrtizBello, PT Physical Therapist                   Obj/Interventions       Row Name 03/07/24 1138          Motor Skills    Therapeutic Exercise --  Cardiac ther. ex. program x 10 reps completed  -MS               User Key  (r) = Recorded By, (t) = Taken By, (c) = Cosigned By      Initials Name Provider Type    MS AngelBello, PT Physical Therapist                   Goals/Plan    No documentation.                  Clinical Impression       Row Name 03/07/24 1139          Pain    Pretreatment Pain Rating 7/10  -MS     Posttreatment Pain Rating 7/10  -MS     Pain Location incisional  -MS     Pain Location - chest  -MS     Pain Intervention(s) Medication (See MAR);Nursing Notified;Repositioned  -MS       Row Name 03/07/24 1130           Positioning and Restraints    Pre-Treatment Position sitting in chair/recliner  -MS     Post Treatment Position bed  -MS     In Bed notified nsg;supine;call light within reach;encouraged to call for assist;exit alarm on;with family/caregiver  All lines/tubes intact.  -MS               User Key  (r) = Recorded By, (t) = Taken By, (c) = Cosigned By      Initials Name Provider Type    MS MunizOrtizBello PT Physical Therapist                   Outcome Measures       Row Name 03/07/24 1140          How much help from another person do you currently need...    Turning from your back to your side while in flat bed without using bedrails? 2  -MS     Moving from lying on back to sitting on the side of a flat bed without bedrails? 2  -MS     Moving to and from a bed to a chair (including a wheelchair)? 3  -MS     Standing up from a chair using your arms (e.g., wheelchair, bedside chair)? 3  -MS     Climbing 3-5 steps with a railing? 2  -MS     To walk in hospital room? 3  -MS     AM-PAC 6 Clicks Score (PT) 15  -MS     Highest Level of Mobility Goal 4 --> Transfer to chair/commode  -MS       Row Name 03/07/24 1140          Functional Assessment    Outcome Measure Options AM-PAC 6 Clicks Basic Mobility (PT)  -MS               User Key  (r) = Recorded By, (t) = Taken By, (c) = Cosigned By      Initials Name Provider Type    MS OrtizBello, PT Physical Therapist                                 Physical Therapy Education       Title: PT OT SLP Therapies (Done)       Topic: Physical Therapy (Done)       Point: Mobility training (Done)       Learning Progress Summary             Patient Acceptance, E,D, VU,NR by MS at 3/7/2024 1140    Acceptance, E,TB, VU by  at 3/6/2024 1007                         Point: Home exercise program (Done)       Learning Progress Summary             Patient Acceptance, E,D, VU,NR by MS at 3/7/2024 1140    Acceptance, E,TB, VU by  at 3/6/2024 1007                         Point: Body mechanics  (Done)       Learning Progress Summary             Patient Acceptance, E,D, VU,NR by MS at 3/7/2024 1140    Acceptance, E,TB, VU by  at 3/6/2024 1007                         Point: Precautions (Done)       Learning Progress Summary             Patient Acceptance, E,D, VU,NR by MS at 3/7/2024 1140    Acceptance, E,TB, VU by  at 3/6/2024 1007                                         User Key       Initials Effective Dates Name Provider Type Discipline    MS 06/16/21 -  Bello Ortiz, PT Physical Therapist PT     01/24/24 -  Yue Oneal, PT Student PT Student PT                  PT Recommendation and Plan     Plan of Care Reviewed With: patient  Outcome Evaluation: Upon entering room, pt. sitting up in chair, awake/alert, and agreeable to work with P.T. this date.  This AM, pt. able to ambulate 60 feet, Min. assist x 1 (+1 for assist with lines), with use of Rwx.  Pt. requires Min. assist x 2 for bed mobility and Min. assist x 1 for sit <-> stand transfers. Cardiac ther. ex. program x 10 reps completed for general strengthening.  Verbal/tactile cues given throughout ambulation for posture correction, Rwx guidance, and to increase his bilateral step length.  Will continue to progress functional mobility as tolerated.     Time Calculation:         PT Charges       Row Name 03/07/24 1143             Time Calculation    Start Time 1038  -MS      Stop Time 1058  -MS      Time Calculation (min) 20 min  -MS      PT Received On 03/07/24  -MS      PT - Next Appointment 03/08/24  -MS         Time Calculation- PT    Total Timed Code Minutes- PT 19 minute(s)  -MS                User Key  (r) = Recorded By, (t) = Taken By, (c) = Cosigned By      Initials Name Provider Type    MS Bello Ortiz, PT Physical Therapist                  Therapy Charges for Today       Code Description Service Date Service Provider Modifiers Qty    79397729041  PT THERAPEUTIC ACT EA 15 MIN 3/7/2024 Bello Ortiz, PT GP 1     [id number]  PT THER SUPP EA 15 MIN 3/7/2024 Bello Ortiz, PT GP 1            PT G-Codes  Outcome Measure Options: AM-PAC 6 Clicks Basic Mobility (PT)  AM-PAC 6 Clicks Score (PT): 15       Bello Ortiz, PT  3/7/2024

## 2024-03-07 NOTE — PROGRESS NOTES
" LOS: 4 days   Patient Care Team:  Eveline Pelaez MD as PCP - General (Internal Medicine & Pediatrics)  James Moy MD as Consulting Physician (Interventional Cardiology)  Russell Cagle MD as Consulting Physician (Hematology and Oncology)  Eveline Pelaez MD as Referring Physician (Internal Medicine & Pediatrics)    Chief Complaint: post op     Subjective:  Symptoms:  No shortness of breath or chest pain.    Diet:  No nausea or vomiting.    Activity level: Impaired due to weakness.          Vital Signs  Temp:  [98.3 °F (36.8 °C)-99.2 °F (37.3 °C)] 98.8 °F (37.1 °C)  Heart Rate:  [] 100  Resp:  [18-19] 18  BP: ()/(55-68) 91/64  Body mass index is 26.31 kg/m².    Intake/Output Summary (Last 24 hours) at 3/7/2024 0909  Last data filed at 3/7/2024 0450  Gross per 24 hour   Intake 760 ml   Output 915 ml   Net -155 ml     No intake/output data recorded.    Chest tube drainage last 8 hours70/35        03/04/24  2359 03/06/24  0600 03/07/24  0651   Weight: 87.3 kg (192 lb 6.4 oz) 92.7 kg (204 lb 5.9 oz) 92.9 kg (204 lb 14.4 oz)         Objective:  Vital signs: (most recent): Blood pressure 91/64, pulse 100, temperature 98.8 °F (37.1 °C), temperature source Oral, resp. rate 18, height 188 cm (74\"), weight 92.9 kg (204 lb 14.4 oz), SpO2 98%.                Results Review:        WBC WBC   Date Value Ref Range Status   03/07/2024 9.31 3.40 - 10.80 10*3/mm3 Final   03/06/2024 7.01 3.40 - 10.80 10*3/mm3 Final   03/05/2024 8.94 3.40 - 10.80 10*3/mm3 Final   03/05/2024 6.85 3.40 - 10.80 10*3/mm3 Final   03/05/2024 4.15 3.40 - 10.80 10*3/mm3 Final      HGB Hemoglobin   Date Value Ref Range Status   03/07/2024 7.9 (L) 13.0 - 17.7 g/dL Final   03/06/2024 8.1 (L) 13.0 - 17.7 g/dL Final   03/05/2024 9.1 (L) 13.0 - 17.7 g/dL Final   03/05/2024 10.3 (L) 13.0 - 17.7 g/dL Final   03/05/2024 11.2 (L) 12.0 - 17.0 g/dL Final   03/05/2024 8.8 (L) 12.0 - 17.0 g/dL Final   03/05/2024 10.2 (L) 12.0 - " 17.0 g/dL Final   03/05/2024 9.9 (L) 12.0 - 17.0 g/dL Final   03/05/2024 9.2 (L) 12.0 - 17.0 g/dL Final   03/05/2024 12.6 12.0 - 17.0 g/dL Final   03/05/2024 12.0 (L) 13.0 - 17.7 g/dL Final      HCT Hematocrit   Date Value Ref Range Status   03/07/2024 23.3 (L) 37.5 - 51.0 % Final   03/06/2024 23.6 (L) 37.5 - 51.0 % Final   03/05/2024 26.6 (L) 37.5 - 51.0 % Final   03/05/2024 29.8 (L) 37.5 - 51.0 % Final   03/05/2024 33 (L) 38 - 51 % Final   03/05/2024 26 (L) 38 - 51 % Final   03/05/2024 30 (L) 38 - 51 % Final   03/05/2024 29 (L) 38 - 51 % Final   03/05/2024 27 (L) 38 - 51 % Final   03/05/2024 37 (L) 38 - 51 % Final   03/05/2024 35.0 (L) 37.5 - 51.0 % Final      Platelets Platelets   Date Value Ref Range Status   03/07/2024 77 (L) 140 - 450 10*3/mm3 Final   03/06/2024 73 (L) 140 - 450 10*3/mm3 Final   03/05/2024 86 (L) 140 - 450 10*3/mm3 Final   03/05/2024 60 (L) 140 - 450 10*3/mm3 Final   03/05/2024 92 (L) 140 - 450 10*3/mm3 Final        PT/INR:    Protime   Date Value Ref Range Status   03/06/2024 16.0 (H) 11.7 - 14.2 Seconds Final   03/05/2024 16.1 (H) 11.7 - 14.2 Seconds Final   /  INR   Date Value Ref Range Status   03/06/2024 1.26 (H) 0.90 - 1.10 Final   03/05/2024 1.27 (H) 0.90 - 1.10 Final       Sodium Sodium   Date Value Ref Range Status   03/07/2024 134 (L) 136 - 145 mmol/L Final   03/06/2024 141 136 - 145 mmol/L Final   03/05/2024 142 136 - 145 mmol/L Final   03/05/2024 137 136 - 145 mmol/L Final   03/05/2024 138 136 - 145 mmol/L Final   03/05/2024 138 136 - 145 mmol/L Final      Potassium Potassium   Date Value Ref Range Status   03/07/2024 4.1 3.5 - 5.2 mmol/L Final   03/06/2024 3.8 3.5 - 5.2 mmol/L Final   03/05/2024 4.1 3.5 - 5.2 mmol/L Final   03/05/2024 4.4 3.5 - 5.2 mmol/L Final     Comment:     Slight hemolysis detected by analyzer. Result may be falsely elevated.   03/05/2024 3.7 3.5 - 5.2 mmol/L Final   03/05/2024 3.8 3.5 - 5.2 mmol/L Final      Chloride Chloride   Date Value Ref Range Status    03/07/2024 101 98 - 107 mmol/L Final   03/06/2024 109 (H) 98 - 107 mmol/L Final   03/05/2024 108 (H) 98 - 107 mmol/L Final   03/05/2024 106 98 - 107 mmol/L Final   03/05/2024 106 98 - 107 mmol/L Final   03/05/2024 104 98 - 107 mmol/L Final      Bicarbonate CO2   Date Value Ref Range Status   03/07/2024 21.1 (L) 22.0 - 29.0 mmol/L Final   03/06/2024 22.0 22.0 - 29.0 mmol/L Final   03/05/2024 19.5 (L) 22.0 - 29.0 mmol/L Final   03/05/2024 20.1 (L) 22.0 - 29.0 mmol/L Final   03/05/2024 24.0 22.0 - 29.0 mmol/L Final   03/05/2024 20.0 (L) 22.0 - 29.0 mmol/L Final      BUN BUN   Date Value Ref Range Status   03/07/2024 14 8 - 23 mg/dL Final   03/06/2024 8 8 - 23 mg/dL Final   03/05/2024 7 (L) 8 - 23 mg/dL Final   03/05/2024 6 (L) 8 - 23 mg/dL Final   03/05/2024 7 (L) 8 - 23 mg/dL Final   03/05/2024 8 8 - 23 mg/dL Final      Creatinine Creatinine   Date Value Ref Range Status   03/07/2024 1.74 (H) 0.76 - 1.27 mg/dL Final   03/06/2024 1.36 (H) 0.76 - 1.27 mg/dL Final   03/05/2024 1.24 0.76 - 1.27 mg/dL Final   03/05/2024 1.19 0.76 - 1.27 mg/dL Final   03/05/2024 1.22 0.76 - 1.27 mg/dL Final   03/05/2024 1.15 0.76 - 1.27 mg/dL Final      Calcium Calcium   Date Value Ref Range Status   03/07/2024 8.4 (L) 8.6 - 10.5 mg/dL Final   03/06/2024 8.4 (L) 8.6 - 10.5 mg/dL Final   03/05/2024 8.6 8.6 - 10.5 mg/dL Final   03/05/2024 8.6 8.6 - 10.5 mg/dL Final   03/05/2024 8.9 8.6 - 10.5 mg/dL Final   03/05/2024 8.9 8.6 - 10.5 mg/dL Final      Magnesium Magnesium   Date Value Ref Range Status   03/05/2024 2.6 (H) 1.6 - 2.4 mg/dL Final   03/05/2024 2.9 (H) 1.6 - 2.4 mg/dL Final   03/05/2024 2.0 1.6 - 2.4 mg/dL Final          aspirin, 81 mg, Oral, Daily  atorvastatin, 40 mg, Oral, Nightly  chlorhexidine, 15 mL, Mouth/Throat, Q12H  enoxaparin, 40 mg, Subcutaneous, Daily  folic acid, 1 mg, Oral, Daily  gabapentin, 200 mg, Oral, Q8H  guaiFENesin, 1,200 mg, Oral, Q12H  insulin lispro, 2-9 Units, Subcutaneous, 4x Daily AC & at  Bedtime  metoprolol tartrate, 25 mg, Oral, Q12H  multivitamin, 1 tablet, Oral, Daily  mupirocin, , Each Nare, BID  pantoprazole, 40 mg, Oral, QAM  senna-docusate sodium, 2 tablet, Oral, Nightly  thiamine, 100 mg, Oral, Daily  vitamin B-12, 1,000 mcg, Oral, Daily      sodium chloride, 30 mL/hr, Last Rate: 30 mL/hr (03/05/24 1133)              CAD (coronary artery disease), native coronary artery    Coronary artery disease of native heart with stable angina pectoris      Assessment & Plan    - severe multi-vessel CAD with hx of PCI in 2017; recent NSTEMI with balloon opening of stent to RCA; LD Plavix 3/1- s/p CABGx6 LIMA/RSVG POD#2 [place]  - hypertension  - hyperlipidemia--statin therapy  - CLL--- on Brukinsa; oncology consulted  - daily ETOH use--watch for s/sx of withdrawal. CIWA protocol  -TCP plt count 73, hold lovenox  -post op anemia- expected acute blood loss     Looks good this morning  Up in the chair  2L NC-- wean as able   Sinus rhythm rate in the 90s--blood pressure borderline-- will add some amiodarone and switch to atenolol  Replete calcium, Hgb 7.9 his morning-- transfuse 1unit PBCs-- hopefully this will help with his blood pressure   Encourage pulmonary toilet-- continue IS/flutter and mucinex  Creatinine 1.74 this morning-- will ask nephrology to evaluation  Seems a little drowsy-- will discontinue oxycodone and gabapentin   Mobilize/increase activity  CXR with suspected small left apical pneumonthorax-- no air leak in tubes- will discuss with Dr. Mott  Continue routine care       WINNIE Redd  03/07/24  09:09 EST

## 2024-03-08 ENCOUNTER — APPOINTMENT (OUTPATIENT)
Dept: GENERAL RADIOLOGY | Facility: HOSPITAL | Age: 69
DRG: 236 | End: 2024-03-08
Payer: COMMERCIAL

## 2024-03-08 LAB
25(OH)D3 SERPL-MCNC: 18.6 NG/ML (ref 30–100)
ALBUMIN SERPL-MCNC: 3.8 G/DL (ref 3.5–5.2)
ANION GAP SERPL CALCULATED.3IONS-SCNC: 8 MMOL/L (ref 5–15)
BH BB BLOOD EXPIRATION DATE: NORMAL
BH BB BLOOD EXPIRATION DATE: NORMAL
BH BB BLOOD TYPE BARCODE: 6200
BH BB BLOOD TYPE BARCODE: 6200
BH BB DISPENSE STATUS: NORMAL
BH BB DISPENSE STATUS: NORMAL
BH BB PRODUCT CODE: NORMAL
BH BB PRODUCT CODE: NORMAL
BH BB UNIT NUMBER: NORMAL
BH BB UNIT NUMBER: NORMAL
BUN SERPL-MCNC: 20 MG/DL (ref 8–23)
BUN/CREAT SERPL: 11.8 (ref 7–25)
CA-I BLD-MCNC: 5 MG/DL (ref 4.6–5.4)
CA-I SERPL ISE-MCNC: 1.24 MMOL/L (ref 1.15–1.35)
CALCIUM SPEC-SCNC: 8.7 MG/DL (ref 8.6–10.5)
CHLORIDE SERPL-SCNC: 100 MMOL/L (ref 98–107)
CO2 SERPL-SCNC: 24 MMOL/L (ref 22–29)
CREAT SERPL-MCNC: 1.7 MG/DL (ref 0.76–1.27)
CROSSMATCH INTERPRETATION: NORMAL
CROSSMATCH INTERPRETATION: NORMAL
DEPRECATED RDW RBC AUTO: 46.6 FL (ref 37–54)
EGFRCR SERPLBLD CKD-EPI 2021: 43.1 ML/MIN/1.73
ERYTHROCYTE [DISTWIDTH] IN BLOOD BY AUTOMATED COUNT: 12.6 % (ref 12.3–15.4)
GLUCOSE BLDC GLUCOMTR-MCNC: 106 MG/DL (ref 70–130)
GLUCOSE BLDC GLUCOMTR-MCNC: 124 MG/DL (ref 70–130)
GLUCOSE BLDC GLUCOMTR-MCNC: 124 MG/DL (ref 70–130)
GLUCOSE SERPL-MCNC: 124 MG/DL (ref 65–99)
HCT VFR BLD AUTO: 24.3 % (ref 37.5–51)
HGB BLD-MCNC: 8.1 G/DL (ref 13–17.7)
MCH RBC QN AUTO: 34 PG (ref 26.6–33)
MCHC RBC AUTO-ENTMCNC: 33.3 G/DL (ref 31.5–35.7)
MCV RBC AUTO: 102.1 FL (ref 79–97)
PHOSPHATE SERPL-MCNC: 3.4 MG/DL (ref 2.5–4.5)
PLATELET # BLD AUTO: 78 10*3/MM3 (ref 140–450)
PMV BLD AUTO: 11.1 FL (ref 6–12)
POTASSIUM SERPL-SCNC: 3.3 MMOL/L (ref 3.5–5.2)
POTASSIUM SERPL-SCNC: 3.7 MMOL/L (ref 3.5–5.2)
RBC # BLD AUTO: 2.38 10*6/MM3 (ref 4.14–5.8)
SODIUM SERPL-SCNC: 132 MMOL/L (ref 136–145)
UNIT  ABO: NORMAL
UNIT  ABO: NORMAL
UNIT  RH: NORMAL
UNIT  RH: NORMAL
WBC NRBC COR # BLD AUTO: 7.89 10*3/MM3 (ref 3.4–10.8)

## 2024-03-08 PROCEDURE — 82306 VITAMIN D 25 HYDROXY: CPT | Performed by: NURSE PRACTITIONER

## 2024-03-08 PROCEDURE — 82948 REAGENT STRIP/BLOOD GLUCOSE: CPT

## 2024-03-08 PROCEDURE — 82330 ASSAY OF CALCIUM: CPT | Performed by: INTERNAL MEDICINE

## 2024-03-08 PROCEDURE — 71045 X-RAY EXAM CHEST 1 VIEW: CPT

## 2024-03-08 PROCEDURE — 97530 THERAPEUTIC ACTIVITIES: CPT

## 2024-03-08 PROCEDURE — 71046 X-RAY EXAM CHEST 2 VIEWS: CPT

## 2024-03-08 PROCEDURE — 80069 RENAL FUNCTION PANEL: CPT | Performed by: INTERNAL MEDICINE

## 2024-03-08 PROCEDURE — 84132 ASSAY OF SERUM POTASSIUM: CPT | Performed by: THORACIC SURGERY (CARDIOTHORACIC VASCULAR SURGERY)

## 2024-03-08 PROCEDURE — 85027 COMPLETE CBC AUTOMATED: CPT | Performed by: NURSE PRACTITIONER

## 2024-03-08 PROCEDURE — 99232 SBSQ HOSP IP/OBS MODERATE 35: CPT | Performed by: INTERNAL MEDICINE

## 2024-03-08 RX ORDER — POTASSIUM CHLORIDE 750 MG/1
40 TABLET, FILM COATED, EXTENDED RELEASE ORAL EVERY 4 HOURS
Status: COMPLETED | OUTPATIENT
Start: 2024-03-08 | End: 2024-03-08

## 2024-03-08 RX ORDER — POTASSIUM CHLORIDE 750 MG/1
20 TABLET, FILM COATED, EXTENDED RELEASE ORAL ONCE
Status: COMPLETED | OUTPATIENT
Start: 2024-03-08 | End: 2024-03-08

## 2024-03-08 RX ORDER — FERROUS SULFATE 325(65) MG
325 TABLET ORAL
Status: DISCONTINUED | OUTPATIENT
Start: 2024-03-08 | End: 2024-03-10 | Stop reason: HOSPADM

## 2024-03-08 RX ADMIN — ATENOLOL 12.5 MG: 25 TABLET ORAL at 11:22

## 2024-03-08 RX ADMIN — MUPIROCIN 1 APPLICATION: 20 OINTMENT TOPICAL at 11:23

## 2024-03-08 RX ADMIN — GUAIFENESIN 1200 MG: 600 TABLET, EXTENDED RELEASE ORAL at 20:54

## 2024-03-08 RX ADMIN — AMIODARONE HYDROCHLORIDE 300 MG: 200 TABLET ORAL at 11:22

## 2024-03-08 RX ADMIN — ATORVASTATIN CALCIUM 40 MG: 20 TABLET, FILM COATED ORAL at 20:54

## 2024-03-08 RX ADMIN — MUPIROCIN 1 APPLICATION: 20 OINTMENT TOPICAL at 20:55

## 2024-03-08 RX ADMIN — POTASSIUM CHLORIDE 40 MEQ: 750 TABLET, EXTENDED RELEASE ORAL at 11:22

## 2024-03-08 RX ADMIN — FOLIC ACID 1 MG: 1 TABLET ORAL at 11:22

## 2024-03-08 RX ADMIN — FERROUS SULFATE TAB 325 MG (65 MG ELEMENTAL FE) 325 MG: 325 (65 FE) TAB at 11:35

## 2024-03-08 RX ADMIN — PANTOPRAZOLE SODIUM 40 MG: 40 TABLET, DELAYED RELEASE ORAL at 06:33

## 2024-03-08 RX ADMIN — Medication 1000 MCG: at 11:22

## 2024-03-08 RX ADMIN — HYDROCODONE BITARTRATE AND ACETAMINOPHEN 2 TABLET: 5; 325 TABLET ORAL at 17:28

## 2024-03-08 RX ADMIN — HYDROCODONE BITARTRATE AND ACETAMINOPHEN 2 TABLET: 5; 325 TABLET ORAL at 06:33

## 2024-03-08 RX ADMIN — HYDROCODONE BITARTRATE AND ACETAMINOPHEN 2 TABLET: 5; 325 TABLET ORAL at 02:12

## 2024-03-08 RX ADMIN — POTASSIUM CHLORIDE 20 MEQ: 750 TABLET, EXTENDED RELEASE ORAL at 20:54

## 2024-03-08 RX ADMIN — DOCUSATE SODIUM 100 MG: 100 CAPSULE, LIQUID FILLED ORAL at 11:24

## 2024-03-08 RX ADMIN — AMIODARONE HYDROCHLORIDE 300 MG: 200 TABLET ORAL at 20:54

## 2024-03-08 RX ADMIN — POLYETHYLENE GLYCOL 3350 17 G: 17 POWDER, FOR SOLUTION ORAL at 11:22

## 2024-03-08 RX ADMIN — HYDROCODONE BITARTRATE AND ACETAMINOPHEN 2 TABLET: 5; 325 TABLET ORAL at 21:54

## 2024-03-08 RX ADMIN — Medication 1 TABLET: at 11:23

## 2024-03-08 RX ADMIN — GUAIFENESIN 1200 MG: 600 TABLET, EXTENDED RELEASE ORAL at 11:22

## 2024-03-08 RX ADMIN — Medication 100 MG: at 11:22

## 2024-03-08 RX ADMIN — DOCUSATE SODIUM 50MG AND SENNOSIDES 8.6MG 2 TABLET: 8.6; 5 TABLET, FILM COATED ORAL at 20:54

## 2024-03-08 RX ADMIN — POTASSIUM CHLORIDE 40 MEQ: 750 TABLET, EXTENDED RELEASE ORAL at 06:33

## 2024-03-08 RX ADMIN — HYDROCODONE BITARTRATE AND ACETAMINOPHEN 2 TABLET: 5; 325 TABLET ORAL at 11:17

## 2024-03-08 RX ADMIN — DOCUSATE SODIUM 100 MG: 100 CAPSULE, LIQUID FILLED ORAL at 20:55

## 2024-03-08 RX ADMIN — ASPIRIN 81 MG: 81 TABLET, COATED ORAL at 11:22

## 2024-03-08 NOTE — PROGRESS NOTES
[facility] INPATIENT PROGRESS NOTE    Length of Stay:  5 days    CHIEF COMPLAINT:  CLL, thrombocytopenia    SUBJECTIVE:   Patient with no new complaints today.  No bleeding issues postoperatively.    ROS:  Review of Systems   Comprehensive review of systems was obtained with pertinent positive findings as noted in the interval history above.  All other systems negative      OBJECTIVE:  Vitals:    03/08/24 0500 03/08/24 0600 03/08/24 0632 03/08/24 0749   BP: 96/59  113/76 96/70   BP Location:    Left arm   Patient Position:    Lying   Pulse: 80  79 84   Resp:    18   Temp:    97.9 °F (36.6 °C)   TempSrc:    Oral   SpO2: 94%  97% 100%   Weight:  92.3 kg (203 lb 8 oz)     Height:             PHYSICAL EXAMINATION:  General: Alert and orient x 3 no distress  Chest/Lungs: Clear to auscultation bilaterally anteriorly.  Sternal incision dressed, no bleeding  Heart: Regular rate and rhythm  Abdomen/GI: Soft nontender distended bowel signs present  Extremities: Trace bilateral lower extremity edema    Patient was examined today, unchanged from above    DIAGNOSTIC DATA:  Results Review:     I reviewed the patient's new clinical results.    Results from last 7 days   Lab Units 03/08/24  0309 03/07/24  0403 03/06/24  0308   WBC 10*3/mm3 7.89 9.31 7.01   HEMOGLOBIN g/dL 8.1* 7.9* 8.1*   HEMATOCRIT % 24.3* 23.3* 23.6*   PLATELETS 10*3/mm3 78* 77* 73*      Results from last 7 days   Lab Units 03/08/24  0309 03/07/24  0403 03/06/24  0308 03/04/24  0838 03/03/24  1745   SODIUM mmol/L 132* 134* 141   < > 137   POTASSIUM mmol/L 3.3* 4.1 3.8   < > 3.9   CHLORIDE mmol/L 100 101 109*   < > 102   CO2 mmol/L 24.0 21.1* 22.0   < > 25.0   BUN mg/dL 20 14 8   < > 13   CREATININE mg/dL 1.70* 1.74* 1.36*   < > 1.23   CALCIUM mg/dL 8.7 8.4* 8.4*   < > 9.3   BILIRUBIN mg/dL  --   --   --   --  0.7   ALK PHOS U/L  --   --   --   --  76   ALT (SGPT) U/L  --   --   --   --  16   AST (SGOT) U/L  --   --   --   --  44*   GLUCOSE mg/dL  124* 140* 110*   < > 137*    < > = values in this interval not displayed.      Lab Results   Component Value Date    NEUTROABS 5.12 03/06/2024     Results from last 7 days   Lab Units 03/06/24  0308 03/05/24  1125 03/04/24  2352 03/04/24  1609 03/04/24  0129 03/03/24  1745   INR  1.26* 1.27*  --   --   --  0.96   APTT seconds  --  29.1 63.4* 61.0*   < > 25.6    < > = values in this interval not displayed.     Results from last 7 days   Lab Units 03/05/24  1513   MAGNESIUM mg/dL 2.6*             Assessment & Plan   ASSESSMENT/PLAN:  This is a 68 y.o. male with:     *Coronary artery disease status post non-ST elevation MI with plans for CABG  The patient had prior history of severe multivessel coronary disease, underwent PCI in 2017.    He experienced non-ST elevation MI with angioplasty to RCA stent on 2/22/2024 and was placed on Plavix after previously receiving Brilinta.    Subsequently discontinued Plavix on 3/1/2024 in preparation for CABG.    Patient admitted on heparin drip 3/4/2024  Patient underwent CABG 3/5/2024     *CLL  Patient has history of CLL, is followed in the outpatient setting by Dr. Cagle in our practice.    He was originally diagnosed in 2005 via peripheral blood flow cytometry.    He was managed initially with a course of observation.    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    Gradual escalation in WBC, gradual development of thrombocytopenia.    Patient initiated treatment in February 2023 with Zanubrutinib.    Patient with gradual response to treatment, no significant side effects.    Labs prior to current hospitalization 2/6/2024 with WBC 9.65, differential 33 segs, 60 lymphs, hemoglobin 14.4, platelet count 105,000.    He was receiving prophylactic acyclovir and Bactrim with treatment  He was notified to discontinue Zanubrutinib on 2/27/2024 due to upcoming CABG with plans to resume 2 weeks postoperatively (last dose Zanubrutinib taken on  2/27/2024).  Patient also discontinued acyclovir and Bactrim prophylaxis at that time.     *Thrombocytopenia  Patient with longstanding thrombocytopenia with platelet count in the 90-low 100,000 range  Unclear whether thrombocytopenia is related to marrow involvement from CLL, ITP related to CLL, or splenomegaly related to CLL  Labs on 3/5/2024 with B12 low normal at 324, folate 14.3  Additional labs on 3//24 with elevated IPF at 12.7%, suspicion for ITP associated with CLL  Monitor platelet count postoperatively, expect some degree of consumption with upcoming cardiopulmonary bypass.  Begin oral B12 1000 mcg daily due to low normal level  The count today stable to slightly improved at 78,000.  This is expected following cardiopulmonary bypass and CABG.  No current bleeding issues, no need for transfusion.  Will continue to monitor expectantly.    *Postoperative anemia  Hemoglobin preoperatively was 12.0 on 3/5/2024  Hemoglobin has declined as expected postoperatively.    Additional labs on 3/6/2024 with iron 22, ferritin 114, iron saturation 10%, TIBC 212.    Patient does appear to have mild iron deficiency.  Initiated oral iron sulfate 325 mg daily on 3/8/2024  Hemoglobin on 3/7/2024 was 7.9, received 1 unit PRBC transfusion  Hemoglobin today improved only slightly at 8.1 following transfusion 1 unit PRBC yesterday.  Decision for further transfusion support postoperatively per CT surgery.    *History of alcohol abuse  Monitor for withdrawal     *Viral prophylaxis  Acyclovir on hold     *PCP prophylaxis  Bactrim on hold        PLAN:   Patient remains off of zanubrutinib (discontinued 2/27/2024 preoperatively) for CLL due to potential bleeding risks with upcoming surgery.  Consider resuming treatment 2 weeks postoperatively  Prophylaxis with acyclovir and Bactrim on hold  Continue oral B12 1000 mcg daily  Expected exacerbation of chronic thrombocytopenia with consumption from upcoming cardiopulmonary bypass  Patient  initiated oral iron sulfate 325 mg daily  Daily CBC/differential    Discussed with patient and wife at bedside           Christopher Nugent MD

## 2024-03-08 NOTE — PLAN OF CARE
Goal Outcome Evaluation:  Plan of Care Reviewed With: patient, spouse        Progress: improving  Outcome Evaluation: VSS. Chest tubes and RIJ removed. Harris catheter removed, due to void by 2000. EMP off. SR. Oxygen weaned to 1LNC. SATS 94-96%.

## 2024-03-08 NOTE — CASE MANAGEMENT/SOCIAL WORK
Continued Stay Note  [facility]     Patient Name: Francisco Espino  MRN: [id number]  Today's Date: 3/8/2024    Admit Date: 3/3/2024    Plan: Home w/ [facility]   Discharge Plan       Row Name 03/08/24 1535       Plan    Plan Home w/ [facility]    Plan Comments CCP followed up with Pt and spouse/Evangelina at bedside.  Pt is ambulating 150 feet with P.T. post op day 3.  Pt reports his d/c plan remains to return home at d/c with assist of spouse.  [facility] has accepted and will follow...........Lyssa VALLECILLO/REYNA                   Discharge Codes    No documentation.                 Expected Discharge Date and Time       Expected Discharge Date Expected Discharge Time    Mar 11, 2024               Lyssa Jarvis RN

## 2024-03-08 NOTE — DISCHARGE SUMMARY
Date of Admission: 3/3/2024  Date of Discharge:  3/10/2024    Discharge Diagnosis:   Severe multivessel CAD now s/p CABG x 6  Recent NSTEMI with balloon opening of stent to RCA  HTN  HLD  CLL  Daily ETOH use  Thrombocytopenia   Expected acute blood loss anemia post-op  SPENSER on CKD stage 2    Presenting Problem/History of Present Illness:  Severe multivessel CAD   Recent NSTEMI with balloon opening of stent to RCA  HTN  HLD  CLL  Daily ETOH use  Thrombocytopenia   CKD stage 2    Hospital Course:  Patient is a 68 y.o. male who was admitted to the hospital on 3/3/24 in preparation for CABG. His Plavix had been discontinued, so he was started on a Heparin drip since he had recently undergone balloon opening of his stent to the RCA. On 3/5/24 he was taken to the Operating Room and under general anesthesia and via median sternotomy underwent CABG x 6 by Dr. Mott. Patient tolerated the procedure well and was transported to the Open Heart Recovery Unit in stable condition. Over the next several hours he was successfully weaned from the ventilator and extubated. On post-operative day #1 he was transferred to the Cardiac step-down unit. On post-operative day #2 he was transfused 1 unit of PRBC for Hemoglobin 7.9. Nephrology was also consulted for creatinine 1.74. On post-operative day #3 his chest tube, central line, and hebert catheter were removed and his Jabari drain was placed to bulb. On post-operative day #4 his Jabari drain and temporary pacing wires were removed. The rest of his hospital course was uneventful and one of gradual improvement. He was also followed by Hematology/Oncology during his stay due to his chronic thrombocytopenia and CLL, he has an appointment with their NP on 3/19 and they will decide on the timing of resuming Zanubrutinib.  An overnight oximetry revealed over 1 hour of desaturations, will be sent home with nocturnal oxygen. On post-operative day # 5, he was deemed stable for discharge home. We  will get a repeat BMP in 1 week to follow up on his creatinine.  Patient was provided with appropriate discharge education. He  was instructed to call office with any questions or concerns.      Procedures Performed:  Procedure(s) 3/5/24 Dr. Mott:  CABG x 6.  Sequential skeletonized LIMA to diagonal branch and then LAD.  Vein graft to PDA.  Vein graft to right coronary artery.  Vein graft to OM1.  Vein graft to OM 2.  Temporary cardiopulmonary bypass.  Antegrade and retrograde cold blood cardioplegia with warm reperfusion.  Neurologic monitoring.  Transesophageal echo.  Endoscopic vein harvest of the right greater saphenous vein. Saphena.  The heart and cardiac chambers were normal.  There was no residual        Consults:   Consults       Date and Time Order Name Status Description    3/7/2024  9:16 AM Inpatient Nephrology Consult Completed     3/3/2024  5:12 PM Inpatient Hematology & Oncology Consult Completed             Pertinent Test Results:    Lab Results   Component Value Date    WBC 6.15 03/09/2024    HGB 8.4 (L) 03/09/2024    HCT 24.7 (L) 03/09/2024    .6 (H) 03/09/2024    PLT 97 (L) 03/09/2024      Lab Results   Component Value Date    GLUCOSE 125 (H) 03/09/2024    CALCIUM 8.8 03/09/2024     (L) 03/09/2024    K 4.1 03/09/2024    K 4.1 03/09/2024    CO2 25.0 03/09/2024     03/09/2024    BUN 16 03/09/2024    CREATININE 1.45 (H) 03/09/2024    EGFRIFAFRI 70 07/09/2021    EGFRIFNONA 60 (L) 01/06/2022    BCR 11.0 03/09/2024    ANIONGAP 9.0 03/09/2024     Lab Results   Component Value Date    INR 1.26 (H) 03/06/2024    PROTIME 16.0 (H) 03/06/2024       Condition on Discharge: Stable    Vital Signs  Temp:  [98.1 °F (36.7 °C)-99.8 °F (37.7 °C)] 98.7 °F (37.1 °C)  Heart Rate:  [92-99] 97  Resp:  [18] 18  BP: (101-117)/(58-79) 101/58  Body mass index is 25.85 kg/m².    Discharge Disposition: Home with Home Health  Home-Health Care [facility]    Discharge Medications     Discharge Medications        New  Medications        Instructions Start Date   amiodarone 200 MG tablet  Commonly known as: PACERONE   200 mg, Oral, Every 24 Hours Scheduled   Start Date: March 11, 2024     atenolol 25 MG tablet  Commonly known as: TENORMIN   25 mg, Oral, Every 12 Hours      Baclofen 5 MG tablet  Commonly known as: LIORESAL   5 mg, Oral, 3 Times Daily PRN      calcium 500 mg vitamin D 5 mcg (200 UT) 500-5 MG-MCG tablet per tablet   1 tablet, Oral, Daily   Start Date: March 11, 2024     cyanocobalamin 1000 MCG tablet  Commonly known as: VITAMIN B-12   1,000 mcg, Oral, Daily   Start Date: March 11, 2024     ferrous sulfate 325 (65 FE) MG tablet   325 mg, Oral, Daily With Breakfast   Start Date: March 11, 2024     HYDROcodone-acetaminophen 5-325 MG per tablet  Commonly known as: NORCO   1 tablet, Oral, Every 4 Hours PRN             Changes to Medications        Instructions Start Date   rosuvastatin 20 MG tablet  Commonly known as: CRESTOR  What changed: how much to take   40 mg, Oral, Nightly             Continue These Medications        Instructions Start Date   acetaminophen 500 MG tablet  Commonly known as: TYLENOL   1,000 mg, Oral, Every 6 Hours PRN      acyclovir 400 MG tablet  Commonly known as: ZOVIRAX   400 mg, Oral, 2 Times Daily      aspirin 81 MG EC tablet   81 mg, Oral, Every Other Day      cetirizine 10 MG tablet  Commonly known as: zyrTEC   10 mg, Oral, Daily PRN      clopidogrel 75 MG tablet  Commonly known as: PLAVIX   75 mg, Oral, Daily      fluticasone 50 MCG/ACT nasal spray  Commonly known as: FLONASE   2 sprays, Nasal, Daily      ondansetron ODT 8 MG disintegrating tablet  Commonly known as: ZOFRAN-ODT   8 mg, Translingual, Every 8 Hours PRN      sulfamethoxazole-trimethoprim 800-160 MG per tablet  Commonly known as: BACTRIM DS,SEPTRA DS   TAKE 1 TABLET BY MOUTH THREE TIMES A WEEK             Stop These Medications      Brukinsa 80 MG chemo capsule  Generic drug: Zanubrutinib     metoprolol succinate XL 25 MG 24 hr  tablet  Commonly known as: TOPROL-XL     nitroglycerin 0.4 MG SL tablet  Commonly known as: NITROSTAT              Discharge Diet: Healthy heart    Activity at Discharge:   No driving x 2 weeks  No lifting greater than 10 pounds x 6 weeks  Ambulate at least 10 minutes 3 times a day    Follow-up Appointments  Future Appointments   Date Time Provider Department Center   4/9/2024 10:00 AM LAB CHAIR 6 [facility]  [facility]   4/9/2024 10:20 AM Russell Cagle MD [facility]   4/10/2024  1:00 PM Jr Kae Mott MD [facility]     Additional Instructions for the Follow-ups that You Need to Schedule       Ambulatory Referral to Cardiac Rehab   As directed      Ambulatory Referral to Home Health   As directed      Please draw BMP 1 week    Order Comments: Please draw BMP 1 week    Face to Face Visit Date: 3/10/2024   Follow-up provider for Plan of Care?: I will be treating the patient on an ongoing basis.  Please send me the Plan of Care for signature.   Follow-up provider: JR KAE MOTT [[id number]]   Reason/Clinical Findings: post op open heart   Describe mobility limitations that make leaving home difficult: weakness   Nursing/Therapeutic Services Requested: Skilled Nursing   Skilled nursing orders: Post CABG care   Frequency: 1 Week 1        Call MD With Problems / Concerns   As directed      Instructions:  Call office at [phone] for any drainage, increased redness, or fever over 100.5    Order Comments: Instructions:  Call office at [phone] for any drainage, increased redness, or fever over 100.5         Discharge Follow-up with PCP   As directed       Currently Documented PCP:    Eveline Pelaez MD    PCP Phone Number:    [phone]     Follow Up Details: in 1 week        Discharge Follow-up with Specialty: Cardiologist WINNIE/PA; 1 Week   As directed      Specialty: Cardiologist APRN/PA   Follow Up: 1 Week   Follow Up Details: bring all prescription bottles to  appointment, call for appointment        Discharge Follow-up with Specified Provider: Cardiologist; 1 Month   As directed      To: Cardiologist   Follow Up: 1 Month   Follow Up Details: call for appointment, bring all medication bottles to appointment        Discharge Follow-up with Specified Provider: Dr. Mott 1 month   As directed      To: Dr. Mott 1 month   Follow Up Details: 4-6 weeks, bring all current medications to appointment        Discharge Follow-up with Specified Provider: Nephrology 2 weeks; 2 Weeks   As directed      To: Nephrology 2 weeks   Follow Up: 2 Weeks        Basic Metabolic Panel    Mar 15, 2024 (Approximate)      Release to patient: Routine Release                   WINNIE Redd  03/10/24  09:47 EDT

## 2024-03-08 NOTE — THERAPY TREATMENT NOTE
Patient Name: Francisco Espino  : 1955    MRN: [id number]                              Today's Date: 3/8/2024       Admit Date: 3/3/2024    Visit Dx:     ICD-10-CM ICD-9-CM   1. 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    Varicose veins    Alcohol abuse    Coronary artery disease involving native coronary artery of native heart with angina pectoris    Dyslipidemia    Myocardial infarction    S/P angioplasty with stent    ST elevation (STEMI) myocardial infarction involving right coronary artery    Elevated liver function tests    Pneumonia    Positive colorectal cancer screening using Cologuard test    Kidney disease    High risk medication use    Bence Dawson proteinuria    Coronary artery disease of native heart with stable angina pectoris    CAD (coronary artery disease), native coronary artery     Past Medical History:   Diagnosis Date    Abnormal liver function test     Alcohol abuse     CLL (chronic lymphocytic leukemia)     Coronary artery disease     stent    Heart disease     History of pneumonia     2019    Hyperlipidemia     Hypertension     Inguinal hernia     left side to have surgery 3/28/19    Myocardial infarction     Screen for colon cancer 2016    Negative Cologaurd    Screening PSA (prostate specific antigen) 2013    .5    Thrombocytopenia      Past Surgical History:   Procedure Laterality Date    CARDIAC CATHETERIZATION  2017    CATARACT EXTRACTION Bilateral     COLONOSCOPY N/A 2006    Normal, repeat in 10 years, Dr. Preethi Gonzalez    COLONOSCOPY N/A 7/15/2020    Procedure: COLONOSCOPY TO CECUM & T.I. WITH COLD SNARE POLYPECTOMIES, CLIP PLACEMENT X 2;  Surgeon: Yennifer Salazar MD;  Location: [facility] ENDOSCOPY;  Service: Gastroenterology;  Laterality: N/A;  PRE- POSITIVE  COLOGUARD  POST- COLON POLYPS, DIVERTICULOSIS, HEMORRHOIDS    CORONARY ANGIOPLASTY WITH STENT PLACEMENT N/A 05/09/2017    Left heart catheterization, Selective native vessel coronary arteriography, Left ventriculography, Successful percutaneous intervention to the 100% occluded right coronary artery with a 3.5 x 38 mm Synergy drug-eluting stent (post-dilated w/ a 4.0 x 20 mm NC Emerge balloon), Selective right femoral angiography, Successful Perclose arteriotomy closure. Dr. James Pierson    CORONARY ARTERY BYPASS GRAFT N/A 3/5/2024    Procedure: STERNOTOMY, CORONARY ARTERY BYPASS GRAFTING TRANSESOPHAGEAL ECHOCARDIOGRAM X6, UTILIZING THE LEFT AUGUSTUS AND RIGHT SAPHENOUS VEIN.  ADRIANE, PRP WITH ANESTHESIA;  Surgeon: Jr Jean Marie Mott MD;  Location: [facility];  Service: Cardiothoracic;  Laterality: N/A;    INGUINAL HERNIA REPAIR Left     INGUINAL HERNIA REPAIR Right     x2    INGUINAL HERNIA REPAIR Left 3/28/2019    Procedure: LEFT INGUINAL HERNIA REPAIR LAPAROSCOPIC;  Surgeon: Preethi Gonzalez MD;  [place] [facility] [place] [facility]  Service: General    LAPAROSCOPIC INGUINAL HERNIA REPAIR Right 10/03/2002    w/ extra peritoneal Goe-Benton mesh (transperitoneal repair), Dr. Preethi Gonzalez    REFRACTIVE SURGERY Bilateral     RETINAL DETACHMENT SURGERY Right 07/02/2013      General Information       Row Name 03/08/24 1448          Physical Therapy Time and Intention    Document Type therapy note (daily note) (P)   -     Mode of Treatment physical therapy;individual therapy (P)   -       Row Name 03/08/24 1448          General Information    Patient Profile Reviewed yes (P)   -     Existing Precautions/Restrictions sternal;cardiac;oxygen therapy device and L/min (P)   -       Row Name 03/08/24 1448          Cognition    Orientation Status (Cognition) oriented x 3 (P)   -       Row Name 03/08/24 1448          Safety Issues, Functional Mobility    Safety Issues Affecting Function (Mobility) safety precaution  awareness;safety precautions follow-through/compliance;sequencing abilities (P)   -     Impairments Affecting Function (Mobility) balance;endurance/activity tolerance;shortness of breath;postural/trunk control (P)   -     Comment, Safety Issues/Impairments (Mobility) Pt had non skid socked donned for safety. (P)   -               User Key  (r) = Recorded By, (t) = Taken By, (c) = Cosigned By      Initials Name Provider Type     Yue Oneal, PT Student PT Student                   Mobility       Row Name 03/08/24 1448          Bed Mobility    Bed Mobility sit-supine;supine-sit (P)   -     Supine-Sit Cross (Bed Mobility) verbal cues;nonverbal cues (demo/gesture);contact guard (P)   -     Sit-Supine Cross (Bed Mobility) verbal cues;nonverbal cues (demo/gesture);contact guard (P)   -     Assistive Device (Bed Mobility) head of bed elevated (P)   -     Comment, (Bed Mobility) Pt needed increased time performing transfers. (P)   -       Row Name 03/08/24 1448          Bed-Chair Transfer    Bed-Chair Cross (Transfers) not tested (P)   -       Row Name 03/08/24 1448          Sit-Stand Transfer    Sit-Stand Cross (Transfers) verbal cues;nonverbal cues (demo/gesture);contact guard (P)   -     Assistive Device (Sit-Stand Transfers) walker, front-wheeled (P)   -     Comment, (Sit-Stand Transfer) No overt LOB. (P)   -       Row Name 03/08/24 1448          Gait/Stairs (Locomotion)    Cross Level (Gait) nonverbal cues (demo/gesture);verbal cues;contact guard (P)   -     Assistive Device (Gait) walker, front-wheeled (P)   -     Patient was able to Ambulate yes (P)   -     Distance in Feet (Gait) 150 ft (P)   -     Deviations/Abnormal Patterns (Gait) kandice decreased;stride length decreased (P)   -     Bilateral Gait Deviations forward flexed posture (P)   -     Cross Level (Stairs) not tested (P)   -     Comment, (Gait/Stairs) Pt needed cueing  to maintain upright posture and during navigation with turns. No overt LOB or veering noted this date. Vitals WNL. (P)   -               User Key  (r) = Recorded By, (t) = Taken By, (c) = Cosigned By      Initials Name Provider Type     Yue Oneal, PT Student PT Student                   Obj/Interventions       Row Name 03/08/24 1451          Motor Skills    Therapeutic Exercise -- (P)   cardiac protocol x 10, 1 STS  -       Row Name 03/08/24 1451          Balance    Balance Assessment sitting static balance;sit to stand dynamic balance;standing static balance;standing dynamic balance (P)   -     Static Sitting Balance verbal cues;non-verbal cues (demo/gesture);standby assist (P)   -     Position, Sitting Balance unsupported;sitting edge of bed (P)   -     Sit to Stand Dynamic Balance verbal cues;non-verbal cues (demo/gesture);contact guard (P)   -     Static Standing Balance verbal cues;non-verbal cues (demo/gesture);contact guard (P)   -     Dynamic Standing Balance verbal cues;non-verbal cues (demo/gesture);contact guard (P)   -     Position/Device Used, Standing Balance walker, front-wheeled (P)   -     Balance Interventions sitting;standing;sit to stand;static;dynamic;supported (P)   -     Comment, Balance No LOB noted this, improved balance with RW. (P)   -               User Key  (r) = Recorded By, (t) = Taken By, (c) = Cosigned By      Initials Name Provider Type     Yue Oneal, PT Student PT Student                   Goals/Plan    No documentation.                  Clinical Impression       Row Name 03/08/24 1452          Pain    Pre/Posttreatment Pain Comment Pt states that he is having chest pain around incision site, however did not report a numerical value. (P)   -     Pain Intervention(s) Repositioned;Nursing Notified;Rest (P)   -       Row Name 03/08/24 3602          Plan of Care Review    Plan of Care Reviewed With patient (P)   -     Progress improving  (P)   -     Outcome Evaluation Pt is agreeable to PT session this treatment session. Family present during session. Pt required CGA and sequencing VCs for supine<>sit transfer. Pt required CGA with RW for STS and during static/dynamic balance activities. Pt required CGA with RW during ambulation, pt able to ambulate 150 ft this date. Pt needed cueing to maintain upright posture and during navigation with turns. Improved performance noted after correction. Pt was left supine in bed with call light in reach and bed alarm actived. PT will continue to follow peripherally as patient progresses.  Vitals stayed WNL during the entirety of session. (P)   -       Row Name 03/08/24 1452          Vital Signs    Pretreatment Heart Rate (beats/min) 84 (P)   -     Pre SpO2 (%) 93 (P)   -     O2 Delivery Pre Treatment nasal cannula (P)   2 L  -MH     O2 Delivery Intra Treatment nasal cannula (P)   2 L  -MH     O2 Delivery Post Treatment nasal cannula (P)   2 L  -MH     Pre Patient Position Supine (P)   -MH     Intra Patient Position Standing (P)   -     Post Patient Position Supine (P)   -     Rest Breaks  1 (P)   -               User Key  (r) = Recorded By, (t) = Taken By, (c) = Cosigned By      Initials Name Provider Type     Yue Oneal, PT Student PT Student                   Outcome Measures       Row Name 03/08/24 1459 03/08/24 0749       How much help from another person do you currently need...    Turning from your back to your side while in flat bed without using bedrails? 3 (P)   - 2  -AC    Moving from lying on back to sitting on the side of a flat bed without bedrails? 3 (P)   - 2  -AC    Moving to and from a bed to a chair (including a wheelchair)? 3 (P)   - 3  -AC    Standing up from a chair using your arms (e.g., wheelchair, bedside chair)? 3 (P)   - 3  -AC    Climbing 3-5 steps with a railing? 2 (P)   - 2  -AC    To walk in hospital room? 3 (P)   - 3  -AC    AM-PAC 6 Clicks Score  (PT) 17 (P)   - 15  -    Highest Level of Mobility Goal 5 --> Static standing (P)   - 4 --> Transfer to chair/commode  -      Row Name 03/08/24 1459          Functional Assessment    Outcome Measure Options AM-PAC 6 Clicks Basic Mobility (PT) (P)   -               User Key  (r) = Recorded By, (t) = Taken By, (c) = Cosigned By      Initials Name Provider Type    AC Dawna Huntley, RN Registered Nurse     Yue Oneal, PT Student PT Student                                 Physical Therapy Education       Title: PT OT SLP Therapies (Done)       Topic: Physical Therapy (Done)       Point: Mobility training (Done)       Learning Progress Summary             Patient Acceptance, E,TB, VU by  at 3/8/2024 1500    Acceptance, E,D, VU,NR by MS at 3/7/2024 1140    Acceptance, E,TB, VU by  at 3/6/2024 1007   Family Acceptance, E,TB, VU by  at 3/8/2024 1500                         Point: Home exercise program (Done)       Learning Progress Summary             Patient Acceptance, E,TB, VU by  at 3/8/2024 1500    Acceptance, E,D, VU,NR by MS at 3/7/2024 1140    Acceptance, E,TB, VU by  at 3/6/2024 1007   Family Acceptance, E,TB, VU by  at 3/8/2024 1500                         Point: Body mechanics (Done)       Learning Progress Summary             Patient Acceptance, E,TB, VU by  at 3/8/2024 1500    Acceptance, E,D, VU,NR by MS at 3/7/2024 1140    Acceptance, E,TB, VU by  at 3/6/2024 1007   Family Acceptance, E,TB, VU by  at 3/8/2024 1500                         Point: Precautions (Done)       Learning Progress Summary             Patient Acceptance, E,TB, VU by  at 3/8/2024 1500    Acceptance, E,D, VU,NR by MS at 3/7/2024 1140    Acceptance, E,TB, VU by  at 3/6/2024 1007   Family Acceptance, E,TB, VU by  at 3/8/2024 1500                                         User Key       Initials Effective Dates Name Provider Type Discipline    MS 06/16/21 -  Bello Ortiz, PT Physical Therapist  PT     01/24/24 -  Yue Oneal PT Student PT Student PT                  PT Recommendation and Plan     Plan of Care Reviewed With: (P) patient  Progress: (P) improving  Outcome Evaluation: (P) Pt is agreeable to PT session this treatment session. Family present during session. Pt required CGA and sequencing VCs for supine<>sit transfer. Pt required CGA with RW for STS and during static/dynamic balance activities. Pt required CGA with RW during ambulation, pt able to ambulate 150 ft this date. Pt needed cueing to maintain upright posture and during navigation with turns. Improved performance noted after correction. Pt was left supine in bed with call light in reach and bed alarm actived. PT will continue to follow peripherally as patient progresses.  Vitals stayed WNL during the entirety of session.     Time Calculation:         PT Charges       Row Name 03/08/24 1500             Time Calculation    Start Time 1333 (P)   -      Stop Time 1351 (P)   -      Time Calculation (min) 18 min (P)   -      PT Received On 03/08/24 (P)   -      PT - Next Appointment 03/09/24 (P)   -         Time Calculation- PT    Total Timed Code Minutes- PT 18 minute(s) (P)   -         Timed Charges    68566 - PT Therapeutic Exercise Minutes 5 (P)   -      91415 - PT Therapeutic Activity Minutes 13 (P)   -         Total Minutes    Timed Charges Total Minutes 18 (P)   -       Total Minutes 18 (P)   -                User Key  (r) = Recorded By, (t) = Taken By, (c) = Cosigned By      Initials Name Provider Type     Yue Oneal, PT Student PT Student                  Therapy Charges for Today       Code Description Service Date Service Provider Modifiers Qty    [id number]  PT THERAPEUTIC ACT EA 15 MIN 3/8/2024 Yue Oneal, PT Student GP 1            PT G-Codes  Outcome Measure Options: (P) AM-PAC 6 Clicks Basic Mobility (PT)  AM-PAC 6 Clicks Score (PT): (P) 17  PT Discharge Summary  Anticipated  Discharge Disposition (PT): (P) home with home health, home with assist, home    Yue Oneal, PT Student  3/8/2024

## 2024-03-08 NOTE — PROGRESS NOTES
" LOS: 5 days   Patient Care Team:  Eveline Pelaez MD as PCP - General (Internal Medicine & Pediatrics)  James Moy MD as Consulting Physician (Interventional Cardiology)  Russell Cagle MD as Consulting Physician (Hematology and Oncology)  Eveline Pelaez MD as Referring Physician (Internal Medicine & Pediatrics)    Chief Complaint: post op     Subjective:  Symptoms:  No shortness of breath or chest pain.    Diet:  No nausea or vomiting.    Activity level: Impaired due to weakness.          Vital Signs  Temp:  [97.4 °F (36.3 °C)-100.1 °F (37.8 °C)] 97.9 °F (36.6 °C)  Heart Rate:  [] 84  Resp:  [17-18] 18  BP: ()/(51-76) 96/70  Body mass index is 26.13 kg/m².    Intake/Output Summary (Last 24 hours) at 3/8/2024 0913  Last data filed at 3/8/2024 0749  Gross per 24 hour   Intake 540 ml   Output 4910 ml   Net -4370 ml     I/O this shift:  In: -   Out: 450 [Urine:450]    Chest tube drainage last 8 hours70/35        03/06/24  0600 03/07/24  0651 03/08/24  0600   Weight: 92.7 kg (204 lb 5.9 oz) 92.9 kg (204 lb 14.4 oz) 92.3 kg (203 lb 8 oz)         Objective:  Vital signs: (most recent): Blood pressure 96/70, pulse 84, temperature 97.9 °F (36.6 °C), temperature source Oral, resp. rate 18, height 188 cm (74\"), weight 92.3 kg (203 lb 8 oz), SpO2 100%.                Results Review:        WBC WBC   Date Value Ref Range Status   03/08/2024 7.89 3.40 - 10.80 10*3/mm3 Final   03/07/2024 9.31 3.40 - 10.80 10*3/mm3 Final   03/06/2024 7.01 3.40 - 10.80 10*3/mm3 Final   03/05/2024 8.94 3.40 - 10.80 10*3/mm3 Final   03/05/2024 6.85 3.40 - 10.80 10*3/mm3 Final      HGB Hemoglobin   Date Value Ref Range Status   03/08/2024 8.1 (L) 13.0 - 17.7 g/dL Final   03/07/2024 7.9 (L) 13.0 - 17.7 g/dL Final   03/06/2024 8.1 (L) 13.0 - 17.7 g/dL Final   03/05/2024 9.1 (L) 13.0 - 17.7 g/dL Final   03/05/2024 10.3 (L) 13.0 - 17.7 g/dL Final   03/05/2024 11.2 (L) 12.0 - 17.0 g/dL Final   03/05/2024 8.8 " (L) 12.0 - 17.0 g/dL Final   03/05/2024 10.2 (L) 12.0 - 17.0 g/dL Final   03/05/2024 9.9 (L) 12.0 - 17.0 g/dL Final      HCT Hematocrit   Date Value Ref Range Status   03/08/2024 24.3 (L) 37.5 - 51.0 % Final   03/07/2024 23.3 (L) 37.5 - 51.0 % Final   03/06/2024 23.6 (L) 37.5 - 51.0 % Final   03/05/2024 26.6 (L) 37.5 - 51.0 % Final   03/05/2024 29.8 (L) 37.5 - 51.0 % Final   03/05/2024 33 (L) 38 - 51 % Final   03/05/2024 26 (L) 38 - 51 % Final   03/05/2024 30 (L) 38 - 51 % Final   03/05/2024 29 (L) 38 - 51 % Final      Platelets Platelets   Date Value Ref Range Status   03/08/2024 78 (L) 140 - 450 10*3/mm3 Final   03/07/2024 77 (L) 140 - 450 10*3/mm3 Final   03/06/2024 73 (L) 140 - 450 10*3/mm3 Final   03/05/2024 86 (L) 140 - 450 10*3/mm3 Final   03/05/2024 60 (L) 140 - 450 10*3/mm3 Final        PT/INR:    Protime   Date Value Ref Range Status   03/06/2024 16.0 (H) 11.7 - 14.2 Seconds Final   03/05/2024 16.1 (H) 11.7 - 14.2 Seconds Final   /  INR   Date Value Ref Range Status   03/06/2024 1.26 (H) 0.90 - 1.10 Final   03/05/2024 1.27 (H) 0.90 - 1.10 Final       Sodium Sodium   Date Value Ref Range Status   03/08/2024 132 (L) 136 - 145 mmol/L Final   03/07/2024 134 (L) 136 - 145 mmol/L Final   03/06/2024 141 136 - 145 mmol/L Final   03/05/2024 142 136 - 145 mmol/L Final   03/05/2024 137 136 - 145 mmol/L Final      Potassium Potassium   Date Value Ref Range Status   03/08/2024 3.3 (L) 3.5 - 5.2 mmol/L Final   03/07/2024 4.1 3.5 - 5.2 mmol/L Final   03/06/2024 3.8 3.5 - 5.2 mmol/L Final   03/05/2024 4.1 3.5 - 5.2 mmol/L Final   03/05/2024 4.4 3.5 - 5.2 mmol/L Final     Comment:     Slight hemolysis detected by analyzer. Result may be falsely elevated.      Chloride Chloride   Date Value Ref Range Status   03/08/2024 100 98 - 107 mmol/L Final   03/07/2024 101 98 - 107 mmol/L Final   03/06/2024 109 (H) 98 - 107 mmol/L Final   03/05/2024 108 (H) 98 - 107 mmol/L Final   03/05/2024 106 98 - 107 mmol/L Final      Bicarbonate  CO2   Date Value Ref Range Status   03/08/2024 24.0 22.0 - 29.0 mmol/L Final   03/07/2024 21.1 (L) 22.0 - 29.0 mmol/L Final   03/06/2024 22.0 22.0 - 29.0 mmol/L Final   03/05/2024 19.5 (L) 22.0 - 29.0 mmol/L Final   03/05/2024 20.1 (L) 22.0 - 29.0 mmol/L Final      BUN BUN   Date Value Ref Range Status   03/08/2024 20 8 - 23 mg/dL Final   03/07/2024 14 8 - 23 mg/dL Final   03/06/2024 8 8 - 23 mg/dL Final   03/05/2024 7 (L) 8 - 23 mg/dL Final   03/05/2024 6 (L) 8 - 23 mg/dL Final      Creatinine Creatinine   Date Value Ref Range Status   03/08/2024 1.70 (H) 0.76 - 1.27 mg/dL Final   03/07/2024 1.74 (H) 0.76 - 1.27 mg/dL Final   03/06/2024 1.36 (H) 0.76 - 1.27 mg/dL Final   03/05/2024 1.24 0.76 - 1.27 mg/dL Final   03/05/2024 1.19 0.76 - 1.27 mg/dL Final      Calcium Calcium   Date Value Ref Range Status   03/08/2024 8.7 8.6 - 10.5 mg/dL Final   03/07/2024 8.4 (L) 8.6 - 10.5 mg/dL Final   03/06/2024 8.4 (L) 8.6 - 10.5 mg/dL Final   03/05/2024 8.6 8.6 - 10.5 mg/dL Final   03/05/2024 8.6 8.6 - 10.5 mg/dL Final      Magnesium Magnesium   Date Value Ref Range Status   03/05/2024 2.6 (H) 1.6 - 2.4 mg/dL Final   03/05/2024 2.9 (H) 1.6 - 2.4 mg/dL Final          amiodarone, 300 mg, Oral, Q12H  aspirin, 81 mg, Oral, Daily  atenolol, 12.5 mg, Oral, Q24H  atorvastatin, 40 mg, Oral, Nightly  chlorhexidine, 15 mL, Mouth/Throat, Q12H  docusate sodium, 100 mg, Oral, BID  enoxaparin, 40 mg, Subcutaneous, Daily  folic acid, 1 mg, Oral, Daily  guaiFENesin, 1,200 mg, Oral, Q12H  insulin lispro, 2-9 Units, Subcutaneous, 4x Daily AC & at Bedtime  multivitamin, 1 tablet, Oral, Daily  mupirocin, , Each Nare, BID  pantoprazole, 40 mg, Oral, QAM  polyethylene glycol, 17 g, Oral, Daily  potassium chloride ER, 40 mEq, Oral, Q4H  senna-docusate sodium, 2 tablet, Oral, Nightly  thiamine, 100 mg, Oral, Daily  vitamin B-12, 1,000 mcg, Oral, Daily      sodium chloride, 30 mL/hr, Last Rate: 30 mL/hr (03/05/24 1133)              CAD (coronary artery  disease), native coronary artery    Coronary artery disease of native heart with stable angina pectoris      Assessment & Plan    - severe multi-vessel CAD with hx of PCI in 2017; recent NSTEMI with balloon opening of stent to RCA; LD Plavix 3/1- s/p CABGx6 LIMA/RSVG POD#3 [place]  - hypertension  - hyperlipidemia--statin therapy  - CLL--- on Brukinsa; oncology consulted  - daily ETOH use--watch for s/sx of withdrawal. CIWA protocol  -TCP plt count 73, hold lovenox  -post op anemia- expected acute blood loss     Looks good this morning  Up in the chair  2L NC-- wean as able   Sinus rhythm rate in the 90s--blood pressure borderline   Will check vitamin D  Encourage pulmonary toilet-- continue IS/flutter and mucinex  Creatinine  stable 1.7 this morning-- nephrology following  Seems a little drowsy-- will discontinue oxycodone and gabapentin   Mobilize/increase activity  PA and lateral without pneumothorax  Discontinue chest tubes, central line and hebert  Will leave wires one more day, place jennifer tube to bulb  Continue routine care       Deedee Tomlin, WINNIE  03/08/24  09:13 EST

## 2024-03-08 NOTE — PLAN OF CARE
Goal Outcome Evaluation:  Plan of Care Reviewed With: (P) patient        Progress: (P) improving  Outcome Evaluation: (P) Pt is agreeable to PT session this treatment session. Family present during session. Pt required CGA and sequencing VCs for supine<>sit transfer. Pt required CGA with RW for STS and during static/dynamic balance activities. Pt required CGA with RW during ambulation, pt able to ambulate 150 ft this date. Pt needed cueing to maintain upright posture and during navigation with turns. Improved performance noted after correction. Pt was left supine in bed with call light in reach and bed alarm actived. PT will continue to follow peripherally as patient progresses.  Vitals stayed WNL during the entirety of session.      Anticipated Discharge Disposition (PT): (P) home with home health, home with assist, home

## 2024-03-08 NOTE — PLAN OF CARE
Goal Outcome Evaluation:  Plan of Care Reviewed With: patient        Progress: improving  Outcome Evaluation: Pt is AO X4. Pain treated per MAR. Chest tube, Harris, RIJ  and Ext pacemaker in place. Intake and output recorded on chart. Slightly soft BP 'S . Pt is asymptomatic.All other vs stable. SR on tele and On 2 l NC. WCTM.         Problem: Adult Inpatient Plan of Care  Goal: Plan of Care Review  Outcome: Ongoing, Not Progressing  Flowsheets (Taken 3/8/2024 0567)  Progress: improving  Plan of Care Reviewed With: patient  Outcome Evaluation: Pt is AO X4. Pain treated per MAR. Chest tube, Harris, RIJ  and Ext pacemaker in place. Intake and output recorded on chart. Slightly soft BP 'S . Pt is asymptomatic.All other vs stable. SR on tele and On 2 l NC. WCTM.  Goal: Patient-Specific Goal (Individualized)  Outcome: Ongoing, Not Progressing  Goal: Absence of Hospital-Acquired Illness or Injury  Outcome: Ongoing, Not Progressing  Intervention: Identify and Manage Fall Risk  Description: Perform standard risk assessment on admission using a validated tool or comprehensive approach appropriate to the patient; reassess fall risk frequently, with change in status or transfer to another level of care.  Communicate fall injury risk to interprofessional healthcare team.  Determine need for increased observation, equipment and environmental modification, such as low bed, signage and supportive, nonskid footwear.  Adjust safety measures to individual developmental age, stage and identified risk factors.  Reinforce the importance of safety and physical activity with patient and family.  Perform regular intentional rounding to assess need for position change, pain assessment and personal needs, including assistance with toileting.  Recent Flowsheet Documentation  Taken 3/8/2024 0430 by Rojelio Vargas, RN  Safety Promotion/Fall Prevention:   activity supervised   assistive device/personal items within reach   fall  prevention program maintained   clutter free environment maintained   nonskid shoes/slippers when out of bed   muscle strengthening facilitated   room organization consistent   safety round/check completed  Taken 3/8/2024 0200 by Rojelio Vargas RN  Safety Promotion/Fall Prevention:   assistive device/personal items within reach   activity supervised   clutter free environment maintained   fall prevention program maintained   muscle strengthening facilitated   nonskid shoes/slippers when out of bed   safety round/check completed   room organization consistent  Taken 3/8/2024 0015 by Rojelio Vargas RN  Safety Promotion/Fall Prevention:   activity supervised   assistive device/personal items within reach   fall prevention program maintained   clutter free environment maintained   nonskid shoes/slippers when out of bed   muscle strengthening facilitated   safety round/check completed   room organization consistent  Taken 3/7/2024 2226 by Rojelio Vargas RN  Safety Promotion/Fall Prevention:   activity supervised   assistive device/personal items within reach   clutter free environment maintained   fall prevention program maintained   nonskid shoes/slippers when out of bed   muscle strengthening facilitated   safety round/check completed   room organization consistent  Taken 3/7/2024 2041 by Rojelio Vargas RN  Safety Promotion/Fall Prevention:   activity supervised   assistive device/personal items within reach   fall prevention program maintained   clutter free environment maintained   nonskid shoes/slippers when out of bed   muscle strengthening facilitated   room organization consistent   safety round/check completed  Intervention: Prevent Skin Injury  Description: Perform a screening for skin injury risk, such as pressure or moisture associated skin damage on admission and at regular intervals throughout hospital stay.  Keep all areas of skin (especially folds) clean and dry.  Maintain adequate skin hydration.  Relieve  and redistribute pressure and protect bony prominences; implement measures based on patient-specific risk factors.  Match turning and repositioning schedule to clinical condition.  Encourage weight shift frequently; assist with reposition if unable to complete independently.  Float heels off bed; avoid pressure on the Achilles tendon.  Keep skin free from extended contact with medical devices.  Encourage functional activity and mobility, as early as tolerated.  Use aids (e.g., slide boards, mechanical lift) during transfer.  Recent Flowsheet Documentation  Taken 3/8/2024 0430 by Rojelio Vargas RN  Body Position:   weight shifting   upper extremity elevated  Taken 3/8/2024 0200 by Rojelio Vargas RN  Body Position: supine, legs elevated  Taken 3/8/2024 0015 by Rojelio Vargas RN  Body Position: position changed independently  Taken 3/7/2024 2041 by Rojelio Vargas RN  Body Position: supine, legs elevated  Intervention: Prevent and Manage VTE (Venous Thromboembolism) Risk  Description: Assess for VTE (venous thromboembolism) risk.  Encourage and assist with early ambulation.  Initiate and maintain compression or other therapy, as indicated, based on identified risk in accordance with organizational protocol and provider order.  Encourage both active and passive leg exercises while in bed, if unable to ambulate.  Recent Flowsheet Documentation  Taken 3/8/2024 0430 by Rojelio Vargas RN  Activity Management: activity encouraged  Taken 3/8/2024 0200 by Rojelio Vargas RN  Activity Management: activity encouraged  Taken 3/8/2024 0015 by Rojelio Vargas RN  Activity Management: activity encouraged  Taken 3/7/2024 2226 by Rojelio Vargas RN  Activity Management: activity encouraged  Taken 3/7/2024 2041 by Rojelio Vargas RN  Activity Management: activity encouraged  Intervention: Prevent Infection  Description: Maintain skin and mucous membrane integrity; promote hand, oral and pulmonary hygiene.  Optimize fluid balance, nutrition, sleep  and glycemic control to maximize infection resistance.  Identify potential sources of infection early to prevent or mitigate progression of infection (e.g., wound, lines, devices).  Evaluate ongoing need for invasive devices; remove promptly when no longer indicated.  Recent Flowsheet Documentation  Taken 3/8/2024 0430 by Rojelio Vargas RN  Infection Prevention:   visitors restricted/screened   single patient room provided   hand hygiene promoted   equipment surfaces disinfected   environmental surveillance performed   cohorting utilized   personal protective equipment utilized   rest/sleep promoted  Taken 3/8/2024 0200 by Rojelio Vargas RN  Infection Prevention:   visitors restricted/screened   single patient room provided   rest/sleep promoted   personal protective equipment utilized   hand hygiene promoted   equipment surfaces disinfected   environmental surveillance performed   cohorting utilized  Taken 3/8/2024 0015 by Rojelio Vargas RN  Infection Prevention:   visitors restricted/screened   single patient room provided   rest/sleep promoted   hand hygiene promoted   equipment surfaces disinfected   environmental surveillance performed   cohorting utilized   personal protective equipment utilized  Taken 3/7/2024 2226 by Rojelio Vargas RN  Infection Prevention:   visitors restricted/screened   single patient room provided   rest/sleep promoted   personal protective equipment utilized   environmental surveillance performed   cohorting utilized   equipment surfaces disinfected   hand hygiene promoted  Taken 3/7/2024 2041 by Rojelio Vargas RN  Infection Prevention:   cohorting utilized   equipment surfaces disinfected   environmental surveillance performed   personal protective equipment utilized   hand hygiene promoted   rest/sleep promoted   single patient room provided   visitors restricted/screened  Goal: Optimal Comfort and Wellbeing  Outcome: Ongoing, Not Progressing  Intervention: Provide Person-Centered  Care  Description: Use a family-focused approach to care.  Develop trust and rapport by proactively providing information, encouraging questions, addressing concerns and offering reassurance.  Acknowledge emotional response to hospitalization.  Recognize and utilize personal coping strategies.  Honor spiritual and cultural preferences.  Recent Flowsheet Documentation  Taken 3/7/2024 2041 by Rojelio Vargas RN  Trust Relationship/Rapport:   thoughts/feelings acknowledged   reassurance provided   questions answered   empathic listening provided   emotional support provided   choices provided   questions encouraged   care explained  Goal: Readiness for Transition of Care  Outcome: Ongoing, Not Progressing

## 2024-03-08 NOTE — PROGRESS NOTES
[facility] Progress Note      Patient Name: Francisco Espino  : 1955  MRN: [id number]  Primary Care Physician:  Eveline Pelaez MD  Date of admission: 3/3/2024    Subjective     Interval History:   Eating well; no N/V  Breathing is comfortable on 3 L/min  UOP yesterday 4.2 L  PRBCs followed by Lasix 40 mg IV yesterday    Review of Systems:   As noted above    Objective     Vitals:   Temp:  [97.4 °F (36.3 °C)-100.1 °F (37.8 °C)] 97.9 °F (36.6 °C)  Heart Rate:  [] 84  Resp:  [17-18] 18  BP: ()/(51-76) 96/70  Flow (L/min):  [2-3] 3    Intake/Output Summary (Last 24 hours) at 3/8/2024 0823  Last data filed at 3/8/2024 0749  Gross per 24 hour   Intake 540 ml   Output 4910 ml   Net -4370 ml       Physical Exam:    General Appearance: Slightly groggy, appropriate, oriented  Skin: warm and dry  HEENT: AT/NC, oral mucosa waste  Neck: supple, no JVD  Lungs: crackles in flanks, not labored on 3 L/min  Heart: RRR, + rub  Abdomen: soft, nontender, + D, + BS  : No palpable bladder  Extremities: no edema, cyanosis or clubbing  Neuro: moves all limbs    Scheduled Meds:     amiodarone, 300 mg, Oral, Q12H  aspirin, 81 mg, Oral, Daily  atenolol, 12.5 mg, Oral, Q24H  atorvastatin, 40 mg, Oral, Nightly  chlorhexidine, 15 mL, Mouth/Throat, Q12H  docusate sodium, 100 mg, Oral, BID  enoxaparin, 40 mg, Subcutaneous, Daily  folic acid, 1 mg, Oral, Daily  guaiFENesin, 1,200 mg, Oral, Q12H  insulin lispro, 2-9 Units, Subcutaneous, 4x Daily AC & at Bedtime  multivitamin, 1 tablet, Oral, Daily  mupirocin, , Each Nare, BID  pantoprazole, 40 mg, Oral, QAM  polyethylene glycol, 17 g, Oral, Daily  potassium chloride ER, 40 mEq, Oral, Q4H  senna-docusate sodium, 2 tablet, Oral, Nightly  thiamine, 100 mg, Oral, Daily  vitamin B-12, 1,000 mcg, Oral, Daily      IV Meds:   sodium chloride, 30 mL/hr, Last Rate: 30 mL/hr (24 0438)        Results Reviewed:   I have personally reviewed the  results from the time of this admission to 3/8/2024 08:23 EST     Results from last 7 days   Lab Units 03/08/24  0309 03/07/24  0403 03/06/24  0308 03/04/24  0838 03/03/24  1745   SODIUM mmol/L 132* 134* 141   < > 137   POTASSIUM mmol/L 3.3* 4.1 3.8   < > 3.9   CHLORIDE mmol/L 100 101 109*   < > 102   CO2 mmol/L 24.0 21.1* 22.0   < > 25.0   BUN mg/dL 20 14 8   < > 13   CREATININE mg/dL 1.70* 1.74* 1.36*   < > 1.23   CALCIUM mg/dL 8.7 8.4* 8.4*   < > 9.3   BILIRUBIN mg/dL  --   --   --   --  0.7   ALK PHOS U/L  --   --   --   --  76   ALT (SGPT) U/L  --   --   --   --  16   AST (SGOT) U/L  --   --   --   --  44*   GLUCOSE mg/dL 124* 140* 110*   < > 137*    < > = values in this interval not displayed.       Estimated Creatinine Clearance: 53.5 mL/min (A) (by C-G formula based on SCr of 1.7 mg/dL (H)).    Results from last 7 days   Lab Units 03/08/24 0309 03/05/24 1513 03/05/24 1125 03/05/24  0425   MAGNESIUM mg/dL  --  2.6* 2.9* 2.0   PHOSPHORUS mg/dL 3.4 2.7 1.4* 4.0             Results from last 7 days   Lab Units 03/08/24 0309 03/07/24  0403 03/06/24  0308 03/05/24  1513 03/05/24  1125   WBC 10*3/mm3 7.89 9.31 7.01 8.94 6.85   HEMOGLOBIN g/dL 8.1* 7.9* 8.1* 9.1* 10.3*   PLATELETS 10*3/mm3 78* 77* 73* 86* 60*       Results from last 7 days   Lab Units 03/06/24 0308 03/05/24  1125 03/03/24  1745   INR  1.26* 1.27* 0.96       Assessment / Plan     ASSESSMENT:  SPENSER on CKD2, nonoliguric, unchanged:  prerenal due to hypotension, tachycardia, ABLA, and large fluid shifts in the setting of CABG. Subnephrotic proteinuria, with 1 g/g by previous ratio.  Volume stable by imaging and exam; low potassium after diuresis; normal AG.    Hypotension  CAD s/p CABG 3/5, with preceding MI in February '24  Anemia, with expected blood loss postoperatively; 1U PRBC ordered.  Iron deficiency by iron studies  CLL with anemia and thrombocytopenia  Regular alcohol use    PLAN:  1.  Replace potassium per protocol  2.  Hold off diuretic  today given fairly clear lungs and soft blood pressure  3.  Surveillance labs      Thank you for involving us in the care of Francisco Espino.  Please feel free to call with any questions.    Leroy Diane MD  03/08/24  08:23 [facility]    [facility]  [phone]    Please note that portions of this note were completed with a voice recognition program.

## 2024-03-09 LAB
ALBUMIN SERPL-MCNC: 3.8 G/DL (ref 3.5–5.2)
ANION GAP SERPL CALCULATED.3IONS-SCNC: 9 MMOL/L (ref 5–15)
BASOPHILS # BLD AUTO: 0.01 10*3/MM3 (ref 0–0.2)
BASOPHILS NFR BLD AUTO: 0.2 % (ref 0–1.5)
BUN SERPL-MCNC: 16 MG/DL (ref 8–23)
BUN/CREAT SERPL: 11 (ref 7–25)
CALCIUM SPEC-SCNC: 8.8 MG/DL (ref 8.6–10.5)
CHLORIDE SERPL-SCNC: 100 MMOL/L (ref 98–107)
CO2 SERPL-SCNC: 25 MMOL/L (ref 22–29)
CREAT SERPL-MCNC: 1.45 MG/DL (ref 0.76–1.27)
DEPRECATED RDW RBC AUTO: 46.1 FL (ref 37–54)
EGFRCR SERPLBLD CKD-EPI 2021: 52.2 ML/MIN/1.73
EOSINOPHIL # BLD AUTO: 0.19 10*3/MM3 (ref 0–0.4)
EOSINOPHIL NFR BLD AUTO: 3.1 % (ref 0.3–6.2)
ERYTHROCYTE [DISTWIDTH] IN BLOOD BY AUTOMATED COUNT: 12.4 % (ref 12.3–15.4)
GLUCOSE SERPL-MCNC: 125 MG/DL (ref 65–99)
HCT VFR BLD AUTO: 24.7 % (ref 37.5–51)
HGB BLD-MCNC: 8.4 G/DL (ref 13–17.7)
LYMPHOCYTES # BLD AUTO: 0.89 10*3/MM3 (ref 0.7–3.1)
LYMPHOCYTES NFR BLD AUTO: 14.5 % (ref 19.6–45.3)
MAGNESIUM SERPL-MCNC: 1.9 MG/DL (ref 1.6–2.4)
MCH RBC QN AUTO: 34.6 PG (ref 26.6–33)
MCHC RBC AUTO-ENTMCNC: 34 G/DL (ref 31.5–35.7)
MCV RBC AUTO: 101.6 FL (ref 79–97)
MONOCYTES # BLD AUTO: 0.93 10*3/MM3 (ref 0.1–0.9)
MONOCYTES NFR BLD AUTO: 15.1 % (ref 5–12)
NEUTROPHILS NFR BLD AUTO: 4.11 10*3/MM3 (ref 1.7–7)
NEUTROPHILS NFR BLD AUTO: 66.8 % (ref 42.7–76)
PHOSPHATE SERPL-MCNC: 2.7 MG/DL (ref 2.5–4.5)
PLATELET # BLD AUTO: 97 10*3/MM3 (ref 140–450)
PMV BLD AUTO: 11.3 FL (ref 6–12)
POTASSIUM SERPL-SCNC: 4.1 MMOL/L (ref 3.5–5.2)
POTASSIUM SERPL-SCNC: 4.1 MMOL/L (ref 3.5–5.2)
RBC # BLD AUTO: 2.43 10*6/MM3 (ref 4.14–5.8)
SODIUM SERPL-SCNC: 134 MMOL/L (ref 136–145)
WBC NRBC COR # BLD AUTO: 6.15 10*3/MM3 (ref 3.4–10.8)

## 2024-03-09 PROCEDURE — 99232 SBSQ HOSP IP/OBS MODERATE 35: CPT | Performed by: INTERNAL MEDICINE

## 2024-03-09 PROCEDURE — 85025 COMPLETE CBC W/AUTO DIFF WBC: CPT | Performed by: INTERNAL MEDICINE

## 2024-03-09 PROCEDURE — 97110 THERAPEUTIC EXERCISES: CPT

## 2024-03-09 PROCEDURE — 80069 RENAL FUNCTION PANEL: CPT | Performed by: INTERNAL MEDICINE

## 2024-03-09 PROCEDURE — 25010000002 ENOXAPARIN PER 10 MG: Performed by: NURSE PRACTITIONER

## 2024-03-09 PROCEDURE — 94762 N-INVAS EAR/PLS OXIMTRY CONT: CPT

## 2024-03-09 PROCEDURE — 83735 ASSAY OF MAGNESIUM: CPT | Performed by: INTERNAL MEDICINE

## 2024-03-09 PROCEDURE — 84132 ASSAY OF SERUM POTASSIUM: CPT | Performed by: THORACIC SURGERY (CARDIOTHORACIC VASCULAR SURGERY)

## 2024-03-09 RX ORDER — BENZONATATE 100 MG/1
100 CAPSULE ORAL 3 TIMES DAILY PRN
Status: DISCONTINUED | OUTPATIENT
Start: 2024-03-09 | End: 2024-03-10 | Stop reason: HOSPADM

## 2024-03-09 RX ORDER — ATENOLOL 25 MG/1
25 TABLET ORAL
Status: DISCONTINUED | OUTPATIENT
Start: 2024-03-10 | End: 2024-03-10 | Stop reason: HOSPADM

## 2024-03-09 RX ORDER — BACLOFEN 10 MG/1
5 TABLET ORAL 3 TIMES DAILY PRN
Status: DISCONTINUED | OUTPATIENT
Start: 2024-03-09 | End: 2024-03-10 | Stop reason: HOSPADM

## 2024-03-09 RX ADMIN — ATORVASTATIN CALCIUM 40 MG: 20 TABLET, FILM COATED ORAL at 21:08

## 2024-03-09 RX ADMIN — POLYETHYLENE GLYCOL 3350 17 G: 17 POWDER, FOR SOLUTION ORAL at 08:50

## 2024-03-09 RX ADMIN — AMIODARONE HYDROCHLORIDE 300 MG: 200 TABLET ORAL at 21:08

## 2024-03-09 RX ADMIN — MUPIROCIN 1 APPLICATION: 20 OINTMENT TOPICAL at 08:50

## 2024-03-09 RX ADMIN — DOCUSATE SODIUM 50MG AND SENNOSIDES 8.6MG 2 TABLET: 8.6; 5 TABLET, FILM COATED ORAL at 21:08

## 2024-03-09 RX ADMIN — Medication 1 TABLET: at 08:50

## 2024-03-09 RX ADMIN — ATENOLOL 12.5 MG: 25 TABLET ORAL at 08:49

## 2024-03-09 RX ADMIN — PANTOPRAZOLE SODIUM 40 MG: 40 TABLET, DELAYED RELEASE ORAL at 06:09

## 2024-03-09 RX ADMIN — CALCIUM CARBONATE-VITAMIN D TAB 500 MG-200 UNIT 1 TABLET: 500-200 TAB at 11:39

## 2024-03-09 RX ADMIN — DOCUSATE SODIUM 100 MG: 100 CAPSULE, LIQUID FILLED ORAL at 21:08

## 2024-03-09 RX ADMIN — GUAIFENESIN 1200 MG: 600 TABLET, EXTENDED RELEASE ORAL at 21:08

## 2024-03-09 RX ADMIN — HYDROCODONE BITARTRATE AND ACETAMINOPHEN 2 TABLET: 5; 325 TABLET ORAL at 03:50

## 2024-03-09 RX ADMIN — MUPIROCIN 1 APPLICATION: 20 OINTMENT TOPICAL at 21:09

## 2024-03-09 RX ADMIN — FOLIC ACID 1 MG: 1 TABLET ORAL at 08:50

## 2024-03-09 RX ADMIN — AMIODARONE HYDROCHLORIDE 300 MG: 200 TABLET ORAL at 08:51

## 2024-03-09 RX ADMIN — DOCUSATE SODIUM 100 MG: 100 CAPSULE, LIQUID FILLED ORAL at 08:51

## 2024-03-09 RX ADMIN — Medication 100 MG: at 08:50

## 2024-03-09 RX ADMIN — HYDROCODONE BITARTRATE AND ACETAMINOPHEN 2 TABLET: 5; 325 TABLET ORAL at 18:07

## 2024-03-09 RX ADMIN — ASPIRIN 81 MG: 81 TABLET, COATED ORAL at 08:51

## 2024-03-09 RX ADMIN — BACLOFEN 5 MG: 10 TABLET ORAL at 12:29

## 2024-03-09 RX ADMIN — FERROUS SULFATE TAB 325 MG (65 MG ELEMENTAL FE) 325 MG: 325 (65 FE) TAB at 08:51

## 2024-03-09 RX ADMIN — GUAIFENESIN 1200 MG: 600 TABLET, EXTENDED RELEASE ORAL at 08:50

## 2024-03-09 RX ADMIN — Medication 1000 MCG: at 08:50

## 2024-03-09 RX ADMIN — BENZONATATE 100 MG: 100 CAPSULE ORAL at 12:28

## 2024-03-09 NOTE — PLAN OF CARE
Goal Outcome Evaluation:  Plan of Care Reviewed With: patient        Progress: improving     Post op day 4 s/p CABG x6, LIMA, R saph, ambulatory standby assist. Harris removed, voiding spontaneous. Spouse at bedside participating in care. Complains of persistent cough through the night. Pain management per MAR. Tele SR, tolerating PO intake.

## 2024-03-09 NOTE — PROGRESS NOTES
[facility] Progress Note      Patient Name: Francisco Espino  : 1955  MRN: [id number]  Primary Care Physician:  Eveline Pelaez MD  Date of admission: 3/3/2024    Subjective     Interval History:   Eating well; no N/V  Walked to the bathroom this morning.  Had low-grade temperature yesterday night at 100.1.  Blood pressure continues to be soft.  Oxygen requirements are improving  Urine output of 2.8 L  Review of Systems:   As noted above    Objective     Vitals:   Temp:  [98.5 °F (36.9 °C)-100.1 °F (37.8 °C)] 98.6 °F (37 °C)  Heart Rate:  [] 95  Resp:  [18] 18  BP: ()/(63-80) 110/66  Flow (L/min):  [2-3] 2    Intake/Output Summary (Last 24 hours) at 3/9/2024 0907  Last data filed at 3/9/2024 0449  Gross per 24 hour   Intake 720 ml   Output 2340 ml   Net -1620 ml       Physical Exam:    General Appearance: Slightly groggy, appropriate, oriented  Skin: warm and dry  HEENT: AT/NC, oral mucosa waste  Neck: supple, no JVD  Lungs: crackles in flanks, not labored on 3 L/min  Heart: RRR, + rub  Abdomen: soft, nontender, + D, + BS  : No palpable bladder  Extremities: no edema, cyanosis or clubbing  Neuro: moves all limbs    Scheduled Meds:     amiodarone, 300 mg, Oral, Q12H  aspirin, 81 mg, Oral, Daily  atenolol, 12.5 mg, Oral, Q24H  atorvastatin, 40 mg, Oral, Nightly  calcium 500 mg vitamin D 5 mcg (200 UT), 1 tablet, Oral, Daily  docusate sodium, 100 mg, Oral, BID  enoxaparin, 40 mg, Subcutaneous, Daily  ferrous sulfate, 325 mg, Oral, Daily With Breakfast  folic acid, 1 mg, Oral, Daily  guaiFENesin, 1,200 mg, Oral, Q12H  multivitamin, 1 tablet, Oral, Daily  mupirocin, , Each Nare, BID  pantoprazole, 40 mg, Oral, QAM  polyethylene glycol, 17 g, Oral, Daily  senna-docusate sodium, 2 tablet, Oral, Nightly  thiamine, 100 mg, Oral, Daily  vitamin B-12, 1,000 mcg, Oral, Daily      IV Meds:   sodium chloride, 30 mL/hr, Last Rate: 30 mL/hr (24 4567)        Results  Reviewed:   I have personally reviewed the results from the time of this admission to 3/9/2024 09:07 EST     Results from last 7 days   Lab Units 03/09/24  0153 03/08/24  1412 03/08/24  0309 03/07/24  0403 03/04/24  0838 03/03/24  1745   SODIUM mmol/L 134*  --  132* 134*   < > 137   POTASSIUM mmol/L 4.1  4.1 3.7 3.3* 4.1   < > 3.9   CHLORIDE mmol/L 100  --  100 101   < > 102   CO2 mmol/L 25.0  --  24.0 21.1*   < > 25.0   BUN mg/dL 16  --  20 14   < > 13   CREATININE mg/dL 1.45*  --  1.70* 1.74*   < > 1.23   CALCIUM mg/dL 8.8  --  8.7 8.4*   < > 9.3   BILIRUBIN mg/dL  --   --   --   --   --  0.7   ALK PHOS U/L  --   --   --   --   --  76   ALT (SGPT) U/L  --   --   --   --   --  16   AST (SGOT) U/L  --   --   --   --   --  44*   GLUCOSE mg/dL 125*  --  124* 140*   < > 137*    < > = values in this interval not displayed.       Estimated Creatinine Clearance: 63.2 mL/min (A) (by C-G formula based on SCr of 1.45 mg/dL (H)).    Results from last 7 days   Lab Units 03/09/24  0153 03/08/24  0309 03/05/24  1513 03/05/24  1125   MAGNESIUM mg/dL 1.9  --  2.6* 2.9*   PHOSPHORUS mg/dL 2.7 3.4 2.7 1.4*             Results from last 7 days   Lab Units 03/09/24  0153 03/08/24  0309 03/07/24  0403 03/06/24  0308 03/05/24  1513   WBC 10*3/mm3 6.15 7.89 9.31 7.01 8.94   HEMOGLOBIN g/dL 8.4* 8.1* 7.9* 8.1* 9.1*   PLATELETS 10*3/mm3 97* 78* 77* 73* 86*       Results from last 7 days   Lab Units 03/06/24  0308 03/05/24  1125 03/03/24  1745   INR  1.26* 1.27* 0.96       Assessment / Plan     ASSESSMENT:  SPENSER on CKD2, nonoliguric. prerenal due to hypotension, tachycardia, ABLA, and large fluid shifts in the setting of CABG. Subnephrotic proteinuria, with 1 g/g by previous ratio.  Kidney function currently is improving off diuretic therapy.  Hypotension  CAD s/p CABG 3/5, with preceding MI in February '24  Anemia, with expected blood loss postoperatively; 1U PRBC ordered.  Iron deficiency by iron studies  CLL with anemia and  thrombocytopenia  Regular alcohol use  Hyponatremia improving    PLAN:  1.  Will continue current therapy for the time being  2.  Continue to hold off diuretics given excellent urine output.    3.  Surveillance labs      Thank you for involving us in the care of Francisco Espino.  Please feel free to call with any questions.    Arlene Pineda MD  03/09/24  09:07 [facility]    [facility]  [phone]    Please note that portions of this note were completed with a voice recognition program.

## 2024-03-09 NOTE — PLAN OF CARE
Goal Outcome Evaluation:  Plan of Care Reviewed With: patient, spouse        Progress: improving  Outcome Evaluation: Patient pleasant and agreeable to PT this morning. SBA for bed moblity, stood with SBA-CGA and ambulated 200' SBA-CGA without any AD. No overt LOB or veering noted. R heel pain mentioned during therapy session- encouraged elevating heels at rest. Will begin following peripherally given patient's current mobility status.    Anticipated Discharge Disposition (PT): home, home with assist

## 2024-03-09 NOTE — PROGRESS NOTES
" LOS: 6 days   Patient Care Team:  Eveline Pelaez MD as PCP - General (Internal Medicine & Pediatrics)  James Moy MD as Consulting Physician (Interventional Cardiology)  Russell Cagle MD as Consulting Physician (Hematology and Oncology)  Eveline Pelaez MD as Referring Physician (Internal Medicine & Pediatrics)    Chief Complaint: post op     Subjective:  Symptoms:  No shortness of breath or chest pain.    Diet:  No nausea or vomiting.    Activity level: Impaired due to weakness.          Vital Signs  Temp:  [98.5 °F (36.9 °C)-100.1 °F (37.8 °C)] 98.6 °F (37 °C)  Heart Rate:  [] 95  Resp:  [18] 18  BP: ()/(63-80) 110/66  Body mass index is 26.29 kg/m².    Intake/Output Summary (Last 24 hours) at 3/9/2024 0807  Last data filed at 3/9/2024 0449  Gross per 24 hour   Intake 720 ml   Output 2340 ml   Net -1620 ml     No intake/output data recorded.    Chest tube drainage last 8 hours         03/07/24  0651 03/08/24  0600 03/09/24  0620   Weight: 92.9 kg (204 lb 14.4 oz) 92.3 kg (203 lb 8 oz) 92.9 kg (204 lb 12.8 oz)         Objective:  Vital signs: (most recent): Blood pressure 110/66, pulse 95, temperature 98.6 °F (37 °C), temperature source Oral, resp. rate 18, height 188 cm (74\"), weight 92.9 kg (204 lb 12.8 oz), SpO2 96%.                Results Review:        WBC WBC   Date Value Ref Range Status   03/09/2024 6.15 3.40 - 10.80 10*3/mm3 Final   03/08/2024 7.89 3.40 - 10.80 10*3/mm3 Final   03/07/2024 9.31 3.40 - 10.80 10*3/mm3 Final      HGB Hemoglobin   Date Value Ref Range Status   03/09/2024 8.4 (L) 13.0 - 17.7 g/dL Final   03/08/2024 8.1 (L) 13.0 - 17.7 g/dL Final   03/07/2024 7.9 (L) 13.0 - 17.7 g/dL Final      HCT Hematocrit   Date Value Ref Range Status   03/09/2024 24.7 (L) 37.5 - 51.0 % Final   03/08/2024 24.3 (L) 37.5 - 51.0 % Final   03/07/2024 23.3 (L) 37.5 - 51.0 % Final      Platelets Platelets   Date Value Ref Range Status   03/09/2024 97 (L) 140 - 450 " "10*3/mm3 Final   03/08/2024 78 (L) 140 - 450 10*3/mm3 Final   03/07/2024 77 (L) 140 - 450 10*3/mm3 Final        PT/INR:    No results found for: \"PROTIME\"  /  No results found for: \"INR\"      Sodium Sodium   Date Value Ref Range Status   03/09/2024 134 (L) 136 - 145 mmol/L Final   03/08/2024 132 (L) 136 - 145 mmol/L Final   03/07/2024 134 (L) 136 - 145 mmol/L Final      Potassium Potassium   Date Value Ref Range Status   03/09/2024 4.1 3.5 - 5.2 mmol/L Final   03/09/2024 4.1 3.5 - 5.2 mmol/L Final   03/08/2024 3.7 3.5 - 5.2 mmol/L Final   03/08/2024 3.3 (L) 3.5 - 5.2 mmol/L Final   03/07/2024 4.1 3.5 - 5.2 mmol/L Final      Chloride Chloride   Date Value Ref Range Status   03/09/2024 100 98 - 107 mmol/L Final   03/08/2024 100 98 - 107 mmol/L Final   03/07/2024 101 98 - 107 mmol/L Final      Bicarbonate CO2   Date Value Ref Range Status   03/09/2024 25.0 22.0 - 29.0 mmol/L Final   03/08/2024 24.0 22.0 - 29.0 mmol/L Final   03/07/2024 21.1 (L) 22.0 - 29.0 mmol/L Final      BUN BUN   Date Value Ref Range Status   03/09/2024 16 8 - 23 mg/dL Final   03/08/2024 20 8 - 23 mg/dL Final   03/07/2024 14 8 - 23 mg/dL Final      Creatinine Creatinine   Date Value Ref Range Status   03/09/2024 1.45 (H) 0.76 - 1.27 mg/dL Final   03/08/2024 1.70 (H) 0.76 - 1.27 mg/dL Final   03/07/2024 1.74 (H) 0.76 - 1.27 mg/dL Final      Calcium Calcium   Date Value Ref Range Status   03/09/2024 8.8 8.6 - 10.5 mg/dL Final   03/08/2024 8.7 8.6 - 10.5 mg/dL Final   03/07/2024 8.4 (L) 8.6 - 10.5 mg/dL Final      Magnesium Magnesium   Date Value Ref Range Status   03/09/2024 1.9 1.6 - 2.4 mg/dL Final          amiodarone, 300 mg, Oral, Q12H  aspirin, 81 mg, Oral, Daily  atenolol, 12.5 mg, Oral, Q24H  atorvastatin, 40 mg, Oral, Nightly  docusate sodium, 100 mg, Oral, BID  enoxaparin, 40 mg, Subcutaneous, Daily  ferrous sulfate, 325 mg, Oral, Daily With Breakfast  folic acid, 1 mg, Oral, Daily  guaiFENesin, 1,200 mg, Oral, Q12H  multivitamin, 1 tablet, " Oral, Daily  mupirocin, , Each Nare, BID  pantoprazole, 40 mg, Oral, QAM  polyethylene glycol, 17 g, Oral, Daily  senna-docusate sodium, 2 tablet, Oral, Nightly  thiamine, 100 mg, Oral, Daily  vitamin B-12, 1,000 mcg, Oral, Daily      sodium chloride, 30 mL/hr, Last Rate: 30 mL/hr (03/05/24 1133)              CAD (coronary artery disease), native coronary artery    Coronary artery disease of native heart with stable angina pectoris      Assessment & Plan    - severe multi-vessel CAD with hx of PCI in 2017; recent NSTEMI with balloon opening of stent to RCA; LD Plavix 3/1- s/p CABGx6 LIMA/RSVG POD#4 Blanch  - hypertension  - hyperlipidemia--statin therapy  - CLL--- on Brukinsa; oncology consulted  - daily ETOH use--watch for s/sx of withdrawal. CIWA protocol  -TCP plt count 73, hold lovenox  -post op anemia- expected acute blood loss     Looks good this morning  Up in the chair  2L NC-- wean as able   Has the hiccups today-- will add some tessalon pearls and see if some baclofen will help as well  Sinus rhythm rate in the 90s--tolerated atenolol  Encourage pulmonary toilet-- continue IS/flutter and mucinex  Creatinine improved-- nephrology following  Mobilize/increase activity  Discontinue AV wires   Continue routine care       Deedee Tomlin, WINNIE  03/09/24  08:07 EST

## 2024-03-09 NOTE — PROGRESS NOTES
[facility] GROUP INPATIENT PROGRESS NOTE    Length of Stay:  6 days    CHIEF COMPLAINT:  CLL, thrombocytopenia    SUBJECTIVE:   Patient reports that he is overall doing well, has continued with sternal postoperative pain.  He was able to ambulate around the nurses station.    ROS:  Review of Systems   A comprehensive review of systems was obtained with pertinent positive findings as noted in the interval history above.  All other systems negative.      OBJECTIVE:  Vitals:    03/09/24 0014 03/09/24 0432 03/09/24 0620 03/09/24 0754   BP:  104/66  110/66   BP Location:  Left arm  Left arm   Patient Position:  Lying  Lying   Pulse: 91 96  95   Resp:  18  18   Temp:  99.1 °F (37.3 °C)  98.6 °F (37 °C)   TempSrc:  Oral  Oral   SpO2: 91% 93%  96%   Weight:   92.9 kg (204 lb 12.8 oz)    Height:             PHYSICAL EXAMINATION:  General: Alert and orient x 3 no distress  Chest/Lungs: Clear to auscultation bilaterally anteriorly.  Sternal incision dressed, no bleeding  Heart: Regular rate and rhythm  Abdomen/GI: Soft nontender distended bowel signs present  Extremities: Trace bilateral lower extremity edema    Patient was examined today, unchanged from above    DIAGNOSTIC DATA:  Results Review:     I reviewed the patient's new clinical results.    Results from last 7 days   Lab Units 03/09/24  0153 03/08/24  0309 03/07/24  0403   WBC 10*3/mm3 6.15 7.89 9.31   HEMOGLOBIN g/dL 8.4* 8.1* 7.9*   HEMATOCRIT % 24.7* 24.3* 23.3*   PLATELETS 10*3/mm3 97* 78* 77*      Results from last 7 days   Lab Units 03/09/24  0153 03/08/24  1412 03/08/24  0309 03/07/24  0403 03/04/24  0838 03/03/24  1745   SODIUM mmol/L 134*  --  132* 134*   < > 137   POTASSIUM mmol/L 4.1  4.1 3.7 3.3* 4.1   < > 3.9   CHLORIDE mmol/L 100  --  100 101   < > 102   CO2 mmol/L 25.0  --  24.0 21.1*   < > 25.0   BUN mg/dL 16  --  20 14   < > 13   CREATININE mg/dL 1.45*  --  1.70* 1.74*   < > 1.23   CALCIUM mg/dL 8.8  --  8.7 8.4*   < > 9.3   BILIRUBIN mg/dL   --   --   --   --   --  0.7   ALK PHOS U/L  --   --   --   --   --  76   ALT (SGPT) U/L  --   --   --   --   --  16   AST (SGOT) U/L  --   --   --   --   --  44*   GLUCOSE mg/dL 125*  --  124* 140*   < > 137*    < > = values in this interval not displayed.      Lab Results   Component Value Date    NEUTROABS 4.11 03/09/2024     Results from last 7 days   Lab Units 03/06/24  0308 03/05/24  1125 03/04/24  2352 03/04/24  1609 03/04/24  0129 03/03/24  1745   INR  1.26* 1.27*  --   --   --  0.96   APTT seconds  --  29.1 63.4* 61.0*   < > 25.6    < > = values in this interval not displayed.     Results from last 7 days   Lab Units 03/09/24  0153   MAGNESIUM mg/dL 1.9             Assessment & Plan   ASSESSMENT/PLAN:  This is a 68 y.o. male with:     *Coronary artery disease status post non-ST elevation MI with plans for CABG  The patient had prior history of severe multivessel coronary disease, underwent PCI in 2017.    He experienced non-ST elevation MI with angioplasty to RCA stent on 2/22/2024 and was placed on Plavix after previously receiving Brilinta.    Subsequently discontinued Plavix on 3/1/2024 in preparation for CABG.    Patient admitted on heparin drip 3/4/2024  Patient underwent CABG 3/5/2024     *CLL  Patient has history of CLL, is followed in the outpatient setting by Dr. Cagle in our practice.    He was originally diagnosed in 2005 via peripheral blood flow cytometry.    He was managed initially with a course of observation.    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    Gradual escalation in WBC, gradual development of thrombocytopenia.    Patient initiated treatment in February 2023 with Zanubrutinib.    Patient with gradual response to treatment, no significant side effects.    Labs prior to current hospitalization 2/6/2024 with WBC 9.65, differential 33 segs, 60 lymphs, hemoglobin 14.4, platelet count 105,000.    He was receiving prophylactic acyclovir  and Bactrim with treatment  He was notified to discontinue Zanubrutinib on 2/27/2024 due to upcoming CABG with plans to resume 2 weeks postoperatively (last dose Zanubrutinib taken on 2/27/2024).  Patient also discontinued acyclovir and Bactrim prophylaxis at that time.     *Thrombocytopenia  Patient with longstanding thrombocytopenia with platelet count in the 90-low 100,000 range  Unclear whether thrombocytopenia is related to marrow involvement from CLL, ITP related to CLL, or splenomegaly related to CLL  Labs on 3/5/2024 with B12 low normal at 324, folate 14.3  Additional labs on 3//24 with elevated IPF at 12.7%, suspicion for ITP associated with CLL  Monitor platelet count postoperatively, expect some degree of consumption with upcoming cardiopulmonary bypass.  Begin oral B12 1000 mcg daily due to low normal level  Platelet count today improved at 97,000, near prior baseline, recovering following surgery.    *Postoperative anemia  Hemoglobin preoperatively was 12.0 on 3/5/2024  Hemoglobin has declined as expected postoperatively.    Additional labs on 3/6/2024 with iron 22, ferritin 114, iron saturation 10%, TIBC 212.    Patient does appear to have mild iron deficiency.  Initiated oral iron sulfate 325 mg daily on 3/8/2024  Hemoglobin on 3/7/2024 was 7.9, received 1 unit PRBC transfusion  Hemoglobin today improved at 8.4.    *SPENSER/CKD2  Creatinine baseline 1.1-1.3  Postoperatively creatinine up to 1.74 on 3/7/2024  Nephrology following  Creatinine improved at 1.45.    *History of alcohol abuse  Monitor for withdrawal     *Viral prophylaxis  Acyclovir on hold     *PCP prophylaxis  Bactrim on hold        PLAN:   Patient remains off of zanubrutinib (discontinued 2/27/2024 preoperatively) for CLL due to potential bleeding risks with upcoming surgery.  Consider resuming treatment 2 weeks postoperatively  Prophylaxis with acyclovir and Bactrim on hold  Continue oral B12 1000 mcg daily  Patient initiated oral iron  sulfate 325 mg daily  Daily CBC/differential  We do not have much more to add, will sign off at this time.  We will make follow-up arrangements with nurse practitioner visit on 3/19/2024 with CBC, CMP and we will decide on the timing Zanubrutinib as well as acyclovir/Bactrim prophylaxis.  Patient will see Dr. Cagle on 4/9/2024 as scheduled    Discussed with patient and wife at bedside.           Christopher Nugent MD

## 2024-03-09 NOTE — THERAPY TREATMENT NOTE
Patient Name: Francisco Espino  : 1955    MRN: [id number]                              Today's Date: 3/9/2024       Admit Date: 3/3/2024    Visit Dx:     ICD-10-CM ICD-9-CM   1. 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    Varicose veins    Alcohol abuse    Coronary artery disease involving native coronary artery of native heart with angina pectoris    Dyslipidemia    Myocardial infarction    S/P angioplasty with stent    ST elevation (STEMI) myocardial infarction involving right coronary artery    Elevated liver function tests    Pneumonia    Positive colorectal cancer screening using Cologuard test    Kidney disease    High risk medication use    Bence Dawson proteinuria    Coronary artery disease of native heart with stable angina pectoris    CAD (coronary artery disease), native coronary artery     Past Medical History:   Diagnosis Date    Abnormal liver function test     Alcohol abuse     CLL (chronic lymphocytic leukemia)     Coronary artery disease     stent    Heart disease     History of pneumonia     2019    Hyperlipidemia     Hypertension     Inguinal hernia     left side to have surgery 3/28/19    Myocardial infarction     Screen for colon cancer 2016    Negative Cologaurd    Screening PSA (prostate specific antigen) 2013    .5    Thrombocytopenia      Past Surgical History:   Procedure Laterality Date    CARDIAC CATHETERIZATION  2017    CATARACT EXTRACTION Bilateral     COLONOSCOPY N/A 2006    Normal, repeat in 10 years, Dr. Preethi Gonzalez    COLONOSCOPY N/A 7/15/2020    Procedure: COLONOSCOPY TO CECUM & T.I. WITH COLD SNARE POLYPECTOMIES, CLIP PLACEMENT X 2;  Surgeon: Yennifer Salazar MD;  Location: [facility];  Service: Gastroenterology;  Laterality: N/A;  PRE- POSITIVE  COLOGUARD  POST- COLON POLYPS, DIVERTICULOSIS, HEMORRHOIDS    CORONARY ANGIOPLASTY WITH STENT PLACEMENT N/A 05/09/2017    Left heart catheterization, Selective native vessel coronary arteriography, Left ventriculography, Successful percutaneous intervention to the 100% occluded right coronary artery with a 3.5 x 38 mm Synergy drug-eluting stent (post-dilated w/ a 4.0 x 20 mm NC Emerge balloon), Selective right femoral angiography, Successful Perclose arteriotomy closure. Dr. James Pierson    CORONARY ARTERY BYPASS GRAFT N/A 3/5/2024    Procedure: STERNOTOMY, CORONARY ARTERY BYPASS GRAFTING TRANSESOPHAGEAL ECHOCARDIOGRAM X6, UTILIZING THE LEFT AUGUSTUS AND RIGHT SAPHENOUS VEIN.  ADRIANE, PRP WITH ANESTHESIA;  Surgeon: Jr Jean Marie Mott MD;  Location: [facility];  Service: Cardiothoracic;  Laterality: N/A;    INGUINAL HERNIA REPAIR Left     INGUINAL HERNIA REPAIR Right     x2    INGUINAL HERNIA REPAIR Left 3/28/2019    Procedure: LEFT INGUINAL HERNIA REPAIR LAPAROSCOPIC;  Surgeon: Preethi Gonzalez MD;  [place] [facility] [place] [facility] [place]: General    LAPAROSCOPIC INGUINAL HERNIA REPAIR Right 10/03/2002    w/ extra peritoneal Goe-Benton mesh (transperitoneal repair), Dr. Preethi Gonzalez    REFRACTIVE SURGERY Bilateral     RETINAL DETACHMENT SURGERY Right 07/02/2013      General Information       Row Name 03/09/24 1132          Physical Therapy Time and Intention    Document Type therapy note (daily note)  -MS     Mode of Treatment physical therapy;individual therapy  -MS       Row Name 03/09/24 1132          General Information    Existing Precautions/Restrictions sternal;cardiac;oxygen therapy device and L/min  external pacemaker  -MS       Row Name 03/09/24 1132          Cognition    Orientation Status (Cognition) oriented x 3  -MS       Row Name 03/09/24 1132          Safety Issues, Functional Mobility    Impairments Affecting Function (Mobility) balance;endurance/activity tolerance;shortness of breath;postural/trunk  control  -MS               User Key  (r) = Recorded By, (t) = Taken By, (c) = Cosigned By      Initials Name Provider Type    Marcia Cosby, PT Physical Therapist                   Mobility       Row Name 03/09/24 1133          Bed Mobility    Bed Mobility supine-sit;sit-supine  -MS     Supine-Sit [place] (Bed Mobility) standby assist;verbal cues  -MS     Sit-Supine [place] (Bed Mobility) standby assist;verbal cues  -MS       Row Name 03/09/24 1133          Sit-Stand Transfer    Sit-Stand [place] (Transfers) standby assist;contact guard;verbal cues;nonverbal cues (demo/gesture)  -MS       Row Name 03/09/24 1133          Gait/Stairs (Locomotion)    [place] Level (Gait) standby assist;contact guard;verbal cues;nonverbal cues (demo/gesture)  -MS     Distance in Feet (Gait) 200  -MS     Deviations/Abnormal Patterns (Gait) kandice decreased;stride length decreased  -MS     Bilateral Gait Deviations forward flexed posture  -MS     Comment, (Gait/Stairs) No overt LOB or veering noted.  -MS               User Key  (r) = Recorded By, (t) = Taken By, (c) = Cosigned By      Initials Name Provider Type    Marcia Cosby, PT Physical Therapist                   Obj/Interventions       Row Name 03/09/24 1133          Balance    Balance Assessment standing static balance  -MS     Static Sitting Balance standby assist  -MS     Position, Sitting Balance unsupported;sitting edge of bed  -MS     Static Standing Balance standby assist;contact guard  -MS     Position/Device Used, Standing Balance unsupported  -MS               User Key  (r) = Recorded By, (t) = Taken By, (c) = Cosigned By      Initials Name Provider Type    Marcia Cosby, PT Physical Therapist                   Goals/Plan    No documentation.                  Clinical Impression       Row Name 03/09/24 1134          Pain    Pretreatment Pain Rating 0/10 - no pain  -MS     Posttreatment Pain Rating 0/10 - no pain  -MS       Row  Name 03/09/24 1134          Plan of Care Review    Plan of Care Reviewed With patient;spouse  -MS     Progress improving  -MS     Outcome Evaluation Patient pleasant and agreeable to PT this morning. SBA for bed moblity, stood with SBA-CGA and ambulated 200' SBA-CGA without any AD. No overt LOB or veering noted. R heel pain mentioned during therapy session- encouraged elevating heels at rest. Will begin following periphrally given patient's current mobility status.  -MS       Row Name 03/09/24 1134          Therapy Assessment/Plan (PT)    Therapy Frequency (PT) 3 times/wk  -MS       Row Name 03/09/24 1134          Vital Signs    Pretreatment Heart Rate (beats/min) 94  -MS     Pre SpO2 (%) 95  -MS     O2 Delivery Pre Treatment supplemental O2  -MS     Intra SpO2 (%) 85  -MS     O2 Delivery Intra Treatment room air  -MS     Post SpO2 (%) 96  -MS     O2 Delivery Post Treatment supplemental O2  -MS       Row Name 03/09/24 1134          Positioning and Restraints    Pre-Treatment Position in bed  -MS     Post Treatment Position bed  -MS     In Bed notified nsg;fowlers;call light within reach;encouraged to call for assist;exit alarm on;heels elevated  -MS               User Key  (r) = Recorded By, (t) = Taken By, (c) = Cosigned By      Initials Name Provider Type    MS HugginsMarcia, PT Physical Therapist                   Outcome Measures       Row Name 03/09/24 1136 03/09/24 0944       How much help from another person do you currently need...    Turning from your back to your side while in flat bed without using bedrails? 3  -MS 3  -AA    Moving from lying on back to sitting on the side of a flat bed without bedrails? 3  -MS 3  -AA    Moving to and from a bed to a chair (including a wheelchair)? 3  -MS 3  -AA    Standing up from a chair using your arms (e.g., wheelchair, bedside chair)? 3  -MS 3  -AA    Climbing 3-5 steps with a railing? 3  -MS 2  -AA    To walk in hospital room? 3  -MS 3  -AA    AM-PAC 6 Clicks  Score (PT) 18  -MS 17  -AA    Highest Level of Mobility Goal 6 --> Walk 10 steps or more  -MS 5 --> Static standing  -AA              User Key  (r) = Recorded By, (t) = Taken By, (c) = Cosigned By      Initials Name Provider Type    Marcia Cosby WILLI, PT Physical Therapist    Cali Crowder, RN Registered Nurse                                 Physical Therapy Education       Title: PT OT SLP Therapies (Done)       Topic: Physical Therapy (Done)       Point: Mobility training (Done)       Learning Progress Summary             Patient Acceptance, E,TB, VU by  at 3/8/2024 1500    Acceptance, E,D, VU,NR by MS at 3/7/2024 1140    Acceptance, E,TB, VU by  at 3/6/2024 1007   Family Acceptance, E,TB, VU by  at 3/8/2024 1500                         Point: Home exercise program (Done)       Learning Progress Summary             Patient Acceptance, E,TB, VU by  at 3/8/2024 1500    Acceptance, E,D, VU,NR by MS at 3/7/2024 1140    Acceptance, E,TB, VU by  at 3/6/2024 1007   Family Acceptance, E,TB, VU by  at 3/8/2024 1500                         Point: Body mechanics (Done)       Learning Progress Summary             Patient Acceptance, E,TB, VU by  at 3/8/2024 1500    Acceptance, E,D, VU,NR by MS at 3/7/2024 1140    Acceptance, E,TB, VU by  at 3/6/2024 1007   Family Acceptance, E,TB, VU by  at 3/8/2024 1500                         Point: Precautions (Done)       Learning Progress Summary             Patient Acceptance, E,TB, VU by  at 3/8/2024 1500    Acceptance, E,D, VU,NR by MS at 3/7/2024 1140    Acceptance, E,TB, VU by  at 3/6/2024 1007   Family Acceptance, E,TB, VU by  at 3/8/2024 1500                                         User Key       Initials Effective Dates Name Provider Type Discipline    MS 06/16/21 -  Bello Ortiz, PT Physical Therapist PT     01/24/24 -  Yue Oneal, OCTAVIANO Student PT Student PT                  PT Recommendation and Plan     Plan of Care Reviewed With:  patient, spouse  Progress: improving  Outcome Evaluation: Patient pleasant and agreeable to PT this morning. SBA for bed moblity, stood with SBA-CGA and ambulated 200' SBA-CGA without any AD. No overt LOB or veering noted. R heel pain mentioned during therapy session- encouraged elevating heels at rest. Will begin following periphrally given patient's current mobility status.     Time Calculation:         PT Charges       Row Name 03/09/24 1132             Time Calculation    Start Time 1058  -MS      Stop Time 1112  -MS      Time Calculation (min) 14 min  -MS      PT Received On 03/09/24  -MS      PT - Next Appointment 03/11/24  -MS      PT Goal Re-Cert Due Date 03/13/24  -MS                User Key  (r) = Recorded By, (t) = Taken By, (c) = Cosigned By      Initials Name Provider Type    Marcia Cosby, PT Physical Therapist                  Therapy Charges for Today       Code Description Service Date Service Provider Modifiers Qty    [id number] HC PT THER PROC EA 15 MIN 3/9/2024 Marcia Huggins, PT GP 1            PT G-Codes  Outcome Measure Options: AM-PAC 6 Clicks Basic Mobility (PT)  AM-PAC 6 Clicks Score (PT): 18  PT Discharge Summary  Anticipated Discharge Disposition (PT): home, home with assist    Marcia Huggins PT  3/9/2024     No

## 2024-03-10 ENCOUNTER — DOCUMENTATION (OUTPATIENT)
Dept: HOME HEALTH SERVICES | Facility: HOME HEALTHCARE | Age: 69
End: 2024-03-10
Payer: COMMERCIAL

## 2024-03-10 ENCOUNTER — HOME HEALTH ADMISSION (OUTPATIENT)
Dept: HOME HEALTH SERVICES | Facility: HOME HEALTHCARE | Age: 69
End: 2024-03-10
Payer: COMMERCIAL

## 2024-03-10 ENCOUNTER — READMISSION MANAGEMENT (OUTPATIENT)
Dept: CALL CENTER | Facility: HOSPITAL | Age: 69
End: 2024-03-10
Payer: COMMERCIAL

## 2024-03-10 VITALS
WEIGHT: 201.3 LBS | OXYGEN SATURATION: 95 % | HEART RATE: 97 BPM | SYSTOLIC BLOOD PRESSURE: 101 MMHG | DIASTOLIC BLOOD PRESSURE: 58 MMHG | RESPIRATION RATE: 18 BRPM | HEIGHT: 74 IN | BODY MASS INDEX: 25.83 KG/M2 | TEMPERATURE: 98.7 F

## 2024-03-10 LAB — GLUCOSE BLDC GLUCOMTR-MCNC: 129 MG/DL (ref 70–130)

## 2024-03-10 PROCEDURE — 82948 REAGENT STRIP/BLOOD GLUCOSE: CPT

## 2024-03-10 PROCEDURE — 99024 POSTOP FOLLOW-UP VISIT: CPT | Performed by: THORACIC SURGERY (CARDIOTHORACIC VASCULAR SURGERY)

## 2024-03-10 RX ORDER — ATENOLOL 25 MG/1
25 TABLET ORAL EVERY 12 HOURS
Qty: 60 TABLET | Refills: 2 | Status: SHIPPED | OUTPATIENT
Start: 2024-03-10 | End: 2024-06-08

## 2024-03-10 RX ORDER — BACLOFEN 5 MG/1
5 TABLET ORAL 3 TIMES DAILY PRN
Qty: 30 TABLET | Refills: 0 | Status: SHIPPED | OUTPATIENT
Start: 2024-03-10

## 2024-03-10 RX ORDER — AMIODARONE HYDROCHLORIDE 200 MG/1
200 TABLET ORAL
Status: DISCONTINUED | OUTPATIENT
Start: 2024-03-11 | End: 2024-03-10 | Stop reason: HOSPADM

## 2024-03-10 RX ORDER — AMIODARONE HYDROCHLORIDE 200 MG/1
200 TABLET ORAL
Qty: 30 TABLET | Refills: 0 | Status: SHIPPED | OUTPATIENT
Start: 2024-03-11

## 2024-03-10 RX ORDER — FERROUS SULFATE 325(65) MG
325 TABLET ORAL
Qty: 30 TABLET | Refills: 2 | Status: SHIPPED | OUTPATIENT
Start: 2024-03-11 | End: 2024-06-09

## 2024-03-10 RX ORDER — HYDROCODONE BITARTRATE AND ACETAMINOPHEN 5; 325 MG/1; MG/1
1 TABLET ORAL EVERY 4 HOURS PRN
Qty: 42 TABLET | Refills: 0 | Status: SHIPPED | OUTPATIENT
Start: 2024-03-10 | End: 2024-03-17

## 2024-03-10 RX ORDER — ROSUVASTATIN CALCIUM 20 MG/1
40 TABLET, COATED ORAL NIGHTLY
Qty: 90 TABLET | Refills: 2 | Status: SHIPPED | OUTPATIENT
Start: 2024-03-10

## 2024-03-10 RX ADMIN — GUAIFENESIN 1200 MG: 600 TABLET, EXTENDED RELEASE ORAL at 08:07

## 2024-03-10 RX ADMIN — Medication 1 TABLET: at 08:07

## 2024-03-10 RX ADMIN — ASPIRIN 81 MG: 81 TABLET, COATED ORAL at 08:07

## 2024-03-10 RX ADMIN — FERROUS SULFATE TAB 325 MG (65 MG ELEMENTAL FE) 325 MG: 325 (65 FE) TAB at 08:08

## 2024-03-10 RX ADMIN — Medication 1000 MCG: at 08:07

## 2024-03-10 RX ADMIN — PANTOPRAZOLE SODIUM 40 MG: 40 TABLET, DELAYED RELEASE ORAL at 06:03

## 2024-03-10 RX ADMIN — Medication 100 MG: at 08:07

## 2024-03-10 RX ADMIN — HYDROCODONE BITARTRATE AND ACETAMINOPHEN 2 TABLET: 5; 325 TABLET ORAL at 06:03

## 2024-03-10 RX ADMIN — CALCIUM CARBONATE-VITAMIN D TAB 500 MG-200 UNIT 1 TABLET: 500-200 TAB at 08:08

## 2024-03-10 RX ADMIN — ATENOLOL 25 MG: 25 TABLET ORAL at 08:08

## 2024-03-10 RX ADMIN — FOLIC ACID 1 MG: 1 TABLET ORAL at 08:07

## 2024-03-10 RX ADMIN — AMIODARONE HYDROCHLORIDE 300 MG: 200 TABLET ORAL at 08:08

## 2024-03-10 RX ADMIN — POLYETHYLENE GLYCOL 3350 17 G: 17 POWDER, FOR SOLUTION ORAL at 06:13

## 2024-03-10 RX ADMIN — MUPIROCIN 1 APPLICATION: 20 OINTMENT TOPICAL at 08:07

## 2024-03-10 RX ADMIN — DOCUSATE SODIUM 100 MG: 100 CAPSULE, LIQUID FILLED ORAL at 08:08

## 2024-03-10 NOTE — OUTREACH NOTE
Prep Survey      Flowsheet Row Responses   [facility] patient discharged from? [facility]   Is LACE score < 7 ? No   Eligibility [facility]   Date of Admission 03/03/24   Date of Discharge 03/10/24   Discharge Disposition Home-Health Care Sv   Discharge diagnosis Severe multivessel CAD now s/p CABG x 6   Does the patient have one of the following disease processes/diagnoses(primary or secondary)? Cardiothoracic surgery   Does the patient have Home health ordered? Yes   What is the Home health agency?  [facility]   Is there a DME ordered? Yes   What DME was ordered? [facility]   Prep survey completed? Yes            BILLIE LIN - Registered Nurse

## 2024-03-10 NOTE — PROGRESS NOTES
[facility] to provide home health SN per [facility] order for Dr Mott.  Noted patient will be on new nocturnal O2 at D/C and noted BMP due approx 3/15.  All info was verified with wife and all is correct.

## 2024-03-10 NOTE — PLAN OF CARE
Goal Outcome Evaluation:  Plan of Care Reviewed With: patient        Progress: improving  Outcome Evaluation: Pt is AO X4. Pain treated per MAR. Intake and output recorded on chart. All other vs stable. SR on tele and On RA.  Will continue to monitor with current care plan adjust as needed.       Problem: Adult Inpatient Plan of Care  Goal: Plan of Care Review  Outcome: Ongoing, Progressing  Flowsheets (Taken 3/10/2024 0435)  Progress: improving  Goal: Patient-Specific Goal (Individualized)  Outcome: Ongoing, Progressing  Goal: Absence of Hospital-Acquired Illness or Injury  Outcome: Ongoing, Progressing  Intervention: Identify and Manage Fall Risk  Description: Perform standard risk assessment on admission using a validated tool or comprehensive approach appropriate to the patient; reassess fall risk frequently, with change in status or transfer to another level of care.  Communicate fall injury risk to interprofessional healthcare team.  Determine need for increased observation, equipment and environmental modification, such as low bed, signage and supportive, nonskid footwear.  Adjust safety measures to individual developmental age, stage and identified risk factors.  Reinforce the importance of safety and physical activity with patient and family.  Perform regular intentional rounding to assess need for position change, pain assessment and personal needs, including assistance with toileting.  Recent Flowsheet Documentation  Taken 3/10/2024 0431 by Rojelio Vargas, RN  Safety Promotion/Fall Prevention:   activity supervised   assistive device/personal items within reach   clutter free environment maintained   fall prevention program maintained   muscle strengthening facilitated   nonskid shoes/slippers when out of bed   room organization consistent   safety round/check completed  Taken 3/10/2024 0158 by Rojelio Vargas, RN  Safety Promotion/Fall Prevention:   assistive device/personal items within reach   activity  supervised   fall prevention program maintained   clutter free environment maintained   nonskid shoes/slippers when out of bed   muscle strengthening facilitated   room organization consistent   safety round/check completed  Taken 3/10/2024 0038 by Rojelio Vargas RN  Safety Promotion/Fall Prevention:   activity supervised   assistive device/personal items within reach   fall prevention program maintained   clutter free environment maintained   nonskid shoes/slippers when out of bed   muscle strengthening facilitated   safety round/check completed   room organization consistent  Taken 3/9/2024 2204 by Rojelio Vargas RN  Safety Promotion/Fall Prevention:   assistive device/personal items within reach   activity supervised   fall prevention program maintained   clutter free environment maintained   muscle strengthening facilitated   nonskid shoes/slippers when out of bed   safety round/check completed   room organization consistent  Taken 3/9/2024 2050 by Rojelio Vargas RN  Safety Promotion/Fall Prevention:   activity supervised   fall prevention program maintained   clutter free environment maintained   assistive device/personal items within reach   nonskid shoes/slippers when out of bed   muscle strengthening facilitated   room organization consistent   safety round/check completed  Intervention: Prevent Skin Injury  Description: Perform a screening for skin injury risk, such as pressure or moisture associated skin damage on admission and at regular intervals throughout hospital stay.  Keep all areas of skin (especially folds) clean and dry.  Maintain adequate skin hydration.  Relieve and redistribute pressure and protect bony prominences; implement measures based on patient-specific risk factors.  Match turning and repositioning schedule to clinical condition.  Encourage weight shift frequently; assist with reposition if unable to complete independently.  Float heels off bed; avoid pressure on the Achilles  tendon.  Keep skin free from extended contact with medical devices.  Encourage functional activity and mobility, as early as tolerated.  Use aids (e.g., slide boards, mechanical lift) during transfer.  Recent Flowsheet Documentation  Taken 3/10/2024 0431 by Rojelio Vargas RN  Body Position: position changed independently  Taken 3/10/2024 0158 by Rojelio Vargas RN  Body Position: position changed independently  Taken 3/10/2024 0038 by Rojelio Vargas RN  Body Position: position changed independently  Taken 3/9/2024 2204 by Rojelio Vargas RN  Body Position: position changed independently  Taken 3/9/2024 2050 by Rojelio Vargas RN  Body Position: position changed independently  Intervention: Prevent and Manage VTE (Venous Thromboembolism) Risk  Description: Assess for VTE (venous thromboembolism) risk.  Encourage and assist with early ambulation.  Initiate and maintain compression or other therapy, as indicated, based on identified risk in accordance with organizational protocol and provider order.  Encourage both active and passive leg exercises while in bed, if unable to ambulate.  Recent Flowsheet Documentation  Taken 3/10/2024 0431 by Rojelio Vargas RN  Activity Management: activity encouraged  Taken 3/10/2024 0158 by Rojelio Vargas RN  Activity Management: activity encouraged  Taken 3/10/2024 0038 by Rojelio Vargas RN  Activity Management: activity encouraged  Taken 3/9/2024 2204 by Rojelio Vargas RN  Activity Management: activity encouraged  Taken 3/9/2024 2050 by Rojelio Vargas RN  Activity Management: activity encouraged  VTE Prevention/Management:   bilateral   compression stockings on  Intervention: Prevent Infection  Description: Maintain skin and mucous membrane integrity; promote hand, oral and pulmonary hygiene.  Optimize fluid balance, nutrition, sleep and glycemic control to maximize infection resistance.  Identify potential sources of infection early to prevent or mitigate progression of infection (e.g., wound,  lines, devices).  Evaluate ongoing need for invasive devices; remove promptly when no longer indicated.  Recent Flowsheet Documentation  Taken 3/10/2024 0431 by Rojelio Vargas RN  Infection Prevention:   visitors restricted/screened   single patient room provided   rest/sleep promoted   personal protective equipment utilized   hand hygiene promoted   equipment surfaces disinfected   environmental surveillance performed   cohorting utilized  Taken 3/10/2024 0158 by Rojelio Vargas RN  Infection Prevention:   visitors restricted/screened   single patient room provided   personal protective equipment utilized   hand hygiene promoted   environmental surveillance performed   cohorting utilized   equipment surfaces disinfected   rest/sleep promoted  Taken 3/10/2024 0038 by Rojelio Vargas RN  Infection Prevention:   visitors restricted/screened   single patient room provided   rest/sleep promoted   personal protective equipment utilized   hand hygiene promoted   equipment surfaces disinfected   environmental surveillance performed   cohorting utilized  Taken 3/9/2024 2204 by Rojelio Vargas RN  Infection Prevention:   visitors restricted/screened   single patient room provided   rest/sleep promoted   personal protective equipment utilized   equipment surfaces disinfected   environmental surveillance performed   cohorting utilized   hand hygiene promoted  Taken 3/9/2024 2050 by Rojelio Vargas RN  Infection Prevention:   visitors restricted/screened   single patient room provided   equipment surfaces disinfected   environmental surveillance performed   cohorting utilized   personal protective equipment utilized   hand hygiene promoted   rest/sleep promoted  Goal: Optimal Comfort and Wellbeing  Outcome: Ongoing, Progressing  Intervention: Provide Person-Centered Care  Description: Use a family-focused approach to care.  Develop trust and rapport by proactively providing information, encouraging questions, addressing concerns and  offering reassurance.  Acknowledge emotional response to hospitalization.  Recognize and utilize personal coping strategies.  Honor spiritual and cultural preferences.  Recent Flowsheet Documentation  Taken 3/10/2024 0431 by Rojelio Vargas RN  Trust Relationship/Rapport:   thoughts/feelings acknowledged   reassurance provided   emotional support provided   choices provided   care explained   questions answered   empathic listening provided   questions encouraged  Taken 3/10/2024 0038 by Rojelio Vargas RN  Trust Relationship/Rapport:   thoughts/feelings acknowledged   reassurance provided   questions encouraged   questions answered   empathic listening provided   emotional support provided   choices provided   care explained  Taken 3/9/2024 2050 by Rojelio Vargas RN  Trust Relationship/Rapport:   thoughts/feelings acknowledged   reassurance provided   questions encouraged   questions answered   choices provided   care explained   empathic listening provided   emotional support provided  Goal: Readiness for Transition of Care  Outcome: Ongoing, Progressing

## 2024-03-10 NOTE — PAYOR COMM NOTE
"Francisco Espino (68 y.o. Male)                                       ATTENTION; DISCHARGE CASE REF #[id number]                                     REPLY TO [facility] DEPT  [phone] OR CALL    FLORA WOOTEN LPROMAN             Date of Birth   1955    Social Security Number       Address   [address] [address]    Home Phone   [phone]    MRN   [id number]       Worship   None    Marital Status                               Admission Date   3/3/24    Admission Type   Urgent    Admitting Provider   Jr Jean Marie Mott MD    Attending Provider       Department, Room/Bed   [facility] CARDIOVASC UNIT, 2212/1       Discharge Date   3/10/2024    Discharge Disposition   Home-Health Care Svc    Discharge Destination                                 Attending Provider: (none)   Allergies: Codeine    Isolation: None   Infection: None   Code Status: Prior    Ht: 188 cm (74\")   Wt: 91.3 kg (201 lb 4.8 oz)    Admission Cmt: None   Principal Problem: CAD (coronary artery disease), native coronary artery [I25.10]                   Active Insurance as of 3/3/2024       Primary Coverage       Payor Plan Insurance Group Employer/Plan Group    ANTHEM BLUE CROSS ANTHEM BLUE CROSS BLUE SHIELD PPO [id number]       Payor Plan Address Payor Plan Phone Number Payor Plan Fax Number Effective Dates    [address] [phone]  1/1/2020 - None Entered    [facility] [id number]         Subscriber Name Subscriber Birth Date Member ID       FRANCISCO ESPINO 1955 [id number]               Secondary Coverage       Payor Plan Insurance Group Employer/Plan Group    MEDICARE MEDICARE A ONLY        Payor Plan Address Payor Plan Phone Number Payor Plan Fax Number Effective Dates    [address] [phone]  2/1/2020 - None Entered    [facility] [id number]         Subscriber Name Subscriber Birth Date Member ID       FRANCISCO ESPINO 1955 [id number]                     Emergency Contacts  "       (Rel.) Home Phone Work Phone Mobile Phone    Evangelina Espino (Spouse) [phone] -- [phone]                 Discharge Summary        Deedee Bonilla APRN at 03/10/24 3697       Attestation signed by Jr Jean Marie Mott MD at 03/10/24 0737    I have reviewed this documentation and agree.                  Date of Admission: 3/3/2024  Date of Discharge:  3/10/2024    Discharge Diagnosis:   Severe multivessel CAD now s/p CABG x 6  Recent NSTEMI with balloon opening of stent to RCA  HTN  HLD  CLL  Daily ETOH use  Thrombocytopenia   Expected acute blood loss anemia post-op  SPENSER on CKD stage 2    Presenting Problem/History of Present Illness:  Severe multivessel CAD   Recent NSTEMI with balloon opening of stent to RCA  HTN  HLD  CLL  Daily ETOH use  Thrombocytopenia   CKD stage 2    Hospital Course:  Patient is a 68 y.o. male who was admitted to the hospital on 3/3/24 in preparation for CABG. His Plavix had been discontinued, so he was started on a Heparin drip since he had recently undergone balloon opening of his stent to the RCA. On 3/5/24 he was taken to the Operating Room and under general anesthesia and via median sternotomy underwent CABG x 6 by Dr. Mott. Patient tolerated the procedure well and was transported to the Open Heart Recovery Unit in stable condition. Over the next several hours he was successfully weaned from the ventilator and extubated. On post-operative day #1 he was transferred to the Cardiac step-down unit. On post-operative day #2 he was transfused 1 unit of PRBC for Hemoglobin 7.9. Nephrology was also consulted for creatinine 1.74. On post-operative day #3 his chest tube, central line, and hebert catheter were removed and his Jabari drain was placed to bulb. On post-operative day #4 his Jabari drain and temporary pacing wires were removed. The rest of his hospital course was uneventful and one of gradual improvement. He was also followed by Hematology/Oncology during  his stay due to his chronic thrombocytopenia and CLL, he has an appointment with their NP on 3/19 and they will decide on the timing of resuming Zanubrutinib.  An overnight oximetry revealed over 1 hour of desaturations, will be sent home with nocturnal oxygen. On post-operative day # 5, he was deemed stable for discharge home. We will get a repeat BMP in 1 week to follow up on his creatinine.  Patient was provided with appropriate discharge education. He  was instructed to call office with any questions or concerns.      Procedures Performed:  Procedure(s) 3/5/24 Dr. Mott:  CABG x 6.  Sequential skeletonized LIMA to diagonal branch and then LAD.  Vein graft to PDA.  Vein graft to right coronary artery.  Vein graft to OM1.  Vein graft to OM 2.  Temporary cardiopulmonary bypass.  Antegrade and retrograde cold blood cardioplegia with warm reperfusion.  Neurologic monitoring.  Transesophageal echo.  Endoscopic vein harvest of the right greater saphenous vein. Saphena.  The heart and cardiac chambers were normal.  There was no residual        Consults:   Consults       Date and Time Order Name Status Description    3/7/2024  9:16 AM Inpatient Nephrology Consult Completed     3/3/2024  5:12 PM Inpatient Hematology & Oncology Consult Completed             Pertinent Test Results:    Lab Results   Component Value Date    WBC 6.15 03/09/2024    HGB 8.4 (L) 03/09/2024    HCT 24.7 (L) 03/09/2024    .6 (H) 03/09/2024    PLT 97 (L) 03/09/2024      Lab Results   Component Value Date    GLUCOSE 125 (H) 03/09/2024    CALCIUM 8.8 03/09/2024     (L) 03/09/2024    K 4.1 03/09/2024    K 4.1 03/09/2024    CO2 25.0 03/09/2024     03/09/2024    BUN 16 03/09/2024    CREATININE 1.45 (H) 03/09/2024    EGFRIFAFRI 70 07/09/2021    EGFRIFNONA 60 (L) 01/06/2022    BCR 11.0 03/09/2024    ANIONGAP 9.0 03/09/2024     Lab Results   Component Value Date    INR 1.26 (H) 03/06/2024    PROTIME 16.0 (H) 03/06/2024       Condition on  Discharge: Stable    Vital Signs  Temp:  [98.1 °F (36.7 °C)-99.8 °F (37.7 °C)] 98.7 °F (37.1 °C)  Heart Rate:  [92-99] 97  Resp:  [18] 18  BP: (101-117)/(58-79) 101/58  Body mass index is 25.85 kg/m².    Discharge Disposition: Home with Home Health  [place] [facility] [place] Medications     Discharge Medications        New Medications        Instructions Start Date   amiodarone 200 MG tablet  Commonly known as: PACERONE   200 mg, Oral, Every 24 Hours Scheduled   Start Date: March 11, 2024     atenolol 25 MG tablet  Commonly known as: TENORMIN   25 mg, Oral, Every 12 Hours      Baclofen 5 MG tablet  Commonly known as: LIORESAL   5 mg, Oral, 3 Times Daily PRN      calcium 500 mg vitamin D 5 mcg (200 UT) 500-5 MG-MCG tablet per tablet   1 tablet, Oral, Daily   Start Date: March 11, 2024     cyanocobalamin 1000 MCG tablet  Commonly known as: VITAMIN B-12   1,000 mcg, Oral, Daily   Start Date: March 11, 2024     ferrous sulfate 325 (65 FE) MG tablet   325 mg, Oral, Daily With Breakfast   Start Date: March 11, 2024     HYDROcodone-acetaminophen 5-325 MG per tablet  Commonly known as: NORCO   1 tablet, Oral, Every 4 Hours PRN             Changes to Medications        Instructions Start Date   rosuvastatin 20 MG tablet  Commonly known as: CRESTOR  What changed: how much to take   40 mg, Oral, Nightly             Continue These Medications        Instructions Start Date   acetaminophen 500 MG tablet  Commonly known as: TYLENOL   1,000 mg, Oral, Every 6 Hours PRN      acyclovir 400 MG tablet  Commonly known as: ZOVIRAX   400 mg, Oral, 2 Times Daily      aspirin 81 MG EC tablet   81 mg, Oral, Every Other Day      cetirizine 10 MG tablet  Commonly known as: zyrTEC   10 mg, Oral, Daily PRN      clopidogrel 75 MG tablet  Commonly known as: PLAVIX   75 mg, Oral, Daily      fluticasone 50 MCG/ACT nasal spray  Commonly known as: FLONASE   2 sprays, Nasal, Daily      ondansetron ODT 8 MG disintegrating tablet  Commonly known  as: ZOFRAN-ODT   8 mg, Translingual, Every 8 Hours PRN      sulfamethoxazole-trimethoprim 800-160 MG per tablet  Commonly known as: BACTRIM DS,SEPTRA DS   TAKE 1 TABLET BY MOUTH THREE TIMES A WEEK             Stop These Medications      Brukinsa 80 MG chemo capsule  Generic drug: Zanubrutinib     metoprolol succinate XL 25 MG 24 hr tablet  Commonly known as: TOPROL-XL     nitroglycerin 0.4 MG SL tablet  Commonly known as: NITROSTAT              Discharge Diet: Healthy heart    Activity at Discharge:   No driving x 2 weeks  No lifting greater than 10 pounds x 6 weeks  Ambulate at least 10 minutes 3 times a day    Follow-up Appointments  Future Appointments   Date Time Provider Department Center   4/9/2024 10:00 AM LAB CHAIR 6 [facility]  [facility]   4/9/2024 10:20 AM Russell Cagle MD [facility]   4/10/2024  1:00 PM Jr Kae Mott MD [facility]     Additional Instructions for the Follow-ups that You Need to Schedule       Ambulatory Referral to Cardiac Rehab   As directed      Ambulatory Referral to Home Health   As directed      Please draw BMP 1 week    Order Comments: Please draw BMP 1 week    Face to Face Visit Date: 3/10/2024   Follow-up provider for Plan of Care?: I will be treating the patient on an ongoing basis.  Please send me the Plan of Care for signature.   Follow-up provider: JR KAE MOTT [[id number]]   Reason/Clinical Findings: post op open heart   Describe mobility limitations that make leaving home difficult: weakness   Nursing/Therapeutic Services Requested: Skilled Nursing   Skilled nursing orders: Post CABG care   Frequency: 1 Week 1        Call MD With Problems / Concerns   As directed      Instructions:  Call office at [phone] for any drainage, increased redness, or fever over 100.5    Order Comments: Instructions:  Call office at [phone] for any drainage, increased redness, or fever over 100.5         Discharge Follow-up with PCP   As directed        Currently Documented PCP:    Eveline Pelaez MD    PCP Phone Number:    [phone]     Follow Up Details: in 1 week        Discharge Follow-up with Specialty: Cardiologist WINNIE/PA; 1 Week   As directed      Specialty: Cardiologist WINNIE/PA   Follow Up: 1 Week   Follow Up Details: bring all prescription bottles to appointment, call for appointment        Discharge Follow-up with Specified Provider: Cardiologist; 1 Month   As directed      To: Cardiologist   Follow Up: 1 Month   Follow Up Details: call for appointment, bring all medication bottles to appointment        Discharge Follow-up with Specified Provider: Dr. Mott 1 month   As directed      To: Dr. Mott 1 month   Follow Up Details: 4-6 weeks, bring all current medications to appointment        Discharge Follow-up with Specified Provider: Nephrology 2 weeks; 2 Weeks   As directed      To: Nephrology 2 weeks   Follow Up: 2 Weeks        Basic Metabolic Panel    Mar 15, 2024 (Approximate)      Release to patient: Routine Release                   WINNIE Redd  03/10/24  09:47 EDT              Electronically signed by Jr Jean Marie Mott MD at 03/10/24 5616

## 2024-03-10 NOTE — CASE MANAGEMENT/SOCIAL WORK
Case Management Discharge Note      Final Note: home with [facility] and [facility] via [facility]    Provided Post Acute Provider Quality & Resource List?: Yes  Post Acute Provider Quality and Resource List: Home Health  Delivered To: Support Person  Support Person: TEGAN / WIFE  Method of Delivery: In person    Selected Continued Care - Discharged on 3/10/2024 Admission date: 3/3/2024 - Discharge disposition: Home-Health Care Svc      Destination    No services have been selected for the patient.                Durable Medical Equipment Coordination complete.      Service Provider Selected Services Address Phone Fax Patient Preferred    [facility] [address], [facility] [address] [phone] --              Dialysis/Infusion    No services have been selected for the patient.                Home Medical Care Coordination complete.      Service Provider Selected Services Address Phone Fax Patient Preferred     [facility] [address] [place] [address] [id number] [phone] --       Internal Comment last updated by Lyssa Jarvis, RN 3/4/2024 1549    [facility] scheduled for Tuesday, 3/5/24                         Therapy    No services have been selected for the patient.                Community Resources    No services have been selected for the patient.                Community & DME    No services have been selected for the patient.                    Selected Continued Care - Episodes Includes continued care and service providers with selected services from the active episodes listed below      Oncology Episode start date: 2/13/2023   There are no active outsourced providers for this episode.                 Transportation Services  Private: Car    Final Discharge Disposition Code: 06 - home with home health care

## 2024-03-10 NOTE — PROGRESS NOTES
[facility] Progress Note      Patient Name: Francisco Espino  : 1955  MRN: [id number]  Primary Care Physician:  Eveline Pelaez MD  Date of admission: 3/3/2024    Subjective     Interval History:   Eating well; no N/V  No new issues noted today.  Wants to go home  Review of Systems:   As noted above    Objective     Vitals:   Temp:  [98.1 °F (36.7 °C)-99.8 °F (37.7 °C)] 98.7 °F (37.1 °C)  Heart Rate:  [94-99] 97  Resp:  [18] 18  BP: (101-117)/(58-70) 101/58  Flow (L/min):  [1] 1    Intake/Output Summary (Last 24 hours) at 3/10/2024 1216  Last data filed at 3/10/2024 0745  Gross per 24 hour   Intake 480 ml   Output 790 ml   Net -310 ml       Physical Exam:    General Appearance: Slightly groggy, appropriate, oriented  Skin: warm and dry  HEENT: AT/NC, oral mucosa waste  Neck: supple, no JVD  Lungs: crackles in flanks, not labored on 3 L/min  Heart: RRR, + rub  Abdomen: soft, nontender, + D, + BS  : No palpable bladder  Extremities: no edema, cyanosis or clubbing  Neuro: moves all limbs    Scheduled Meds:     [START ON 3/11/2024] amiodarone, 200 mg, Oral, Q24H  aspirin, 81 mg, Oral, Daily  atenolol, 25 mg, Oral, Q24H  atorvastatin, 40 mg, Oral, Nightly  calcium 500 mg vitamin D 5 mcg (200 UT), 1 tablet, Oral, Daily  docusate sodium, 100 mg, Oral, BID  enoxaparin, 40 mg, Subcutaneous, Daily  ferrous sulfate, 325 mg, Oral, Daily With Breakfast  folic acid, 1 mg, Oral, Daily  guaiFENesin, 1,200 mg, Oral, Q12H  multivitamin, 1 tablet, Oral, Daily  mupirocin, , Each Nare, BID  pantoprazole, 40 mg, Oral, QAM  polyethylene glycol, 17 g, Oral, Daily  senna-docusate sodium, 2 tablet, Oral, Nightly  thiamine, 100 mg, Oral, Daily  vitamin B-12, 1,000 mcg, Oral, Daily      IV Meds:   sodium chloride, 30 mL/hr, Last Rate: 30 mL/hr (24 1133)        Results Reviewed:   I have personally reviewed the results from the time of this admission to 3/10/2024 12:16 EDT     Results from  last 7 days   Lab Units 03/09/24  0153 03/08/24  1412 03/08/24  0309 03/07/24  0403 03/04/24  0838 03/03/24  1745   SODIUM mmol/L 134*  --  132* 134*   < > 137   POTASSIUM mmol/L 4.1  4.1 3.7 3.3* 4.1   < > 3.9   CHLORIDE mmol/L 100  --  100 101   < > 102   CO2 mmol/L 25.0  --  24.0 21.1*   < > 25.0   BUN mg/dL 16  --  20 14   < > 13   CREATININE mg/dL 1.45*  --  1.70* 1.74*   < > 1.23   CALCIUM mg/dL 8.8  --  8.7 8.4*   < > 9.3   BILIRUBIN mg/dL  --   --   --   --   --  0.7   ALK PHOS U/L  --   --   --   --   --  76   ALT (SGPT) U/L  --   --   --   --   --  16   AST (SGOT) U/L  --   --   --   --   --  44*   GLUCOSE mg/dL 125*  --  124* 140*   < > 137*    < > = values in this interval not displayed.       Estimated Creatinine Clearance: 62.1 mL/min (A) (by C-G formula based on SCr of 1.45 mg/dL (H)).    Results from last 7 days   Lab Units 03/09/24  0153 03/08/24  0309 03/05/24  1513 03/05/24  1125   MAGNESIUM mg/dL 1.9  --  2.6* 2.9*   PHOSPHORUS mg/dL 2.7 3.4 2.7 1.4*             Results from last 7 days   Lab Units 03/09/24  0153 03/08/24  0309 03/07/24  0403 03/06/24  0308 03/05/24  1513   WBC 10*3/mm3 6.15 7.89 9.31 7.01 8.94   HEMOGLOBIN g/dL 8.4* 8.1* 7.9* 8.1* 9.1*   PLATELETS 10*3/mm3 97* 78* 77* 73* 86*       Results from last 7 days   Lab Units 03/06/24  0308 03/05/24  1125 03/03/24  1745   INR  1.26* 1.27* 0.96       Assessment / Plan     ASSESSMENT:  SPENSER on CKD2, nonoliguric. prerenal due to hypotension, tachycardia, ABLA, and large fluid shifts in the setting of CABG. Subnephrotic proteinuria, with 1 g/g by previous ratio.  Kidney function currently is improving off diuretic therapy.  Hypotension  CAD s/p CABG 3/5, with preceding MI in February '24  Anemia, with expected blood loss postoperatively; 1U PRBC ordered.  Iron deficiency by iron studies  CLL with anemia and thrombocytopenia  Regular alcohol use  Hyponatremia improving    PLAN:  1.  No labs today will reorder.  2.  Okay to discharge home if  creatinine is stable.      Thank you for involving us in the care of Francisco Espino.  Please feel free to call with any questions.    Arlene Pineda MD  03/10/24  12:16 EDT    [facility]  [phone]    Please note that portions of this note were completed with a voice recognition program.

## 2024-03-10 NOTE — PROGRESS NOTES
" LOS: 7 days   Patient Care Team:  Eveline Pelaez MD as PCP - General (Internal Medicine & Pediatrics)  James Moy MD as Consulting Physician (Interventional Cardiology)  Russell Cagle MD as Consulting Physician (Hematology and Oncology)  Eveline Pelaez MD as Referring Physician (Internal Medicine & Pediatrics)    Chief Complaint: post op     Subjective:  Symptoms:  No shortness of breath or chest pain.    Diet:  No nausea or vomiting.    Activity level: Impaired due to weakness.          Vital Signs  Temp:  [98.1 °F (36.7 °C)-99.8 °F (37.7 °C)] 98.7 °F (37.1 °C)  Heart Rate:  [92-99] 97  Resp:  [18] 18  BP: (101-117)/(58-79) 101/58  Body mass index is 25.85 kg/m².    Intake/Output Summary (Last 24 hours) at 3/10/2024 0818  Last data filed at 3/10/2024 0745  Gross per 24 hour   Intake 480 ml   Output 790 ml   Net -310 ml     I/O this shift:  In: 240 [P.O.:240]  Out: -     Chest tube drainage last 8 hours         03/08/24  0600 03/09/24  0620 03/10/24  0717   Weight: 92.3 kg (203 lb 8 oz) 92.9 kg (204 lb 12.8 oz) 91.3 kg (201 lb 4.8 oz)         Objective:  Vital signs: (most recent): Blood pressure 101/58, pulse 97, temperature 98.7 °F (37.1 °C), temperature source Oral, resp. rate 18, height 188 cm (74\"), weight 91.3 kg (201 lb 4.8 oz), SpO2 95%.                Results Review:        WBC WBC   Date Value Ref Range Status   03/09/2024 6.15 3.40 - 10.80 10*3/mm3 Final   03/08/2024 7.89 3.40 - 10.80 10*3/mm3 Final      HGB Hemoglobin   Date Value Ref Range Status   03/09/2024 8.4 (L) 13.0 - 17.7 g/dL Final   03/08/2024 8.1 (L) 13.0 - 17.7 g/dL Final      HCT Hematocrit   Date Value Ref Range Status   03/09/2024 24.7 (L) 37.5 - 51.0 % Final   03/08/2024 24.3 (L) 37.5 - 51.0 % Final      Platelets Platelets   Date Value Ref Range Status   03/09/2024 97 (L) 140 - 450 10*3/mm3 Final   03/08/2024 78 (L) 140 - 450 10*3/mm3 Final        PT/INR:    No results found for: \"PROTIME\"  /  No " "results found for: \"INR\"      Sodium Sodium   Date Value Ref Range Status   03/09/2024 134 (L) 136 - 145 mmol/L Final   03/08/2024 132 (L) 136 - 145 mmol/L Final      Potassium Potassium   Date Value Ref Range Status   03/09/2024 4.1 3.5 - 5.2 mmol/L Final   03/09/2024 4.1 3.5 - 5.2 mmol/L Final   03/08/2024 3.7 3.5 - 5.2 mmol/L Final   03/08/2024 3.3 (L) 3.5 - 5.2 mmol/L Final      Chloride Chloride   Date Value Ref Range Status   03/09/2024 100 98 - 107 mmol/L Final   03/08/2024 100 98 - 107 mmol/L Final      Bicarbonate CO2   Date Value Ref Range Status   03/09/2024 25.0 22.0 - 29.0 mmol/L Final   03/08/2024 24.0 22.0 - 29.0 mmol/L Final      BUN BUN   Date Value Ref Range Status   03/09/2024 16 8 - 23 mg/dL Final   03/08/2024 20 8 - 23 mg/dL Final      Creatinine Creatinine   Date Value Ref Range Status   03/09/2024 1.45 (H) 0.76 - 1.27 mg/dL Final   03/08/2024 1.70 (H) 0.76 - 1.27 mg/dL Final      Calcium Calcium   Date Value Ref Range Status   03/09/2024 8.8 8.6 - 10.5 mg/dL Final   03/08/2024 8.7 8.6 - 10.5 mg/dL Final      Magnesium Magnesium   Date Value Ref Range Status   03/09/2024 1.9 1.6 - 2.4 mg/dL Final          amiodarone, 300 mg, Oral, Q12H  aspirin, 81 mg, Oral, Daily  atenolol, 25 mg, Oral, Q24H  atorvastatin, 40 mg, Oral, Nightly  calcium 500 mg vitamin D 5 mcg (200 UT), 1 tablet, Oral, Daily  docusate sodium, 100 mg, Oral, BID  enoxaparin, 40 mg, Subcutaneous, Daily  ferrous sulfate, 325 mg, Oral, Daily With Breakfast  folic acid, 1 mg, Oral, Daily  guaiFENesin, 1,200 mg, Oral, Q12H  multivitamin, 1 tablet, Oral, Daily  mupirocin, , Each Nare, BID  pantoprazole, 40 mg, Oral, QAM  polyethylene glycol, 17 g, Oral, Daily  senna-docusate sodium, 2 tablet, Oral, Nightly  thiamine, 100 mg, Oral, Daily  vitamin B-12, 1,000 mcg, Oral, Daily      sodium chloride, 30 mL/hr, Last Rate: 30 mL/hr (03/05/24 1133)              CAD (coronary artery disease), native coronary artery    Coronary artery disease of " native heart with stable angina pectoris      Assessment & Plan    - severe multi-vessel CAD with hx of PCI in 2017; recent NSTEMI with balloon opening of stent to RCA; LD Plavix 3/1- s/p CABGx6 LIMA/RSVG POD#5 [place]  - hypertension  - hyperlipidemia--statin therapy  - CLL--- on Brukinsa; oncology consulted  - daily ETOH use--watch for s/sx of withdrawal. CIWA protocol  -TCP plt count 73, hold lovenox  -post op anemia- expected acute blood loss     Looks good this morning  Up in the chair  On room air-- tolerating   Overnight with over an hour of desaturations-- will need nocturnal oxygen at discharge  Sinus rhythm rate in the 90s--tolerated atenolol  Encourage pulmonary toilet-- continue IS/flutter and mucinex  Creatinine improved-- nephrology following  Mobilize/increase activity  I think he can go home with home health  He has an appointment to see his oncologist NP on the 19th which they will decide timing of resuming Zanubrutinib   Continue routine care       WINNIE Redd  03/10/24  08:18 EDT

## 2024-03-11 ENCOUNTER — TRANSITIONAL CARE MANAGEMENT TELEPHONE ENCOUNTER (OUTPATIENT)
Dept: CALL CENTER | Facility: HOSPITAL | Age: 69
End: 2024-03-11
Payer: COMMERCIAL

## 2024-03-11 ENCOUNTER — HOME CARE VISIT (OUTPATIENT)
Dept: HOME HEALTH SERVICES | Facility: HOME HEALTHCARE | Age: 69
End: 2024-03-11
Payer: COMMERCIAL

## 2024-03-11 VITALS
RESPIRATION RATE: 18 BRPM | HEART RATE: 82 BPM | OXYGEN SATURATION: 94 % | DIASTOLIC BLOOD PRESSURE: 58 MMHG | SYSTOLIC BLOOD PRESSURE: 98 MMHG

## 2024-03-11 DIAGNOSIS — C91.10 CLL (CHRONIC LYMPHOCYTIC LEUKEMIA): Primary | ICD-10-CM

## 2024-03-11 PROCEDURE — G0299 HHS/HOSPICE OF RN EA 15 MIN: HCPCS

## 2024-03-11 RX ORDER — ONDANSETRON 8 MG/1
TABLET, ORALLY DISINTEGRATING ORAL
Qty: 9 TABLET | Refills: 14 | Status: SHIPPED | OUTPATIENT
Start: 2024-03-11

## 2024-03-11 RX ORDER — ASPIRIN 81 MG/1
81 TABLET ORAL EVERY OTHER DAY
Qty: 30 TABLET | Refills: 0 | Status: SHIPPED | OUTPATIENT
Start: 2024-03-11

## 2024-03-11 RX ORDER — CLOPIDOGREL BISULFATE 75 MG/1
75 TABLET ORAL DAILY
Qty: 30 TABLET | Refills: 0 | Status: SHIPPED | OUTPATIENT
Start: 2024-03-11 | End: 2024-04-10

## 2024-03-11 RX ORDER — AMIODARONE HYDROCHLORIDE 200 MG/1
200 TABLET ORAL DAILY
Qty: 30 TABLET | Refills: 0 | OUTPATIENT
Start: 2024-03-11

## 2024-03-11 NOTE — OUTREACH NOTE
Call Center TCM Note      Flowsheet Row Responses   [facility] patient discharged from? [facility]   Does the patient have one of the following disease processes/diagnoses(primary or secondary)? Cardiothoracic surgery   TCM attempt successful? No   Unsuccessful attempts Attempt 1            Lillian Delatorre MA    3/11/2024, 14:54 EDT

## 2024-03-11 NOTE — OUTREACH NOTE
Call Center TCM Note      Flowsheet Row Responses   [facility] patient discharged from? [place]   Does the patient have one of the following disease processes/diagnoses(primary or secondary)? Cardiothoracic surgery   TCM attempt successful? Yes   Call start time 1619   Discharge diagnosis Severe multivessel CAD now s/p CABG x 6   Does the patient have an appointment with their PCP within 7-14 days of discharge? No   Nursing Interventions Patient desires to follow up with specialty only, Routed TCM call to PCP office, Patient declined scheduling/rescheduling appointment at this time   TCM call completed? Yes   Wrap up additional comments Severe multivessel CAD now s/p CABG x 6 - Pt doing well, he was acutely constipated once home, no BM during entire hospital stay, It took 3 suppositories and 2 enemas at home to get things moving and he is still recovering but much better. All new medications and Home O2 in place. Pt will sched TCM APPT with PCP once all specialty appts in place            Lillian Delatorre MA    3/11/2024, 16:26 EDT

## 2024-03-11 NOTE — HOME HEALTH
SOC Note: Mr. Espino was discharged home on 3/10 after CABG x6 by Dr. Mott.He lives with his wife who provides most care. Performed Med rec. His daughter labeled all pill bottles with when to take them. No questions about meds. Went over discharge instructions. He says he didn't have a bowel movement in the hospital so when he got home he took miralax, 3 suppositories, and 2 enemas. Since last night he has had a bowel movement every 1-2 hours but it is slowing down. Instructed on follow up appointments. He and his wife said he will call tomorrow for 1 week cardiology f/u and 2 week neph f/u. Cardiac rehab not scheduled yet. SN to draw BMP on 3/15 (In referal order). SN ordered for post CABG care. Visit set: 2w2, 1w2.    Home Health ordered for:     Primary Dx: Encounter for surgical aftercare following surgery on the circulatory system     Focus of Care: Post CABG care    HB status/Living Arrangements: HB. Lives with wife    Code Status: Full code    Educated on Emergency Plan, steps to take prior to going to the ER and when to Call [facility]    Plan next visit: CP assess, med education, post CABG teaching

## 2024-03-12 NOTE — PAYOR COMM NOTE
"Francsico Espino (68 y.o. Male)                   ATTENTION; - DISCHARGE CASE REF #[id number]                  REPLY TO UR DEPT  [phone] OR CALL   FLORA WOOTEN LPROMAN           Date of Birth   1955    Social Security Number       Address   [address] [address]    Home Phone   [phone]    MRN   [id number]       Alevism   None    Marital Status                               Admission Date   3/3/24    Admission Type   Urgent    Admitting Provider   Jr Jean Marie Mott MD    Attending Provider       Department, Room/Bed   [facility] CARDIOVASC UNIT, 2212/1       Discharge Date   3/10/2024    Discharge Disposition   Home-Health Care Svc    Discharge Destination                                 Attending Provider: (none)   Allergies: Codeine    Isolation: None   Infection: None   Code Status: Prior    Ht: 188 cm (74\")   Wt: 91.3 kg (201 lb 4.8 oz)    Admission Cmt: None   Principal Problem: CAD (coronary artery disease), native coronary artery [I25.10]                   Active Insurance as of 3/3/2024       Primary Coverage       Payor Plan Insurance Group Employer/Plan Group    ANTHEM BLUE CROSS ANTHEM BLUE CROSS BLUE SHIELD PPO [id number]       Payor Plan Address Payor Plan Phone Number Payor Plan Fax Number Effective Dates    [address] [phone]  1/1/2020 - None Entered    [facility] [id number]         Subscriber Name Subscriber Birth Date Member ID       FRANCISCO ESPINO 1955 [id number]               Secondary Coverage       Payor Plan Insurance Group Employer/Plan Group    MEDICARE MEDICARE A ONLY        Payor Plan Address Payor Plan Phone Number Payor Plan Fax Number Effective Dates    [address] [phone]  2/1/2020 - None Entered    [facility] [address]         Subscriber Name Subscriber Birth Date Member ID       FRANCISCO ESPINO 1955 [id number]                     Emergency Contacts        (Rel.) Home Phone Work " Phone Mobile Phone    Evangelina Espino (Spouse) [phone] -- [phone]                 Discharge Summary        Deedee BonillaWINNIE at 03/10/24 2973       Attestation signed by Jr Jean Marie Mott MD at 03/10/24 6248    I have reviewed this documentation and agree.                  Date of Admission: 3/3/2024  Date of Discharge:  3/10/2024    Discharge Diagnosis:   Severe multivessel CAD now s/p CABG x 6  Recent NSTEMI with balloon opening of stent to RCA  HTN  HLD  CLL  Daily ETOH use  Thrombocytopenia   Expected acute blood loss anemia post-op  SPENSER on CKD stage 2    Presenting Problem/History of Present Illness:  Severe multivessel CAD   Recent NSTEMI with balloon opening of stent to RCA  HTN  HLD  CLL  Daily ETOH use  Thrombocytopenia   CKD stage 2    Hospital Course:  Patient is a 68 y.o. male who was admitted to the hospital on 3/3/24 in preparation for CABG. His Plavix had been discontinued, so he was started on a Heparin drip since he had recently undergone balloon opening of his stent to the RCA. On 3/5/24 he was taken to the Operating Room and under general anesthesia and via median sternotomy underwent CABG x 6 by Dr. Mott. Patient tolerated the procedure well and was transported to the Open Heart Recovery Unit in stable condition. Over the next several hours he was successfully weaned from the ventilator and extubated. On post-operative day #1 he was transferred to the Cardiac step-down unit. On post-operative day #2 he was transfused 1 unit of PRBC for Hemoglobin 7.9. Nephrology was also consulted for creatinine 1.74. On post-operative day #3 his chest tube, central line, and hebert catheter were removed and his Jabari drain was placed to bulb. On post-operative day #4 his Jabari drain and temporary pacing wires were removed. The rest of his hospital course was uneventful and one of gradual improvement. He was also followed by Hematology/Oncology during his stay due to his chronic thrombocytopenia  and CLL, he has an appointment with their NP on 3/19 and they will decide on the timing of resuming Zanubrutinib.  An overnight oximetry revealed over 1 hour of desaturations, will be sent home with nocturnal oxygen. On post-operative day # 5, he was deemed stable for discharge home. We will get a repeat BMP in 1 week to follow up on his creatinine.  Patient was provided with appropriate discharge education. He  was instructed to call office with any questions or concerns.      Procedures Performed:  Procedure(s) 3/5/24 Dr. Mott:  CABG x 6.  Sequential skeletonized LIMA to diagonal branch and then LAD.  Vein graft to PDA.  Vein graft to right coronary artery.  Vein graft to OM1.  Vein graft to OM 2.  Temporary cardiopulmonary bypass.  Antegrade and retrograde cold blood cardioplegia with warm reperfusion.  Neurologic monitoring.  Transesophageal echo.  Endoscopic vein harvest of the right greater saphenous vein. Saphena.  The heart and cardiac chambers were normal.  There was no residual        Consults:   Consults       Date and Time Order Name Status Description    3/7/2024  9:16 AM Inpatient Nephrology Consult Completed     3/3/2024  5:12 PM Inpatient Hematology & Oncology Consult Completed             Pertinent Test Results:    Lab Results   Component Value Date    WBC 6.15 03/09/2024    HGB 8.4 (L) 03/09/2024    HCT 24.7 (L) 03/09/2024    .6 (H) 03/09/2024    PLT 97 (L) 03/09/2024      Lab Results   Component Value Date    GLUCOSE 125 (H) 03/09/2024    CALCIUM 8.8 03/09/2024     (L) 03/09/2024    K 4.1 03/09/2024    K 4.1 03/09/2024    CO2 25.0 03/09/2024     03/09/2024    BUN 16 03/09/2024    CREATININE 1.45 (H) 03/09/2024    EGFRIFAFRI 70 07/09/2021    EGFRIFNONA 60 (L) 01/06/2022    BCR 11.0 03/09/2024    ANIONGAP 9.0 03/09/2024     Lab Results   Component Value Date    INR 1.26 (H) 03/06/2024    PROTIME 16.0 (H) 03/06/2024       Condition on Discharge: Stable    Vital Signs  Temp:   [98.1 °F (36.7 °C)-99.8 °F (37.7 °C)] 98.7 °F (37.1 °C)  Heart Rate:  [92-99] 97  Resp:  [18] 18  BP: (101-117)/(58-79) 101/58  Body mass index is 25.85 kg/m².    Discharge Disposition: Home with Home Health  Home-Health Care [facility]    Discharge Medications     Discharge Medications        New Medications        Instructions Start Date   amiodarone 200 MG tablet  Commonly known as: PACERONE   200 mg, Oral, Every 24 Hours Scheduled   Start Date: March 11, 2024     atenolol 25 MG tablet  Commonly known as: TENORMIN   25 mg, Oral, Every 12 Hours      Baclofen 5 MG tablet  Commonly known as: LIORESAL   5 mg, Oral, 3 Times Daily PRN      calcium 500 mg vitamin D 5 mcg (200 UT) 500-5 MG-MCG tablet per tablet   1 tablet, Oral, Daily   Start Date: March 11, 2024     cyanocobalamin 1000 MCG tablet  Commonly known as: VITAMIN B-12   1,000 mcg, Oral, Daily   Start Date: March 11, 2024     ferrous sulfate 325 (65 FE) MG tablet   325 mg, Oral, Daily With Breakfast   Start Date: March 11, 2024     HYDROcodone-acetaminophen 5-325 MG per tablet  Commonly known as: NORCO   1 tablet, Oral, Every 4 Hours PRN             Changes to Medications        Instructions Start Date   rosuvastatin 20 MG tablet  Commonly known as: CRESTOR  What changed: how much to take   40 mg, Oral, Nightly             Continue These Medications        Instructions Start Date   acetaminophen 500 MG tablet  Commonly known as: TYLENOL   1,000 mg, Oral, Every 6 Hours PRN      acyclovir 400 MG tablet  Commonly known as: ZOVIRAX   400 mg, Oral, 2 Times Daily      aspirin 81 MG EC tablet   81 mg, Oral, Every Other Day      cetirizine 10 MG tablet  Commonly known as: zyrTEC   10 mg, Oral, Daily PRN      clopidogrel 75 MG tablet  Commonly known as: PLAVIX   75 mg, Oral, Daily      fluticasone 50 MCG/ACT nasal spray  Commonly known as: FLONASE   2 sprays, Nasal, Daily      ondansetron ODT 8 MG disintegrating tablet  Commonly known as: ZOFRAN-ODT   8 mg, Translingual,  Every 8 Hours PRN      sulfamethoxazole-trimethoprim 800-160 MG per tablet  Commonly known as: BACTRIM DS,SEPTRA DS   TAKE 1 TABLET BY MOUTH THREE TIMES A WEEK             Stop These Medications      Brukinsa 80 MG chemo capsule  Generic drug: Zanubrutinib     metoprolol succinate XL 25 MG 24 hr tablet  Commonly known as: TOPROL-XL     nitroglycerin 0.4 MG SL tablet  Commonly known as: NITROSTAT              Discharge Diet: Healthy heart    Activity at Discharge:   No driving x 2 weeks  No lifting greater than 10 pounds x 6 weeks  Ambulate at least 10 minutes 3 times a day    Follow-up Appointments  Future Appointments   Date Time Provider Department Center   4/9/2024 10:00 AM LAB CHAIR 6 [facility]  [facility]   4/9/2024 10:20 AM Russell Cagle MD [facility]   4/10/2024  1:00 PM Jr Kae Mott MD [facility]     Additional Instructions for the Follow-ups that You Need to Schedule       Ambulatory Referral to Cardiac Rehab   As directed      Ambulatory Referral to Home Health   As directed      Please draw BMP 1 week    Order Comments: Please draw BMP 1 week    Face to Face Visit Date: 3/10/2024   Follow-up provider for Plan of Care?: I will be treating the patient on an ongoing basis.  Please send me the Plan of Care for signature.   Follow-up provider: JR KAE MOTT [[id number]]   Reason/Clinical Findings: post op open heart   Describe mobility limitations that make leaving home difficult: weakness   Nursing/Therapeutic Services Requested: Skilled Nursing   Skilled nursing orders: Post CABG care   Frequency: 1 Week 1        Call MD With Problems / Concerns   As directed      Instructions:  Call office at [phone] for any drainage, increased redness, or fever over 100.5    Order Comments: Instructions:  Call office at [phone] for any drainage, increased redness, or fever over 100.5         Discharge Follow-up with PCP   As directed       Currently Documented PCP:    Benigno  Eveline Desai MD    PCP Phone Number:    [phone]     Follow Up Details: in 1 week        Discharge Follow-up with Specialty: Cardiologist WINNIE/PA; 1 Week   As directed      Specialty: Cardiologist APRN/PA   Follow Up: 1 Week   Follow Up Details: bring all prescription bottles to appointment, call for appointment        Discharge Follow-up with Specified Provider: Cardiologist; 1 Month   As directed      To: Cardiologist   Follow Up: 1 Month   Follow Up Details: call for appointment, bring all medication bottles to appointment        Discharge Follow-up with Specified Provider: Dr. Mott 1 month   As directed      To: Dr. Mott 1 month   Follow Up Details: 4-6 weeks, bring all current medications to appointment        Discharge Follow-up with Specified Provider: Nephrology 2 weeks; 2 Weeks   As directed      To: Nephrology 2 weeks   Follow Up: 2 Weeks        Basic Metabolic Panel    Mar 15, 2024 (Approximate)      Release to patient: Routine Release                   WINNIE Redd  03/10/24  09:47 EDT              Electronically signed by Jr Jean Marie Mott MD at 03/10/24 1144

## 2024-03-13 NOTE — PROGRESS NOTES
[facility] Clinical Pharmacy Services: National Cardiology Data Registry (NCDR) Medication Review    Francisco Espino is s/p CABG x6 for multi-vessel CAD . Pharmacy to review discharge medications to make sure appropriate medications have been prescribed.    Patient has been discharged on the following:  Aspirin: not prescribed  High Intensity Statin: Rosuvastatin 40 mg (20 mg x 2 tablets) nightly  Beta-blocker: Atenolol 25 mg twice daily  P2Y12 Inhibitor: Clopidogrel 75 mg daily  LVEF >40 (no requirement for ACE/ARB)    Oncology providers have recommended not to put the patient on both aspirin and clopidogrel due to longstanding thrombocytopenia from CLL. Cardiologists want the patient to continue on clopidogrel due to past NSTEMI and risk factors for CAD.    These medications meet the requirements for NCDR discharge medication for chest pain and MI.    Carmela Garcia, Pharm.D., [facility]   Clinical Pharmacist   Phone Extension

## 2024-03-14 ENCOUNTER — SPECIALTY PHARMACY (OUTPATIENT)
Dept: PHARMACY | Facility: HOSPITAL | Age: 69
End: 2024-03-14
Payer: COMMERCIAL

## 2024-03-14 ENCOUNTER — TELEPHONE (OUTPATIENT)
Dept: ONCOLOGY | Facility: CLINIC | Age: 69
End: 2024-03-14
Payer: COMMERCIAL

## 2024-03-14 NOTE — TELEPHONE ENCOUNTER
Caller: Evangelina Espino    Relationship: Emergency Contact    Best call back number: [phone]    What is the best time to reach you: ANYTIME    Who are you requesting to speak with (clinical staff, provider,  specific staff member): CLINICAL     Do you know the name of the person who called:     What was the call regarding: PATIENT HAD BYPASS SURGERY ON 3/5/2024  WHEN DOES HE RESTART HIS BRUKINSA?    CALL TO ADVISE    Is it okay if the provider responds through MyChart:

## 2024-03-14 NOTE — TELEPHONE ENCOUNTER
Called Evangelina to let her know he was to resume taking the Brukinsa 2 weeks post surgery. She v/u.

## 2024-03-14 NOTE — PROGRESS NOTES
Pt in my queue to follow up on Brukinsa supply. He was held due to surgery on 2/27/2024 and was to resume 2 weeks after.    I contacted pt and he has NOT started back on his Brukinsa since surgery. He states he has a couple follow up appts that need to be done and he will contact me back once this is completed to coordinate the delivery of his Brukinsa.    Marta Jansen, Pharmacy Technician  Specialty Pharmacy Technician

## 2024-03-15 ENCOUNTER — HOME CARE VISIT (OUTPATIENT)
Dept: HOME HEALTH SERVICES | Facility: HOME HEALTHCARE | Age: 69
End: 2024-03-15
Payer: COMMERCIAL

## 2024-03-15 ENCOUNTER — LAB REQUISITION (OUTPATIENT)
Dept: LAB | Facility: HOSPITAL | Age: 69
End: 2024-03-15
Payer: COMMERCIAL

## 2024-03-15 VITALS
BODY MASS INDEX: 24.04 KG/M2 | WEIGHT: 187.2 LBS | DIASTOLIC BLOOD PRESSURE: 68 MMHG | HEART RATE: 80 BPM | TEMPERATURE: 97.6 F | SYSTOLIC BLOOD PRESSURE: 122 MMHG | RESPIRATION RATE: 18 BRPM | OXYGEN SATURATION: 95 %

## 2024-03-15 DIAGNOSIS — Z48.812 ENCOUNTER FOR SURGICAL AFTERCARE FOLLOWING SURGERY ON THE CIRCULATORY SYSTEM: ICD-10-CM

## 2024-03-15 DIAGNOSIS — N18.2 CHRONIC KIDNEY DISEASE, STAGE 2 (MILD): ICD-10-CM

## 2024-03-15 DIAGNOSIS — Z95.1 PRESENCE OF AORTOCORONARY BYPASS GRAFT: ICD-10-CM

## 2024-03-15 DIAGNOSIS — I12.9 HYPERTENSIVE CHRONIC KIDNEY DISEASE WITH STAGE 1 THROUGH STAGE 4 CHRONIC KIDNEY DISEASE, OR UNSPECIFIED CHRONIC KIDNEY DISEASE: ICD-10-CM

## 2024-03-15 LAB
ANION GAP SERPL CALCULATED.3IONS-SCNC: 11 MMOL/L (ref 5–15)
BUN SERPL-MCNC: 13 MG/DL (ref 8–23)
BUN/CREAT SERPL: 10.4 (ref 7–25)
CALCIUM SPEC-SCNC: 9.5 MG/DL (ref 8.6–10.5)
CHLORIDE SERPL-SCNC: 101 MMOL/L (ref 98–107)
CO2 SERPL-SCNC: 22 MMOL/L (ref 22–29)
CREAT SERPL-MCNC: 1.25 MG/DL (ref 0.76–1.27)
EGFRCR SERPLBLD CKD-EPI 2021: 62.3 ML/MIN/1.73
GLUCOSE SERPL-MCNC: 146 MG/DL (ref 65–99)
POTASSIUM SERPL-SCNC: 4.6 MMOL/L (ref 3.5–5.2)
SODIUM SERPL-SCNC: 134 MMOL/L (ref 136–145)

## 2024-03-15 PROCEDURE — 80048 BASIC METABOLIC PNL TOTAL CA: CPT | Performed by: NURSE PRACTITIONER

## 2024-03-15 PROCEDURE — G0299 HHS/HOSPICE OF RN EA 15 MIN: HCPCS

## 2024-03-16 NOTE — HOME HEALTH
CP and general assessment completed.  vss, afebrile.  sternal and right leg incisions clean, dry and intact. some edema noted to RLE.  Lab drawn BMP as ordered.  Reviewed daily weights, bowel regimen, and staying hydrated.  Reviewed standard Dr. Pantera perera home instructions.        NEXT SN VISIT; CP assess, incision assess, continue medication teaching

## 2024-03-18 NOTE — PROGRESS NOTES
"Enter Query Response Below      Query Response: clinically insignificant             If applicable, please update the problem list.     Patient: Francisco Espino        : 1955  Account: [id number]           Admit Date: 3/3/2024        How to Respond to this query:       a. Click New Note     b. Answer query within the yellow box.                c. Update the Problem List, if applicable.      If you have any questions about this query contact me at: [email or web address]     Dr. Pineda,     68-year-old male who was admitted for preparation for CABG and underwent CABG x6. Your notes document \"Hyponatremia improving.\" The patient's sodium ranged from 132 to 140. The patient was treated with holding diuretics and monitoring labs.    Please clarify the following:    - Hyponatremia- clinically significant  - Hyponatremia- clinically insignificant  - Other- specify _________  - Unable to determine    By submitting this query, we are merely seeking further clarification of documentation to accurately reflect all conditions that you are monitoring, evaluating, treating or that extend the hospitalization or utilize additional resources of care. Please utilize your independent clinical judgment when addressing the question(s) above.     This query and your response, once completed, will be entered into the legal medical record.    Sincerely,  Connie Martinez RN   Clinical Documentation Integrity Program   "

## 2024-03-19 ENCOUNTER — HOME CARE VISIT (OUTPATIENT)
Dept: HOME HEALTH SERVICES | Facility: HOME HEALTHCARE | Age: 69
End: 2024-03-19
Payer: COMMERCIAL

## 2024-03-19 VITALS
WEIGHT: 177.2 LBS | RESPIRATION RATE: 18 BRPM | BODY MASS INDEX: 22.75 KG/M2 | SYSTOLIC BLOOD PRESSURE: 118 MMHG | TEMPERATURE: 97.3 F | OXYGEN SATURATION: 97 % | DIASTOLIC BLOOD PRESSURE: 78 MMHG | HEART RATE: 73 BPM

## 2024-03-19 PROCEDURE — G0299 HHS/HOSPICE OF RN EA 15 MIN: HCPCS

## 2024-03-19 NOTE — HOME HEALTH
CP and general assessment completed.  vss, afebrile. Patient state he does not have any pain. 0/10 on pain scale.  Patient reports he is doing very well.  sternal and right leg incisions clean, dry and intact, no ss of infection noted.  Patient appetite good, +bm, hydrating well. Planning discharge Friday 3/22/24.  Patient does not want to wear oxygen at night. States he does not need it.  Patient wanting to send back oxygen to [place]  Current 02 sats 97% on room air. No ss of respiratory distress noted.        NEXT SN VISIT: CP assessment, possible discharge, Patient has follow up cardiologist appointment next week.

## 2024-03-20 ENCOUNTER — READMISSION MANAGEMENT (OUTPATIENT)
Dept: CALL CENTER | Facility: HOSPITAL | Age: 69
End: 2024-03-20
Payer: COMMERCIAL

## 2024-03-21 ENCOUNTER — TELEPHONE (OUTPATIENT)
Dept: CARDIAC REHAB | Facility: HOSPITAL | Age: 69
End: 2024-03-21
Payer: COMMERCIAL

## 2024-03-21 NOTE — OUTREACH NOTE
CT Surgery Week 2 Survey      Flowsheet Row Responses   [facility] patient discharged from? [facility]   Does the patient have one of the following disease processes/diagnoses(primary or secondary)? Cardiothoracic surgery   Week 2 attempt successful? Yes   Call start time 1958   Call end time 2001   Discharge diagnosis Severe multivessel CAD now s/p CABG x 6   Meds reviewed with patient/caregiver? Yes   Is the patient having any side effects they believe may be caused by any medication additions or changes? No   Does the patient have all medications related to this admission filled (includes all antibiotics, pain medications, cardiac medications, etc.) Yes   Is the patient taking all medications as directed (includes completed medication regime)? Yes   Does the patient have a primary care provider?  Yes   Does the patient have an appointment scheduled with their C/T surgeon? Yes   Has the patient kept scheduled appointments due by today? Yes   Comments Surg on Monday and has appt with oncology and PCP   What is the Home health agency?   [facility]   Has home health visited the patient within 72 hours of discharge? Yes   What DME was ordered? [facility]   Psychosocial issues? No   Did the patient receive a copy of their discharge instructions? Yes   Nursing interventions Reviewed instructions with patient   What is the patient's perception of their health status since discharge? Improving   Nursing interventions Nurse provided patient education   Is the patient /caregiver able to teach back basic post-op care? Practice cough and deep breath every 4 hours while awake, Hold pillow to support chest when coughing, Drive as instructed by MD in discharge instructions, No tub bath, swimming, or hot tub until instructed by MD, Use a clean wash cloth and antibacterial bar or liquid soap to clean incisions, Shower daily, Lifting as instructed by MD in discharge instructions   Is the  patient/caregiver able to teach back signs and symptoms of incisional infection? Increased redness, swelling or pain at the incisonal site, Increased drainage or bleeding, Incisional warmth, Pus or odor from incision, Fever   Is the patient/caregiver able to teach back steps to recovery at home? Set small, achievable goals for return to baseline health, Rest and rebuild strength, gradually increase activity, Eat a well-balance diet   If the patient is a current smoker, are they able to teach back resources for cessation? Not a smoker   Is the patient/caregiver able to teach back the hierarchy of who to call/visit for symptoms/problems? PCP, Specialist, Home health nurse, Urgent Care, ED, 911 Yes   Week 2 call completed? Yes   Graduated Yes   Graduated/Revoked comments States he is doing well with no questions or needs at this time.   Call end time 2001            Carline BERGERON - Licensed Nurse

## 2024-03-21 NOTE — TELEPHONE ENCOUNTER
I spoke with patient yesterday re: recent referral to cardiac rehab. He doesn't feel he is ready to schedule his first appointment and prefers to discuss with Dr. Ramirez next week.  He will call us when ready to get started.    Thank  you for the referral!

## 2024-03-22 ENCOUNTER — HOME CARE VISIT (OUTPATIENT)
Dept: HOME HEALTH SERVICES | Facility: HOME HEALTHCARE | Age: 69
End: 2024-03-22
Payer: COMMERCIAL

## 2024-03-26 ENCOUNTER — SPECIALTY PHARMACY (OUTPATIENT)
Dept: PHARMACY | Facility: HOSPITAL | Age: 69
End: 2024-03-26
Payer: COMMERCIAL

## 2024-03-26 ENCOUNTER — TELEPHONE (OUTPATIENT)
Dept: ONCOLOGY | Facility: CLINIC | Age: 69
End: 2024-03-26
Payer: COMMERCIAL

## 2024-03-26 ENCOUNTER — OFFICE VISIT (OUTPATIENT)
Age: 69
End: 2024-03-26
Payer: COMMERCIAL

## 2024-03-26 VITALS
DIASTOLIC BLOOD PRESSURE: 78 MMHG | SYSTOLIC BLOOD PRESSURE: 102 MMHG | WEIGHT: 187 LBS | HEART RATE: 67 BPM | BODY MASS INDEX: 24 KG/M2 | HEIGHT: 74 IN | OXYGEN SATURATION: 100 %

## 2024-03-26 DIAGNOSIS — D62 POSTOPERATIVE ANEMIA DUE TO ACUTE BLOOD LOSS: ICD-10-CM

## 2024-03-26 DIAGNOSIS — R53.83 EASY FATIGABILITY: ICD-10-CM

## 2024-03-26 DIAGNOSIS — C91.10 CHRONIC LYMPHOCYTIC LEUKEMIA: ICD-10-CM

## 2024-03-26 DIAGNOSIS — I25.708 CORONARY ARTERY DISEASE OF BYPASS GRAFT OF NATIVE HEART WITH STABLE ANGINA PECTORIS: Primary | ICD-10-CM

## 2024-03-26 RX ORDER — ROSUVASTATIN CALCIUM 40 MG/1
40 TABLET, COATED ORAL NIGHTLY
Qty: 90 TABLET | Refills: 3 | Status: SHIPPED | OUTPATIENT
Start: 2024-03-26

## 2024-03-26 RX ORDER — METOPROLOL SUCCINATE 25 MG/1
25 TABLET, EXTENDED RELEASE ORAL DAILY
Qty: 90 TABLET | Refills: 3 | Status: SHIPPED | OUTPATIENT
Start: 2024-03-26

## 2024-03-26 RX ORDER — ZANUBRUTINIB 80 MG/1
160 CAPSULE, GELATIN COATED ORAL 2 TIMES DAILY
Qty: 120 CAPSULE | Refills: 5 | Status: SHIPPED | OUTPATIENT
Start: 2024-03-26

## 2024-03-26 NOTE — TELEPHONE ENCOUNTER
Called patient and left a detailed voicemail that I was calling to check on him and to see if there was anything he needs from us. Requested a call back at [phone]. Deana Samuels RN

## 2024-03-26 NOTE — PROGRESS NOTES
Brukinsa approval renewed from 3/26/24-3/26/25-Ramiro/Abbie Jansen, Pharmacy Technician  Specialty Pharmacy Technician

## 2024-03-26 NOTE — PROGRESS NOTES
Drug: Brukinsa (zanubrutinib)  Strength: 80 mg  Directions: Take two capsules by mouth twice a day  Quantity: 120  Refills: 5  Pharmacy prescription sent to:  [facility]  Specialty Pharmacy  Completed independent double check on medication order/RX.  Jazz Saenz Rph, BCOP

## 2024-03-26 NOTE — TELEPHONE ENCOUNTER
----- Message from Keturah Resendiz sent at 3/25/2024 10:49 AM EDT -----  Regarding: PT cancelled from today appt erin Guillermo, So, Mr Espino had an appt rebekah/ Eli today.  Wife states he just too weak to come in.  He gets worn out walking from one room to another.  They said they would just keep the 4/9 appt.  I didn't know if you might want to call them.  Just an FYI.  Thank you Jasmin

## 2024-03-26 NOTE — PROGRESS NOTES
Subjective:     Encounter Date:03/26/2024      Patient ID: Francisco Espino is a 68 y.o. male.    Chief Complaint:  Establish care, CAD status post CABG    HPI:   68 y.o. male with CAD status post recent NSTEMI with POBA to RCA and subsequent CABG x 6 with Dr. Mott on 3/5/2024 (LIMA to diagonal/LAD, SVG to PDA, SVG to RCA, SVG to OM1, SVG to OM 2), CLL, hypertension, hyperlipidemia, CKD who presents to establish care. He has been experiencing easy fatigability and dyspnea on exertion since being discharged.  He says he has good days and bad days.  He thinks that all of the new medications that he is on are contributing.  He notes intermittent surgical site pain.    The following portions of the patient's history were reviewed and updated as appropriate: allergies, current medications, past family history, past medical history, past social history, past surgical history and problem list.     REVIEW OF SYSTEMS:   All systems reviewed.  Pertinent positives identified in HPI.  All other systems are negative.    Past Medical History:   Diagnosis Date    Abnormal liver function test     Alcohol abuse     CLL (chronic lymphocytic leukemia)     Coronary artery disease     stent    Heart disease     History of pneumonia     1/2019    Hyperlipidemia     Hypertension     Inguinal hernia     left side to have surgery 3/28/19    Myocardial infarction     Screen for colon cancer 09/2016    Negative Cologaurd    Screening PSA (prostate specific antigen) 02/2013    .5    Thrombocytopenia        Family History   Problem Relation Age of Onset    Heart attack Mother     Heart disease Mother     Diabetes Mother     Aortic aneurysm Mother     Coronary artery disease Mother     Early death Mother     No Known Problems Father     Diabetes type II Other     Diabetes Brother     Hyperlipidemia Brother     Stroke Sister 65    Diabetes Sister     Hyperlipidemia Sister     Hypertension Sister     No Known Problems Brother     Stomach  cancer Paternal Uncle     Malmarbin Hyperthermia Neg Hx        Social History     Socioeconomic History    Marital status:      Spouse name: Charlotte Espino   Tobacco Use    Smoking status: Never    Smokeless tobacco: Never   Vaping Use    Vaping status: Never Used   Substance and Sexual Activity    Alcohol use: Yes     Alcohol/week: 21.0 standard drinks of alcohol     Types: 21 Shots of liquor per week    Drug use: No    Sexual activity: Defer       Allergies   Allergen Reactions    Codeine Nausea Only and Nausea And Vomiting       Past Surgical History:   Procedure Laterality Date    CARDIAC CATHETERIZATION  2017    CATARACT EXTRACTION Bilateral     COLONOSCOPY N/A 06/05/2006    Normal, repeat in 10 years, Dr. Preethi Gonzalez    COLONOSCOPY N/A 7/15/2020    Procedure: COLONOSCOPY TO CECUM & T.I. WITH COLD SNARE POLYPECTOMIES, CLIP PLACEMENT X 2;  Surgeon: Yennifer Salazar MD;  Location: [facility] ENDOSCOPY;  Service: Gastroenterology;  Laterality: N/A;  PRE- POSITIVE COLOGUARD  POST- COLON POLYPS, DIVERTICULOSIS, HEMORRHOIDS    CORONARY ANGIOPLASTY WITH STENT PLACEMENT N/A 05/09/2017    Left heart catheterization, Selective native vessel coronary arteriography, Left ventriculography, Successful percutaneous intervention to the 100% occluded right coronary artery with a 3.5 x 38 mm Synergy drug-eluting stent (post-dilated w/ a 4.0 x 20 mm NC Emerge balloon), Selective right femoral angiography, Successful Perclose arteriotomy closure. Dr. James Pierson    CORONARY ARTERY BYPASS GRAFT N/A 3/5/2024    Procedure: STERNOTOMY, CORONARY ARTERY BYPASS GRAFTING TRANSESOPHAGEAL ECHOCARDIOGRAM X6, UTILIZING THE LEFT AUGUSTUS AND RIGHT SAPHENOUS VEIN.  ADRIANE, PRP WITH ANESTHESIA;  Surgeon: Jr Jean Marie Mott MD;  Location: [facility] CVOR;  Service: Cardiothoracic;  Laterality: N/A;    INGUINAL HERNIA REPAIR Left     INGUINAL HERNIA REPAIR Right     x2    INGUINAL HERNIA REPAIR Left 3/28/2019    Procedure: LEFT INGUINAL HERNIA  REPAIR LAPAROSCOPIC;  Surgeon: Preethi Gonzalez MD;  Location: [facility] OR [facility];  Service: General    LAPAROSCOPIC INGUINAL HERNIA REPAIR Right 10/03/2002    w/ extra peritoneal Goe-Benton mesh (transperitoneal repair), Dr. Preethi Gonzalez    REFRACTIVE SURGERY Bilateral     RETINAL DETACHMENT SURGERY Right 07/02/2013       Procedures       Objective:         Vitals:    03/26/24 1257   BP: 102/78   Pulse: 67   SpO2: 100%       PHYSICAL EXAM:  GEN: well appearing, in NAD   HEENT: NCAT, EOMI, moist mucus membranes   Respiratory: CTAB, no wheezes, rales or rhonchi  CV: normal rate, regular rhythm, normal S1, S2, no murmurs, rubs, gallops, +2 radial pulses b/l  GI: soft, nontender, nondistended  MSK: no edema  Skin: no rash, warm, dry  Heme/Lymph: no bruising or bleeding  Neuro: Alert and Oriented x 3, grossly normal motor function        Assessment:         (I25.708) Coronary artery disease of bypass graft of native heart with stable angina pectoris - Plan: Adult Transthoracic Echo Complete w/ Color, Spectral and Contrast if Necessary Per Protocol    (R53.83) Easy fatigability - Plan: Adult Transthoracic Echo Complete w/ Color, Spectral and Contrast if Necessary Per Protocol    (D62) Postoperative anemia due to acute blood loss    68 y.o. male with CAD status post recent NSTEMI with POBA to RCA and subsequent CABG x 6 with Dr. Mott on 3/5/2024 (LIMA to diagonal/LAD, SVG to PDA, SVG to RCA, SVG to OM1, SVG to OM 2), CLL, hypertension, hyperlipidemia, CKD who presents to establish care.       Plan:       #CAD status post CABG  Recent NSTEMI with POBA to RCA and subsequent CABG x 6 with Dr. Mott on 3/5/2024 (LIMA to diagonal/LAD, SVG to PDA, SVG to RCA, SVG to OM1, SVG to OM 2.)  Significant postoperative anemia with hemoglobin down to 8.1 from 12.5.  This is likely contributing to his symptoms.  To be sure of no change in EF, will obtain echocardiogram.  - Continue aspirin 80 mg daily, Plavix 75 mg daily for at least 1 year  since MI: February 2024  - switch atenolol BID to Toprol 25 mg daily decrease pill burden and allow for higher blood pressure  - DC amiodarone    Dr Mott, thank you very much for referring this kind patient to me. Please call me with any questions or concerns. I will see the patient again in the office in 6 months or earlier as needed.         Emery Yeung MD, [facility]  03/26/24  [facility]    Outpatient Encounter Medications as of 3/26/2024   Medication Sig Dispense Refill    acetaminophen (TYLENOL) 500 MG tablet Take 2 tablets by mouth Every 6 (Six) Hours As Needed for Mild Pain. Indications: Pain      acyclovir (ZOVIRAX) 400 MG tablet Take 1 tablet by mouth 2 (Two) Times a Day. 180 tablet 1    aspirin 81 MG EC tablet Take 1 tablet by mouth Every Other Day. 30 tablet 0    Calcium Carbonate-Vitamin D (calcium 500 mg vitamin D 5 mcg, 200 UT,) 500-5 MG-MCG tablet per tablet Take 1 tablet by mouth Daily for 30 days. 30 tablet 0    clopidogrel (PLAVIX) 75 MG tablet Take 1 tablet by mouth Daily for 30 days. 30 tablet 0    ferrous sulfate 325 (65 FE) MG tablet Take 1 tablet by mouth Daily With Breakfast for 90 days. 30 tablet 2    ondansetron ODT (ZOFRAN-ODT) 8 MG disintegrating tablet DISSOLVE 1 TABLET UNDER THE TONGUE EVERY 8 HOURS AS NEEDED FOR NAUSEA/VOMITING 9 tablet 14    rosuvastatin (CRESTOR) 40 MG tablet Take 1 tablet by mouth Every Night. Indications: High Amount of Fats in the Blood 90 tablet 3    sulfamethoxazole-trimethoprim (BACTRIM DS,SEPTRA DS) 800-160 MG per tablet TAKE 1 TABLET BY MOUTH THREE TIMES A WEEK 12 tablet 7    vitamin B-12 (VITAMIN B-12) 1000 MCG tablet Take 1 tablet by mouth Daily for 90 days. 30 tablet 2    Zanubrutinib (Brukinsa) 80 MG chemo capsule Take 2 capsules by mouth 2 (Two) Times a Day. 120 capsule 5    [DISCONTINUED] amiodarone (PACERONE) 200 MG tablet Take 1 tablet by mouth Daily. 30 tablet 0    [DISCONTINUED] atenolol (TENORMIN) 25 MG tablet Take 1 tablet by mouth  Every 12 (Twelve) Hours for 90 days. 60 tablet 2    [DISCONTINUED] rosuvastatin (CRESTOR) 20 MG tablet Take 2 tablets by mouth Every Night. 90 tablet 2    fluticasone (FLONASE) 50 MCG/ACT nasal spray 2 sprays into the nostril(s) as directed by provider Daily. (Patient taking differently: 2 sprays into the nostril(s) as directed by provider Daily. Rarely used) 1 bottle 0    metoprolol succinate XL (TOPROL-XL) 25 MG 24 hr tablet Take 1 tablet by mouth Daily. 90 tablet 3    [DISCONTINUED] baclofen (LIORESAL) 5 MG tablet Take 1 tablet by mouth 3 (Three) Times a Day As Needed (hiccups). 30 tablet 0    [DISCONTINUED] cetirizine (zyrTEC) 10 MG tablet Take 1 tablet by mouth Daily As Needed for Allergies.      [DISCONTINUED] clopidogrel (PLAVIX) 75 MG tablet Take 1 tablet by mouth Daily. Indications: Heart Attack       No facility-administered encounter medications on file as of 3/26/2024.

## 2024-03-26 NOTE — PROGRESS NOTES
Re: Refills of Oral Specialty Medication - Brukinsa (zanubrutinib)    Drug-Drug Interactions: The current medication list was reviewed and there are no relevant drug-drug interactions with the specialty medication.  Medication Allergies: The patient has no relevant allergies as it relates to their oral specialty medication  Review of Labs/Dose Adjustments: NO DOSE CHANGE - I reviewed the most recent note and labs and the patient will continue without any dose changes.  I sent refills as described below.    Drug: Brukinsa (zanubrutinib)  Strength: 80 mg  Directions: Take two capsules by mouth twice a day  Quantity: 120  Refills: 5  Pharmacy prescription sent to:  [facility]    Name/Credentials: Chastity Carbajal PharmD, BCPS    3/26/2024  12:52 EDT

## 2024-03-27 ENCOUNTER — TELEPHONE (OUTPATIENT)
Dept: ONCOLOGY | Facility: CLINIC | Age: 69
End: 2024-03-27
Payer: COMMERCIAL

## 2024-03-27 ENCOUNTER — HOME CARE VISIT (OUTPATIENT)
Dept: HOME HEALTH SERVICES | Facility: HOME HEALTHCARE | Age: 69
End: 2024-03-27
Payer: COMMERCIAL

## 2024-03-27 ENCOUNTER — LAB REQUISITION (OUTPATIENT)
Dept: LAB | Facility: HOSPITAL | Age: 69
End: 2024-03-27
Payer: COMMERCIAL

## 2024-03-27 VITALS
TEMPERATURE: 97.8 F | OXYGEN SATURATION: 96 % | DIASTOLIC BLOOD PRESSURE: 78 MMHG | SYSTOLIC BLOOD PRESSURE: 118 MMHG | RESPIRATION RATE: 18 BRPM | HEART RATE: 77 BPM

## 2024-03-27 DIAGNOSIS — N18.2 CHRONIC KIDNEY DISEASE, STAGE 2 (MILD): ICD-10-CM

## 2024-03-27 DIAGNOSIS — I12.9 HYPERTENSIVE CHRONIC KIDNEY DISEASE WITH STAGE 1 THROUGH STAGE 4 CHRONIC KIDNEY DISEASE, OR UNSPECIFIED CHRONIC KIDNEY DISEASE: ICD-10-CM

## 2024-03-27 DIAGNOSIS — C95.10 CHRONIC LEUKEMIA OF UNSPECIFIED CELL TYPE NOT HAVING ACHIEVED REMISSION: ICD-10-CM

## 2024-03-27 LAB
ALBUMIN SERPL-MCNC: 4.6 G/DL (ref 3.5–5.2)
ALBUMIN/GLOB SERPL: 1.5 G/DL
ALP SERPL-CCNC: 113 U/L (ref 39–117)
ALT SERPL W P-5'-P-CCNC: 12 U/L (ref 1–41)
ANION GAP SERPL CALCULATED.3IONS-SCNC: 11.9 MMOL/L (ref 5–15)
AST SERPL-CCNC: 17 U/L (ref 1–40)
BASOPHILS # BLD AUTO: 0.05 10*3/MM3 (ref 0–0.2)
BASOPHILS NFR BLD AUTO: 0.6 % (ref 0–1.5)
BILIRUB SERPL-MCNC: 1.2 MG/DL (ref 0–1.2)
BILIRUB UR QL STRIP: NEGATIVE
BUN SERPL-MCNC: 10 MG/DL (ref 8–23)
BUN/CREAT SERPL: 7.8 (ref 7–25)
CALCIUM SPEC-SCNC: 10 MG/DL (ref 8.6–10.5)
CHLORIDE SERPL-SCNC: 102 MMOL/L (ref 98–107)
CLARITY UR: CLEAR
CO2 SERPL-SCNC: 24.1 MMOL/L (ref 22–29)
COLOR UR: YELLOW
CREAT SERPL-MCNC: 1.28 MG/DL (ref 0.76–1.27)
DEPRECATED RDW RBC AUTO: 49.9 FL (ref 37–54)
EGFRCR SERPLBLD CKD-EPI 2021: 60.6 ML/MIN/1.73
EOSINOPHIL # BLD AUTO: 0.43 10*3/MM3 (ref 0–0.4)
EOSINOPHIL NFR BLD AUTO: 5 % (ref 0.3–6.2)
ERYTHROCYTE [DISTWIDTH] IN BLOOD BY AUTOMATED COUNT: 13.3 % (ref 12.3–15.4)
GLOBULIN UR ELPH-MCNC: 3 GM/DL
GLUCOSE SERPL-MCNC: 109 MG/DL (ref 65–99)
GLUCOSE UR STRIP-MCNC: NEGATIVE MG/DL
HCT VFR BLD AUTO: 36.4 % (ref 37.5–51)
HGB BLD-MCNC: 11.9 G/DL (ref 13–17.7)
HGB UR QL STRIP.AUTO: NEGATIVE
IMM GRANULOCYTES # BLD AUTO: 0.02 10*3/MM3 (ref 0–0.05)
IMM GRANULOCYTES NFR BLD AUTO: 0.2 % (ref 0–0.5)
KETONES UR QL STRIP: NEGATIVE
LEUKOCYTE ESTERASE UR QL STRIP.AUTO: NEGATIVE
LYMPHOCYTES # BLD AUTO: 3.62 10*3/MM3 (ref 0.7–3.1)
LYMPHOCYTES NFR BLD AUTO: 42.2 % (ref 19.6–45.3)
MCH RBC QN AUTO: 32.9 PG (ref 26.6–33)
MCHC RBC AUTO-ENTMCNC: 32.7 G/DL (ref 31.5–35.7)
MCV RBC AUTO: 100.6 FL (ref 79–97)
MONOCYTES # BLD AUTO: 0.76 10*3/MM3 (ref 0.1–0.9)
MONOCYTES NFR BLD AUTO: 8.9 % (ref 5–12)
NEUTROPHILS NFR BLD AUTO: 3.7 10*3/MM3 (ref 1.7–7)
NEUTROPHILS NFR BLD AUTO: 43.1 % (ref 42.7–76)
NITRITE UR QL STRIP: NEGATIVE
NRBC BLD AUTO-RTO: 0 /100 WBC (ref 0–0.2)
PH UR STRIP.AUTO: 6.5 [PH] (ref 5–8)
PLATELET # BLD AUTO: 170 10*3/MM3 (ref 140–450)
PMV BLD AUTO: 11.4 FL (ref 6–12)
POTASSIUM SERPL-SCNC: 4.3 MMOL/L (ref 3.5–5.2)
PROT SERPL-MCNC: 7.6 G/DL (ref 6–8.5)
PROT UR QL STRIP: ABNORMAL
RBC # BLD AUTO: 3.62 10*6/MM3 (ref 4.14–5.8)
SODIUM SERPL-SCNC: 138 MMOL/L (ref 136–145)
SP GR UR STRIP: 1.01 (ref 1–1.03)
UROBILINOGEN UR QL STRIP: ABNORMAL
WBC NRBC COR # BLD AUTO: 8.58 10*3/MM3 (ref 3.4–10.8)

## 2024-03-27 PROCEDURE — 85025 COMPLETE CBC W/AUTO DIFF WBC: CPT

## 2024-03-27 PROCEDURE — 85025 COMPLETE CBC W/AUTO DIFF WBC: CPT | Performed by: INTERNAL MEDICINE

## 2024-03-27 PROCEDURE — 80053 COMPREHEN METABOLIC PANEL: CPT | Performed by: INTERNAL MEDICINE

## 2024-03-27 PROCEDURE — 80053 COMPREHEN METABOLIC PANEL: CPT

## 2024-03-27 PROCEDURE — 81003 URINALYSIS AUTO W/O SCOPE: CPT

## 2024-03-27 PROCEDURE — 81003 URINALYSIS AUTO W/O SCOPE: CPT | Performed by: INTERNAL MEDICINE

## 2024-03-27 PROCEDURE — G0299 HHS/HOSPICE OF RN EA 15 MIN: HCPCS

## 2024-03-27 NOTE — TELEPHONE ENCOUNTER
Home Health nurse, Winsome, called to ask if we need any labs ordered since he missed last appt. Ordered cbc cmp and ua with culture if indicated. Winsome jane/thiago. Deana Samuels RN

## 2024-03-28 ENCOUNTER — SPECIALTY PHARMACY (OUTPATIENT)
Dept: PHARMACY | Facility: HOSPITAL | Age: 69
End: 2024-03-28
Payer: COMMERCIAL

## 2024-03-28 NOTE — PROGRESS NOTES
Specialty Pharmacy Refill Coordination Note     Francisco is a 68 y.o. male contacted today regarding refills of Brukinsa specialty medication(s).    Reviewed and verified with patient:       Specialty medication(s) and dose(s) confirmed: yes  Brukinsa 80 mg caps-2 caps twice per day.    Refill Questions      Flowsheet Row Most Recent Value   Changes to allergies? No   Changes to medications? No   New conditions or infections since last clinic visit Yes   If yes, please describe  Pt recently had bipass surgery   Unplanned office visit, urgent care, ED, or hospital admission in the last 4 weeks  No   How does patient/caregiver feel medication is working? Good   Financial problems or insurance changes  No   Since the previous refill, were any specialty medication doses or scheduled injections missed or delayed?  Yes   If yes, please provide the amount 2 weeks   Why were doses missed? Pt had bipass surgery   Does this patient require a clinical escalation to a pharmacist? No            Delivery Questions      Flowsheet Row Most Recent Value   Delivery method Beeline  [STAT [facility]eline to home address same day on 3/29/2024-$0 copay with insurance and copay card-Address Confirmed]   Delivery address verified with patient/caregiver? Yes  [Ship to home address]   Delivery address Home   Number of medications in delivery 1   Medication(s) being filled and delivered Zanubrutinib (Antineoplastic Enzyme Inhibitors)   Doses left of specialty medications 4 days-enough until 4/1   Copay verified? Yes   Copay amount $0 copay with insurance and copay card   Copay form of payment No copayment ($0)          Brukinsa delivery coordinated with pt for STAT delivery on 3/29/2024. $0 copay with insurance and copay card. His last delivery was on 1/30/2024. He was held for 2 weeks for surgery. No questions or concerns to report to MTM Team today.     Follow-up: 21 day(s)     Marta Jansen, Pharmacy Technician  Specialty Pharmacy Technician

## 2024-04-01 ENCOUNTER — TELEPHONE (OUTPATIENT)
Dept: ONCOLOGY | Facility: CLINIC | Age: 69
End: 2024-04-01

## 2024-04-01 NOTE — TELEPHONE ENCOUNTER
Caller: Evangelina Espino    Relationship to patient: Emergency Contact    Best call back number:     [phone]     Chief complaint: NEEDS LATER IN THE DAY IF POSSIBLE     Type of visit: LAB AND FOLLOW UP    Requested date: AFTER 10 AM SAME DAY     If rescheduling, when is the original appointment: 04/09/24

## 2024-04-03 ENCOUNTER — TELEPHONE (OUTPATIENT)
Dept: CARDIAC SURGERY | Facility: CLINIC | Age: 69
End: 2024-04-03
Payer: COMMERCIAL

## 2024-04-03 DIAGNOSIS — R09.02 POSTOPERATIVE HYPOXIA: Primary | ICD-10-CM

## 2024-04-03 DIAGNOSIS — Z98.890 POSTOPERATIVE HYPOXIA: Primary | ICD-10-CM

## 2024-04-03 NOTE — TELEPHONE ENCOUNTER
Caller: Evangelina Espino    Relationship: Emergency Contact    Best call back number: [phone]     What orders are you requesting (i.e. lab or imaging): ORDER FOR O2 TO BE PICKED UP SENT TO [facility]    In what timeframe would the patient need to come in: ASAP    Where will you receive your lab/imaging services: N/A    Additional notes: PT HAS BEEN OFF O2 FOR SOMETIME AND [facility] NEEDS ORDER TO     ONCE ORDER IS SENT TO [facility] PLEASE CALL PTS WIFE AND ADVISE SO SHE CAN ARRANGE  WITH [facility]

## 2024-04-03 NOTE — TELEPHONE ENCOUNTER
Left voicemail for spouse to return call to discuss the need for patient to have an overnight oximetry.

## 2024-04-03 NOTE — TELEPHONE ENCOUNTER
Spoke to patient's wife. Notified her that Dr. Mott recommends he have an overnight oximetry to assess the need for nocturnal oxygen. Patient is agreeable to overnight oximetry. Order faxed to [facility]

## 2024-04-04 ENCOUNTER — HOME CARE VISIT (OUTPATIENT)
Dept: HOME HEALTH SERVICES | Facility: HOME HEALTHCARE | Age: 69
End: 2024-04-04
Payer: COMMERCIAL

## 2024-04-04 PROCEDURE — G0299 HHS/HOSPICE OF RN EA 15 MIN: HCPCS

## 2024-04-05 ENCOUNTER — HOSPITAL ENCOUNTER (OUTPATIENT)
Dept: CARDIOLOGY | Facility: HOSPITAL | Age: 69
Discharge: HOME | End: 2024-04-05
Payer: COMMERCIAL

## 2024-04-05 VITALS
HEIGHT: 74 IN | SYSTOLIC BLOOD PRESSURE: 110 MMHG | BODY MASS INDEX: 23.77 KG/M2 | DIASTOLIC BLOOD PRESSURE: 64 MMHG | WEIGHT: 185.19 LBS | HEART RATE: 90 BPM

## 2024-04-05 DIAGNOSIS — I25.708 CORONARY ARTERY DISEASE OF BYPASS GRAFT OF NATIVE HEART WITH STABLE ANGINA PECTORIS: ICD-10-CM

## 2024-04-05 DIAGNOSIS — R53.83 EASY FATIGABILITY: ICD-10-CM

## 2024-04-05 LAB
AORTIC DIMENSIONLESS INDEX: 0.8 (DI)
ASCENDING AORTA: 3.4 CM
BH CV ECHO MEAS - ACS: 1.81 CM
BH CV ECHO MEAS - AO MAX PG: 6.5 MMHG
BH CV ECHO MEAS - AO MEAN PG: 3.9 MMHG
BH CV ECHO MEAS - AO ROOT DIAM: 4.3 CM
BH CV ECHO MEAS - AO V2 MAX: 127.6 CM/SEC
BH CV ECHO MEAS - AO V2 VTI: 20.7 CM
BH CV ECHO MEAS - AVA(I,D): 3.1 CM2
BH CV ECHO MEAS - EDV(CUBED): 58.3 ML
BH CV ECHO MEAS - EDV(MOD-SP2): 100 ML
BH CV ECHO MEAS - EDV(MOD-SP4): 144 ML
BH CV ECHO MEAS - EF(MOD-BP): 54.5 %
BH CV ECHO MEAS - EF(MOD-SP2): 56 %
BH CV ECHO MEAS - EF(MOD-SP4): 52.8 %
BH CV ECHO MEAS - ESV(CUBED): 22.9 ML
BH CV ECHO MEAS - ESV(MOD-SP2): 44 ML
BH CV ECHO MEAS - ESV(MOD-SP4): 68 ML
BH CV ECHO MEAS - FS: 26.7 %
BH CV ECHO MEAS - IVS/LVPW: 1.08 CM
BH CV ECHO MEAS - IVSD: 1.29 CM
BH CV ECHO MEAS - LAT PEAK E' VEL: 11.7 CM/SEC
BH CV ECHO MEAS - LV DIASTOLIC VOL/BSA (35-75): 68.5 CM2
BH CV ECHO MEAS - LV MASS(C)D: 165.6 GRAMS
BH CV ECHO MEAS - LV MAX PG: 4 MMHG
BH CV ECHO MEAS - LV MEAN PG: 1.79 MMHG
BH CV ECHO MEAS - LV SYSTOLIC VOL/BSA (12-30): 32.3 CM2
BH CV ECHO MEAS - LV V1 MAX: 99.4 CM/SEC
BH CV ECHO MEAS - LV V1 VTI: 15.7 CM
BH CV ECHO MEAS - LVIDD: 3.9 CM
BH CV ECHO MEAS - LVIDS: 2.8 CM
BH CV ECHO MEAS - LVOT AREA: 4.1 CM2
BH CV ECHO MEAS - LVOT DIAM: 2.28 CM
BH CV ECHO MEAS - LVPWD: 1.19 CM
BH CV ECHO MEAS - MED PEAK E' VEL: 7.3 CM/SEC
BH CV ECHO MEAS - MR MAX PG: 17.3 MMHG
BH CV ECHO MEAS - MR MAX VEL: 207.7 CM/SEC
BH CV ECHO MEAS - MV A DUR: 0.13 SEC
BH CV ECHO MEAS - MV A MAX VEL: 68.4 CM/SEC
BH CV ECHO MEAS - MV DEC TIME: 0.16 SEC
BH CV ECHO MEAS - MV E MAX VEL: 47.5 CM/SEC
BH CV ECHO MEAS - MV E/A: 0.69
BH CV ECHO MEAS - PA ACC TIME: 0.08 SEC
BH CV ECHO MEAS - PA V2 MAX: 90.9 CM/SEC
BH CV ECHO MEAS - PULM A REVS DUR: 0.11 SEC
BH CV ECHO MEAS - PULM A REVS VEL: 123.3 CM/SEC
BH CV ECHO MEAS - PULM DIAS VEL: 30.9 CM/SEC
BH CV ECHO MEAS - PULM S/D: 1.6
BH CV ECHO MEAS - PULM SYS VEL: 49.5 CM/SEC
BH CV ECHO MEAS - QP/QS: 0.51
BH CV ECHO MEAS - RAP SYSTOLE: 8 MMHG
BH CV ECHO MEAS - RV MAX PG: 2.9 MMHG
BH CV ECHO MEAS - RV V1 MAX: 84.9 CM/SEC
BH CV ECHO MEAS - RV V1 VTI: 13.1 CM
BH CV ECHO MEAS - RVOT DIAM: 1.77 CM
BH CV ECHO MEAS - RVSP: 17 MMHG
BH CV ECHO MEAS - SI(MOD-SP2): 26.6 ML/M2
BH CV ECHO MEAS - SI(MOD-SP4): 36.1 ML/M2
BH CV ECHO MEAS - SV(LVOT): 63.7 ML
BH CV ECHO MEAS - SV(MOD-SP2): 56 ML
BH CV ECHO MEAS - SV(MOD-SP4): 76 ML
BH CV ECHO MEAS - SV(RVOT): 32.3 ML
BH CV ECHO MEAS - TR MAX PG: 8.9 MMHG
BH CV ECHO MEAS - TR MAX VEL: 149.3 CM/SEC
BH CV ECHO MEASUREMENTS AVERAGE E/E' RATIO: 5
BH CV XLRA - RV BASE: 2.7 CM
BH CV XLRA - RV LENGTH: 7.3 CM
BH CV XLRA - RV MID: 2.9 CM
BH CV XLRA - TDI S': 8.6 CM/SEC
LEFT ATRIUM VOLUME INDEX: 24.4 ML/M2
SINUS: 3.5 CM
STJ: 3.1 CM

## 2024-04-05 PROCEDURE — 93306 TTE W/DOPPLER COMPLETE: CPT

## 2024-04-05 PROCEDURE — 25510000001 PERFLUTREN (DEFINITY) 8.476 MG IN SODIUM CHLORIDE (PF) 0.9 % 10 ML INJECTION: Performed by: STUDENT IN AN ORGANIZED HEALTH CARE EDUCATION/TRAINING PROGRAM

## 2024-04-05 RX ADMIN — PERFLUTREN 3 ML: 6.52 INJECTION, SUSPENSION INTRAVENOUS at 13:50

## 2024-04-10 ENCOUNTER — LAB (OUTPATIENT)
Dept: LAB | Facility: HOSPITAL | Age: 69
End: 2024-04-10
Payer: COMMERCIAL

## 2024-04-10 ENCOUNTER — OFFICE VISIT (OUTPATIENT)
Dept: CARDIAC SURGERY | Facility: CLINIC | Age: 69
End: 2024-04-10
Payer: COMMERCIAL

## 2024-04-10 ENCOUNTER — OFFICE VISIT (OUTPATIENT)
Dept: ONCOLOGY | Facility: CLINIC | Age: 69
End: 2024-04-10
Payer: COMMERCIAL

## 2024-04-10 ENCOUNTER — TELEPHONE (OUTPATIENT)
Dept: CARDIAC SURGERY | Facility: CLINIC | Age: 69
End: 2024-04-10
Payer: COMMERCIAL

## 2024-04-10 VITALS
WEIGHT: 180 LBS | TEMPERATURE: 97.8 F | SYSTOLIC BLOOD PRESSURE: 112 MMHG | OXYGEN SATURATION: 98 % | HEIGHT: 74 IN | HEART RATE: 86 BPM | DIASTOLIC BLOOD PRESSURE: 74 MMHG | BODY MASS INDEX: 23.1 KG/M2 | RESPIRATION RATE: 18 BRPM

## 2024-04-10 VITALS
TEMPERATURE: 98.6 F | HEART RATE: 88 BPM | OXYGEN SATURATION: 99 % | SYSTOLIC BLOOD PRESSURE: 115 MMHG | DIASTOLIC BLOOD PRESSURE: 70 MMHG

## 2024-04-10 DIAGNOSIS — R80.3 BENCE JONES PROTEINURIA: ICD-10-CM

## 2024-04-10 DIAGNOSIS — Z95.1 S/P CABG X 6: Primary | ICD-10-CM

## 2024-04-10 DIAGNOSIS — N28.9 KIDNEY DISEASE: ICD-10-CM

## 2024-04-10 DIAGNOSIS — C91.10 CHRONIC LYMPHOCYTIC LEUKEMIA: ICD-10-CM

## 2024-04-10 DIAGNOSIS — R80.3 BENCE JONES PROTEINURIA: Primary | ICD-10-CM

## 2024-04-10 DIAGNOSIS — Z79.899 HIGH RISK MEDICATION USE: ICD-10-CM

## 2024-04-10 DIAGNOSIS — R09.02 POSTOPERATIVE HYPOXIA: Primary | ICD-10-CM

## 2024-04-10 DIAGNOSIS — Z98.890 POSTOPERATIVE HYPOXIA: Primary | ICD-10-CM

## 2024-04-10 LAB
ALBUMIN SERPL-MCNC: 4.2 G/DL (ref 3.5–5.2)
ALBUMIN/GLOB SERPL: 1.7 G/DL
ALP SERPL-CCNC: 96 U/L (ref 39–117)
ALT SERPL W P-5'-P-CCNC: 10 U/L (ref 1–41)
ANION GAP SERPL CALCULATED.3IONS-SCNC: 10.8 MMOL/L (ref 5–15)
AST SERPL-CCNC: 19 U/L (ref 1–40)
BASOPHILS # BLD AUTO: 0.06 10*3/MM3 (ref 0–0.2)
BASOPHILS NFR BLD AUTO: 0.7 % (ref 0–1.5)
BILIRUB SERPL-MCNC: 0.7 MG/DL (ref 0–1.2)
BILIRUB UR QL STRIP: NEGATIVE
BUN SERPL-MCNC: 10 MG/DL (ref 8–23)
BUN/CREAT SERPL: 7.8 (ref 7–25)
CALCIUM SPEC-SCNC: 10 MG/DL (ref 8.6–10.5)
CHLORIDE SERPL-SCNC: 102 MMOL/L (ref 98–107)
CLARITY UR: CLEAR
CO2 SERPL-SCNC: 25.2 MMOL/L (ref 22–29)
COLOR UR: YELLOW
CREAT SERPL-MCNC: 1.28 MG/DL (ref 0.76–1.27)
DEPRECATED RDW RBC AUTO: 49.1 FL (ref 37–54)
EGFRCR SERPLBLD CKD-EPI 2021: 60.6 ML/MIN/1.73
EOSINOPHIL # BLD AUTO: 0.56 10*3/MM3 (ref 0–0.4)
EOSINOPHIL NFR BLD AUTO: 6.9 % (ref 0.3–6.2)
ERYTHROCYTE [DISTWIDTH] IN BLOOD BY AUTOMATED COUNT: 13.3 % (ref 12.3–15.4)
GLOBULIN UR ELPH-MCNC: 2.5 GM/DL
GLUCOSE SERPL-MCNC: 119 MG/DL (ref 65–99)
GLUCOSE UR STRIP-MCNC: NEGATIVE MG/DL
HCT VFR BLD AUTO: 34.7 % (ref 37.5–51)
HGB BLD-MCNC: 11.3 G/DL (ref 13–17.7)
HGB UR QL STRIP.AUTO: NEGATIVE
IMM GRANULOCYTES # BLD AUTO: 0.02 10*3/MM3 (ref 0–0.05)
IMM GRANULOCYTES NFR BLD AUTO: 0.2 % (ref 0–0.5)
KETONES UR QL STRIP: NEGATIVE
LEUKOCYTE ESTERASE UR QL STRIP.AUTO: NEGATIVE
LYMPHOCYTES # BLD AUTO: 3.05 10*3/MM3 (ref 0.7–3.1)
LYMPHOCYTES NFR BLD AUTO: 37.6 % (ref 19.6–45.3)
MCH RBC QN AUTO: 32.8 PG (ref 26.6–33)
MCHC RBC AUTO-ENTMCNC: 32.6 G/DL (ref 31.5–35.7)
MCV RBC AUTO: 100.9 FL (ref 79–97)
MONOCYTES # BLD AUTO: 0.68 10*3/MM3 (ref 0.1–0.9)
MONOCYTES NFR BLD AUTO: 8.4 % (ref 5–12)
NEUTROPHILS NFR BLD AUTO: 3.75 10*3/MM3 (ref 1.7–7)
NEUTROPHILS NFR BLD AUTO: 46.2 % (ref 42.7–76)
NITRITE UR QL STRIP: NEGATIVE
NRBC BLD AUTO-RTO: 0 /100 WBC (ref 0–0.2)
PH UR STRIP.AUTO: 6 [PH] (ref 4.5–8)
PLATELET # BLD AUTO: 184 10*3/MM3 (ref 140–450)
PMV BLD AUTO: 11.1 FL (ref 6–12)
POTASSIUM SERPL-SCNC: 4.6 MMOL/L (ref 3.5–5.2)
PROT SERPL-MCNC: 6.7 G/DL (ref 6–8.5)
PROT UR QL STRIP: ABNORMAL
RBC # BLD AUTO: 3.44 10*6/MM3 (ref 4.14–5.8)
SODIUM SERPL-SCNC: 138 MMOL/L (ref 136–145)
SP GR UR STRIP: 1.02 (ref 1–1.03)
UROBILINOGEN UR QL STRIP: ABNORMAL
WBC NRBC COR # BLD AUTO: 8.12 10*3/MM3 (ref 3.4–10.8)

## 2024-04-10 PROCEDURE — 36415 COLL VENOUS BLD VENIPUNCTURE: CPT

## 2024-04-10 PROCEDURE — 80053 COMPREHEN METABOLIC PANEL: CPT

## 2024-04-10 PROCEDURE — 81003 URINALYSIS AUTO W/O SCOPE: CPT

## 2024-04-10 PROCEDURE — 99024 POSTOP FOLLOW-UP VISIT: CPT | Performed by: THORACIC SURGERY (CARDIOTHORACIC VASCULAR SURGERY)

## 2024-04-10 PROCEDURE — 85025 COMPLETE CBC W/AUTO DIFF WBC: CPT

## 2024-04-10 RX ORDER — AMIODARONE HYDROCHLORIDE 200 MG/1
200 TABLET ORAL DAILY
Qty: 30 TABLET | Refills: 0 | OUTPATIENT
Start: 2024-04-10

## 2024-04-10 NOTE — LETTER
"April 10, 2024       No Recipients    Patient: Francisco Espino   YOB: 1955   Date of Visit: 4/10/2024     Dear Eveline Pelaez MD:       Thank you for referring Francisco Espino to me for evaluation. Below are the relevant portions of my assessment and plan of care.    If you have questions, please do not hesitate to call me. I look forward to following Francisco along with you.         Sincerely,        Jean Marie Mott MD        CC:   No Recipients    Jr Jean Marie Mott MD  04/10/24 1336  Sign when Signing Visit  CARDIOVASCULAR SURGERY FOLLOW-UP PROGRESS NOTE  Chief Complaint:     Tired        HPI:   Dear Dr. Pelaez, Eveline Desai MD and colleagues:    It was nice to see Francisco Espino in follow up 5 weeks after surgery.  As you know, he is a 68 y.o. male with a STEMI with balloon angioplaswho underwent CABG x 6 on March 5, 2024. He did well postoperatively and continues to do well. He comes in today complaining of occasional orthostatic dizziness and a couple of days of feeling tired alternating with good days.  His activity level has been good.       Physical Exam:         /74 (BP Location: Left arm, Patient Position: Sitting, Cuff Size: Adult)   Pulse 86   Temp 97.8 °F (36.6 °C)   Resp 18   Ht 188 cm (74\")   Wt 81.6 kg (180 lb)   SpO2 98%   BMI 23.11 kg/m²   Heart:  regular rate and rhythm, S1, S2 normal, no murmur, click, rub or gallop  Lungs:  clear to auscultation bilaterally  Extremities:  no edema  Incision(s):  mid chest healing well, right leg healing well, sternum stable    Assessment/Plan:     S/P CABG. Overall, he is doing well.    No significant post-op complications    Keep incisions clean and dry  OK to begin cardiac rehab  Follow-up as scheduled with cardiology  Follow-up as scheduled with PCP  Follow-up with Dr. Cagle in the hematology for his CLL.  He can stop his Plavix since it has been a month after his bypass surgery NSTEMI.  He will continue on his baby " aspirin.  He can stop his iron.    No restrictions of activity.      Thank you for allowing me to participate in the care of your   patient.  Regards,  Jean Marie Mott MD

## 2024-04-10 NOTE — PROGRESS NOTES
Subjective     REASON FOR FOLLOW UP:   1. CLL , ASSOCIATED BENCE DAWSON PROTEINURIA undergoing BRUKINSA    HISTORY OF PRESENT ILLNESS:    On 04/10/2024, this 68-year-old male returns to the office. During the visit 2 months ago, he was not having any heart issues subsequently. He developed angina and he ended up with a slight myocardial infarction. He was seen at [facility] and advised to proceed with bypass surgery for his heart. He decided to do this at [facility] given the fact that all his physicians are at [facility]. He has completed his 6th bypass operation by Yash Mott Jr. MD. He went home almost 3 weeks ago and he has been slowly to recover. He had terrible constipation a few days after leaving the hospital requiring enema, laxatives and finally large bowel movement happened. The patient has not had any heart irregularity or angina. The surgical site has healed well and he has had minimal fluctuation in blood pressure. He was seen by Dr. Mott today and Plavix was discontinued. He is taking a minimal dose of metoprolol, aspirin and he is supposed to start again, the Brukinsa today. The Brukinsa is a high risk medication for the treatment of his CLL associated with Bence-Dawson proteinuria.     Besides this the patient feels fatigued. He is not in full steam yet and he has days that he feels lightheaded and dizzy. He also was taking iron that he will discontinue as per my request today given the fact that the hemoglobin has substantially improved. The patient denies any urinary symptoms at this time. The urine is not excessively foamy. He has not had any febrile illness, infections or anything else. The surgical site for the grafts for the veins in the right lower extremity has healed well.             Past Medical History:   Diagnosis Date    Abnormal liver function test     Alcohol abuse     CLL (chronic lymphocytic leukemia)     Coronary artery disease     stent    Heart disease     History of  pneumonia     1/2019    Hyperlipidemia     Hypertension     Inguinal hernia     left side to have surgery 3/28/19    Myocardial infarction     Screen for colon cancer 09/2016    Negative Cologaurd    Screening PSA (prostate specific antigen) 02/2013    .5    Thrombocytopenia         Past Surgical History:   Procedure Laterality Date    CARDIAC CATHETERIZATION  2017    CATARACT EXTRACTION Bilateral     COLONOSCOPY N/A 06/05/2006    Normal, repeat in 10 years, Dr. Preethi Gonzalez    COLONOSCOPY N/A 7/15/2020    Procedure: COLONOSCOPY TO CECUM & T.I. WITH COLD SNARE POLYPECTOMIES, CLIP PLACEMENT X 2;  Surgeon: Yennifer Salazar MD;  Location: [facility] ENDOSCOPY;  Service: Gastroenterology;  Laterality: N/A;  PRE- POSITIVE COLOGUARD  POST- COLON POLYPS, DIVERTICULOSIS, HEMORRHOIDS    CORONARY ANGIOPLASTY WITH STENT PLACEMENT N/A 05/09/2017    Left heart catheterization, Selective native vessel coronary arteriography, Left ventriculography, Successful percutaneous intervention to the 100% occluded right coronary artery with a 3.5 x 38 mm Synergy drug-eluting stent (post-dilated w/ a 4.0 x 20 mm NC Emerge balloon), Selective right femoral angiography, Successful Perclose arteriotomy closure. Dr. James Pierson    CORONARY ARTERY BYPASS GRAFT N/A 3/5/2024    Procedure: STERNOTOMY, CORONARY ARTERY BYPASS GRAFTING TRANSESOPHAGEAL ECHOCARDIOGRAM X6, UTILIZING THE LEFT AUGUSTUS AND RIGHT SAPHENOUS VEIN.  ADRIANE, PRP WITH ANESTHESIA;  Surgeon: Jr Jean Marie Mott MD;  Location: [facility];  Service: Cardiothoracic;  Laterality: N/A;    INGUINAL HERNIA REPAIR Left     INGUINAL HERNIA REPAIR Right     x2    INGUINAL HERNIA REPAIR Left 3/28/2019    Procedure: LEFT INGUINAL HERNIA REPAIR LAPAROSCOPIC;  Surgeon: Preethi Gonzalez MD;  Location: [facility];  Service: General    LAPAROSCOPIC INGUINAL HERNIA REPAIR Right 10/03/2002    w/ extra peritoneal Goe-Benton mesh (transperitoneal repair), Dr. Preethi Gonzalez    REFRACTIVE SURGERY  Bilateral     RETINAL DETACHMENT SURGERY Right 07/02/2013      ONCOLOGY HISTORY SHOLA ENNIS MD:ONCOLOGIC HISTORY:  On 12/22/10 he was reviewed.  He   had a totally normal white count with normal differential, predominance of lymphocytes.  Normal platelet count.  His chemistry profile was normal including LDH.  His beta-2 microglobulin was normal.  His quantitative immunoglobulins were normal.  His jane  p  heral blood flow cytometry documented a typical pattern by flow cytometry of CLL, CD5, CD19 negative, dim positive CD20, ZAP-70 negative and CD38 negative.  His CT scan of the chest, abdomen and pelvis disclosed no peripheral adenopathy or hepatosplenomeg  a  ly.  His level of immunoglobulins was normal.  With this in mind we thought that the patient had very early stage disease with excellent prognostic features and we advised him to remain on observation.  He will be rechecked every 3 months for the first ye  ar and thereafter every 6 months depending on the clinical circumstances.  Appropriate booklet information was given to him and his wife to review and further learn.          Given the excellent characteristics of his disease on clinical grounds, I doubt at this time a bone marrow is needed.        On 1/8/13 his physical exam is unremarkable with no peripheral adenopathy or hepatosplenomegaly, no B symptoms, no infections, no autoimmunity.  On the other hand he had areas of the skin on the left cheek and right cheek bhavani  t look like actinic keratosis.  His white count was up to17,000 with a normal hemoglobin and borderline platelet count of 125,000.  We asked the patient to try to minimize stressors in life and we asked him to return back in four months.  We also asked hi  m to be seen by dermatology in order to treat his multiple actinic keratosis.          On 05/09/13 the patient was asymptomatic with regard to his chronic lymphoid leukemia, no fatigue, fevers, chills, repeated infections or peripheral  lymphadenopathy.  His physi  francisco j exam was unremarkable with no palpable lymphadenopathy or hepatosplenomegaly.  His white count has now improved to 12,900, stable hemoglobin of 13.6 and stable platelet count of 121,000.  With this in mind we advised him to remain on observation, retu  rning for reevaluation in six months.         On 05/07/2014 the patient was asymptomatic in regard to his CLL with no autoimmunity, no infections and no progressive disease clinically.  On the other hand we have seen duplication of his white count in one year.  T  he patient and wife are aware that eventually he will require intervention for this if the white count continues changing the way it is.  At this time we do not justify the need of any other therapy or any other testing.        Current Outpatient Medications on File Prior to Visit   Medication Sig Dispense Refill    acetaminophen (TYLENOL) 500 MG tablet Take 2 tablets by mouth Every 6 (Six) Hours As Needed for Mild Pain. Indications: Pain      acyclovir (ZOVIRAX) 400 MG tablet Take 1 tablet by mouth 2 (Two) Times a Day. 180 tablet 1    aspirin 81 MG EC tablet Take 1 tablet by mouth Every Other Day. (Patient taking differently: Take 1 tablet by mouth Daily. Indications: Coronary Bypass Surgery) 30 tablet 0    Calcium Carbonate-Vitamin D (calcium 500 mg vitamin D 5 mcg, 200 UT,) 500-5 MG-MCG tablet per tablet Take 1 tablet by mouth Daily for 30 days. 30 tablet 0    metoprolol succinate XL (TOPROL-XL) 25 MG 24 hr tablet Take 1 tablet by mouth Daily. 90 tablet 3    ondansetron ODT (ZOFRAN-ODT) 8 MG disintegrating tablet DISSOLVE 1 TABLET UNDER THE TONGUE EVERY 8 HOURS AS NEEDED FOR NAUSEA/VOMITING 9 tablet 14    rosuvastatin (CRESTOR) 40 MG tablet Take 1 tablet by mouth Every Night. Indications: High Amount of Fats in the Blood 90 tablet 3    sulfamethoxazole-trimethoprim (BACTRIM DS,SEPTRA DS) 800-160 MG per tablet TAKE 1 TABLET BY MOUTH THREE TIMES A WEEK 12 tablet 7    vitamin  B-12 (VITAMIN B-12) 1000 MCG tablet Take 1 tablet by mouth Daily for 90 days. 30 tablet 2    Zanubrutinib (Brukinsa) 80 MG chemo capsule Take 2 capsules by mouth 2 (Two) Times a Day. 120 capsule 5    [DISCONTINUED] clopidogrel (PLAVIX) 75 MG tablet Take 1 tablet by mouth Daily for 30 days. 30 tablet 0    [DISCONTINUED] ferrous sulfate 325 (65 FE) MG tablet Take 1 tablet by mouth Daily With Breakfast for 90 days. 30 tablet 2    [DISCONTINUED] fluticasone (FLONASE) 50 MCG/ACT nasal spray 2 sprays into the nostril(s) as directed by provider Daily. (Patient taking differently: 2 sprays into the nostril(s) as directed by provider Daily. Rarely used) 1 bottle 0     No current facility-administered medications on file prior to visit.        ALLERGIES:    Allergies   Allergen Reactions    Codeine Nausea Only and Nausea And Vomiting        Social History     Socioeconomic History    Marital status:      Spouse name: Charlotte Espino   Tobacco Use    Smoking status: Never    Smokeless tobacco: Never   Vaping Use    Vaping status: Never Used   Substance and Sexual Activity    Alcohol use: Yes     Alcohol/week: 21.0 standard drinks of alcohol     Types: 21 Shots of liquor per week    Drug use: No    Sexual activity: Defer        Family History   Problem Relation Age of Onset    Heart attack Mother     Heart disease Mother     Diabetes Mother     Aortic aneurysm Mother     Coronary artery disease Mother     Early death Mother     No Known Problems Father     Diabetes type II Other     Diabetes Brother     Hyperlipidemia Brother     Stroke Sister 65    Diabetes Sister     Hyperlipidemia Sister     Hypertension Sister     No Known Problems Brother     Stomach cancer Paternal Uncle     Malig Hyperthermia Neg Hx         Objective     Vitals:    04/10/24 1659   BP: 115/70   Pulse: 88   Temp: 98.6 °F (37 °C)   SpO2: 99%   PainSc: 0-No pain           2/6/2024     9:50 AM   Current Status   ECOG score 0     EXAM                    GENERAL:  Well-developed, Patient  in no acute distress.   SKIN:  Warm, dry ,NO purpura ,no rash.  HEENT:  Pupils were equal and reactive to light and accomodation, conjunctivae noninjected,  normal visual acuity.   NECK:  Supple with good range of motion; no thyromegaly , no JVD or bruits,.No carotid artery pain, no carotid abnormal pulsation   LYMPHATICS:  No cervical, NO supraclavicular, NO axillary, NO inguinal adenopathies.  CARDIAC   normal rate , regular rhythm, without murmur,NO rubs NO S3 NO S4 SURGICAL SITE COMPLETELY HEALED NO PAIN  LUNGS: normal breath sounds bilateral, no wheezing, NO rhonchi, NO crackles ,NO rubs.  VASCULAR VENOUS: no cyanosis, NO collateral circulation, NO varicosities, NO edema, NO palpable cords, NO pain,NO erythema, NO pigmentation of the skin.Surgical site of vein graft in right lower extremity.      ABDOMEN:  Soft, NO pain,no hepatomegaly, no splenomegaly,no masses, no ascites, no collateral circulation,no distention.  EXTREMITIES  AND SPINE:  No clubbing, no cyanosis ,no deformities , no pain .No kyphosis,  no pain in spine, no pain in ribs , no pain in pelvic bone.  NEUROLOGICAL:  Patient was awake, alert, oriented to time, person and place.      RESULTS REVIEWED:  Results from last 7 days   Lab Units 04/10/24  1616   WBC 10*3/mm3 8.12   NEUTROS ABS 10*3/mm3 3.75   HEMOGLOBIN g/dL 11.3*   HEMATOCRIT % 34.7*   PLATELETS 10*3/mm3 184       Results from last 7 days   Lab Units 04/10/24  1616   SODIUM mmol/L 138   POTASSIUM mmol/L 4.6   CHLORIDE mmol/L 102   CO2 mmol/L 25.2   BUN mg/dL 10   CREATININE mg/dL 1.28*   CALCIUM mg/dL 10.0   ALBUMIN g/dL 4.2   BILIRUBIN mg/dL 0.7   ALK PHOS U/L 96   ALT (SGPT) U/L 10   AST (SGOT) U/L 19   GLUCOSE mg/dL 119*         Assessment & Plan      1. CLL  CLL dating back to initial diagnosis in 2005.  He has remained on a course of observation without any need for active treatment.    12/22/2010 peripheral blood flow cytometry  documented a typical pattern by flow cytometry of CLL, CD5, CD19 negative, dim positive CD20, ZAP-70 negative and CD38 negative.  His CT scan of the chest, abdomen and pelvis disclosed no peripheral adenopathy or hepatosplenomegaly.  His level of immunoglobulins was normal.  With this in mind we thought that the patient had very early stage disease with excellent prognostic features and we advised him to remain on observation  1/24/2023, leukocytosis worsened, though the patient was recently treated with prednisone for eustachian tube dysfunction.  WBC sylvain to 110,000, though then improved to 88,000.  He had no peripheral adenopathy or hepatosplenomegaly.  1/24/2023, creatinine elevated at 1.4 with urinalysis showing 300 mg of protein.  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  Absolute lymphocyte count 70.87 which is improvement compared to 113.15 2 weeks ago.  4/18/2023 WBC 60,000.  5/16/2023 WBC 41.28, hgb 13.4, platelet count 104,000.  6/8/2023 patient advised to discontinue allopurinol and continue with Brukinsa at the same dose.  When WBC normalizes plans to do a new urinary collection of 24 hours to remeasure monoclonal protein studies and Bence-Dawson proteinuria.    7/20/2023 WBC 19.14, hemoglobin 13.5, platelet count 97,000.  9/8/2023 WBC 12.45, hemoglobin 13.9, platelet count 100,000.  Patient will be sent with a 24-hour urine jug for UPEP, ELISA, FLC.  On 10/20/2023, the patient has had dramatic improvement in his leukocytosis to the point of  almost normalization.  He still has some peripheral lymphocytosis but I expect that it will continue to improve as time goes by.  Most importantly, the triggering factor for the treatment of his disease with Brukinsa was the rise in the creatinine to 1.67, found to have almost 500 mg of proteinuria and Bence-Dawson proteinuria.  Urine protein electrophoresis disclosed normalization of protein secretion in the urine and resolution of the Bence-Dawson proteinuria.  This proves the point that leukemia was the cause of the problem, now that the treatment has subsided.  Kidney function has recovered and almost normalized.  A creatinine of 1.2 for a 68-year-old male is almost normal.    Given this fact, I advised the patient to remain on Brukinsa at the same dose.  Given the fact that he has not had any cardiac irregularity, abnormal bleeding, or any other new problem, I advised him to remain on the medicine and return to see us back every couple of months with repeated CBC, CMP, and urinalysis, to continue monitoring for proteinuria.      He will have serum protein and urine electrophoresis at that time.  No need for radiological assessment at this time.         On 12/14/2023, the patient is doing fine from the point of view of his CLL with no fevers, no chills, no sweats, no pruritis, no adenopathy, no infections, no autoimmunity. His white count remains stable at 13,000. The hemoglobin keeps improving, normalized now and the platelet count finally is improving. His absolute lymphocyte count remains elevated similar to what it was previously. On the other hand he has had a dramatic improvement of these numbers over the last several months if we account for the difference. In absence of any side effects of the medicine, I advised him to take the brukinsa at the same dose and return to see us back in 6 weeks with repeat CBC and CMP. A urinalysis will be repeated then. Today, his urinalysis shows minimal proteinuria with  minimal ketonuria. He is not fasting. Encouraged him to have proper nutrition all the time and proper hydration. His specific gravity is 1.020 telling me that he is probably a little bit on the dry side.    On 02/06/2024 the patient was further reviewed. He has not encountered any side effects of Brukinsa, no cardiac irregularity, no abnormal bleeding, no nausea, no vomiting, no diarrhea, no rashes. He has not had any fever, chills, night sweats, pruritus, adenopathy, autoimmunity or infections associated with his CLL. Furthermore his total lymphocyte count now is in the 5000 category, the hemoglobin is normal, the platelet count 109,000. He also again has no peripheral adenopathy or hepatosplenomegaly on his clinical examination. He has an ECOG performance status of 0. Given these findings we advised the patient to continue his Brukinsa at the same dose and return to see us back in a couple of months. I think he is doing so well that I think he does not need to come so often anymore. In 2 months he will have repeat CBC, CMP and a urinalysis. We know that his Bence-Dawson proteinuria has disappeared in a urine collection that was done a time ago.    On 04/10/2024, the patient returns to the office. He has had heart operation for 6 bypasses for coronary artery disease by Yash Mott MD. He has been now at home for almost 2 weeks and obviously he discontinued his Brukinsa in anticipation of his heart operation to minimize bleeding. The hemoglobin was 8.4 at the time that he left the hospital. Today, it is 11.2. It is perfectly fine for the patient to discontinue the oral iron that I do not think he needs anymore. On the other hand, he has not started the Brukinsa yet and his white count remains excellent with minimal lymphocyte excess and normal platelet count. No peripheral adenopathies.     I advised him to resume the Brukinsa at the typical dose and I would like to review him back in a month with repeat CBC and  CMP. He is aware that he has to monitor himself for cardiac irregularity or abnormal bleeding. Again, the patient is no longer taking Plavix as per today. He will remain on a baby aspirin.     Eventually, we will recheck his urine for monoclonal protein studies in a couple of months or so.        2. Prophylaxis  Prophylactic acyclovir 500 mg daily until white count is normal  Acyclovir 500 mg twice daily and Bactrim DS Monday, Wednesday, Friday 6/8/2023 allopurinol was discontinued however patient continues on prophylactic acyclovir and Bactrim DS.  On 10/20/2023, his prophylactic medications will remain the same.   On 12/14/2023, prophylactic medication remains ongoing. He has not had any infection.    On 02/06/2024 prophylactic medication will remain the same. He has not developed any fever blisters, shingles or any other infection of any nature.    On 04/10/2024, I advised the patient to resume his acyclovir and Bactrim prophylactically to minimize infections associated with CLL and his Brukinsa. These medicines never bothered him before. He will resume them starting tomorrow.          The patient is taking a high risk medication that requires frequent assessment for toxicity.    On 04/10/2024, overall I think the patient tolerated his 6th bypass heart operation better than I expected given the circumstances and he is slowly recovering. He is taking a low-dose metoprolol XL 25 mg once a day and the aspirin. I asked him to take it easy in regard to physical activity until he goes to cardiac rehab and eventually he is able to do treadmill exercise at home.     I do not think he is ready to return to work and I pointed out this to him.     I expect that his appetite will build up and his muscle will build up. He lost close to 10 pounds of weight during all these issues.     I discussed this with him in detail. I reviewed all this medications and I reviewed all the discharge summary from the hospital discharge a  few weeks ago.            Russell Cagle MD  04/10/24

## 2024-04-10 NOTE — TELEPHONE ENCOUNTER
Notified patient that the results of his overnight oximetry showed he needs to continue to use nocturnal oxygen. Discussed with patient that Dr. Mott recommends he be referred to sleep medicine. Patient agreeable to referral to sleep medicine. Referral placed.

## 2024-04-15 ENCOUNTER — TELEPHONE (OUTPATIENT)
Dept: ONCOLOGY | Facility: CLINIC | Age: 69
End: 2024-04-15
Payer: COMMERCIAL

## 2024-04-15 NOTE — TELEPHONE ENCOUNTER
Returned call to spouse. She denies other symptoms, but states the patient has just been more tired lately. Recently dc'd his iron. Okay'd him starting again on M, W, F. Advised to call back if this doesn't seem to help with his energy. Pt's spouse v/u. Deana Samuels RN

## 2024-04-15 NOTE — TELEPHONE ENCOUNTER
Caller: Evangelina Espino    Relationship: Emergency Contact    Best call back number: [phone]    What is the best time to reach you: ANYTIME    Who are you requesting to speak with (clinical staff, provider,  specific staff member): CLINICAL    What was the call regarding: PT HAD OPEN HEART SURGERY ON 3-5 AND IS NOW HOME BUT FEELING VERY TIRED, WANTING TO NO IF HE CAN TAKE IRON PILLS  PLEASE CALL TO ADVISE

## 2024-04-16 ENCOUNTER — TELEPHONE (OUTPATIENT)
Dept: CARDIAC SURGERY | Facility: CLINIC | Age: 69
End: 2024-04-16
Payer: COMMERCIAL

## 2024-04-16 NOTE — TELEPHONE ENCOUNTER
Patient called to again request oxygen be discontinued. Discussed with patient that his overnight oximetry showed that he still needs to be on oxygen. Patient was referred to sleep medicine by Dr. Mott after his failed overnight oximetry. Patient has an appointment with sleep medicine on 5/17/24, but he states he is going to cancel it as he does not have any trouble sleeping. Notified patient that we are unable to discontinue home oxygen since he still requires oxygen with sleep per his overnight oximetry. Patient sates he will call his PCP.

## 2024-04-23 ENCOUNTER — SPECIALTY PHARMACY (OUTPATIENT)
Dept: PHARMACY | Facility: HOSPITAL | Age: 69
End: 2024-04-23
Payer: COMMERCIAL

## 2024-04-23 NOTE — PROGRESS NOTES
Specialty Pharmacy Refill Coordination Note     Francisco is a 68 y.o. male contacted today regarding refills of  Brukinsa specialty medication(s).    Reviewed and verified with patient:       Specialty medication(s) and dose(s) confirmed: yes  Brukinsa 80 mg caps-2 caps twice per day.    Refill Questions      Flowsheet Row Most Recent Value   Changes to allergies? No   Changes to medications? No   New conditions or infections since last clinic visit No   Unplanned office visit, urgent care, ED, or hospital admission in the last 4 weeks  No   How does patient/caregiver feel medication is working? Good   Financial problems or insurance changes  No   Since the previous refill, were any specialty medication doses or scheduled injections missed or delayed?  No   If yes, please provide the amount 0   Why were doses missed? N/A   Does this patient require a clinical escalation to a pharmacist? No            Delivery Questions      Flowsheet Row Most Recent Value   Delivery method Beeline  [Ship to home address-Ship 4/25 for delivery 4/26-$0 copay with insurance and copay card-Address Confirmed]   Delivery address verified with patient/caregiver? Yes  [Ship to home address]   Delivery address Home   Number of medications in delivery 1   Medication(s) being filled and delivered Zanubrutinib (Antineoplastic Enzyme Inhibitors)   Doses left of specialty medications 10 days   Copay verified? Yes   Copay amount $0 copay with insurance and copay card   Copay form of payment No copayment ($0)          Brukinsa delivery coordinated with pt for 4/26/2024 to his home address. $0 copay with insurance and copay card. His last delivery was on 3/29/2024. No questions or concerns to report to MTM Team today. Pt reports recovering well from surgery.     Follow-up: 21 day(s)     Marta Jansen, Pharmacy Technician  Specialty Pharmacy Technician

## 2024-04-29 NOTE — PROGRESS NOTES
A My Chart message has been sent to the patient for [place] [facility]     [facility]   Physical Therapy Initial Evaluation      Name: Christy Lorenz  [facility] Number: [id number]    Therapy Diagnosis:   Encounter Diagnoses   Name Primary?    Abnormal posture Yes    Decreased range of motion     Decreased strength         Physician: Order, Paper    Physician Orders: PT Eval and Treat   Medical Diagnosis from Referral: M54.2 (ICD-10-CM) - Cervicalgia   Evaluation Date: 4/29/2024  Authorization Period Expiration: 12/31/2024  Plan of Care Expiration: 6/28/2024  Progress Note Due: 5/29/2024  Visit # / Visits authorized: 1/ 1   FOTO: 4/29/2024    Time In: 8:04  Time Out: 9:10  Total Appointment Time (timed & untimed codes): 66 minutes      Precautions: Standard     Subjective     Date of onset: 2000    History of current condition - Christy reports: She reports that in 2000 she was working at [employer] at picked up a 50# tray and walked a few steps then had a strong burning in the neck, middle of the shoulder, and right hand went numb. She dealt with that for a year. When she retired in 2022 she started doing other things. She thought she was active when she was working but when she stopped working, she noticed she wasn't all that active. She reports that the burning, shooting, and hand numbness started again. She was told in 2020 she had a slight fracture at C5. But then there was the quarantine. When she was home, she did nothing and it kind of went away. This last year it got bad again. She went to a neurologist, who then sent her to [facility]. There she was told it was a degeneration of the disk and isn't really are fracture but there is a chip. She does not want surgery. She was told to go to PT and now she is here. She has been recently switched to celebrex and that seems to help. She had terrible migraines one that goes up the back of the head and is so bad she had trouble seeing, she then has some that will go to the eye. She was [facility]  that spreads to the entire head is cervicogenic causes nausea and light sensitivity. She is an . Her she reports she may have had one headache in the last month and it usually is in the morning.She reports right now having Right laterally Neck pain. She has had a CT of her neck and a brain scan and was told all her vascular stuff was intact.  She does do aqua exercises 5x/week for about an hour to 1.5 hours. Does have pain in the Right shoulder and out it was from the bra strap but changed the bra and it didn't do anything. She does get injections in her thumbs cause they lock up but that also seems to have helped the numbness in the Right hand.      Oh note: she has had to be hospitalized for a headache that coincided with her jaw locking and arm pain.     Medical History:   Past Medical History:   Diagnosis Date    Anemia     Depression 06/01/2017    HLD (hyperlipidemia)     Hypothyroidism     OAB (overactive bladder)     Osteopenia     Vitamin D deficiency        Surgical History:   Christy Lorenz  has a past surgical history that includes Hysterectomy (Bilateral); Oophorectomy; Gallbladder surgery (1999); Colonoscopy (N/A, 3/25/2022); and Colonoscopy (N/A, 5/3/2023).    Medications:   Christy has a current medication list which includes the following prescription(s): aspirin, atorvastatin, calcium citrate-vitamin d3 315-200 mg, duloxetine, fluocinolone acetonide oil, levocetirizine, myrbetriq, mirabegron, pantoprazole, and prevident 5000 booster plus.    Allergies:   Review of patient's allergies indicates:   Allergen Reactions    Morphine Hives and Hallucinations    Codeine Hives, Nausea Only and Rash        Imaging:  MRI 2022:  Impression:     Degenerative cervical spondylosis, resulting in mild spinal canal stenosis at C5-6 and moderate/ severe neural foraminal narrowing at C4-5 and C5-6, as above.        Electronically signed by:Rico Hargrove MD    Prior Therapy: Never had PT for anything  for.  Social History: lives with their spouse  Occupation: retired  Prior Level of Function: Independent  Current Level of Function: independent    Pain:  Current 1/10, worst 8/10, best 1/10   Location: right neck   Description: Aching and humming radiating up to the top and front of the Right head.   Aggravating Factors: first thing in the morning  Easing Factors: supporting and rubbing the Right side of the neck    Pts goals: would like to know what she can and cannot do, and would like to not get the headaches.    Objective     Observation: pt appears well nourished and in no signs of distress  Posture: Right shoulder depression, B scapular abduction and ant tilt  Palpation: No tenderness noted  Sensation: intact    DTR Response   C5 - Biceps 1+ B   C6 - Brachioradialis 2+ B   C7 - Tricpes  1+ B    Pathological Reflexes    Arevalo's neg   Babinski NT   Clonus NT   Inverted Supinator neg     Myotomes Response   C1- Flexion normal   C2 - Extension normal   C3 - Lat Flexion normal   C4 - Scapulothoracic elevation equal   C5 - Abduction equal   C6 - Elbow flexion/Wrist ext equal   C7 - Elbow Ext/Wrist flex equal   C8 - Thumb Ext equal   T1 - Intrinsic hand muscles  equal     Dermatomes Response   C1- Vertex of head normal   C2 - Posterior Auricular equal   C3 - Lateral neck equal   C4 - Shoulder equal   C5 - Lateral Arm equal   C6 - Post thumb equal   C7 - Middle finger equal   C8 - Pinky Finger equal   T1 - Medial forearm equal   T2 - Axilla equal       Functional Tests:  Hands behind head: equal - full -  pain on the Right side of the neck  Hands behind back: equal - full- pain on the right side of the neck    Range of Motion/Strength:     CERVICAL AROM Pain/Dysfunction with Movement   Flexion 50 Ache with a little pop at first    Extension 45    Right rotation 50    Left rotation 60      U/E MMT Right Left Pain/Dysfunction with Movement   Shoulder Flexion 4+/5 4+/5    Shoulder Abduction 4+/5 4+/5    Shoulder IR  "NT NT    Shoulder ER   NT NT    Elbow Flexion  5/5 5/5    Elbow Extension 5/5 5/5    Rhomboids 5/5 5/5    Mid Traps 3+/5 3+/5    Low Traps 3/5 3+/5    Serratus Ant 3+/5 3+/5      Joint Mobility:   - Cervical: Hypomobility with C1-2 rotation to the Right, C3-7 side glide to the Right.  - Thoracic: Hypomobility PA mobilization C7-T6 with increased pain at T4.    - Glenohumeral:  To be tested next visit if needed    Special Tests:   - Ligament Instability:neg  - Spurling's - positive B for reproduction of symptoms on Right   - Distraction - positive for reduction of symptoms  - ULTT A - positive for reproduction of symptoms on Right in neck to shoulder       CMS Impairment/Limitation/Restriction for FOTO Survey    Therapist reviewed FOTO scores for Christy Lorenz on 4/29/2024.   FOTO documents entered into [employer] - see Media section.           TREATMENT     Treatment Time In: 8:40  Treatment Time Out: 9:06  Total Treatment time separate from Evaluation: 36 minutes    Christy received the following manual therapy techniques: Joint mobilizations were applied to the: cervicothoracic spine for 10 minutes, including:  Cervical side glide grade 3 C3-6   Pt education    Christy participated in neuromuscular re-education activities to improve: Balance, Coordination, Kinesthetic, Sense, and Proprioception for 26 minutes. The following activities were included:  Chin tuck with rotation x10 B  Shrug x10 5"  Throacic ext chair x10    Home Exercises and Patient Education Provided    Education provided:   - Pt educated on POC  - Pt educated on HEP  - Pt educated on anatomy and physiology of current condition as it relates to signs and symptoms     Written Home Exercises Provided: yes.  Exercises were reviewed and Christy was able to demonstrate them prior to the end of the session.  Christy demonstrated good  understanding of the education provided.     See EMR under Patient Instructions for exercises provided initial " evaluation.    Assessment     Christy is a 70 y.o. female referred to outpatient Physical Therapy with a medical diagnosis of cervicalgia. Patient presents with signs and symptoms of cervical radiculopathy and possible cervicogenic headaches. Headaches were not elicited with joint mobility today, but mobility into side gliding and rotation were limited. She was positive for Spurlings, Distraction, ULTTA, and rotation <60 to the Right all indicative of radiculopathy related pain. She further demonstrates postural deficits and weakness which may be a contributing factor. PT to address dysfunction listed above in order to return pt to previous level of function.     Patient prognosis is Fair.   Patient will benefit from skilled outpatient Physical Therapy to address the deficits stated above and in the chart below, provide patient /family education, and to maximize patientt's level of independence.     Plan of care discussed with patient: Yes  Patient's spiritual, cultural and educational needs considered and patient is agreeable to the plan of care and goals as stated below:     Anticipated Barriers for therapy: chronicity    Medical Necessity is demonstrated by the following  History  Co-morbidities and personal factors that may impact the plan of care [] LOW: no personal factors / co-morbidities  [x] MODERATE: 1-2 personal factors / co-morbidities  [] HIGH: 3+ personal factors / co-morbidities    Moderate / High Support Documentation:   Co-morbidities affecting plan of care: Depression, Osteopenia    Personal Factors:   no deficits     Examination  Body Structures and Functions, activity limitations and participation restrictions that may impact the plan of care [] LOW: addressing 1-2 elements  [x] MODERATE: 3+ elements  [] HIGH: 4+ elements (please support below)    Moderate / High Support Documentation: Difficulty looking while driving, cleaning, overhead activities     Clinical Presentation [x] LOW: stable  []  MODERATE: Evolving  [] HIGH: Unstable     Decision Making/ Complexity Score: low           Goals:  STG (4 Weeks)  Pt will be independent with Initial home exercise program in order to facilitate Physical Therapy treatment  Pt will reduce worst pain to 5/10 in order to perform ADLs  Pt will improve cervical rotation to 60' to the Right in order to look while driving  Pt will improve Mid and low trap strength to 4-/5 in order to improve posture    LTG(8weeks)  Pt will be independent c final home exercise program in order to DC to home program  Pt will improve FOTO limitation to 60% indicative of overall improvement in function   Pt will improve worst pain to 1/10 in order to return to previous level of function   Pt will improve mid and low trap strength to 4/5 in order to improve posture.       Plan     Plan of care Certification: 4/29/2024 to 6/28/2024.    Outpatient Physical Therapy 1 times weekly for 8 weeks to include the following interventions: Cervical/Lumbar Traction, Electrical Stimulation PRN, Gait Training, Manual Therapy, Moist Heat/ Ice, Neuromuscular Re-ed, Patient Education, Therapeutic Activities, and Therapeutic Exercise.     Zuly Dent, PT, DPT, OCS

## 2024-05-17 RX ORDER — AMIODARONE HYDROCHLORIDE 200 MG/1
200 TABLET ORAL DAILY
Qty: 30 TABLET | Refills: 0 | OUTPATIENT
Start: 2024-05-17

## 2024-05-22 ENCOUNTER — SPECIALTY PHARMACY (OUTPATIENT)
Dept: PHARMACY | Facility: HOSPITAL | Age: 69
End: 2024-05-22
Payer: COMMERCIAL

## 2024-05-22 NOTE — PROGRESS NOTES
Specialty Pharmacy Refill Coordination Note     Francisco is a 68 y.o. male contacted today regarding refills of Brukinsa specialty medication(s).    Reviewed and verified with patient:       Specialty medication(s) and dose(s) confirmed: yes  Brukinsa 80 mg caps-2 caps twice per day.    Refill Questions      Flowsheet Row Most Recent Value   Changes to allergies? No   Changes to medications? No   New conditions or infections since last clinic visit No   If yes, please describe  N/A   Unplanned office visit, urgent care, ED, or hospital admission in the last 4 weeks  No   How does patient/caregiver feel medication is working? Good   Financial problems or insurance changes  No   Since the previous refill, were any specialty medication doses or scheduled injections missed or delayed?  No   If yes, please provide the amount 0   Why were doses missed? N/A   Does this patient require a clinical escalation to a pharmacist? No            Delivery Questions      Flowsheet Row Most Recent Value   Delivery method [facility]  [Ship to home address-Ship 5/23 for delivery 5/24-$0 copay with copay card and [employer]-Address Confirmed]   Delivery address verified with patient/caregiver? Yes  [Ship to home address]   Delivery address Home   Number of medications in delivery 1   Medication(s) being filled and delivered Zanubrutinib (Antineoplastic Enzyme Inhibitors)   Doses left of specialty medications 7-10 days   Copay verified? Yes   Copay amount $0 copay with insurance, copay card and [employer]   Copay form of payment No copayment ($0)          Brukinsa delivery coordinated with pt for 5/24/2024 to his home address. $0 copay with insurance, copay card and [employer]. His last delivery was on 4/26/2024. No questions or concerns to report to MTM Team today.     Follow-up: 21 day(s)     Marta Jansen, Pharmacy Technician  Specialty Pharmacy Technician

## 2024-06-04 ENCOUNTER — OFFICE VISIT (OUTPATIENT)
Dept: ONCOLOGY | Facility: CLINIC | Age: 69
End: 2024-06-04
Payer: COMMERCIAL

## 2024-06-04 ENCOUNTER — LAB (OUTPATIENT)
Dept: LAB | Facility: HOSPITAL | Age: 69
End: 2024-06-04
Payer: COMMERCIAL

## 2024-06-04 VITALS
WEIGHT: 190.9 LBS | DIASTOLIC BLOOD PRESSURE: 73 MMHG | SYSTOLIC BLOOD PRESSURE: 117 MMHG | TEMPERATURE: 97.7 F | OXYGEN SATURATION: 97 % | BODY MASS INDEX: 24.5 KG/M2 | HEIGHT: 74 IN | HEART RATE: 73 BPM

## 2024-06-04 DIAGNOSIS — N28.9 KIDNEY DISEASE: ICD-10-CM

## 2024-06-04 DIAGNOSIS — D62 POSTOPERATIVE ANEMIA DUE TO ACUTE BLOOD LOSS: ICD-10-CM

## 2024-06-04 DIAGNOSIS — C91.10 CHRONIC LYMPHOCYTIC LEUKEMIA: ICD-10-CM

## 2024-06-04 DIAGNOSIS — Z79.899 HIGH RISK MEDICATION USE: Primary | ICD-10-CM

## 2024-06-04 LAB
ALBUMIN SERPL-MCNC: 4.6 G/DL (ref 3.5–5.2)
ALBUMIN/GLOB SERPL: 1.8 G/DL
ALP SERPL-CCNC: 84 U/L (ref 39–117)
ALT SERPL W P-5'-P-CCNC: 18 U/L (ref 1–41)
ANION GAP SERPL CALCULATED.3IONS-SCNC: 13.5 MMOL/L (ref 5–15)
AST SERPL-CCNC: 29 U/L (ref 1–40)
BASOPHILS # BLD AUTO: 0.03 10*3/MM3 (ref 0–0.2)
BASOPHILS NFR BLD AUTO: 0.3 % (ref 0–1.5)
BILIRUB SERPL-MCNC: 1.2 MG/DL (ref 0–1.2)
BUN SERPL-MCNC: 12 MG/DL (ref 8–23)
BUN/CREAT SERPL: 10.3 (ref 7–25)
CALCIUM SPEC-SCNC: 9.5 MG/DL (ref 8.6–10.5)
CHLORIDE SERPL-SCNC: 102 MMOL/L (ref 98–107)
CO2 SERPL-SCNC: 24.5 MMOL/L (ref 22–29)
CREAT SERPL-MCNC: 1.16 MG/DL (ref 0.76–1.27)
DEPRECATED RDW RBC AUTO: 65.3 FL (ref 37–54)
EGFRCR SERPLBLD CKD-EPI 2021: 67.8 ML/MIN/1.73
EOSINOPHIL # BLD AUTO: 0.04 10*3/MM3 (ref 0–0.4)
EOSINOPHIL NFR BLD AUTO: 0.4 % (ref 0.3–6.2)
ERYTHROCYTE [DISTWIDTH] IN BLOOD BY AUTOMATED COUNT: 16.9 % (ref 12.3–15.4)
GLOBULIN UR ELPH-MCNC: 2.6 GM/DL
GLUCOSE SERPL-MCNC: 96 MG/DL (ref 65–99)
HCT VFR BLD AUTO: 44.2 % (ref 37.5–51)
HGB BLD-MCNC: 14.2 G/DL (ref 13–17.7)
IMM GRANULOCYTES # BLD AUTO: 0.01 10*3/MM3 (ref 0–0.05)
IMM GRANULOCYTES NFR BLD AUTO: 0.1 % (ref 0–0.5)
LYMPHOCYTES # BLD AUTO: 5.13 10*3/MM3 (ref 0.7–3.1)
LYMPHOCYTES NFR BLD AUTO: 56.2 % (ref 19.6–45.3)
MCH RBC QN AUTO: 33.4 PG (ref 26.6–33)
MCHC RBC AUTO-ENTMCNC: 32.1 G/DL (ref 31.5–35.7)
MCV RBC AUTO: 104 FL (ref 79–97)
MONOCYTES # BLD AUTO: 0.75 10*3/MM3 (ref 0.1–0.9)
MONOCYTES NFR BLD AUTO: 8.2 % (ref 5–12)
NEUTROPHILS NFR BLD AUTO: 3.17 10*3/MM3 (ref 1.7–7)
NEUTROPHILS NFR BLD AUTO: 34.8 % (ref 42.7–76)
NRBC BLD AUTO-RTO: 0 /100 WBC (ref 0–0.2)
PLATELET # BLD AUTO: 93 10*3/MM3 (ref 140–450)
PMV BLD AUTO: 11 FL (ref 6–12)
POTASSIUM SERPL-SCNC: 4.1 MMOL/L (ref 3.5–5.2)
PROT SERPL-MCNC: 7.2 G/DL (ref 6–8.5)
RBC # BLD AUTO: 4.25 10*6/MM3 (ref 4.14–5.8)
SODIUM SERPL-SCNC: 140 MMOL/L (ref 136–145)
WBC NRBC COR # BLD AUTO: 9.13 10*3/MM3 (ref 3.4–10.8)

## 2024-06-04 PROCEDURE — 99214 OFFICE O/P EST MOD 30 MIN: CPT | Performed by: INTERNAL MEDICINE

## 2024-06-04 NOTE — PROGRESS NOTES
Subjective     REASON FOR FOLLOW UP:   1. CLL , ASSOCIATED BENCE DAWSON PROTEINURIA undergoing BRUKINSA    HISTORY OF PRESENT ILLNESS:  On 06/04/2024, this 69-year-old patient who has CLL associated with Bence-Dawson proteinuria and is undergoing Brukinsa therapy returns to the office. He has had a heart operation for 6 bypasses 3 months ago and has taken him a good while to recover. Surgical incision is healing back to normal. He has lesser degree of shortness of breath. No chest pain. No angina. Minimal if any cough and improving appetite. He feels better but he is not back to baseline. He is not playing any golf yet. He denies any fever, chills, infections, adenopathies or rashes. He has not had any trouble with bowel activity or urination. He has multiple skin lesions in his face that will require dermatological assessment.     In regard to Brukinsa, he has not had any significant clinical bleeding. He remains on aspirin. He has not had any other side effects of the medicine including no diarrhea.           Past Medical History:   Diagnosis Date    Abnormal liver function test     Alcohol abuse     CLL (chronic lymphocytic leukemia)     Coronary artery disease     stent    Heart disease     History of pneumonia     1/2019    Hyperlipidemia     Hypertension     Inguinal hernia     left side to have surgery 3/28/19    Myocardial infarction     Screen for colon cancer 09/2016    Negative Cologaurd    Screening PSA (prostate specific antigen) 02/2013    .5    Thrombocytopenia         Past Surgical History:   Procedure Laterality Date    CARDIAC CATHETERIZATION  2017    CATARACT EXTRACTION Bilateral     COLONOSCOPY N/A 06/05/2006    Normal, repeat in 10 years, Dr. Preethi Gonzalez    COLONOSCOPY N/A 7/15/2020    Procedure: COLONOSCOPY TO CECUM & T.I. WITH COLD SNARE POLYPECTOMIES, CLIP PLACEMENT X 2;  Surgeon: Yennifer Salazar MD;  Location: [facility] ENDOSCOPY;  Service: Gastroenterology;  Laterality: N/A;  PRE-  POSITIVE COLOGUARD  POST- COLON POLYPS, DIVERTICULOSIS, HEMORRHOIDS    CORONARY ANGIOPLASTY WITH STENT PLACEMENT N/A 05/09/2017    Left heart catheterization, Selective native vessel coronary arteriography, Left ventriculography, Successful percutaneous intervention to the 100% occluded right coronary artery with a 3.5 x 38 mm Synergy drug-eluting stent (post-dilated w/ a 4.0 x 20 mm NC Emerge balloon), Selective right femoral angiography, Successful Perclose arteriotomy closure. Dr. James Pierson    CORONARY ARTERY BYPASS GRAFT N/A 3/5/2024    Procedure: STERNOTOMY, CORONARY ARTERY BYPASS GRAFTING TRANSESOPHAGEAL ECHOCARDIOGRAM X6, UTILIZING THE LEFT AUGUSTUS AND RIGHT SAPHENOUS VEIN.  ADRIANE, PRP WITH ANESTHESIA;  Surgeon: Jr Jean Marie Mott MD;  Location: [facility];  Service: Cardiothoracic;  Laterality: N/A;    INGUINAL HERNIA REPAIR Left     INGUINAL HERNIA REPAIR Right     x2    INGUINAL HERNIA REPAIR Left 3/28/2019    Procedure: LEFT INGUINAL HERNIA REPAIR LAPAROSCOPIC;  Surgeon: Preethi Gonzalez MD;  [place] [facility] [place] [facility]  Service: General    LAPAROSCOPIC INGUINAL HERNIA REPAIR Right 10/03/2002    w/ extra peritoneal Goe-Benton mesh (transperitoneal repair), Dr. Preethi Gonzalez    REFRACTIVE SURGERY Bilateral     RETINAL DETACHMENT SURGERY Right 07/02/2013      ONCOLOGY HISTORY SHOLA ENNIS MD:ONCOLOGIC HISTORY:  On 12/22/10 he was reviewed.  He   had a totally normal white count with normal differential, predominance of lymphocytes.  Normal platelet count.  His chemistry profile was normal including LDH.  His beta-2 microglobulin was normal.  His quantitative immunoglobulins were normal.  His jane  p  heral blood flow cytometry documented a typical pattern by flow cytometry of CLL, CD5, CD19 negative, dim positive CD20, ZAP-70 negative and CD38 negative.  His CT scan of the chest, abdomen and pelvis disclosed no peripheral adenopathy or hepatosplenomeg  a  ly.  His level of immunoglobulins was normal.   With this in mind we thought that the patient had very early stage disease with excellent prognostic features and we advised him to remain on observation.  He will be rechecked every 3 months for the first ye  ar and thereafter every 6 months depending on the clinical circumstances.  Appropriate booklet information was given to him and his wife to review and further learn.          Given the excellent characteristics of his disease on clinical grounds, I doubt at this time a bone marrow is needed.        On 1/8/13 his physical exam is unremarkable with no peripheral adenopathy or hepatosplenomegaly, no B symptoms, no infections, no autoimmunity.  On the other hand he had areas of the skin on the left cheek and right cheek bhavani  t look like actinic keratosis.  His white count was up to17,000 with a normal hemoglobin and borderline platelet count of 125,000.  We asked the patient to try to minimize stressors in life and we asked him to return back in four months.  We also asked priscila edwards to be seen by dermatology in order to treat his multiple actinic keratosis.          On 05/09/13 the patient was asymptomatic with regard to his chronic lymphoid leukemia, no fatigue, fevers, chills, repeated infections or peripheral lymphadenopathy.  His physi  francisco j exam was unremarkable with no palpable lymphadenopathy or hepatosplenomegaly.  His white count has now improved to 12,900, stable hemoglobin of 13.6 and stable platelet count of 121,000.  With this in mind we advised him to remain on observation, retu  rning for reevaluation in six months.         On 05/07/2014 the patient was asymptomatic in regard to his CLL with no autoimmunity, no infections and no progressive disease clinically.  On the other hand we have seen duplication of his white count in one year.  T  he patient and wife are aware that eventually he will require intervention for this if the white count continues changing the way it is.  At this time we do not justify  the need of any other therapy or any other testing.        Current Outpatient Medications on File Prior to Visit   Medication Sig Dispense Refill    acetaminophen (TYLENOL) 500 MG tablet Take 2 tablets by mouth Every 6 (Six) Hours As Needed for Mild Pain. Indications: Pain      acyclovir (ZOVIRAX) 400 MG tablet Take 1 tablet by mouth 2 (Two) Times a Day. 180 tablet 1    aspirin 81 MG EC tablet Take 1 tablet by mouth Every Other Day. (Patient taking differently: Take 1 tablet by mouth Daily. Indications: Coronary Bypass Surgery) 30 tablet 0    metoprolol succinate XL (TOPROL-XL) 25 MG 24 hr tablet Take 1 tablet by mouth Daily. 90 tablet 3    ondansetron ODT (ZOFRAN-ODT) 8 MG disintegrating tablet DISSOLVE 1 TABLET UNDER THE TONGUE EVERY 8 HOURS AS NEEDED FOR NAUSEA/VOMITING 9 tablet 14    rosuvastatin (CRESTOR) 40 MG tablet Take 1 tablet by mouth Every Night. Indications: High Amount of Fats in the Blood 90 tablet 3    sulfamethoxazole-trimethoprim (BACTRIM DS,SEPTRA DS) 800-160 MG per tablet TAKE 1 TABLET BY MOUTH THREE TIMES A WEEK 12 tablet 7    vitamin B-12 (VITAMIN B-12) 1000 MCG tablet Take 1 tablet by mouth Daily for 90 days. 30 tablet 2    Zanubrutinib (Brukinsa) 80 MG chemo capsule Take 2 capsules by mouth 2 (Two) Times a Day. 120 capsule 5     No current facility-administered medications on file prior to visit.        ALLERGIES:    Allergies   Allergen Reactions    Codeine Nausea Only and Nausea And Vomiting        Social History     Socioeconomic History    Marital status:      Spouse name: Charlotte Espino   Tobacco Use    Smoking status: Never    Smokeless tobacco: Never   Vaping Use    Vaping status: Never Used   Substance and Sexual Activity    Alcohol use: Yes     Alcohol/week: 21.0 standard drinks of alcohol     Types: 21 Shots of liquor per week    Drug use: No    Sexual activity: Defer        Family History   Problem Relation Age of Onset    Heart attack Mother     Heart disease Mother      "Diabetes Mother     Aortic aneurysm Mother     Coronary artery disease Mother     Early death Mother     No Known Problems Father     Diabetes type II Other     Diabetes Brother     Hyperlipidemia Brother     Stroke Sister 65    Diabetes Sister     Hyperlipidemia Sister     Hypertension Sister     No Known Problems Brother     Stomach cancer Paternal Uncle     Malig Hyperthermia Neg Hx         Objective     Vitals:    06/04/24 1610   BP: 117/73   Pulse: 73   Temp: 97.7 °F (36.5 °C)   TempSrc: Oral   SpO2: 97%   Weight: 86.6 kg (190 lb 14.4 oz)   Height: 188 cm (74.02\")   PainSc: 0-No pain           6/4/2024     4:12 PM   Current Status   ECOG score 0     EXAM                     GENERAL:  Well-developed, Patient  in no acute distress.   SKIN:  Warm, dry ,NO purpura ,no rash.  HEENT:  Pupils were equal and reactive to light and accomodation, conjunctivae noninjected,  normal visual acuity.   NECK:  Supple with good range of motion; no thyromegaly , no JVD or bruits,.No carotid artery pain, no carotid abnormal pulsation   LYMPHATICS:  No cervical, NO supraclavicular, NO axillary, NO inguinal adenopathies.  CARDIAC   normal rate , regular rhythm, without murmur,NO rubs NO S3 NO S4   LUNGS: normal breath sounds bilateral, no wheezing, NO rhonchi, NO crackles ,NO rubs.  VASCULAR VENOUS: no cyanosis, NO collateral circulation, NO varicosities, NO edema, NO palpable cords, NO pain,NO erythema, NO pigmentation of the skin.  ABDOMEN:  Soft, NO pain,no hepatomegaly, no splenomegaly,no masses, no ascites, no collateral circulation,no distention.  EXTREMITIES  AND SPINE:  No clubbing, no cyanosis ,no deformities , no pain .No kyphosis,  no pain in spine, no pain in ribs , no pain in pelvic bone.  NEUROLOGICAL:  Patient was awake, alert, oriented to time, person and place.      RESULTS REVIEWED:  Results from last 7 days   Lab Units 06/04/24  1603   WBC 10*3/mm3 9.13   NEUTROS ABS 10*3/mm3 3.17   HEMOGLOBIN g/dL 14.2 "   HEMATOCRIT % 44.2   PLATELETS 10*3/mm3 93*       Results from last 7 days   Lab Units 06/04/24  1603   SODIUM mmol/L 140   POTASSIUM mmol/L 4.1   CHLORIDE mmol/L 102   CO2 mmol/L 24.5   BUN mg/dL 12   CREATININE mg/dL 1.16   CALCIUM mg/dL 9.5   ALBUMIN g/dL 4.6   BILIRUBIN mg/dL 1.2   ALK PHOS U/L 84   ALT (SGPT) U/L 18   AST (SGOT) U/L 29   GLUCOSE mg/dL 96         Assessment & Plan      1. CLL  CLL dating back to initial diagnosis in 2005.  He has remained on a course of observation without any need for active treatment.    12/22/2010 peripheral blood flow cytometry documented a typical pattern by flow cytometry of CLL, CD5, CD19 negative, dim positive CD20, ZAP-70 negative and CD38 negative.  His CT scan of the chest, abdomen and pelvis disclosed no peripheral adenopathy or hepatosplenomegaly.  His level of immunoglobulins was normal.  With this in mind we thought that the patient had very early stage disease with excellent prognostic features and we advised him to remain on observation  1/24/2023, leukocytosis worsened, though the patient was recently treated with prednisone for eustachian tube dysfunction.  WBC sylvain to 110,000, though then improved to 88,000.  He had no peripheral adenopathy or hepatosplenomegaly.  1/24/2023, creatinine elevated at 1.4 with urinalysis showing 300 mg of protein.  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  Absolute lymphocyte count 70.87 which is improvement compared to 113.15 2 weeks ago.  4/18/2023 WBC  60,000.  5/16/2023 WBC 41.28, hgb 13.4, platelet count 104,000.  6/8/2023 patient advised to discontinue allopurinol and continue with Brukinsa at the same dose.  When WBC normalizes plans to do a new urinary collection of 24 hours to remeasure monoclonal protein studies and Bence-Dawson proteinuria.    7/20/2023 WBC 19.14, hemoglobin 13.5, platelet count 97,000.  9/8/2023 WBC 12.45, hemoglobin 13.9, platelet count 100,000.  Patient will be sent with a 24-hour urine jug for UPEP, ELISA, FLC.  On 10/20/2023, the patient has had dramatic improvement in his leukocytosis to the point of almost normalization.  He still has some peripheral lymphocytosis but I expect that it will continue to improve as time goes by.  Most importantly, the triggering factor for the treatment of his disease with Brukinsa was the rise in the creatinine to 1.67, found to have almost 500 mg of proteinuria and Bence-Dawson proteinuria.  Urine protein electrophoresis disclosed normalization of protein secretion in the urine and resolution of the Bence-Dawson proteinuria.  This proves the point that leukemia was the cause of the problem, now that the treatment has subsided.  Kidney function has recovered and almost normalized.  A creatinine of 1.2 for a 68-year-old male is almost normal.    Given this fact, I advised the patient to remain on Brukinsa at the same dose.  Given the fact that he has not had any cardiac irregularity, abnormal bleeding, or any other new problem, I advised him to remain on the medicine and return to see us back every couple of months with repeated CBC, CMP, and urinalysis, to continue monitoring for proteinuria.      He will have serum protein and urine electrophoresis at that time.  No need for radiological assessment at this time.         On 12/14/2023, the patient is doing fine from the point of view of his CLL with no fevers, no chills, no sweats, no pruritis, no adenopathy, no infections, no autoimmunity. His white count  remains stable at 13,000. The hemoglobin keeps improving, normalized now and the platelet count finally is improving. His absolute lymphocyte count remains elevated similar to what it was previously. On the other hand he has had a dramatic improvement of these numbers over the last several months if we account for the difference. In absence of any side effects of the medicine, I advised him to take the brukinsa at the same dose and return to see us back in 6 weeks with repeat CBC and CMP. A urinalysis will be repeated then. Today, his urinalysis shows minimal proteinuria with minimal ketonuria. He is not fasting. Encouraged him to have proper nutrition all the time and proper hydration. His specific gravity is 1.020 telling me that he is probably a little bit on the dry side.    On 02/06/2024 the patient was further reviewed. He has not encountered any side effects of Brukinsa, no cardiac irregularity, no abnormal bleeding, no nausea, no vomiting, no diarrhea, no rashes. He has not had any fever, chills, night sweats, pruritus, adenopathy, autoimmunity or infections associated with his CLL. Furthermore his total lymphocyte count now is in the 5000 category, the hemoglobin is normal, the platelet count 109,000. He also again has no peripheral adenopathy or hepatosplenomegaly on his clinical examination. He has an ECOG performance status of 0. Given these findings we advised the patient to continue his Brukinsa at the same dose and return to see us back in a couple of months. I think he is doing so well that I think he does not need to come so often anymore. In 2 months he will have repeat CBC, CMP and a urinalysis. We know that his Bence-Dawson proteinuria has disappeared in a urine collection that was done a time ago.    On 04/10/2024, the patient returns to the office. He has had heart operation for 6 bypasses for coronary artery disease by Yash Mott MD. He has been now at home for almost 2 weeks and obviously  he discontinued his Brukinsa in anticipation of his heart operation to minimize bleeding. The hemoglobin was 8.4 at the time that he left the hospital. Today, it is 11.2. It is perfectly fine for the patient to discontinue the oral iron that I do not think he needs anymore. On the other hand, he has not started the Brukinsa yet and his white count remains excellent with minimal lymphocyte excess and normal platelet count. No peripheral adenopathies.     I advised him to resume the Brukinsa at the typical dose and I would like to review him back in a month with repeat CBC and CMP. He is aware that he has to monitor himself for cardiac irregularity or abnormal bleeding. Again, the patient is no longer taking Plavix as per today. He will remain on a baby aspirin.     Eventually, we will recheck his urine for monoclonal protein studies in a couple of months or so.    On 06/04/2024, the patient has continued his Brukinsa since 2 months ago and he has not had any issues with the medication. Today his white count is normal. White count still shows in the differential some peripheral lymphocytosis with minimal increase to 5000, normalization of the hemoglobin and minimally low platelet count. These numbers per se are not concerning to me in absence of any peripheral adenopathy, splenomegaly, hepatomegaly, B symptoms, infection or autoimmunity. I advised him to remain on the Brukinsa. I advised him to remain on his Bactrim and acyclovir and he will be reviewed back in a couple of months.        2. Prophylaxis  Prophylactic acyclovir 500 mg daily until white count is normal  Acyclovir 500 mg twice daily and Bactrim DS Monday, Wednesday, Friday 6/8/2023 allopurinol was discontinued however patient continues on prophylactic acyclovir and Bactrim DS.  On 10/20/2023, his prophylactic medications will remain the same.   On 12/14/2023, prophylactic medication remains ongoing. He has not had any infection.    On 02/06/2024  prophylactic medication will remain the same. He has not developed any fever blisters, shingles or any other infection of any nature.    On 04/10/2024, I advised the patient to resume his acyclovir and Bactrim prophylactically to minimize infections associated with CLL and his Brukinsa. These medicines never bothered him before. He will resume them starting tomorrow.    On 06/04/2024, the patient looks clinically very stable. Recovering from the bypass surgery for his heart has taken a long time but he is taking one day at a time.     We will review him back in a couple of months with repeat CBC, CMP, LDH and a urinalysis at that point. If by any chance urine shows protein, he will require a urine collection to check for Bence-Dawson proteinuria.     The patient takes a high-risk medication that requires frequent checkup for toxicity.              Russell Cagle MD  06/04/24

## 2024-06-05 ENCOUNTER — SPECIALTY PHARMACY (OUTPATIENT)
Dept: PHARMACY | Facility: HOSPITAL | Age: 69
End: 2024-06-05
Payer: COMMERCIAL

## 2024-06-05 NOTE — PROGRESS NOTES
Specialty Pharmacy Note: Brukinsa (zanubrutinib)    Francisco Espino is a 69 y.o. male with CLL was seen 6/4/24 by Dr. Cagle. Per provider dictation, no changes to oral oncology regimen Brukinsa (zanubrutinib).  Labs Review: The CMP and CBC from 6/4/24 have been reviewed. No dose adjustments are needed for the oral specialty medication(s) based on the labs.    Specialty pharmacy will continue to follow patient.    Chastity Carbajal, PharmD, BCPS  6/5/2024  15:50 EDT

## 2024-06-20 ENCOUNTER — SPECIALTY PHARMACY (OUTPATIENT)
Dept: PHARMACY | Facility: HOSPITAL | Age: 69
End: 2024-06-20
Payer: COMMERCIAL

## 2024-06-20 NOTE — PROGRESS NOTES
Specialty Pharmacy Refill Coordination Note     Francisco is a 69 y.o. male contacted today regarding refills of Brukinsa specialty medication(s).    Reviewed and verified with patient:       Specialty medication(s) and dose(s) confirmed: yes  Brukinsa 80 mg caps-2 caps twice per day.    Refill Questions      Flowsheet Row Most Recent Value   Changes to allergies? No   Changes to medications? No   New conditions or infections since last clinic visit No   If yes, please describe  N/A   Unplanned office visit, urgent care, ED, or hospital admission in the last 4 weeks  No   How does patient/caregiver feel medication is working? Good   Financial problems or insurance changes  No   Since the previous refill, were any specialty medication doses or scheduled injections missed or delayed?  No   If yes, please provide the amount 0   Why were doses missed? N/A   Does this patient require a clinical escalation to a pharmacist? No            Delivery Questions      Flowsheet Row Most Recent Value   Delivery method Beeline  [Ship to home address-[facility] 6/24 for delivery 6/25-$0 copay with insurance and [employer]-Address Confirmed]   Delivery address verified with patient/caregiver? Yes  [Ship to [facility] address]   Delivery address Home  [Ship to home address-[facility] 6/24 for delivery 6/25-$0 copay with insurance and [employer]-Address Confirmed]   Number of medications in delivery 1   Medication(s) being filled and delivered Zanubrutinib (Antineoplastic Enzyme Inhibitors)   Doses left of specialty medications About 7-10 days   Copay verified? Yes   Copay amount $0 copay with insurance and [employer]-Copay card has reached max benefit   Copay form of payment No copayment ($0)          Brukinsa delivery coordinated with pt for 6/25/2024 to his home address. $0 copay with insurance and [employer]. His last delivery was on 5/24/2024. No questions or concerns to report to MTM Team today.     Follow-up: 21 day(s)     Marta Jansen, Pharmacy  Technician  Specialty Pharmacy Technician

## 2024-07-08 ENCOUNTER — OFFICE VISIT (OUTPATIENT)
Dept: INTERNAL MEDICINE | Facility: CLINIC | Age: 69
End: 2024-07-08
Payer: COMMERCIAL

## 2024-07-08 VITALS
WEIGHT: 189.8 LBS | SYSTOLIC BLOOD PRESSURE: 122 MMHG | HEIGHT: 74 IN | BODY MASS INDEX: 24.36 KG/M2 | OXYGEN SATURATION: 94 % | DIASTOLIC BLOOD PRESSURE: 84 MMHG | TEMPERATURE: 98.9 F | HEART RATE: 111 BPM

## 2024-07-08 DIAGNOSIS — Z79.899 HIGH RISK MEDICATION USE: ICD-10-CM

## 2024-07-08 DIAGNOSIS — I25.119 CORONARY ARTERY DISEASE INVOLVING NATIVE CORONARY ARTERY OF NATIVE HEART WITH ANGINA PECTORIS: Primary | ICD-10-CM

## 2024-07-08 DIAGNOSIS — I10 ESSENTIAL HYPERTENSION: ICD-10-CM

## 2024-07-08 DIAGNOSIS — R00.2 PALPITATIONS: ICD-10-CM

## 2024-07-08 DIAGNOSIS — Z12.11 COLON CANCER SCREENING: ICD-10-CM

## 2024-07-08 DIAGNOSIS — Z95.1 S/P CABG X 6: ICD-10-CM

## 2024-07-08 DIAGNOSIS — E78.2 MIXED HYPERLIPIDEMIA: ICD-10-CM

## 2024-07-08 PROCEDURE — 99214 OFFICE O/P EST MOD 30 MIN: CPT | Performed by: INTERNAL MEDICINE

## 2024-07-08 PROCEDURE — 93000 ELECTROCARDIOGRAM COMPLETE: CPT | Performed by: INTERNAL MEDICINE

## 2024-07-08 RX ORDER — FERROUS SULFATE 325(65) MG
TABLET ORAL
COMMUNITY
Start: 2024-06-26 | End: 2024-07-08

## 2024-07-08 NOTE — PROGRESS NOTES
Francisco Espino is a 69 y.o. male, who presents with a chief complaint of   Chief Complaint   Patient presents with    Hypertension     Check up, heart surgery in march           Hypertension       Pt here with his wife for f/u.    He had a blocked stent and ended up having to have a bypass surgery.  He had 6 bypasses done by Dr. Mott at the end of February.  He had a post op anemia down to 7.9.  he got a transfusion and has been on iron.  Last hgb was back to 14.  He has not started cardiac rehab.  HR was 111 today in the office.  Pt denies palpitations.He is on crestor, asa, and metoprolol.      CLL - pt was following with dr. Cagle but he has just retired and pt will f/u with dr. To. Pt on Brukinsa. He takes acyclovir and bactrim for PPX.     HLD - on crestor.  No cramps or myalgias    HTN - on metoprolol.    The following portions of the patient's history were reviewed and updated as appropriate: allergies, current medications, past family history, past medical history, past social history, past surgical history and problem list.    Allergies: Codeine    Review of Systems   Constitutional: Negative.    HENT: Negative.     Eyes: Negative.    Respiratory: Negative.     Cardiovascular: Negative.    Gastrointestinal: Negative.    Endocrine: Negative.    Genitourinary: Negative.    Musculoskeletal: Negative.    Skin: Negative.    Allergic/Immunologic: Negative.    Neurological: Negative.    Hematological: Negative.    Psychiatric/Behavioral: Negative.     All other systems reviewed and are negative.            Wt Readings from Last 3 Encounters:   07/08/24 86.1 kg (189 lb 12.8 oz)   06/04/24 86.6 kg (190 lb 14.4 oz)   04/10/24 81.6 kg (180 lb)     Temp Readings from Last 3 Encounters:   07/08/24 98.9 °F (37.2 °C) (Infrared)   06/04/24 97.7 °F (36.5 °C) (Oral)   04/10/24 98.6 °F (37 °C)     BP Readings from Last 3 Encounters:   07/08/24 122/84   06/04/24 117/73   04/10/24 115/70     Pulse Readings from  Last 3 Encounters:   07/08/24 111   06/04/24 73   04/10/24 88     Body mass index is 24.37 kg/m².  SpO2 Readings from Last 3 Encounters:   07/08/24 94%   06/04/24 97%   04/10/24 99%          Physical Exam  Vitals and nursing note reviewed.   Constitutional:       General: He is not in acute distress.     Appearance: He is well-developed.   HENT:      Head: Normocephalic and atraumatic.      Right Ear: External ear normal.      Left Ear: External ear normal.      Nose: Nose normal.   Eyes:      Conjunctiva/sclera: Conjunctivae normal.      Pupils: Pupils are equal, round, and reactive to light.   Cardiovascular:      Rate and Rhythm: Normal rate and regular rhythm.      Heart sounds: Normal heart sounds.   Pulmonary:      Effort: Pulmonary effort is normal. No respiratory distress.      Breath sounds: Normal breath sounds. No wheezing.   Musculoskeletal:         General: Normal range of motion.      Cervical back: Normal range of motion and neck supple.      Comments: Normal gait   Skin:     General: Skin is warm and dry.   Neurological:      Mental Status: He is alert and oriented to person, place, and time.   Psychiatric:         Behavior: Behavior normal.         Thought Content: Thought content normal.         Judgment: Judgment normal.         Results for orders placed or performed in visit on 04/10/24   Comprehensive Metabolic Panel    Specimen: Blood   Result Value Ref Range    Glucose 119 (H) 65 - 99 mg/dL    BUN 10 8 - 23 mg/dL    Creatinine 1.28 (H) 0.76 - 1.27 mg/dL    Sodium 138 136 - 145 mmol/L    Potassium 4.6 3.5 - 5.2 mmol/L    Chloride 102 98 - 107 mmol/L    CO2 25.2 22.0 - 29.0 mmol/L    Calcium 10.0 8.6 - 10.5 mg/dL    Total Protein 6.7 6.0 - 8.5 g/dL    Albumin 4.2 3.5 - 5.2 g/dL    ALT (SGPT) 10 1 - 41 U/L    AST (SGOT) 19 1 - 40 U/L    Alkaline Phosphatase 96 39 - 117 U/L    Total Bilirubin 0.7 0.0 - 1.2 mg/dL    Globulin 2.5 gm/dL    A/G Ratio 1.7 g/dL    BUN/Creatinine Ratio 7.8 7.0 - 25.0     Anion Gap 10.8 5.0 - 15.0 mmol/L    eGFR 60.6 >60.0 mL/min/1.73   Urinalysis without microscopic (no culture) - Urine, Clean Catch    Specimen: Urine, Clean Catch   Result Value Ref Range    Color, UA Yellow Yellow, Straw    Appearance, UA Clear Clear    pH, UA 6.0 4.5 - 8.0    Specific Gravity, UA 1.020 1.002 - 1.030    Glucose, UA Negative Negative    Ketones, UA Negative Negative    Bilirubin, UA Negative Negative    Blood, UA Negative Negative    Protein, UA 30 mg/dL (1+) (A) Negative    Leuk Esterase, UA Negative Negative    Nitrite, UA Negative Negative    Urobilinogen, UA 0.2 E.U./dL 0.2 - 1.0 E.U./dL   CBC Auto Differential    Specimen: Blood   Result Value Ref Range    WBC 8.12 3.40 - 10.80 10*3/mm3    RBC 3.44 (L) 4.14 - 5.80 10*6/mm3    Hemoglobin 11.3 (L) 13.0 - 17.7 g/dL    Hematocrit 34.7 (L) 37.5 - 51.0 %    .9 (H) 79.0 - 97.0 fL    MCH 32.8 26.6 - 33.0 pg    MCHC 32.6 31.5 - 35.7 g/dL    RDW 13.3 12.3 - 15.4 %    RDW-SD 49.1 37.0 - 54.0 fl    MPV 11.1 6.0 - 12.0 fL    Platelets 184 140 - 450 10*3/mm3    Neutrophil % 46.2 42.7 - 76.0 %    Lymphocyte % 37.6 19.6 - 45.3 %    Monocyte % 8.4 5.0 - 12.0 %    Eosinophil % 6.9 (H) 0.3 - 6.2 %    Basophil % 0.7 0.0 - 1.5 %    Immature Grans % 0.2 0.0 - 0.5 %    Neutrophils, Absolute 3.75 1.70 - 7.00 10*3/mm3    Lymphocytes, Absolute 3.05 0.70 - 3.10 10*3/mm3    Monocytes, Absolute 0.68 0.10 - 0.90 10*3/mm3    Eosinophils, Absolute 0.56 (H) 0.00 - 0.40 10*3/mm3    Basophils, Absolute 0.06 0.00 - 0.20 10*3/mm3    Immature Grans, Absolute 0.02 0.00 - 0.05 10*3/mm3    nRBC 0.0 0.0 - 0.2 /100 WBC     Result Review :       Data reviewed : Cardiology studies cardiac cath and Recent hospitalization notes amin and cabg at [facility].           ECG 12 Lead    Date/Time: 7/8/2024 2:48 PM  Performed by: Eveline Pelaez MD    Authorized by: Eveline Pelaez MD  Comparison: compared with previous ECG   Comparison to previous ECG: 3/4/24 -  similar  3/7/24 - different  Rhythm: sinus rhythm  Rate: tachycardic  BPM: 100  Conduction: 1st degree AV block  QRS axis: left    Clinical impression: abnormal EKG  Comments: Inferoposterior infarct              Assessment and Plan    Diagnoses and all orders for this visit:    1. Coronary artery disease involving native coronary artery of native heart with angina pectoris (Primary) - s/p cabg recently.  On Asa, crestor, and metoprolol.    Pt tachycardic in office.  EKG is similar to EKG when pt was post op from his cabg in early march but different than EKG at time of d/c from that hospitalization.  No a-fib on EKG.  I have ordered a holter monitor and asked pt to monitor on his apple watch.  I have also reached out to pt's cardiologist dr. Ramirez to see if he would like any other changes while the holter is pending.  Discussed with pt that he should go to the ED if palpitations, chest pain, or concern for a-fib.      2. High risk medication use  -     DEXA Bone Density Axial; Future    3. Colon cancer screening  -     Ambulatory Referral For Screening Colonoscopy    4. Essential hypertension - cont metoprolol.      5. Mixed hyperlipidemia - on crestor         BMI is within normal parameters. No other follow-up for BMI required.             Outpatient Medications Prior to Visit   Medication Sig Dispense Refill    acetaminophen (TYLENOL) 500 MG tablet Take 2 tablets by mouth Every 6 (Six) Hours As Needed for Mild Pain. Indications: Pain      acyclovir (ZOVIRAX) 400 MG tablet Take 1 tablet by mouth 2 (Two) Times a Day. 180 tablet 1    aspirin 81 MG EC tablet Take 1 tablet by mouth Every Other Day. (Patient taking differently: Take 1 tablet by mouth Daily. Indications: Coronary Bypass Surgery) 30 tablet 0    metoprolol succinate XL (TOPROL-XL) 25 MG 24 hr tablet Take 1 tablet by mouth Daily. 90 tablet 3    rosuvastatin (CRESTOR) 40 MG tablet Take 1 tablet by mouth Every Night. Indications: High Amount of Fats in the  Blood 90 tablet 3    sulfamethoxazole-trimethoprim (BACTRIM DS,SEPTRA DS) 800-160 MG per tablet TAKE 1 TABLET BY MOUTH THREE TIMES A WEEK 12 tablet 7    Zanubrutinib (Brukinsa) 80 MG chemo capsule Take 2 capsules by mouth 2 (Two) Times a Day. 120 capsule 5    ferrous sulfate 325 (65 FE) MG tablet TAKE 1 TABLET BY MOUTH DAILY WITH BREAKFAST FOR 90 DAYS.      ondansetron ODT (ZOFRAN-ODT) 8 MG disintegrating tablet DISSOLVE 1 TABLET UNDER THE TONGUE EVERY 8 HOURS AS NEEDED FOR NAUSEA/VOMITING (Patient not taking: Reported on 7/8/2024) 9 tablet 14     No facility-administered medications prior to visit.     No orders of the defined types were placed in this encounter.    [unfilled]  Medications Discontinued During This Encounter   Medication Reason    ferrous sulfate 325 (65 FE) MG tablet *Therapy completed         Return in about 6 months (around 1/8/2025) for Recheck, labs.    Patient was given instructions and counseling regarding her condition or for health maintenance advice. Please see specific information pulled into the AVS if appropriate.

## 2024-07-15 ENCOUNTER — SPECIALTY PHARMACY (OUTPATIENT)
Dept: PHARMACY | Facility: HOSPITAL | Age: 69
End: 2024-07-15
Payer: COMMERCIAL

## 2024-07-15 NOTE — PROGRESS NOTES
Encounter created to make changes to MTM Team members and Provider for SpRx Program enrollment.     Marta Jansen, Pharmacy Technician  Specialty Pharmacy Technician

## 2024-07-23 RX ORDER — LISINOPRIL 5 MG/1
5 TABLET ORAL DAILY
Qty: 90 TABLET | Refills: 1 | Status: SHIPPED | OUTPATIENT
Start: 2024-07-23

## 2024-07-25 ENCOUNTER — SPECIALTY PHARMACY (OUTPATIENT)
Dept: PHARMACY | Facility: HOSPITAL | Age: 69
End: 2024-07-25
Payer: COMMERCIAL

## 2024-07-25 NOTE — PROGRESS NOTES
Specialty Pharmacy Refill Coordination Note     Francisco is a 69 y.o. male contacted today regarding refills of Brukinsa specialty medication(s).    Reviewed and verified with patient:       Specialty medication(s) and dose(s) confirmed: yes  Brukinsa 80 mg caps-2 caps twice per day.    Refill Questions      Flowsheet Row Most Recent Value   Changes to allergies? No   Changes to medications? No   New conditions or infections since last clinic visit No   If yes, please describe  N/A   Unplanned office visit, urgent care, ED, or hospital admission in the last 4 weeks  No   How does patient/caregiver feel medication is working? Good   Financial problems or insurance changes  No   Since the previous refill, were any specialty medication doses or scheduled injections missed or delayed?  No   If yes, please provide the amount 0   Why were doses missed? N/A   Does this patient require a clinical escalation to a pharmacist? No            Delivery Questions      Flowsheet Row Most Recent Value   Delivery method Beeline   Delivery address verified with patient/caregiver? Yes  [Ship to home address]   Delivery address Home  [Ship to home address-Ship 7/30 for delivery 7/31-$0 copay with insurance and [employer]-Address Confirmed-Signature Required]   Number of medications in delivery 1   Medication(s) being filled and delivered Zanubrutinib (Antineoplastic Enzyme Inhibitors)   Doses left of specialty medications About 7-10 days   Copay verified? Yes   Copay amount $0 copay with insurance and [employer]-Copay card has reached max benefit   Copay form of payment No copayment ($0)   Ship Date 7/30/2024   Delivery Date 7/31/2024   Signature Required Yes          Brukinsa delivery coordinated with pt for 7/31/2024 to his home address. $0 copay with insurance and [employer]. His last delivery was on 6/25/2024. No questions or concerns to report to MTM Team today.     Follow-up: 21 day(s)     Marta Jansen, Pharmacy  Technician  Specialty Pharmacy Technician

## 2024-07-26 ENCOUNTER — SPECIALTY PHARMACY (OUTPATIENT)
Dept: PHARMACY | Facility: HOSPITAL | Age: 69
End: 2024-07-26
Payer: COMMERCIAL

## 2024-07-26 NOTE — PROGRESS NOTES
Specialty Pharmacy Patient Management Program  Oncology 6-Month Clinical Assessment       Francisco Espino is a 69 y.o. male with CLL ( CD5, CD19 negative, dim positive CD20, ZAP-70 negative and CD38 negative.) called today to assess adherence and side effects.    Regimen: Brukinsa 160 mg po bid    Reason for Outreach: Routine medication check-in .       Problem list reviewed by Jazz Saenz RPH on 7/26/2024 at 10:46 AM  Medicines reviewed by Jazz Saenz RPH on 7/26/2024 at 10:46 AM  Allergies reviewed by Jazz Saenz RPH on 7/26/2024 at 10:46 AM     Goals Addressed Today        Specialty Pharmacy General Goal      Reduction of Bence Dawson proteinuria            Medication Assessment:  Medication Assessment  Follow Up Clinical Assessment  Linked Medication(s) Assessed: Zanubrutinib (Antineoplastic Enzyme Inhibitors)  Therapeutic appropriateness: Appropriate  Medication tolerability: Tolerating with no to minimal ADRs  Medication plan: Continue therapy with normal follow-up  Quality of Life Improvement Scale: 9-A good deal better  Comments on Quality of Life: No issues reported today  Administration: twice daily.   Patient can self administer medications: yes  Medication Follow-Up Plan: Next clinical assessment  Lab Review: The labs listed below have been reviewed. No dose adjustments are needed for the oral specialty medication(s) based on the labs.    Lab Results   Component Value Date    GLUCOSE 96 06/04/2024    CALCIUM 9.5 06/04/2024     06/04/2024    K 4.1 06/04/2024    CO2 24.5 06/04/2024     06/04/2024    BUN 12 06/04/2024    CREATININE 1.16 06/04/2024    EGFRIFAFRI 70 07/09/2021    EGFRIFNONA 60 (L) 01/06/2022    BCR 10.3 06/04/2024    ANIONGAP 13.5 06/04/2024     Lab Results   Component Value Date    WBC 9.13 06/04/2024    RBC 4.25 06/04/2024    HGB 14.2 06/04/2024    HCT 44.2 06/04/2024    .0 (H) 06/04/2024    MCH 33.4 (H) 06/04/2024    MCHC 32.1 06/04/2024    RDW 16.9 (H)  06/04/2024    RDWSD 65.3 (H) 06/04/2024    MPV 11.0 06/04/2024    PLT 93 (L) 06/04/2024    NEUTRORELPCT 34.8 (L) 06/04/2024    LYMPHORELPCT 56.2 (H) 06/04/2024    MONORELPCT 8.2 06/04/2024    EOSRELPCT 0.4 06/04/2024    BASORELPCT 0.3 06/04/2024    AUTOIGPER 0.1 06/04/2024    NEUTROABS 3.17 06/04/2024    LYMPHSABS 5.13 (H) 06/04/2024    MONOSABS 0.75 06/04/2024    EOSABS 0.04 06/04/2024    BASOSABS 0.03 06/04/2024    AUTOIGNUM 0.01 06/04/2024    NRBC 0.0 06/04/2024     Drug-drug interactions  Completed medication reconciliation today to assess for drug interactions. Patient denies starting or stopping any medications.    Assessed medication list for interactions, no significant drug interactions noted.   Advised patient to call the clinic if any new medications are started so we can assess for drug-drug interactions.  Drug-food interactions discussed: eating grapefruit and drinking grapefruit juice  Vaccines are coordinated by the patient's oncologist and primary care provider.    Allergies  Known allergies and reactions were discussed with the patient. The patient's chart has been reviewed for allergy information and updated as necessary.   Allergies   Allergen Reactions    Codeine Nausea Only and Nausea And Vomiting        Hospitalizations and Urgent Care Visits Since Last Assessment:  Unplanned hospitalizations or inpatient admissions: no  ED Visits: no  Urgent Office Visits: no    Adherence Assessment:  Adherence Questions  Linked Medication(s) Assessed: Zanubrutinib (Antineoplastic Enzyme Inhibitors)  On average, how many doses/injections does the patient miss per month?: 1  What are the identified reasons for non-adherence or missed doses? : no problems identified  What is the estimated medication adherence level?: %  Based on the patient/caregiver response and refill history, does this patient require an MTP to track adherence improvements?: no    Quality of Life Assessment:  Quality of Life  Assessment  Quality of Life Improvement Scale: 9-A good deal better  Comments on Quality of Life: No issues reported today  -- Quality of Life: 9/10    Financial Assessment:  Medication availability/affordability: Patient has had no issues obtaining medication from pharmacy.    Attestation     I attest the patient was actively involved in and has agreed to the above plan of care.  If the prescribed therapy is at any point deemed not appropriate based on the current or future assessments, a consultation will be initiated with the patient's specialty care provider to determine the best course of action. The revised plan of therapy will be documented along with any required assessments and/or additional patient education provided.       All questions addressed and patient had no additional concerns. Patient has pharmacy contact information.    Name/Credentials Jackeline Saenz Rph, BCOP    7/26/2024  10:49 EDT

## 2024-07-29 DIAGNOSIS — C91.10 CHRONIC LYMPHOCYTIC LEUKEMIA: ICD-10-CM

## 2024-07-29 RX ORDER — SULFAMETHOXAZOLE AND TRIMETHOPRIM 800; 160 MG/1; MG/1
TABLET ORAL
Qty: 12 TABLET | Refills: 7 | Status: SHIPPED | OUTPATIENT
Start: 2024-07-29

## 2024-08-01 ENCOUNTER — TELEPHONE (OUTPATIENT)
Dept: INTERNAL MEDICINE | Facility: CLINIC | Age: 69
End: 2024-08-01

## 2024-08-01 NOTE — TELEPHONE ENCOUNTER
PATIENT'S WIFE CALLED IN REGARDS TO BONE DENSITY TEST. HE WOULD LIKE TO HAVE THE TEST AND GET SCHEDULED.    PLEASE CALL AND ADVISE [phone]

## 2024-08-01 NOTE — TELEPHONE ENCOUNTER
Called and spoke with patients wife and informed her that there was already an order placed and they would just need to contact our scheduling department to get this scheduled. Supplied her with the scheduling departments number.

## 2024-08-06 ENCOUNTER — HOSPITAL ENCOUNTER (OUTPATIENT)
Dept: BONE DENSITY | Facility: HOSPITAL | Age: 69
Discharge: HOME | End: 2024-08-06
Admitting: INTERNAL MEDICINE
Payer: COMMERCIAL

## 2024-08-06 DIAGNOSIS — Z79.899 HIGH RISK MEDICATION USE: ICD-10-CM

## 2024-08-06 PROCEDURE — 77080 DXA BONE DENSITY AXIAL: CPT

## 2024-08-13 ENCOUNTER — SPECIALTY PHARMACY (OUTPATIENT)
Dept: PHARMACY | Facility: HOSPITAL | Age: 69
End: 2024-08-13
Payer: COMMERCIAL

## 2024-08-14 ENCOUNTER — TELEPHONE (OUTPATIENT)
Dept: INTERNAL MEDICINE | Facility: CLINIC | Age: 69
End: 2024-08-14

## 2024-08-14 NOTE — TELEPHONE ENCOUNTER
Caller: Evangelina Espino    Relationship: Emergency Contact    Best call back number: [phone] BUT , PLEASE CALL [phone]     What is the best time to reach you: ANYTIME    What was the call regarding: PATIENTS WIFE STATED THE PATIENT IS REQUESTING TO KNOW IF HE SHOULD GET A COVID SHOT OR NOT.    PATIENTS WIFE STATED THE PATIENT WILL GO TO A NEARBY PHARMACY FOR THIS IF NEEDED.    PLEASE CALL PATIENTS PHONE [phone] TO ADVISE.    Is it okay if the provider responds through [employer]: NO, PLEASE CALL

## 2024-08-16 ENCOUNTER — SPECIALTY PHARMACY (OUTPATIENT)
Dept: ONCOLOGY | Facility: HOSPITAL | Age: 69
End: 2024-08-16
Payer: COMMERCIAL

## 2024-08-16 ENCOUNTER — OFFICE VISIT (OUTPATIENT)
Dept: ONCOLOGY | Facility: CLINIC | Age: 69
End: 2024-08-16
Payer: COMMERCIAL

## 2024-08-16 ENCOUNTER — LAB (OUTPATIENT)
Dept: LAB | Facility: HOSPITAL | Age: 69
End: 2024-08-16
Payer: COMMERCIAL

## 2024-08-16 VITALS
WEIGHT: 196.9 LBS | OXYGEN SATURATION: 96 % | BODY MASS INDEX: 25.27 KG/M2 | HEIGHT: 74 IN | TEMPERATURE: 98.5 F | HEART RATE: 81 BPM | SYSTOLIC BLOOD PRESSURE: 125 MMHG | DIASTOLIC BLOOD PRESSURE: 60 MMHG | RESPIRATION RATE: 18 BRPM

## 2024-08-16 DIAGNOSIS — D62 POSTOPERATIVE ANEMIA DUE TO ACUTE BLOOD LOSS: ICD-10-CM

## 2024-08-16 DIAGNOSIS — Z79.899 HIGH RISK MEDICATION USE: ICD-10-CM

## 2024-08-16 DIAGNOSIS — N28.9 KIDNEY DISEASE: ICD-10-CM

## 2024-08-16 DIAGNOSIS — C91.10 CHRONIC LYMPHOCYTIC LEUKEMIA: ICD-10-CM

## 2024-08-16 DIAGNOSIS — C91.10 CHRONIC LYMPHOCYTIC LEUKEMIA: Primary | ICD-10-CM

## 2024-08-16 LAB
ALBUMIN SERPL-MCNC: 4.2 G/DL (ref 3.5–5.2)
ALBUMIN/GLOB SERPL: 1.6 G/DL
ALP SERPL-CCNC: 77 U/L (ref 39–117)
ALT SERPL W P-5'-P-CCNC: 16 U/L (ref 1–41)
ANION GAP SERPL CALCULATED.3IONS-SCNC: 8.3 MMOL/L (ref 5–15)
AST SERPL-CCNC: 25 U/L (ref 1–40)
BACTERIA UR QL AUTO: ABNORMAL /HPF
BASOPHILS # BLD AUTO: 0.02 10*3/MM3 (ref 0–0.2)
BASOPHILS NFR BLD AUTO: 0.3 % (ref 0–1.5)
BILIRUB SERPL-MCNC: 1.3 MG/DL (ref 0–1.2)
BILIRUB UR QL STRIP: ABNORMAL
BUN SERPL-MCNC: 15 MG/DL (ref 8–23)
BUN/CREAT SERPL: 11.2 (ref 7–25)
CALCIUM SPEC-SCNC: 9.4 MG/DL (ref 8.6–10.5)
CHLORIDE SERPL-SCNC: 104 MMOL/L (ref 98–107)
CLARITY UR: CLEAR
CO2 SERPL-SCNC: 26.7 MMOL/L (ref 22–29)
COLOR UR: YELLOW
CREAT SERPL-MCNC: 1.34 MG/DL (ref 0.76–1.27)
DEPRECATED RDW RBC AUTO: 52.5 FL (ref 37–54)
EGFRCR SERPLBLD CKD-EPI 2021: 57 ML/MIN/1.73
EOSINOPHIL # BLD AUTO: 0.03 10*3/MM3 (ref 0–0.4)
EOSINOPHIL NFR BLD AUTO: 0.4 % (ref 0.3–6.2)
ERYTHROCYTE [DISTWIDTH] IN BLOOD BY AUTOMATED COUNT: 13.7 % (ref 12.3–15.4)
GLOBULIN UR ELPH-MCNC: 2.6 GM/DL
GLUCOSE SERPL-MCNC: 129 MG/DL (ref 65–99)
GLUCOSE UR STRIP-MCNC: NEGATIVE MG/DL
HCT VFR BLD AUTO: 40.9 % (ref 37.5–51)
HGB BLD-MCNC: 13.7 G/DL (ref 13–17.7)
HGB UR QL STRIP.AUTO: NEGATIVE
HYALINE CASTS UR QL AUTO: ABNORMAL /LPF
IMM GRANULOCYTES # BLD AUTO: 0.02 10*3/MM3 (ref 0–0.05)
IMM GRANULOCYTES NFR BLD AUTO: 0.3 % (ref 0–0.5)
KETONES UR QL STRIP: ABNORMAL
LDH SERPL-CCNC: 143 U/L (ref 135–225)
LEUKOCYTE ESTERASE UR QL STRIP.AUTO: NEGATIVE
LYMPHOCYTES # BLD AUTO: 3.68 10*3/MM3 (ref 0.7–3.1)
LYMPHOCYTES NFR BLD AUTO: 46.5 % (ref 19.6–45.3)
MCH RBC QN AUTO: 35 PG (ref 26.6–33)
MCHC RBC AUTO-ENTMCNC: 33.5 G/DL (ref 31.5–35.7)
MCV RBC AUTO: 104.6 FL (ref 79–97)
MONOCYTES # BLD AUTO: 0.58 10*3/MM3 (ref 0.1–0.9)
MONOCYTES NFR BLD AUTO: 7.3 % (ref 5–12)
NEUTROPHILS NFR BLD AUTO: 3.58 10*3/MM3 (ref 1.7–7)
NEUTROPHILS NFR BLD AUTO: 45.2 % (ref 42.7–76)
NITRITE UR QL STRIP: NEGATIVE
NRBC BLD AUTO-RTO: 0 /100 WBC (ref 0–0.2)
PH UR STRIP.AUTO: 7.5 [PH] (ref 4.5–8)
PLATELET # BLD AUTO: 91 10*3/MM3 (ref 140–450)
PMV BLD AUTO: 11.2 FL (ref 6–12)
POTASSIUM SERPL-SCNC: 4.1 MMOL/L (ref 3.5–5.2)
PROT SERPL-MCNC: 6.8 G/DL (ref 6–8.5)
PROT UR QL STRIP: ABNORMAL
RBC # BLD AUTO: 3.91 10*6/MM3 (ref 4.14–5.8)
RBC # UR STRIP: ABNORMAL /HPF
REF LAB TEST METHOD: ABNORMAL
SODIUM SERPL-SCNC: 139 MMOL/L (ref 136–145)
SP GR UR STRIP: 1.02 (ref 1–1.03)
SQUAMOUS #/AREA URNS HPF: ABNORMAL /HPF
UROBILINOGEN UR QL STRIP: ABNORMAL
WBC # UR STRIP: ABNORMAL /HPF
WBC NRBC COR # BLD AUTO: 7.91 10*3/MM3 (ref 3.4–10.8)

## 2024-08-16 PROCEDURE — 80053 COMPREHEN METABOLIC PANEL: CPT

## 2024-08-16 PROCEDURE — 36415 COLL VENOUS BLD VENIPUNCTURE: CPT

## 2024-08-16 PROCEDURE — 83615 LACTATE (LD) (LDH) ENZYME: CPT

## 2024-08-16 PROCEDURE — 85025 COMPLETE CBC W/AUTO DIFF WBC: CPT

## 2024-08-16 PROCEDURE — 99214 OFFICE O/P EST MOD 30 MIN: CPT | Performed by: INTERNAL MEDICINE

## 2024-08-16 PROCEDURE — 81001 URINALYSIS AUTO W/SCOPE: CPT

## 2024-08-16 NOTE — PROGRESS NOTES
"  Subjective     REASON FOR FOLLOW UP:   1. CLL on BRUKINSA    HISTORY OF PRESENT ILLNESS:  On 06/04/2024, this 69-year-old patient who has CLL associated with Bence-Dawson proteinuria and is undergoing Brukinsa therapy returns to the office.  He is presently doing well with no nausea vomiting diarrhea significant skin rash.  He has noted no lymphadenopathy weight loss fevers chills or other constitutional symptoms.             Objective     Vitals:    08/16/24 1549   BP: 125/60   Pulse: 81   Resp: 18   Temp: 98.5 °F (36.9 °C)   TempSrc: Skin   SpO2: 96%   Weight: 89.3 kg (196 lb 14.4 oz)   Height: 188 cm (74.02\")   PainSc: 0-No pain           8/16/2024     3:47 PM   Current Status   ECOG score 0     EXAM       CONSTITUTIONAL: pleasant well-developed adult man  HEENT: no icterus, no thrush, moist membranes  LYMPH: no cervical or supraclavicular lad  CV: RRR, S1S2, no murmur, sternotomy scar  RESP: cta bilat, no wheezing, no rales  GI: soft, non-tender, no splenomegaly, +bs  MUSC: no edema, normal gait  NEURO: alert and oriented x3, normal strength  PSYCH: normal mood and affect  Skin fair complexion with AK's    RESULTS REVIEWED:  Results from last 7 days   Lab Units 08/16/24  1543   WBC 10*3/mm3 7.91   NEUTROS ABS 10*3/mm3 3.58   HEMOGLOBIN g/dL 13.7   HEMATOCRIT % 40.9   PLATELETS 10*3/mm3 91*     Lab Results   Component Value Date    GLUCOSE 96 06/04/2024    BUN 12 06/04/2024    CREATININE 1.16 06/04/2024     06/04/2024    K 4.1 06/04/2024     06/04/2024    CALCIUM 9.5 06/04/2024    PROTEINTOT 7.2 06/04/2024    ALBUMIN 4.6 06/04/2024    ALT 18 06/04/2024    AST 29 06/04/2024    ALKPHOS 84 06/04/2024    BILITOT 1.2 06/04/2024    GLOB 2.6 06/04/2024    AGRATIO 1.8 06/04/2024    BCR 10.3 06/04/2024    ANIONGAP 13.5 06/04/2024    EGFR 67.8 06/04/2024                 Assessment & Plan      *Chronic lymphocytic leukemia  CLL  initial diagnosis in 2005.   The patient was observed until March 2023 when he had " progressive lymphocytosis and initiated zanubrutinib which he continues  CBC today-WBC 7.9, hemoglobin 13.7, platelets 91    *Prophylaxis-  Bactrim DS 3 times weekly  Acyclovir for 100 mg twice daily    *Coronary artery disease status post CABG x 6 3/5/2024    Oncology plan/recommendations:  Continue zanubrutinib 160 mg twice daily  Continue prophylaxis as above  Platelet count adequate for aspirin therapy  4-month follow-up CBC CMP LDH urinalysis MD visit    Kuldeep To MD  08/16/24

## 2024-08-16 NOTE — PROGRESS NOTES
MTM Encounter-Re: Adherence and side effects (Brukinsa)    Today's encounter was conducted in person, face-to-face.     Medication:  Brukinsa 160 mg po bid  - Reason for outreach: Routine medication check-in .  - Administration: as prescribed .  - Missed doses: Patient reports missing 1 doses in the last 30 days.  - Self-administration: Patient demonstrates ability to self-administer medication. No barriers to adherence identified.   - Diagnosis/Indication: CLL. Progress toward achieving therapeutic goals reviewed.   - Patient denies side effects.    - Medication availability/affordability: Patient has had no issues obtaining medication from pharmacy.   - Questions/concerns about medications: none       Completed medication reconciliation today to assess for drug interactions.   Reviewed allergies, medical history, labs, quality of life( no issues reported), and medication history with patient.   Patient denies starting or stopping any medications.  Assessed medication list for interactions,  level c ddi per Up To Date with ASA and Brukinsa-can enhance antiplatelet effect.  No report of bruising, nosebleed, or blood in urine or stool.  Advised pt to call the clinic if any new medications are started so we can assess for drug-drug interactions.     All questions addressed. Patient had no additional concerns for MTM office.     Jazz Saenz RPH  8/16/2024  16:10 EDT

## 2024-08-19 ENCOUNTER — SPECIALTY PHARMACY (OUTPATIENT)
Dept: PHARMACY | Facility: HOSPITAL | Age: 69
End: 2024-08-19
Payer: COMMERCIAL

## 2024-08-19 NOTE — PROGRESS NOTES
Specialty Pharmacy Note: Brukinsa (zanubrutinib)    Francisco Espino is a 69 y.o. male with CLL was seen 8/16/24 by Dr. To. Per provider dictation, no changes to oral oncology regimen Brukinsa (zanubrutinib) 160 mg po bid.  Labs Review: The CMP and CBC from 8/16/24 have been reviewed. No dose adjustments are needed for the oral specialty medication(s) based on the labs.    Specialty pharmacy will continue to follow patient.    Jackeline Saenz Rph, VIGNESH  8/19/2024  08:36 EDT

## 2024-08-26 ENCOUNTER — SPECIALTY PHARMACY (OUTPATIENT)
Dept: PHARMACY | Facility: HOSPITAL | Age: 69
End: 2024-08-26
Payer: COMMERCIAL

## 2024-08-26 NOTE — PROGRESS NOTES
I contacted Mr. Espino to coordinate a refill shipment of Brukinsa. He has half of a bottle on-hand and asked that I schedule his delivery to arrive on 9/4.    Jeannette Bhakta - Care Coordinator   8/26/2024  14:17 EDT

## 2024-08-30 ENCOUNTER — SPECIALTY PHARMACY (OUTPATIENT)
Dept: PHARMACY | Facility: HOSPITAL | Age: 69
End: 2024-08-30
Payer: COMMERCIAL

## 2024-08-30 NOTE — PROGRESS NOTES
Specialty Pharmacy Patient Management Program  Oncology / Hematology Refill Outreach      Francisco was contacted today regarding refills of his medication(s).    Specialty medication(s) and dose(s) confirmed: Brukinsa    Refill Questions      Flowsheet Row Most Recent Value   Changes to allergies? No   Changes to medications? No   New conditions or infections since last clinic visit No   If yes, please describe  N/A   Unplanned office visit, urgent care, ED, or hospital admission in the last 4 weeks  No   How does patient/caregiver feel medication is working? Good   Financial problems or insurance changes  No   Since the previous refill, were any specialty medication doses or scheduled injections missed or delayed?  No   If yes, please provide the amount N/A   Why were doses missed? N/A   Does this patient require a clinical escalation to a pharmacist? No          Delivery Questions      Flowsheet Row Most Recent Value   Delivery method Beeline   Delivery address verified with patient/caregiver? Yes   Delivery address Home  [[facility], sig-required, to the home on 9/3 to arrive 9/4. Address confirmed. $0 co-pay.]   Number of medications in delivery 1   Medication(s) being filled and delivered Zanubrutinib (Antineoplastic Enzyme Inhibitors)   Doses left of specialty medications 12   Copay verified? Yes   Copay amount $0   Copay form of payment No copayment ($0)   Ship Date 9/3   Delivery Date 9/4   Signature Required Yes            Follow-Up: 21 Days [USUALLY TIME FRAME UNTIL NEXT REFILL OUTREACH FOLLOW-UP (APPROX) Ex. 25d, 85d, etc. ]    Jeannette Bhakta, Pharmacy Technician  8/30/2024  14:36 EDT

## 2024-09-23 ENCOUNTER — SPECIALTY PHARMACY (OUTPATIENT)
Dept: PHARMACY | Facility: HOSPITAL | Age: 69
End: 2024-09-23
Payer: COMMERCIAL

## 2024-09-23 DIAGNOSIS — C91.10 CHRONIC LYMPHOCYTIC LEUKEMIA: ICD-10-CM

## 2024-09-23 RX ORDER — ZANUBRUTINIB 80 MG/1
160 CAPSULE, GELATIN COATED ORAL 2 TIMES DAILY
Qty: 120 CAPSULE | Refills: 5 | Status: SHIPPED | OUTPATIENT
Start: 2024-09-23 | End: 2024-09-23 | Stop reason: SDUPTHER

## 2024-09-23 RX ORDER — ZANUBRUTINIB 80 MG/1
160 CAPSULE, GELATIN COATED ORAL 2 TIMES DAILY
Qty: 120 CAPSULE | Refills: 5 | Status: SHIPPED | OUTPATIENT
Start: 2024-09-23

## 2024-09-27 ENCOUNTER — SPECIALTY PHARMACY (OUTPATIENT)
Dept: PHARMACY | Facility: HOSPITAL | Age: 69
End: 2024-09-27
Payer: COMMERCIAL

## 2024-10-07 DIAGNOSIS — C91.10 CHRONIC LYMPHOCYTIC LEUKEMIA: ICD-10-CM

## 2024-10-07 RX ORDER — ACYCLOVIR 400 MG/1
400 TABLET ORAL 2 TIMES DAILY
Qty: 180 TABLET | Refills: 1 | Status: SHIPPED | OUTPATIENT
Start: 2024-10-07 | End: 2024-10-14 | Stop reason: SDUPTHER

## 2024-10-14 DIAGNOSIS — C91.10 CHRONIC LYMPHOCYTIC LEUKEMIA: ICD-10-CM

## 2024-10-14 RX ORDER — ACYCLOVIR 400 MG/1
400 TABLET ORAL 2 TIMES DAILY
Qty: 180 TABLET | Refills: 2 | Status: SHIPPED | OUTPATIENT
Start: 2024-10-14

## 2024-10-14 RX ORDER — ACYCLOVIR 400 MG/1
400 TABLET ORAL 2 TIMES DAILY
Qty: 180 TABLET | Refills: 1 | OUTPATIENT
Start: 2024-10-14

## 2024-10-14 NOTE — TELEPHONE ENCOUNTER
Caller: Evangelina Espino    Relationship: Emergency Contact    Best call back number: [phone]    Requested Prescriptions:   Requested Prescriptions     Pending Prescriptions Disp Refills    acyclovir (ZOVIRAX) 400 MG tablet 180 tablet 1     Sig: Take 1 tablet by mouth 2 (Two) Times a Day. Indications: PROPHYLAXIS        Pharmacy where request should be sent: [facility]/PHARMACY #[id number] - [place], [place] - [address] [phone] [facility] [phone]      Last office visit with prescribing clinician: 8/16/2024   Last telemedicine visit with prescribing clinician: Visit date not found   Next office visit with prescribing clinician: 12/6/2024       Does the patient have less than a 3 day supply:  [x] Yes  [] No    Milana Cortes Rep   10/14/24 11:38 EDT       SPOUSE CALLED WANTED TO SEE IF PATIENT STILL NEEDS TO CONTINUE MEDICATION AND IF SO THEY NEED REFILLED

## 2024-10-14 NOTE — TELEPHONE ENCOUNTER
Caller: Evangelina Espino    Relationship: Emergency Contact    Best call back number: [phone]    Requested Prescriptions:   Requested Prescriptions     Pending Prescriptions Disp Refills    acyclovir (ZOVIRAX) 400 MG tablet 180 tablet 1     Sig: Take 1 tablet by mouth 2 (Two) Times a Day. Indications: PROPHYLAXIS        Pharmacy where request should be sent:  [facility]    Last office visit with prescribing clinician: 8/16/2024   Last telemedicine visit with prescribing clinician: Visit date not found   Next office visit with prescribing clinician: 12/6/2024     Additional details provided by patient: WANTING TO KNOW IF HE NEEDS TO STAY ON MEDICATION, IF SO NEEDS REFILL     Does the patient have less than a 3 day supply:  [x] Yes  [] No    Would you like a call back once the refill request has been completed: [] Yes [] No    If the office needs to give you a call back, can they leave a voicemail: [] Yes [] No    Milana Pratt Rep   10/14/24 12:15 EDT

## 2024-10-21 ENCOUNTER — TELEPHONE (OUTPATIENT)
Dept: INTERNAL MEDICINE | Facility: CLINIC | Age: 69
End: 2024-10-21
Payer: COMMERCIAL

## 2024-10-21 DIAGNOSIS — Z12.11 COLON CANCER SCREENING: Primary | ICD-10-CM

## 2024-10-21 NOTE — TELEPHONE ENCOUNTER
Caller: Evangelina Espino    Relationship: Emergency Contact    Best call back number: [phone]     What is the medical concern/diagnosis: ROUTINE COLONOSCOPY     What specialty or service is being requested: GASTRO    What is the provider, practice or medical service name:NA      Any additional details: THEY BELIEVE PATIENT IS DUE FOR COLONOSCOPY AND WANT TO KNOW IF REFERRAL CAN BE PLACED. PLEASE CALL AND ADVISE.

## 2024-10-25 ENCOUNTER — OFFICE VISIT (OUTPATIENT)
Age: 69
End: 2024-10-25
Payer: COMMERCIAL

## 2024-10-25 VITALS
DIASTOLIC BLOOD PRESSURE: 70 MMHG | BODY MASS INDEX: 25.15 KG/M2 | HEIGHT: 74 IN | WEIGHT: 196 LBS | SYSTOLIC BLOOD PRESSURE: 112 MMHG | HEART RATE: 82 BPM

## 2024-10-25 DIAGNOSIS — I25.10 CORONARY ARTERY DISEASE INVOLVING NATIVE CORONARY ARTERY OF NATIVE HEART WITHOUT ANGINA PECTORIS: Primary | ICD-10-CM

## 2024-10-25 DIAGNOSIS — Z95.1 S/P CABG X 6: ICD-10-CM

## 2024-10-25 PROCEDURE — 99214 OFFICE O/P EST MOD 30 MIN: CPT | Performed by: STUDENT IN AN ORGANIZED HEALTH CARE EDUCATION/TRAINING PROGRAM

## 2024-10-25 RX ORDER — CETIRIZINE HYDROCHLORIDE 10 MG/1
1 TABLET ORAL DAILY
COMMUNITY

## 2024-10-25 NOTE — PROGRESS NOTES
Subjective:     Encounter Date: 10/25/2024      Patient ID: Francisco Espino is a 69 y.o. male.    Chief Complaint:  CAD status post CABG    HPI:   69 y.o. male with CAD with NSTEMI and POBA to RCA and subsequent CABG x 6 with Dr. Mott on 3/5/2024 (LIMA to diagonal/LAD, SVG to PDA, SVG to RCA, SVG to OM1, SVG to OM 2), CLL, hypertension, hyperlipidemia, CKD who presents for follow up.  He was last seen in March and was doing okay at that time.  He was experiencing easy fatigue and dyspnea exertion as well as surgical site pain at that time.  Since then, his dyspnea has not resolved.  He still has some fatigue though it is much improved.  Unfortunately he had a detached retina couple of weeks ago and had a procedure to repair this.      The following portions of the patient's history were reviewed and updated as appropriate: allergies, current medications, past family history, past medical history, past social history, past surgical history and problem list.     REVIEW OF SYSTEMS:   All systems reviewed.  Pertinent positives identified in HPI.  All other systems are negative.    Past Medical History:   Diagnosis Date    Abnormal liver function test     Alcohol abuse     CLL (chronic lymphocytic leukemia)     Coronary artery disease     stent    Heart disease     History of pneumonia     1/2019    Hyperlipidemia     Hypertension     Inguinal hernia     left side to have surgery 3/28/19    Myocardial infarction     Screen for colon cancer 09/2016    Negative Cologaurd    Screening PSA (prostate specific antigen) 02/2013    .5    Thrombocytopenia        Family History   Problem Relation Age of Onset    Heart attack Mother     Heart disease Mother     Diabetes Mother     Aortic aneurysm Mother     Coronary artery disease Mother     Early death Mother     No Known Problems Father     Diabetes type II Other     Diabetes Brother     Hyperlipidemia Brother     Stroke Sister 65    Diabetes Sister     Hyperlipidemia Sister      Hypertension Sister     No Known Problems Brother     Stomach cancer Paternal Uncle     Malig Hyperthermia Neg Hx        Social History     Socioeconomic History    Marital status:      Spouse name: Charlotte Espino   Tobacco Use    Smoking status: Never     Passive exposure: Never    Smokeless tobacco: Never   Vaping Use    Vaping status: Never Used   Substance and Sexual Activity    Alcohol use: Yes     Alcohol/week: 21.0 standard drinks of alcohol     Types: 21 Shots of liquor per week    Drug use: No    Sexual activity: Defer       Allergies   Allergen Reactions    Codeine Nausea Only and Nausea And Vomiting       Past Surgical History:   Procedure Laterality Date    CARDIAC CATHETERIZATION  2017    CATARACT EXTRACTION Bilateral     COLONOSCOPY N/A 06/05/2006    Normal, repeat in 10 years, Dr. Preethi Gonzalez    COLONOSCOPY N/A 7/15/2020    Procedure: COLONOSCOPY TO CECUM & T.I. WITH COLD SNARE POLYPECTOMIES, CLIP PLACEMENT X 2;  Surgeon: Yennifer Salazar MD;  Location: [facility] ENDOSCOPY;  Service: Gastroenterology;  Laterality: N/A;  PRE- POSITIVE COLOGUARD  POST- COLON POLYPS, DIVERTICULOSIS, HEMORRHOIDS    CORONARY ANGIOPLASTY WITH STENT PLACEMENT N/A 05/09/2017    Left heart catheterization, Selective native vessel coronary arteriography, Left ventriculography, Successful percutaneous intervention to the 100% occluded right coronary artery with a 3.5 x 38 mm Synergy drug-eluting stent (post-dilated w/ a 4.0 x 20 mm NC Emerge balloon), Selective right femoral angiography, Successful Perclose arteriotomy closure. Dr. James Pierson    CORONARY ARTERY BYPASS GRAFT N/A 3/5/2024    Procedure: STERNOTOMY, CORONARY ARTERY BYPASS GRAFTING TRANSESOPHAGEAL ECHOCARDIOGRAM X6, UTILIZING THE LEFT AUGUSTUS AND RIGHT SAPHENOUS VEIN.  ADRIANE, PRP WITH ANESTHESIA;  Surgeon: Jr Jean Marie Mott MD;  Location: [facility] CVOR;  Service: Cardiothoracic;  Laterality: N/A;    INGUINAL HERNIA REPAIR Left     INGUINAL HERNIA  REPAIR Right     x2    INGUINAL HERNIA REPAIR Left 3/28/2019    Procedure: LEFT INGUINAL HERNIA REPAIR LAPAROSCOPIC;  Surgeon: Preethi Gonzalez MD;  Location: [facility];  Service: General    LAPAROSCOPIC INGUINAL HERNIA REPAIR Right 10/03/2002    w/ extra peritoneal Goe-Benton mesh (transperitoneal repair), Dr. Preethi Gonzalez    REFRACTIVE SURGERY Bilateral     RETINAL DETACHMENT SURGERY Right 07/02/2013       Procedures       Objective:         Vitals:    10/25/24 1317   BP: 112/70   Pulse: 82       PHYSICAL EXAM:  GEN: well appearing, in NAD   HEENT: NCAT, EOMI, moist mucus membranes   Respiratory: CTAB, no wheezes, rales or rhonchi  CV: normal rate, regular rhythm, normal S1, S2, no murmurs, rubs, gallops, +2 radial pulses b/l  GI: soft, nontender, nondistended  MSK: no edema  Skin: no rash, warm, dry  Heme/Lymph: no bruising or bleeding  Neuro: Alert and Oriented x 3, grossly normal motor function        Assessment:         (I25.10) Coronary artery disease involving native coronary artery of native heart without angina pectoris    (Z95.1) S/P CABG x 6    69 y.o. male with CAD with NSTEMI and POBA to RCA and subsequent CABG x 6 with Dr. Mott on 3/5/2024 (LIMA to diagonal/LAD, SVG to PDA, SVG to RCA, SVG to OM1, SVG to OM 2), CLL, hypertension, hyperlipidemia, CKD who presents for follow up.       Plan:       #CAD status post CABG  Recent NSTEMI with POBA to RCA and subsequent CABG x 6 with Dr. Mott on 3/5/2024 (LIMA to diagonal/LAD, SVG to PDA, SVG to RCA, SVG to OM1, SVG to OM 2.)  Significant postoperative anemia with hemoglobin down to 8.1 from 12.5.  This is likely contributing to his symptoms.  To be sure of no change in EF, will obtain echocardiogram.  - Continue aspirin 80 mg daily, plavix discontinued presumably due to recent detached retina, ok to not restart  - continue Toprol 25 mg daily    Dr Mott, thank you very much for referring this kind patient to me. Please call me with any questions or  concerns. I will see the patient again in the office in 6 months or earlier as needed.         Emery Yeung MD, [facility]  10/25/24  [facility]    Outpatient Encounter Medications as of 10/25/2024   Medication Sig Dispense Refill    acetaminophen (TYLENOL) 500 MG tablet Take 2 tablets by mouth Every 6 (Six) Hours As Needed for Mild Pain. Indications: Pain      acyclovir (ZOVIRAX) 400 MG tablet Take 1 tablet by mouth 2 (Two) Times a Day. Indications: PROPHYLAXIS 180 tablet 2    aspirin 81 MG EC tablet Take 1 tablet by mouth Every Other Day. (Patient taking differently: Take 1 tablet by mouth Daily. Indications: Coronary Bypass Surgery) 30 tablet 0    cetirizine (KLS Aller-Lesli) 10 MG tablet Take 1 tablet by mouth Daily.      metoprolol succinate XL (TOPROL-XL) 25 MG 24 hr tablet Take 1 tablet by mouth Daily. 90 tablet 3    rosuvastatin (CRESTOR) 40 MG tablet Take 1 tablet by mouth Every Night. Indications: High Amount of Fats in the Blood 90 tablet 3    sulfamethoxazole-trimethoprim (BACTRIM DS,SEPTRA DS) 800-160 MG per tablet TAKE 1 TABLET BY MOUTH THREE TIMES A WEEK. 12 tablet 7    Zanubrutinib (Brukinsa) 80 MG chemo capsule Take 2 capsules by mouth 2 (Two) Times a Day. 120 capsule 5    [DISCONTINUED] ondansetron ODT (ZOFRAN-ODT) 8 MG disintegrating tablet DISSOLVE 1 TABLET UNDER THE TONGUE EVERY 8 HOURS AS NEEDED FOR NAUSEA/VOMITING 9 tablet 14     No facility-administered encounter medications on file as of 10/25/2024.

## 2024-10-28 ENCOUNTER — SPECIALTY PHARMACY (OUTPATIENT)
Dept: PHARMACY | Facility: HOSPITAL | Age: 69
End: 2024-10-28
Payer: COMMERCIAL

## 2024-10-28 NOTE — PROGRESS NOTES
Specialty Pharmacy Patient Management Program  Oncology / Hematology Refill Outreach      Francisco was contacted today regarding refills of his medication(s).    Specialty medication(s) and dose(s) confirmed: Brukinsa    Refill Questions      Flowsheet Row Most Recent Value   Changes to allergies? No   Changes to medications? No   New conditions or infections since last clinic visit No   If yes, please describe  N/A   Unplanned office visit, urgent care, ED, or hospital admission in the last 4 weeks  No   How does patient/caregiver feel medication is working? Good   Financial problems or insurance changes  No   Since the previous refill, were any specialty medication doses or scheduled injections missed or delayed?  No   If yes, please provide the amount N/A   Why were doses missed? N/A   Does this patient require a clinical escalation to a pharmacist? No          Delivery Questions      Flowsheet Row Most Recent Value   Delivery method Beeline   Delivery address verified with patient/caregiver? Yes   Delivery address Home  [Shp to the patient on 11/4 to arrive 11/5. Address confirmed. $0 co-pay.]   Number of medications in delivery 1   Medication(s) being filled and delivered Zanubrutinib (Brukinsa)   Doses left of specialty medications about 10   Copay verified? Yes   Copay amount $0   Copay form of payment No copayment ($0)   Ship Date 11/4   Delivery Date 11/5   Signature Required No            Follow-Up: 21 D [USUALLY TIME FRAME UNTIL NEXT REFILL OUTREACH FOLLOW-UP (APPROX) Ex. 25d, 85d, etc. ]    Jeannette Bhakta, Pharmacy Technician  10/28/2024  15:28 EDT

## 2024-11-18 ENCOUNTER — TELEPHONE (OUTPATIENT)
Dept: INTERNAL MEDICINE | Facility: CLINIC | Age: 69
End: 2024-11-18

## 2024-11-18 NOTE — TELEPHONE ENCOUNTER
Caller: Francisco Espino    Relationship: Self    Best call back number: [phone]     What orders are you requesting (i.e. lab or imaging): COLONOSCOPY     In what timeframe would the patient need to come in: ASAP     Where will you receive your lab/imaging services: [facility] AT [place] [address] [address] [facility] [address]       Additional notes:       FAX NUMBER TO SEND THE ORDER [phone]

## 2024-11-27 ENCOUNTER — SPECIALTY PHARMACY (OUTPATIENT)
Dept: PHARMACY | Facility: HOSPITAL | Age: 69
End: 2024-11-27
Payer: COMMERCIAL

## 2024-11-27 RX ORDER — AMIODARONE HYDROCHLORIDE 200 MG/1
200 TABLET ORAL DAILY
Qty: 30 TABLET | Refills: 0 | OUTPATIENT
Start: 2024-11-27

## 2024-11-27 NOTE — PROGRESS NOTES
I contacted Mr. Espino to coordinate a refill shipment of Brukinsa. He  has a two week supply on-hand so we agreed that I would call back in one week  to coordinate the next refill shipment.     Jeannette Bhakta - Care Coordinator   11/27/2024  15:15 EST

## 2024-12-05 ENCOUNTER — SPECIALTY PHARMACY (OUTPATIENT)
Dept: PHARMACY | Facility: TELEHEALTH | Age: 69
End: 2024-12-05
Payer: COMMERCIAL

## 2024-12-05 ENCOUNTER — TELEPHONE (OUTPATIENT)
Dept: ONCOLOGY | Facility: CLINIC | Age: 69
End: 2024-12-05
Payer: COMMERCIAL

## 2024-12-05 NOTE — PROGRESS NOTES
Specialty Pharmacy Refill Coordination Note     Francisco is a 69 y.o. male contacted today regarding refills of  BRUKINSA specialty medication(s).    Reviewed and verified with patient:  Allergies  Meds       Specialty medication(s) and dose(s) confirmed: yes    Refill Questions      Flowsheet Row Most Recent Value   Changes to allergies? No   Changes to medications? No   New conditions or infections since last clinic visit No   If yes, please describe  N/A   Unplanned office visit, urgent care, ED, or hospital admission in the last 4 weeks  No   How does patient/caregiver feel medication is working? Good   Financial problems or insurance changes  No   Since the previous refill, were any specialty medication doses or scheduled injections missed or delayed?  No   If yes, please provide the amount N/A   Why were doses missed? N/A   Does this patient require a clinical escalation to a pharmacist? No            Delivery Questions      Flowsheet Row Most Recent Value   Delivery method Beeline   Delivery address verified with patient/caregiver? Yes   Delivery address Home   Number of medications in delivery 1   Medication(s) being filled and delivered Zanubrutinib (Brukinsa)   Doses left of specialty medications 10   Copay verified? Yes   Copay amount $0   Copay form of payment No copayment ($0)   Ship Date 12/9   Delivery Date 12/10   Signature Required No                   Follow-up: 23 day(s)     Sondra Benavides, Pharmacy Technician  Specialty Pharmacy Technician        
Principal Discharge DX:	Fever

## 2024-12-05 NOTE — TELEPHONE ENCOUNTER
Caller: Evangelina Espino    Relationship to patient: Emergency Contact    Best call back number: [phone]    Type of visit: LAB, SPEC PHARMACY, FU    Requested date: FIRST AVAIL     If rescheduling, when is the original appointment: 12/6

## 2024-12-09 ENCOUNTER — TELEPHONE (OUTPATIENT)
Dept: GASTROENTEROLOGY | Facility: CLINIC | Age: 69
End: 2024-12-09
Payer: COMMERCIAL

## 2024-12-09 NOTE — TELEPHONE ENCOUNTER
POSITIVE COLOGUARD    LAST C/S  7/15/2020    IN EPIC     PERSONAL HX OF POLYPS    NO FAMILY HX OF POLYPS    NO FAMILY HX OF COLON CA     ASA OR BLOOD THINNERS          LIST OF  MEDICATIONS    BRU KINSQ  ACYCLOVIR  ROSUVASATIN  METOPROLO  SULFAMETHOXAS          OA QUESTIONNAIRE SCANNED IN MEDIA

## 2024-12-11 ENCOUNTER — PREP FOR SURGERY (OUTPATIENT)
Dept: OTHER | Facility: HOSPITAL | Age: 69
End: 2024-12-11
Payer: COMMERCIAL

## 2024-12-11 DIAGNOSIS — Z12.11 ENCOUNTER FOR SCREENING FOR MALIGNANT NEOPLASM OF COLON: Primary | ICD-10-CM

## 2024-12-11 DIAGNOSIS — Z86.0100 HISTORY OF COLON POLYPS: ICD-10-CM

## 2024-12-18 ENCOUNTER — TELEPHONE (OUTPATIENT)
Dept: INTERNAL MEDICINE | Facility: CLINIC | Age: 69
End: 2024-12-18
Payer: COMMERCIAL

## 2024-12-18 DIAGNOSIS — I25.119 CORONARY ARTERY DISEASE INVOLVING NATIVE CORONARY ARTERY OF NATIVE HEART WITH ANGINA PECTORIS: ICD-10-CM

## 2024-12-18 DIAGNOSIS — I10 ESSENTIAL HYPERTENSION: Primary | ICD-10-CM

## 2024-12-18 DIAGNOSIS — E78.2 MIXED HYPERLIPIDEMIA: ICD-10-CM

## 2024-12-18 DIAGNOSIS — R73.9 HYPERGLYCEMIA: ICD-10-CM

## 2024-12-19 ENCOUNTER — TELEPHONE (OUTPATIENT)
Dept: GASTROENTEROLOGY | Facility: CLINIC | Age: 69
End: 2024-12-19
Payer: COMMERCIAL

## 2024-12-19 NOTE — TELEPHONE ENCOUNTER
TEAGAN MENA  FOR COLON ON 04/15/2025  ARRIVE AT  730AM Mailed Prep instructions to Mailing address on-file. OK FOR HUB TO READ

## 2025-01-02 ENCOUNTER — SPECIALTY PHARMACY (OUTPATIENT)
Dept: PHARMACY | Facility: HOSPITAL | Age: 70
End: 2025-01-02
Payer: COMMERCIAL

## 2025-01-02 NOTE — PROGRESS NOTES
Specialty Pharmacy Patient Management Program  Oncology / Hematology Refill Outreach      Francisco was contacted today regarding refills of his medication(s).    Specialty medication(s) and dose(s) confirmed: Brukinsa    Refill Questions      Flowsheet Row Most Recent Value   Changes to allergies? No   Changes to medications? No   New conditions or infections since last clinic visit No   If yes, please describe  N/A   Unplanned office visit, urgent care, ED, or hospital admission in the last 4 weeks  No   How does patient/caregiver feel medication is working? Good   Financial problems or insurance changes  No   Since the previous refill, were any specialty medication doses or scheduled injections missed or delayed?  No   If yes, please provide the amount N/A   Why were doses missed? N/A   Does this patient require a clinical escalation to a pharmacist? No          Delivery Questions      Flowsheet Row Most Recent Value   Delivery method UPS   Delivery address verified with patient/caregiver? Yes   Delivery address Home   Number of medications in delivery 1   Medication(s) being filled and delivered Zanubrutinib (Brukinsa)   Doses left of specialty medications 14   Copay verified? Yes   Copay amount $0   Copay form of payment No copayment ($0)   Ship Date 1/6   Delivery Date 1/7   Signature Required No            Follow-Up: 21 d [USUALLY TIME FRAME UNTIL NEXT REFILL OUTREACH FOLLOW-UP (APPROX) Ex. 25d, 85d, etc. ]    Jeannette Bhakta, Pharmacy Technician  1/2/2025  14:24 EST

## 2025-01-13 NOTE — PROGRESS NOTES
"  Subjective     REASON FOR FOLLOW UP:   1. CLL on BRUKINSA    HISTORY OF PRESENT ILLNESS:  On 06/04/2024, this 69-year-old patient who has CLL associated with Bence-Dawson proteinuria and is undergoing Brukinsa therapy returns to the office.  He is presently doing well with no nausea vomiting diarrhea significant skin rash.  He has noted no lymphadenopathy weight loss fevers chills or other constitutional symptoms.  No new complaints brought forth by the patient today.             Objective     Vitals:    01/17/25 1226   BP: 132/86   Pulse: 120   Resp: 16   Temp: 97.4 °F (36.3 °C)   TempSrc: Oral   SpO2: 95%   Weight: 88 kg (193 lb 14.4 oz)   Height: 188 cm (74.02\")   PainSc: 0-No pain             1/17/2025    12:26 PM   Current Status   ECOG score 0     EXAM       CONSTITUTIONAL: pleasant well-developed adult man  HEENT: no icterus, no thrush, moist membranes  LYMPH: no cervical or supraclavicular lad  CV: RRR, S1S2, no murmur, sternotomy scar  RESP: cta bilat, no wheezing, no rales  GI: soft, non-tender, no splenomegaly, +bs  MUSC: no edema, normal gait  NEURO: alert and oriented x3, normal strength  PSYCH: normal mood and affect  Skin fair complexion with AK's    RESULTS REVIEWED:  Results from last 7 days   Lab Units 01/17/25  1234   WBC 10*3/mm3 6.87   NEUTROS ABS 10*3/mm3 2.58   HEMOGLOBIN g/dL 14.5   HEMATOCRIT % 42.3   PLATELETS 10*3/mm3 87*       Lab Results   Component Value Date    GLUCOSE 129 (H) 08/16/2024    BUN 15 08/16/2024    CREATININE 1.34 (H) 08/16/2024     08/16/2024    K 4.1 08/16/2024     08/16/2024    CALCIUM 9.4 08/16/2024    PROTEINTOT 6.8 08/16/2024    ALBUMIN 4.2 08/16/2024    ALT 16 08/16/2024    AST 25 08/16/2024    ALKPHOS 77 08/16/2024    BILITOT 1.3 (H) 08/16/2024    GLOB 2.6 08/16/2024    AGRATIO 1.6 08/16/2024    BCR 11.2 08/16/2024    ANIONGAP 8.3 08/16/2024    EGFR 57.0 (L) 08/16/2024                 Assessment & Plan      *Chronic lymphocytic leukemia  CLL  initial " diagnosis in 2005.   The patient was observed until March 2023 when he had progressive lymphocytosis and initiated zanubrutinib which he continues  CBC today-WBC 6.87, hemoglobin 14.5, platelets 87    *Prophylaxis-  Bactrim DS 3 times weekly  Acyclovir for 100 mg twice daily    *Coronary artery disease status post CABG x 6 3/5/2024    Oncology plan/recommendations:  Continue zanubrutinib 160 mg twice daily  Continue prophylaxis as above  Platelet count adequate for aspirin therapy  Recommended dermatology evaluation of SKs/full skin exam  4-month follow-up CBC CMP LDH urinalysis MD visit    Kuldeep To MD  01/17/25

## 2025-01-14 RX ORDER — LISINOPRIL 5 MG/1
5 TABLET ORAL DAILY
Qty: 90 TABLET | Refills: 1 | OUTPATIENT
Start: 2025-01-14

## 2025-01-14 NOTE — TELEPHONE ENCOUNTER
The original prescription was discontinued on 8/16/2024   Rx Refill Note  Requested Prescriptions     Refused Prescriptions Disp Refills    lisinopril (PRINIVIL,ZESTRIL) 5 MG tablet [Pharmacy Med Name: LISINOPRIL 5MG TABS] 90 tablet 1     Sig: TAKE ONE TABLET BY MOUTH EVERY DAY      Last office visit with prescribing clinician: 7/8/2024   Last telemedicine visit with prescribing clinician: Visit date not found   Next office visit with prescribing clinician: Visit date not found                         Would you like a call back once the refill request has been completed: [] Yes [] No    If the office needs to give you a call back, can they leave a voicemail: [] Yes [] No    Renetta Maurer MA  01/14/25, 08:45 EST

## 2025-01-17 ENCOUNTER — LAB (OUTPATIENT)
Dept: LAB | Facility: HOSPITAL | Age: 70
End: 2025-01-17
Payer: COMMERCIAL

## 2025-01-17 ENCOUNTER — OFFICE VISIT (OUTPATIENT)
Dept: ONCOLOGY | Facility: CLINIC | Age: 70
End: 2025-01-17
Payer: COMMERCIAL

## 2025-01-17 ENCOUNTER — SPECIALTY PHARMACY (OUTPATIENT)
Dept: ONCOLOGY | Facility: HOSPITAL | Age: 70
End: 2025-01-17
Payer: COMMERCIAL

## 2025-01-17 VITALS
BODY MASS INDEX: 24.88 KG/M2 | TEMPERATURE: 97.4 F | RESPIRATION RATE: 16 BRPM | SYSTOLIC BLOOD PRESSURE: 132 MMHG | OXYGEN SATURATION: 95 % | HEIGHT: 74 IN | DIASTOLIC BLOOD PRESSURE: 86 MMHG | WEIGHT: 193.9 LBS | HEART RATE: 120 BPM

## 2025-01-17 DIAGNOSIS — C91.10 CHRONIC LYMPHOCYTIC LEUKEMIA: ICD-10-CM

## 2025-01-17 DIAGNOSIS — C91.10 CHRONIC LYMPHOCYTIC LEUKEMIA: Primary | ICD-10-CM

## 2025-01-17 LAB
ALBUMIN SERPL-MCNC: 4.5 G/DL (ref 3.5–5.2)
ALBUMIN/GLOB SERPL: 1.9 G/DL
ALP SERPL-CCNC: 82 U/L (ref 39–117)
ALT SERPL W P-5'-P-CCNC: 57 U/L (ref 1–41)
ANION GAP SERPL CALCULATED.3IONS-SCNC: 13.7 MMOL/L (ref 5–15)
AST SERPL-CCNC: 67 U/L (ref 1–40)
BASOPHILS # BLD AUTO: 0.02 10*3/MM3 (ref 0–0.2)
BASOPHILS NFR BLD AUTO: 0.3 % (ref 0–1.5)
BILIRUB SERPL-MCNC: 1.6 MG/DL (ref 0–1.2)
BILIRUB UR QL STRIP: ABNORMAL
BUN SERPL-MCNC: 9 MG/DL (ref 8–23)
BUN/CREAT SERPL: 7.6 (ref 7–25)
CALCIUM SPEC-SCNC: 9.5 MG/DL (ref 8.6–10.5)
CHLORIDE SERPL-SCNC: 103 MMOL/L (ref 98–107)
CLARITY UR: CLEAR
CO2 SERPL-SCNC: 25.3 MMOL/L (ref 22–29)
COLOR UR: YELLOW
CREAT SERPL-MCNC: 1.18 MG/DL (ref 0.76–1.27)
DEPRECATED RDW RBC AUTO: 54.3 FL (ref 37–54)
EGFRCR SERPLBLD CKD-EPI 2021: 66.4 ML/MIN/1.73
EOSINOPHIL # BLD AUTO: 0.03 10*3/MM3 (ref 0–0.4)
EOSINOPHIL NFR BLD AUTO: 0.4 % (ref 0.3–6.2)
ERYTHROCYTE [DISTWIDTH] IN BLOOD BY AUTOMATED COUNT: 13.7 % (ref 12.3–15.4)
GLOBULIN UR ELPH-MCNC: 2.4 GM/DL
GLUCOSE SERPL-MCNC: 121 MG/DL (ref 65–99)
GLUCOSE UR STRIP-MCNC: NEGATIVE MG/DL
HCT VFR BLD AUTO: 42.3 % (ref 37.5–51)
HGB BLD-MCNC: 14.5 G/DL (ref 13–17.7)
HGB UR QL STRIP.AUTO: ABNORMAL
IMM GRANULOCYTES # BLD AUTO: 0.01 10*3/MM3 (ref 0–0.05)
IMM GRANULOCYTES NFR BLD AUTO: 0.1 % (ref 0–0.5)
KETONES UR QL STRIP: ABNORMAL
LDH SERPL-CCNC: 166 U/L (ref 135–225)
LEUKOCYTE ESTERASE UR QL STRIP.AUTO: NEGATIVE
LYMPHOCYTES # BLD AUTO: 3.77 10*3/MM3 (ref 0.7–3.1)
LYMPHOCYTES NFR BLD AUTO: 54.9 % (ref 19.6–45.3)
MCH RBC QN AUTO: 36.7 PG (ref 26.6–33)
MCHC RBC AUTO-ENTMCNC: 34.3 G/DL (ref 31.5–35.7)
MCV RBC AUTO: 107.1 FL (ref 79–97)
MONOCYTES # BLD AUTO: 0.46 10*3/MM3 (ref 0.1–0.9)
MONOCYTES NFR BLD AUTO: 6.7 % (ref 5–12)
NEUTROPHILS NFR BLD AUTO: 2.58 10*3/MM3 (ref 1.7–7)
NEUTROPHILS NFR BLD AUTO: 37.6 % (ref 42.7–76)
NITRITE UR QL STRIP: NEGATIVE
NRBC BLD AUTO-RTO: 0 /100 WBC (ref 0–0.2)
PH UR STRIP.AUTO: 5.5 [PH] (ref 4.5–8)
PLATELET # BLD AUTO: 87 10*3/MM3 (ref 140–450)
PMV BLD AUTO: 10.9 FL (ref 6–12)
POTASSIUM SERPL-SCNC: 4.4 MMOL/L (ref 3.5–5.2)
PROT SERPL-MCNC: 6.9 G/DL (ref 6–8.5)
PROT UR QL STRIP: ABNORMAL
RBC # BLD AUTO: 3.95 10*6/MM3 (ref 4.14–5.8)
SODIUM SERPL-SCNC: 142 MMOL/L (ref 136–145)
SP GR UR STRIP: >=1.03 (ref 1–1.03)
UROBILINOGEN UR QL STRIP: ABNORMAL
WBC NRBC COR # BLD AUTO: 6.87 10*3/MM3 (ref 3.4–10.8)

## 2025-01-17 PROCEDURE — 85025 COMPLETE CBC W/AUTO DIFF WBC: CPT

## 2025-01-17 PROCEDURE — 81003 URINALYSIS AUTO W/O SCOPE: CPT

## 2025-01-17 PROCEDURE — 83615 LACTATE (LD) (LDH) ENZYME: CPT

## 2025-01-17 PROCEDURE — 36415 COLL VENOUS BLD VENIPUNCTURE: CPT

## 2025-01-17 PROCEDURE — 80053 COMPREHEN METABOLIC PANEL: CPT

## 2025-01-17 PROCEDURE — 99214 OFFICE O/P EST MOD 30 MIN: CPT | Performed by: INTERNAL MEDICINE

## 2025-01-17 NOTE — PROGRESS NOTES
Specialty Pharmacy Patient Management Program  Oncology Reassessment     Francisco Espino was referred by an their provider to the Oncology Patient Management program offered by [facility] for CLL. A follow-up outreach was conducted, including assessment of continued therapy appropriateness, medication adherence, and side effect incidence and management for Brukinsa 160 mg po bid.    Changes to Insurance Coverage or Financial Support  None at this time    Relevant Past Medical History and Comorbidities  Relevant medical history and concomitant health conditions were discussed with the patient. The patient's chart has been reviewed for relevant past medical history and comorbid health conditions and updated as necessary.   Past Medical History:   Diagnosis Date    Abnormal liver function test     Alcohol abuse     CLL (chronic lymphocytic leukemia)     Coronary artery disease     stent    Heart disease     History of pneumonia     1/2019    Hyperlipidemia     Hypertension     Inguinal hernia     left side to have surgery 3/28/19    Myocardial infarction     Screen for colon cancer 09/2016    Negative Cologaurd    Screening PSA (prostate specific antigen) 02/2013    .5    Thrombocytopenia      Social History     Socioeconomic History    Marital status:      Spouse name: Charlotte Espino   Tobacco Use    Smoking status: Never     Passive exposure: Never    Smokeless tobacco: Never   Vaping Use    Vaping status: Never Used   Substance and Sexual Activity    Alcohol use: Yes     Alcohol/week: 21.0 standard drinks of alcohol     Types: 21 Shots of liquor per week    Drug use: No    Sexual activity: Defer     Problem list reviewed by Jazz Saenz RP on 1/17/2025 at 12:57 PM    Hospitalizations and Urgent Care Since Last Assessment  ED Visits, Admissions, or Hospitalizations: none  Urgent Office Visits: none    Allergies  Known allergies and reactions were discussed with the patient. The  patient's chart has been reviewed for allergy information and updated as necessary.   Allergies   Allergen Reactions    Codeine Nausea Only and Nausea And Vomiting     Allergies reviewed by Jazz Saenz RPH on 1/17/2025 at 12:57 PM    Relevant Laboratory Values  Relevant laboratory values were discussed with the patient. The following specialty medication dose adjustment(s) are recommended: No dose adjustments are needed for the oral specialty medication(s) based on the labs.    Lab Results   Component Value Date    GLUCOSE 129 (H) 08/16/2024    CALCIUM 9.4 08/16/2024     08/16/2024    K 4.1 08/16/2024    CO2 26.7 08/16/2024     08/16/2024    BUN 15 08/16/2024    CREATININE 1.34 (H) 08/16/2024    EGFRIFAFRI 70 07/09/2021    EGFRIFNONA 60 (L) 01/06/2022    BCR 11.2 08/16/2024    ANIONGAP 8.3 08/16/2024     Lab Results   Component Value Date    WBC 6.87 01/17/2025    RBC 3.95 (L) 01/17/2025    HGB 14.5 01/17/2025    HCT 42.3 01/17/2025    .1 (H) 01/17/2025    MCH 36.7 (H) 01/17/2025    MCHC 34.3 01/17/2025    RDW 13.7 01/17/2025    RDWSD 54.3 (H) 01/17/2025    MPV 10.9 01/17/2025    PLT 87 (L) 01/17/2025    NEUTRORELPCT 37.6 (L) 01/17/2025    LYMPHORELPCT 54.9 (H) 01/17/2025    MONORELPCT 6.7 01/17/2025    EOSRELPCT 0.4 01/17/2025    BASORELPCT 0.3 01/17/2025    AUTOIGPER 0.1 01/17/2025    NEUTROABS 2.58 01/17/2025    LYMPHSABS 3.77 (H) 01/17/2025    MONOSABS 0.46 01/17/2025    EOSABS 0.03 01/17/2025    BASOSABS 0.02 01/17/2025    AUTOIGNUM 0.01 01/17/2025    NRBC 0.0 01/17/2025       Current Medication List  This medication list has been reviewed with the patient and evaluated for any interactions or necessary modifications/recommendations, and updated to include all prescription medications, OTC medications, and supplements the patient is currently taking.  This list reflects what is contained in the patient's profile, which has also been marked as reviewed to communicate to other providers it  is the most up to date version of the patient's current medication therapy.     Current Outpatient Medications:     acetaminophen (TYLENOL) 500 MG tablet, Take 2 tablets by mouth Every 6 (Six) Hours As Needed for Mild Pain. Indications: Pain, Disp: , Rfl:     acyclovir (ZOVIRAX) 400 MG tablet, Take 1 tablet by mouth 2 (Two) Times a Day. Indications: PROPHYLAXIS, Disp: 180 tablet, Rfl: 2    aspirin 81 MG EC tablet, Take 1 tablet by mouth Every Other Day. (Patient taking differently: Take 1 tablet by mouth Daily. Indications: Coronary Bypass Surgery), Disp: 30 tablet, Rfl: 0    azithromycin (ZITHROMAX) 250 MG tablet, Take 2 tablets the first day, then 1 tablet daily for 4 days., Disp: 6 tablet, Rfl: 0    cetirizine (KLS Aller-Lesli) 10 MG tablet, Take 1 tablet by mouth Daily., Disp: , Rfl:     metoprolol succinate XL (TOPROL-XL) 25 MG 24 hr tablet, Take 1 tablet by mouth Daily., Disp: 90 tablet, Rfl: 3    rosuvastatin (CRESTOR) 40 MG tablet, Take 1 tablet by mouth Every Night. Indications: High Amount of Fats in the Blood, Disp: 90 tablet, Rfl: 3    sulfamethoxazole-trimethoprim (BACTRIM DS,SEPTRA DS) 800-160 MG per tablet, TAKE 1 TABLET BY MOUTH THREE TIMES A WEEK., Disp: 12 tablet, Rfl: 7    Zanubrutinib (Brukinsa) 80 MG chemo capsule, Take 2 capsules by mouth 2 (Two) Times a Day., Disp: 120 capsule, Rfl: 5    Medicines reviewed by Jazz Saenz [facility] on 1/17/2025 at 12:57 PM    Drug Interactions  Assessed medication list for interactions,  level c ddi per Up To Date with Brukinsa and ASA- can increase antiplatelet effects- no reports of nosebleeds, bruising, or blood in urine/stool.  Advised patient to call the clinic if any new medications are started so we can assess for drug-drug interactions.  Drug-food interactions discussed: eating grapefruit and drinking grapefruit juice    Adverse Drug Reactions  Medication tolerability: Tolerating with no to minimal ADRs  Medication plan: Continue therapy with normal  follow-up  Plan for ADR Management: none reported today    Adherence, Self-Administration, and Current Therapy Problems  Adherence related to the patient's specialty therapy was discussed with the patient. The Adherence segment of this outreach has been reviewed and updated.     Adherence Questions  Linked Medication(s) Assessed: Zanubrutinib (Brukinsa)  On average, how many doses/injections does the patient miss per month?: 1  What are the identified reasons for non-adherence or missed doses? : no problems identified  What is the estimated medication adherence level?: %  Based on the patient/caregiver response and refill history, does this patient require an MTP to track adherence improvements?: no    Additional Barriers to Patient Self-Administration: none  Methods for Supporting Patient Self-Administration: pharmacy calls  Patient has had no issues obtaining medication from pharmacy.    Open Medication Therapy Problems  No medication therapy recommendations to display    Goals of Therapy  Goals related to the patient's specialty therapy were discussed with the patient. The Patient Goals segment of this outreach has been reviewed and updated.   Goals Addressed Today        Specialty Pharmacy General Goal      Reduction of Bence Dawson proteinuria, noralize WBC and platelets  8/16/24 CBC today-WBC 7.9, hemoglobin 13.7, platelets 91               Quality of Life Assessment   Quality of Life related to the patient's enrollment in the patient management program and services provided was discussed with the patient. The QOL segment of this outreach has been reviewed and updated.  Quality of Life Improvement Scale: 9-A good deal better    Discussed aforementioned material with patient in person, face-to-face, in clinic.     Reassessment Plan & Follow-Up  1. Medication Therapy Changes: none  2. Related Plans, Therapy Recommendations, or Issues to Be Addressed: none  3. Pharmacist to perform regular assessments no  more than (6) months from the previous assessment.   4. Care Coordinator to set up future refill outreaches, coordinate prescription delivery, and escalate clinical questions to pharmacist.    Attestation  Therapeutic appropriateness: Appropriate   I attest the patient was actively involved in and has agreed to the above plan of care.  If the prescribed therapy is at any point deemed not appropriate based on the current or future assessments, a consultation will be initiated with the patient's specialty care provider to determine the best course of action. The revised plan of therapy will be documented along with any required assessments and/or additional patient education provided.     Jackeline Saenz Rph, [facility]  Clinical Specialty Pharmacist, Oncology  1/17/2025  12:59 EST

## 2025-01-20 ENCOUNTER — SPECIALTY PHARMACY (OUTPATIENT)
Dept: PHARMACY | Facility: HOSPITAL | Age: 70
End: 2025-01-20
Payer: COMMERCIAL

## 2025-01-20 NOTE — PROGRESS NOTES
Specialty Pharmacy Note: Brukinsa (zanubrutinib)    Francisco Espino is a 69 y.o. male with CLL was seen 1/17/25 by Dr. To. Per provider dictation, no changes to oral oncology regimen Calquence (acalabrutinib) 100 mg po bid.  Labs Review: The CMP and CBC from 1/17/25 have been reviewed. No dose adjustments are needed for the oral specialty medication(s) based on the labs.    Specialty pharmacy will continue to follow patient.    Jackeline Saenz RPH, BCOP  1/20/2025  09:44 EST

## 2025-02-03 ENCOUNTER — SPECIALTY PHARMACY (OUTPATIENT)
Dept: PHARMACY | Facility: HOSPITAL | Age: 70
End: 2025-02-03
Payer: COMMERCIAL

## 2025-02-03 NOTE — PROGRESS NOTES
Specialty Pharmacy Patient Management Program  Oncology / Hematology Refill Outreach      Francisco was contacted today regarding refills of his medication(s).    Specialty medication(s) and dose(s) confirmed: Brukinsa    Refill Questions      Flowsheet Row Most Recent Value   Changes to allergies? No   Changes to medications? No   New conditions or infections since last clinic visit No   If yes, please describe  N/A   Unplanned office visit, urgent care, ED, or hospital admission in the last 4 weeks  No   How does patient/caregiver feel medication is working? Good   Financial problems or insurance changes  No   Since the previous refill, were any specialty medication doses or scheduled injections missed or delayed?  No   If yes, please provide the amount N/A   Why were doses missed? N/A   Does this patient require a clinical escalation to a pharmacist? No          Delivery Questions      Flowsheet Row Most Recent Value   Delivery method Beeline   Delivery address verified with patient/caregiver? Yes   Delivery address Home  [Shp to the home on 2/10 to arrive 2/11. Address confirmed. $0 co-pay. FILL @ [facility].]   Number of medications in delivery 1   Medication(s) being filled and delivered Zanubrutinib (Brukinsa)   Doses left of specialty medications 10 DAYS WORTH   Copay verified? Yes   Copay amount $0   Copay form of payment No copayment ($0)   Ship Date 2/10/2025   Delivery Date Selection 02/11/25   Signature Required Yes            Follow-Up: 21 d [USUALLY TIME FRAME UNTIL NEXT REFILL OUTREACH FOLLOW-UP (APPROX) Ex. 25d, 85d, etc. ]    Jeannette Bhakta, Pharmacy Technician  2/3/2025  15:43 EST

## 2025-02-17 ENCOUNTER — TELEPHONE (OUTPATIENT)
Dept: ONCOLOGY | Facility: CLINIC | Age: 70
End: 2025-02-17
Payer: COMMERCIAL

## 2025-02-17 ENCOUNTER — SPECIALTY PHARMACY (OUTPATIENT)
Dept: PHARMACY | Facility: HOSPITAL | Age: 70
End: 2025-02-17
Payer: COMMERCIAL

## 2025-02-17 NOTE — TELEPHONE ENCOUNTER
“Please be informed that patient has passed. Patient has been marked  in the system. The date of death is: 24.  PT'S WIFE ASKED TO PLEASE CANCEL ALL MEDICATIONS/RX ESPECIALLY THE MAIL ORDER ONE.    Caller: Evangelina Espino    Relationship: Emergency Contact    Best call back number: [phone]

## (undated) DEVICE — ADAPT CLN BIOGUARD AIR/H2O DISP

## (undated) DEVICE — DRSNG WND GZ PAD BORDERED 4X8IN STRL

## (undated) DEVICE — PK PERFUS CUST W/CARDIOPLEGIA

## (undated) DEVICE — SYR LUERLOK 20CC BX/50

## (undated) DEVICE — THE TORRENT IRRIGATION SCOPE CONNECTOR IS USED WITH THE TORRENT IRRIGATION TUBING TO PROVIDE IRRIGATION FLUIDS SUCH AS STERILE WATER DURING GASTROINTESTINAL ENDOSCOPIC PROCEDURES WHEN USED IN CONJUNCTION WITH AN IRRIGATION PUMP (OR ELECTROSURGICAL UNIT).: Brand: TORRENT

## (undated) DEVICE — THE SINGLE USE ETRAP – POLYP TRAP IS USED FOR SUCTION RETRIEVAL OF ENDOSCOPICALLY REMOVED POLYPS.: Brand: ETRAP

## (undated) DEVICE — Device: Brand: LEVEL 1

## (undated) DEVICE — 2, DISPOSABLE SUCTION/IRRIGATOR WITH DISPOSABLE TIP: Brand: STRYKEFLOW

## (undated) DEVICE — CORONARY ARTERY BYPASS GRAFT MARKERS, STAINLESS STEEL, DISTAL, WITHOUT HOLDER: Brand: ANASTOMARK CORONARY ARTERY BYPASS GRAFT MARKERS, STAINLESS STEEL, DISTAL

## (undated) DEVICE — BLUNT TIP TROCAR: Brand: AUTO SUTURE

## (undated) DEVICE — SUT VIC 0 TN 27IN DYED JTN0G

## (undated) DEVICE — PK SUT OPN HEART POLLOCK CUST

## (undated) DEVICE — TOWEL,OR,DSP,ST,WHITE,DLX,4/PK,20PK/CS: Brand: MEDLINE

## (undated) DEVICE — 12 FOOT DISPOSABLE EXTENSION CABLE WITH SAFE CONNECT / SCREW-DOWN

## (undated) DEVICE — PK HEART OPN 40

## (undated) DEVICE — BLAKE SILICONE DRAINS CARDIO CONNECTOR 2:1: Brand: BLAKE

## (undated) DEVICE — DRSNG WND GEL FIBR OPTICELL AG PLS W/SLV LF 4X5IN  STRL

## (undated) DEVICE — TUBING, SUCTION, 1/4" X 10', STRAIGHT: Brand: MEDLINE

## (undated) DEVICE — SUT SILK 0 CT1 CR8 18IN C021D

## (undated) DEVICE — DRN WND CH RND FUL/FLUT NO/TROC 3/8IN 28F

## (undated) DEVICE — BG TRANSF W/COUPLER SPK 600ML

## (undated) DEVICE — OASIS DRAIN, SINGLE, INLINE & ATS COMPATIBLE: Brand: OASIS

## (undated) DEVICE — Device

## (undated) DEVICE — ADHS SKIN SURG TISS VISC PREMIERPRO EXOFIN HI/VISC FAST/DRY

## (undated) DEVICE — SNAR POLYP SENSATION STDOVL 27 240 BX40

## (undated) DEVICE — OVAL SHAPE BALLOON: Brand: EXTRA VIEW

## (undated) DEVICE — GLV SURG BIOGEL LTX PF 6

## (undated) DEVICE — CANN O2 ETCO2 FITS ALL CONN CO2 SMPL A/ 7IN DISP LF

## (undated) DEVICE — SENSR CERBRL O2 PK/2

## (undated) DEVICE — TROC SYS CANN HERN EZ PRT

## (undated) DEVICE — SUT VIC 5/0 PS2 18IN J495H

## (undated) DEVICE — SOL IRR NACL 0.9PCT BT 1000ML

## (undated) DEVICE — LN SMPL CO2 SHTRM SD STREAM W/M LUER

## (undated) DEVICE — NEEDLE, QUINCKE, 20GX3.5": Brand: MEDLINE

## (undated) DEVICE — SYS VASOVIEW HEMOPRO ENDOSCOPIC HARVST VESL

## (undated) DEVICE — CANN ART SOFTFLOW EXT W/SUT/RNG 7MM

## (undated) DEVICE — GLV SURG BIOGEL M LTX PF 7 1/2

## (undated) DEVICE — GLV SURG BIOGEL LTX PF 6 1/2

## (undated) DEVICE — TBG INSUFFLATION LUER LOCK: Brand: MEDLINE INDUSTRIES, INC.

## (undated) DEVICE — LABEL SHEET CUSTOM 2X2 YELLOW: Brand: MEDLINE INDUSTRIES, INC.

## (undated) DEVICE — BLOWER/MISTER AXIOUS OPCAB W/TBG

## (undated) DEVICE — CVR PROB 96IN LF STRL

## (undated) DEVICE — SYS PERFUS SEP PLATLT W TIPS CUST

## (undated) DEVICE — PK ATS CUST W CARDIOTOMY RESEVOIR

## (undated) DEVICE — GOWN ,SIRUS,NONREINFORCED SMALL: Brand: MEDLINE

## (undated) DEVICE — DRP SLUSH WARMR MACH CIR 44X44IN

## (undated) DEVICE — SUT VIC 0 CT1 CR8 27IN JJ41G

## (undated) DEVICE — HARMONIC SYNERGY DISSECTING HOOK WITH TORQUE WRENCH. FOR USE WITH BLUE HAND PIECE ONLY: Brand: HARMONIC SYNERGY

## (undated) DEVICE — ADHS SKIN DERMABOND TOP ADVANCED

## (undated) DEVICE — LOU OPEN HEART DR POLLOCK: Brand: MEDLINE INDUSTRIES, INC.

## (undated) DEVICE — MEDICINE CUP, GRADUATED, STER: Brand: MEDLINE

## (undated) DEVICE — CLAMP INSERT: Brand: STEALTH® CLAMP INSERT

## (undated) DEVICE — SENSR O2 OXIMAX FNGR A/ 18IN NONSTR

## (undated) DEVICE — SOL IRR H2O BTL 1000ML STRL

## (undated) DEVICE — DECANTER BAG 9": Brand: MEDLINE INDUSTRIES, INC.

## (undated) DEVICE — HEMOCONCENTRATOR PERFUS LPS06

## (undated) DEVICE — BIOPATCH™ ANTIMICROBIAL DRESSING WITH CHLORHEXIDINE GLUCONATE IS A HYDROPHILLIC POLYURETHANE ABSORPTIVE FOAM WITH CHLORHEXIDINE GLUCONATE (CHG) WHICH INHIBITS BACTERIAL GROWTH UNDER THE DRESSING. THE DRESSING IS INTENDED TO BE USED TO ABSORB EXUDATE, COVER A WOUND CAUSED BY VASCULAR AND NONVASCULAR PERCUTANEOUS MEDICAL DEVICES DURING SURGERY, AS WELL AS REDUCE LOCAL INFECTION AND COLONIZATION OF MICROORGANISMS.: Brand: BIOPATCH

## (undated) DEVICE — SOL ISO/ALC 70PCT 4OZ

## (undated) DEVICE — APPL CHLORAPREP W/TINT 26ML ORNG

## (undated) DEVICE — NDL HYPO PRECISIONGLIDE REG 25G 1 1/2

## (undated) DEVICE — DRSNG SURESITE WNDW 2.38X2.75

## (undated) DEVICE — SUT PROLN MO.5 7/0 DBLARM BV175 6 2X30 BX/12

## (undated) DEVICE — ST. SORBAVIEW ULTIMATE IJ SYSTEM A,C: Brand: CENTURION

## (undated) DEVICE — SUT VIC 3/0 SH 27IN J416H

## (undated) DEVICE — OSC GEN LAPAROSCOPY: Brand: MEDLINE INDUSTRIES, INC.

## (undated) DEVICE — KT ORCA ORCAPOD DISP STRL